# Patient Record
Sex: FEMALE | Race: BLACK OR AFRICAN AMERICAN | NOT HISPANIC OR LATINO | URBAN - METROPOLITAN AREA
[De-identification: names, ages, dates, MRNs, and addresses within clinical notes are randomized per-mention and may not be internally consistent; named-entity substitution may affect disease eponyms.]

---

## 2020-11-22 ENCOUNTER — INPATIENT (INPATIENT)
Facility: HOSPITAL | Age: 70
LOS: 9 days | Discharge: HOME HEALTH | End: 2020-12-02
Attending: HOSPITALIST | Admitting: HOSPITALIST
Payer: MEDICARE

## 2020-11-22 VITALS
SYSTOLIC BLOOD PRESSURE: 92 MMHG | DIASTOLIC BLOOD PRESSURE: 49 MMHG | HEART RATE: 89 BPM | TEMPERATURE: 98 F | OXYGEN SATURATION: 98 % | RESPIRATION RATE: 18 BRPM

## 2020-11-22 DIAGNOSIS — Z98.890 OTHER SPECIFIED POSTPROCEDURAL STATES: Chronic | ICD-10-CM

## 2020-11-22 DIAGNOSIS — R62.7 ADULT FAILURE TO THRIVE: ICD-10-CM

## 2020-11-22 DIAGNOSIS — R19.7 DIARRHEA, UNSPECIFIED: ICD-10-CM

## 2020-11-22 DIAGNOSIS — K86.9 DISEASE OF PANCREAS, UNSPECIFIED: ICD-10-CM

## 2020-11-22 DIAGNOSIS — R73.9 HYPERGLYCEMIA, UNSPECIFIED: ICD-10-CM

## 2020-11-22 DIAGNOSIS — Z29.9 ENCOUNTER FOR PROPHYLACTIC MEASURES, UNSPECIFIED: ICD-10-CM

## 2020-11-22 DIAGNOSIS — N28.9 DISORDER OF KIDNEY AND URETER, UNSPECIFIED: ICD-10-CM

## 2020-11-22 DIAGNOSIS — E87.2 ACIDOSIS: ICD-10-CM

## 2020-11-22 DIAGNOSIS — Z02.9 ENCOUNTER FOR ADMINISTRATIVE EXAMINATIONS, UNSPECIFIED: ICD-10-CM

## 2020-11-22 LAB
ALBUMIN SERPL ELPH-MCNC: 3.3 G/DL — SIGNIFICANT CHANGE UP (ref 3.3–5)
ALP SERPL-CCNC: 301 U/L — HIGH (ref 40–120)
ALT FLD-CCNC: 114 U/L — HIGH (ref 4–33)
ANION GAP SERPL CALC-SCNC: 12 MMO/L — SIGNIFICANT CHANGE UP (ref 7–14)
AST SERPL-CCNC: 91 U/L — HIGH (ref 4–32)
BASE EXCESS BLDV CALC-SCNC: 2.7 MMOL/L — SIGNIFICANT CHANGE UP
BASOPHILS # BLD AUTO: 0 K/UL — SIGNIFICANT CHANGE UP (ref 0–0.2)
BASOPHILS NFR BLD AUTO: 0 % — SIGNIFICANT CHANGE UP (ref 0–2)
BILIRUB SERPL-MCNC: 0.3 MG/DL — SIGNIFICANT CHANGE UP (ref 0.2–1.2)
BLOOD GAS VENOUS - CREATININE: 0.61 MG/DL — SIGNIFICANT CHANGE UP (ref 0.5–1.3)
BLOOD GAS VENOUS - FIO2: 21 — SIGNIFICANT CHANGE UP
BUN SERPL-MCNC: 15 MG/DL — SIGNIFICANT CHANGE UP (ref 7–23)
CALCIUM SERPL-MCNC: 9.9 MG/DL — SIGNIFICANT CHANGE UP (ref 8.4–10.5)
CHLORIDE BLDV-SCNC: 103 MMOL/L — SIGNIFICANT CHANGE UP (ref 96–108)
CHLORIDE SERPL-SCNC: 101 MMOL/L — SIGNIFICANT CHANGE UP (ref 98–107)
CO2 SERPL-SCNC: 24 MMOL/L — SIGNIFICANT CHANGE UP (ref 22–31)
CREAT SERPL-MCNC: 0.45 MG/DL — LOW (ref 0.5–1.3)
EOSINOPHIL # BLD AUTO: 0 K/UL — SIGNIFICANT CHANGE UP (ref 0–0.5)
EOSINOPHIL NFR BLD AUTO: 0 % — SIGNIFICANT CHANGE UP (ref 0–6)
GAS PNL BLDV: 138 MMOL/L — SIGNIFICANT CHANGE UP (ref 136–146)
GLUCOSE BLDC GLUCOMTR-MCNC: 182 MG/DL — HIGH (ref 70–99)
GLUCOSE BLDC GLUCOMTR-MCNC: 199 MG/DL — HIGH (ref 70–99)
GLUCOSE BLDC GLUCOMTR-MCNC: 204 MG/DL — HIGH (ref 70–99)
GLUCOSE BLDC GLUCOMTR-MCNC: 251 MG/DL — HIGH (ref 70–99)
GLUCOSE BLDV-MCNC: 647 MG/DL — CRITICAL HIGH (ref 70–99)
GLUCOSE SERPL-MCNC: 674 MG/DL — CRITICAL HIGH (ref 70–99)
HCO3 BLDV-SCNC: 24 MMOL/L — SIGNIFICANT CHANGE UP (ref 20–27)
HCT VFR BLD CALC: 40.2 % — SIGNIFICANT CHANGE UP (ref 34.5–45)
HCT VFR BLDV CALC: 41.9 % — SIGNIFICANT CHANGE UP (ref 34.5–45)
HGB BLD-MCNC: 13.3 G/DL — SIGNIFICANT CHANGE UP (ref 11.5–15.5)
HGB BLDV-MCNC: 13.6 G/DL — SIGNIFICANT CHANGE UP (ref 11.5–15.5)
IMM GRANULOCYTES NFR BLD AUTO: 0.4 % — SIGNIFICANT CHANGE UP (ref 0–1.5)
LACTATE BLDV-MCNC: 3.1 MMOL/L — HIGH (ref 0.5–2)
LYMPHOCYTES # BLD AUTO: 0.77 K/UL — LOW (ref 1–3.3)
LYMPHOCYTES # BLD AUTO: 14.8 % — SIGNIFICANT CHANGE UP (ref 13–44)
MCHC RBC-ENTMCNC: 29.2 PG — SIGNIFICANT CHANGE UP (ref 27–34)
MCHC RBC-ENTMCNC: 33.1 % — SIGNIFICANT CHANGE UP (ref 32–36)
MCV RBC AUTO: 88.4 FL — SIGNIFICANT CHANGE UP (ref 80–100)
MONOCYTES # BLD AUTO: 0.31 K/UL — SIGNIFICANT CHANGE UP (ref 0–0.9)
MONOCYTES NFR BLD AUTO: 6 % — SIGNIFICANT CHANGE UP (ref 2–14)
NEUTROPHILS # BLD AUTO: 4.1 K/UL — SIGNIFICANT CHANGE UP (ref 1.8–7.4)
NEUTROPHILS NFR BLD AUTO: 78.8 % — HIGH (ref 43–77)
NRBC # FLD: 0 K/UL — SIGNIFICANT CHANGE UP (ref 0–0)
PCO2 BLDV: 58 MMHG — HIGH (ref 41–51)
PH BLDV: 7.31 PH — LOW (ref 7.32–7.43)
PLATELET # BLD AUTO: 135 K/UL — LOW (ref 150–400)
PMV BLD: 12.4 FL — SIGNIFICANT CHANGE UP (ref 7–13)
PO2 BLDV: < 24 MMHG — LOW (ref 35–40)
POTASSIUM BLDV-SCNC: 4.7 MMOL/L — HIGH (ref 3.4–4.5)
POTASSIUM SERPL-MCNC: 5.2 MMOL/L — SIGNIFICANT CHANGE UP (ref 3.5–5.3)
POTASSIUM SERPL-SCNC: 5.2 MMOL/L — SIGNIFICANT CHANGE UP (ref 3.5–5.3)
PROT SERPL-MCNC: 6.3 G/DL — SIGNIFICANT CHANGE UP (ref 6–8.3)
RBC # BLD: 4.55 M/UL — SIGNIFICANT CHANGE UP (ref 3.8–5.2)
RBC # FLD: 15.1 % — HIGH (ref 10.3–14.5)
SAO2 % BLDV: 18.3 % — LOW (ref 60–85)
SARS-COV-2 RNA SPEC QL NAA+PROBE: SIGNIFICANT CHANGE UP
SODIUM SERPL-SCNC: 137 MMOL/L — SIGNIFICANT CHANGE UP (ref 135–145)
TSH SERPL-MCNC: 2.48 UIU/ML — SIGNIFICANT CHANGE UP (ref 0.27–4.2)
WBC # BLD: 5.2 K/UL — SIGNIFICANT CHANGE UP (ref 3.8–10.5)
WBC # FLD AUTO: 5.2 K/UL — SIGNIFICANT CHANGE UP (ref 3.8–10.5)

## 2020-11-22 PROCEDURE — 99223 1ST HOSP IP/OBS HIGH 75: CPT | Mod: GC

## 2020-11-22 PROCEDURE — 99285 EMERGENCY DEPT VISIT HI MDM: CPT

## 2020-11-22 PROCEDURE — 71250 CT THORAX DX C-: CPT | Mod: 26

## 2020-11-22 PROCEDURE — 71046 X-RAY EXAM CHEST 2 VIEWS: CPT | Mod: 26

## 2020-11-22 PROCEDURE — 74177 CT ABD & PELVIS W/CONTRAST: CPT | Mod: 26

## 2020-11-22 RX ORDER — DEXTROSE 50 % IN WATER 50 %
15 SYRINGE (ML) INTRAVENOUS ONCE
Refills: 0 | Status: DISCONTINUED | OUTPATIENT
Start: 2020-11-22 | End: 2020-11-22

## 2020-11-22 RX ORDER — GLUCAGON INJECTION, SOLUTION 0.5 MG/.1ML
1 INJECTION, SOLUTION SUBCUTANEOUS ONCE
Refills: 0 | Status: DISCONTINUED | OUTPATIENT
Start: 2020-11-22 | End: 2020-11-25

## 2020-11-22 RX ORDER — SODIUM CHLORIDE 9 MG/ML
1000 INJECTION, SOLUTION INTRAVENOUS
Refills: 0 | Status: DISCONTINUED | OUTPATIENT
Start: 2020-11-22 | End: 2020-11-22

## 2020-11-22 RX ORDER — DEXTROSE 50 % IN WATER 50 %
25 SYRINGE (ML) INTRAVENOUS ONCE
Refills: 0 | Status: DISCONTINUED | OUTPATIENT
Start: 2020-11-22 | End: 2020-11-22

## 2020-11-22 RX ORDER — SODIUM CHLORIDE 9 MG/ML
1000 INJECTION INTRAMUSCULAR; INTRAVENOUS; SUBCUTANEOUS ONCE
Refills: 0 | Status: COMPLETED | OUTPATIENT
Start: 2020-11-22 | End: 2020-11-22

## 2020-11-22 RX ORDER — DEXTROSE 50 % IN WATER 50 %
15 SYRINGE (ML) INTRAVENOUS ONCE
Refills: 0 | Status: DISCONTINUED | OUTPATIENT
Start: 2020-11-22 | End: 2020-11-25

## 2020-11-22 RX ORDER — SODIUM CHLORIDE 9 MG/ML
1000 INJECTION, SOLUTION INTRAVENOUS
Refills: 0 | Status: DISCONTINUED | OUTPATIENT
Start: 2020-11-22 | End: 2020-11-25

## 2020-11-22 RX ORDER — INSULIN LISPRO 100/ML
VIAL (ML) SUBCUTANEOUS
Refills: 0 | Status: DISCONTINUED | OUTPATIENT
Start: 2020-11-22 | End: 2020-11-22

## 2020-11-22 RX ORDER — INSULIN LISPRO 100/ML
VIAL (ML) SUBCUTANEOUS AT BEDTIME
Refills: 0 | Status: DISCONTINUED | OUTPATIENT
Start: 2020-11-22 | End: 2020-11-23

## 2020-11-22 RX ORDER — ENOXAPARIN SODIUM 100 MG/ML
40 INJECTION SUBCUTANEOUS DAILY
Refills: 0 | Status: DISCONTINUED | OUTPATIENT
Start: 2020-11-22 | End: 2020-11-23

## 2020-11-22 RX ORDER — POLYETHYLENE GLYCOL 3350 17 G/17G
17 POWDER, FOR SOLUTION ORAL DAILY
Refills: 0 | Status: DISCONTINUED | OUTPATIENT
Start: 2020-11-22 | End: 2020-11-27

## 2020-11-22 RX ORDER — INSULIN LISPRO 100/ML
VIAL (ML) SUBCUTANEOUS AT BEDTIME
Refills: 0 | Status: DISCONTINUED | OUTPATIENT
Start: 2020-11-22 | End: 2020-11-22

## 2020-11-22 RX ORDER — GLUCAGON INJECTION, SOLUTION 0.5 MG/.1ML
1 INJECTION, SOLUTION SUBCUTANEOUS ONCE
Refills: 0 | Status: DISCONTINUED | OUTPATIENT
Start: 2020-11-22 | End: 2020-11-22

## 2020-11-22 RX ORDER — DEXTROSE 50 % IN WATER 50 %
12.5 SYRINGE (ML) INTRAVENOUS ONCE
Refills: 0 | Status: DISCONTINUED | OUTPATIENT
Start: 2020-11-22 | End: 2020-11-25

## 2020-11-22 RX ORDER — INSULIN LISPRO 100/ML
6 VIAL (ML) SUBCUTANEOUS ONCE
Refills: 0 | Status: COMPLETED | OUTPATIENT
Start: 2020-11-22 | End: 2020-11-22

## 2020-11-22 RX ORDER — DEXTROSE 50 % IN WATER 50 %
25 SYRINGE (ML) INTRAVENOUS ONCE
Refills: 0 | Status: DISCONTINUED | OUTPATIENT
Start: 2020-11-22 | End: 2020-11-25

## 2020-11-22 RX ORDER — DEXTROSE 50 % IN WATER 50 %
12.5 SYRINGE (ML) INTRAVENOUS ONCE
Refills: 0 | Status: DISCONTINUED | OUTPATIENT
Start: 2020-11-22 | End: 2020-11-22

## 2020-11-22 RX ORDER — SENNA PLUS 8.6 MG/1
2 TABLET ORAL AT BEDTIME
Refills: 0 | Status: DISCONTINUED | OUTPATIENT
Start: 2020-11-22 | End: 2020-11-27

## 2020-11-22 RX ORDER — INSULIN LISPRO 100/ML
VIAL (ML) SUBCUTANEOUS
Refills: 0 | Status: DISCONTINUED | OUTPATIENT
Start: 2020-11-22 | End: 2020-11-23

## 2020-11-22 RX ADMIN — SODIUM CHLORIDE 1000 MILLILITER(S): 9 INJECTION INTRAMUSCULAR; INTRAVENOUS; SUBCUTANEOUS at 13:20

## 2020-11-22 RX ADMIN — Medication 1: at 22:37

## 2020-11-22 RX ADMIN — SODIUM CHLORIDE 1000 MILLILITER(S): 9 INJECTION INTRAMUSCULAR; INTRAVENOUS; SUBCUTANEOUS at 12:39

## 2020-11-22 RX ADMIN — Medication 6 UNIT(S): at 13:20

## 2020-11-22 RX ADMIN — Medication 6 UNIT(S): at 14:43

## 2020-11-22 RX ADMIN — SODIUM CHLORIDE 1000 MILLILITER(S): 9 INJECTION INTRAMUSCULAR; INTRAVENOUS; SUBCUTANEOUS at 14:41

## 2020-11-22 RX ADMIN — SODIUM CHLORIDE 1000 MILLILITER(S): 9 INJECTION INTRAMUSCULAR; INTRAVENOUS; SUBCUTANEOUS at 17:45

## 2020-11-22 NOTE — ED ADULT NURSE NOTE - OBJECTIVE STATEMENT
pt bib family d/t  increased weakness, decreased po intake, diarrhea and loss of taste x 2 months. pt appears cachetic. pt aaaox 3, decreased temp noted, warming measures applied. piv placed, labs sent. see emar for tx. denies pain.

## 2020-11-22 NOTE — ED ADULT NURSE NOTE - NS ED PATIENT SAFETY CONCERN
----- Message from Karina Osorio sent at 2/8/2017  3:02 PM CST -----  Contact: [phone]  Please call above patient mother need to speak you about coming in thanks   Unable to assess due to medical condition

## 2020-11-22 NOTE — ED PROVIDER NOTE - PHYSICAL EXAMINATION
PE:   GEN: Awake, alert, interactive, cachetic   HEAD AND NECK: NC/AT. Airway patent. Neck supple.   EYES: Clear b/l. PERRL  CARDIAC: RRR. S1, S2. No evident pedal edema.    RESP: Normal respiratory effort with no use of accessory muscles or retractions. Clear throughout on auscultation.  ABD: soft, non-distended, non-tender. No rebound, no guarding.   NEURO: AOx3, CN II-XII grossly intact, no focal deficits.   MSK: Moving all extremities with no apparent deformities.   SKIN: Warm, dry, intact normal color

## 2020-11-22 NOTE — H&P ADULT - PROBLEM SELECTOR PLAN 3
Frail and cachetic, likely d/t malignancy. Poor appetite.   - Regular diet as tolerated  - Nutrition consult

## 2020-11-22 NOTE — H&P ADULT - PROBLEM SELECTOR PLAN 6
Transitions of Care Status:  1.  Name of PCP: none currently  2.  PCP Contacted on Admission: [ ] Y    [ ] N    3.  PCP contacted at Discharge: [ ] Y    [ ] N    [ ] N/A  4.  Post-Discharge Appointment Date and Location:  5.  Summary of Handoff given to PCP: Indeterminate 1.6cm R upper lobe renal lesion.  - R renal ultrasound

## 2020-11-22 NOTE — ED PROVIDER NOTE - CLINICAL SUMMARY MEDICAL DECISION MAKING FREE TEXT BOX
71yo female hx of smoking presents complaining of unexplained weight loss and intermittent diarrhea for months. Cachetic on exam. Concerning for failure to thrive possibly 2/2 malignancy. Will get labs, ekg, ct abd and admit.

## 2020-11-22 NOTE — H&P ADULT - PROBLEM SELECTOR PLAN 1
Pancreatic lesion in neck and body. Unintentional weight loss, poor appetite, generalized weakness likely secondary to malignancy.  Likely has suppression of both exocrine and endocrine function causing hyperglycemia and diarrhea.  Elevation in alk phos, AST, ALT likely also secondary to obstruction from lesion. Indeterminate lesions in liver and kidney.  - Triple phase CT of abdomen to better characterize pancreatic lesion or MR  - Check CA 19-9, CEA  - CT chest for preliminary staging  - GI consult for EUS FNA Pancreatic lesion in neck and body. Unintentional weight loss, poor appetite, generalized weakness likely secondary to malignancy.  Likely has suppression of both exocrine and endocrine function causing hyperglycemia and diarrhea.  Elevation in alk phos, AST, ALT likely also secondary to obstruction from lesion. Indeterminate lesions in liver and kidney.  - Check CA 19-9, CEA  - CT chest for preliminary staging  - GI consult for EUS FNA and whether triple phase or MR for further evaluating pancreatic lesion Pancreatic lesion in neck and body. Unintentional weight loss, poor appetite, generalized weakness likely secondary to malignancy.  Likely has suppression of both exocrine and endocrine function causing hyperglycemia and diarrhea.  Elevation in alk phos, AST, ALT likely also secondary to obstruction from lesion. Indeterminate lesions in liver and kidney.  - Check CA 19-9  - CT chest for preliminary staging  - GI consult for EUS FNA and whether triple phase or MR for further evaluating pancreatic lesion

## 2020-11-22 NOTE — ED PROVIDER NOTE - OBJECTIVE STATEMENT
69yo female hx of smoking presents complaining of unexplained weight loss and intermittent diarrhea for months. States that since May she has been progressively weak and has lost almost 40 pounds. Reports decreased appetite with intermittent non bloody diarrhea. Denies abd pain, hx of ca, fevers/chills, chest pain, difficulty breathing, rubin, palpitations, urinary symptoms, nausea/vomiting and other associated sx.

## 2020-11-22 NOTE — H&P ADULT - NSHPLABSRESULTS_GEN_ALL_CORE
LABS: Personally reviewed labs, imaging, and ECG                          13.3   5.20  )-----------( 135      ( 22 Nov 2020 12:00 )             40.2       11-22    137  |  101  |  15  ----------------------------<  674<HH>  5.2   |  24  |  0.45<L>    Ca    9.9      22 Nov 2020 12:00    TPro  6.3  /  Alb  3.3  /  TBili  0.3  /  DBili  x   /  AST  91<H>  /  ALT  114<H>  /  AlkPhos  301<H>  11-22       LIVER FUNCTIONS - ( 22 Nov 2020 12:00 )  Alb: 3.3 g/dL / Pro: 6.3 g/dL / ALK PHOS: 301 u/L / ALT: 114 u/L / AST: 91 u/L / GGT: x                            Lactate Trend            CAPILLARY BLOOD GLUCOSE      POCT Blood Glucose.: 182 mg/dL (22 Nov 2020 16:21)            RADIOLOGY & ADDITIONAL TESTS: LABS: Personally reviewed labs, imaging, and ECG                          13.3   5.20  )-----------( 135      ( 22 Nov 2020 12:00 )             40.2       11-22    137  |  101  |  15  ----------------------------<  674<HH>  5.2   |  24  |  0.45<L>    Ca    9.9      22 Nov 2020 12:00    TPro  6.3  /  Alb  3.3  /  TBili  0.3  /  DBili  x   /  AST  91<H>  /  ALT  114<H>  /  AlkPhos  301<H>  11-22       LIVER FUNCTIONS - ( 22 Nov 2020 12:00 )  Alb: 3.3 g/dL / Pro: 6.3 g/dL / ALK PHOS: 301 u/L / ALT: 114 u/L / AST: 91 u/L / GGT: x                Blood Gas Venous Comprehensive (11.22.20 @ 12:00)   Blood Gas Venous - Lactate: 3.1: Please note updated reference range. mmol/L   Blood Gas Venous - Chloride: 103 mmol/L   Blood Gas Venous - Creatinine: 0.61 mg/dL   pH, Venous: 7.31 pH   pCO2, Venous: 58 mmHg   pO2, Venous: < 24 mmHg   HCO3, Venous: 24 mmol/L   Blood Gas Venous - FIO2: 21   Base Excess, Venous: 2.7: REFERENCE RANGE = -3 + 2 mmol/L mmol/L   Oxygen Saturation, Venous: 18.3 %   Blood Gas Venous - Sodium: 138 mmol/L   Blood Gas Venous - Potassium: 4.7 mmol/L   Blood Gas Venous - Glucose: 647 mg/dL   Blood Gas Venous - Hemoglobin: 13.6 g/dL   Blood Gas Venous - Hematocrit: 41.9 %     CAPILLARY BLOOD GLUCOSE  POCT Blood Glucose.: 182 mg/dL (22 Nov 2020 16:21)      RADIOLOGY & ADDITIONAL TESTS:  < from: CT Abdomen and Pelvis w/ IV Cont (11.22.20 @ 13:39) >    IMPRESSION:  Findings suspicious for obstructive neoplasm in the pancreatic neck/body. Suggest further evaluation with dedicated pancreatic CT or MRI.  Two indeterminate liver lesions, likely benign. Further characterization by MRI is advised.  Questionable indeterminate 1.6 cm right upper pole renal lesion. Recommend further evaluation ultrasound.  < end of copied text >    < from: Xray Chest 2 Views PA/Lat (11.22.20 @ 12:44) >  IMPRESSION: Clear lungs.  < end of copied text >

## 2020-11-22 NOTE — H&P ADULT - NSHPSOCIALHISTORY_GEN_ALL_CORE
Lives alone, performs all IADL alone  Formerly a nurse    Tobacco: 1/2 PPD x 20 years  Alcohol: none  Rec drugs: none

## 2020-11-22 NOTE — ED ADULT NURSE REASSESSMENT NOTE - NS ED NURSE REASSESS COMMENT FT1
Patient alert and awake, breathing with ease on room air, administered RX and in CT scan of abd at this time, no new distress noted. Covering RN: Patient alert and awake, breathing with ease on room air, administered Rx as ordered, patient CT scan of abd at this time, no new distress noted.

## 2020-11-22 NOTE — H&P ADULT - ATTENDING COMMENTS
70F w/ ?osteoporosis, multiple tendon repairs who presents with weight loss since May and diarrhea x 2 weeks. Found to have a pancreatic mass on CT A/P concerning for malignancy c/b hyperglycemia to 600s in ED with improvement s/p subq insulin and IVF. On exam abdomen is soft, non tender, non distended.    #Pancreatic mass: pt aware it is concerning for malignancy. CT abdomen showed 1.8 cm hypoattenuating lesion in anterior pancreas and severe duct dilatation measuring up to 1.4 cm. elevated transaminases and alk phos secondary to obstruction. Consult GI/[facility] for further reccs.    #Hyperglycemia: Possibly due to pancreatic insufficiency in setting of malignancy. FS 180s now. Start on ISS. Endo consult, check A1c    #Severe stool burden on CT abdomen/pelvis with overflow diarrhea - start miralax and senna, if no improvement in stool burden consider manual disimpaction. Passing flatus, no obstruction.    # R. kidney upper pole lesion: incidentally seen on CT A/P (1.6 cm right upper pole lesion). Per radiology obtain US to evaluate further.    #Severe protein malnourishment - secondary to malignancy. nutrition consult.    #DVT ppx: lovenox subq

## 2020-11-22 NOTE — H&P ADULT - NSHPREVIEWOFSYSTEMS_GEN_ALL_CORE
General: ++ weight loss, gen weak, poor appetite. No fevers, chills.  HEENT: No headaches, rhinorrhea, sore throat  CVS: No chest pain, palpitations  Resp: No cough, dyspnea, wheezing  GI: + diarrhea. No abdominal pain, nausea, vomiting, hematochezia, melena  : No dysuria, frequency, hematuria  MSK: No joint pain or swelling  Ext: No edema, no claudication  Skin: No rashes or itching  Heme: No easy bruising or petechiae  Neuro: No confusion, focal weakness, or loss of consciousness  Endocrine: No excessive heat or cold symptoms

## 2020-11-22 NOTE — ED PROVIDER NOTE - ATTENDING CONTRIBUTION TO CARE
agree with above hpi  on my exam  GEN - NAD; cachectic; A+O x3   HEAD - NC/AT   EYES- PERRL, EOMI  ENT: Airway patent, dry mm, Oral cavity and pharynx normal. No inflammation, swelling, exudate, or lesions.    NECK: Neck supple, non-tender without lymphadenopathy, no masses.  PULMONARY - CTA b/l, symmetric breath sounds.   CARDIAC -s1s2, RRR, no M,G,R  ABDOMEN - +BS, ND, NT, soft, no guarding, no rebound  BACK - no CVA tenderness, Normal  spine   EXTREMITIES - FROM, symmetric pulses, capillary refill < 2 seconds, no edema   SKIN - no rash or bruising   NEUROLOGIC - alert, speech clear, no focal deficits  PSYCH -nl mood/affect, nl insight.  Patient presents to ed with several months of generalized weakness, intermittent diarrhea, weight loss of approx 40 pounds, diarrhea worse over last few weeks, and loss of taste since may. no fevers, cough, cp, sob, abd pain, vomiting, dysuria, hematuria. +smoking hx. On exam appears cachectic and with dry mm, otherwise nonfocal. Plan for labs, ct a/p-eval for bowel pathology v. malignancy v. elec disturbance v. metabolic dysfunction, hydrate, admit pending ed w/u.

## 2020-11-22 NOTE — ED PROVIDER NOTE - PROGRESS NOTE DETAILS
Scott: Glucose noted on blood gas. pH is 7.31. Waiting for CMP to evaluate AG and K. Will add more fluids for now. Insulin pending labs. Scott: No AG on CMP. Not DKA. Will give insulin push and more fluids. TBA Scott: CT results discussed with patient. Discussed the possibility of malignancy. Pt asked for some time and would ask questions later. Hospitalist paged for admission

## 2020-11-22 NOTE — H&P ADULT - PROBLEM SELECTOR PLAN 5
DVT ppx: Lovenox  Diet: Regular, as tolerated VBG with mild acidosis, mixed respiratory and metabolic. PCO2 elevated. Lactate 3.1. Lactic acidosis likely d/t hypovolemia/dehydration.  - Trend VBG and lactate in AM

## 2020-11-22 NOTE — ED PROVIDER NOTE - NS ED ROS FT
Constitutional: (-) Fever, (-) Anorexia, (-) Generalized Malaise, (+) Weight loss.   Eyes: (-)Discharge, (-) Irritation,  (-) Visual changes  EARS: (-) Ear Pain, (-) Apparent hearing changes  NOSE: (-) Congestion, (-) Bloody nose  MOUTH/THROAT: (-) Vocal Changes, (-) Drooling, (-) Sore throat  NECK: (-) Lumps, (-) Stiffness, (-) Pain  CV: (-) Chest Pain, (-) Palpitations, (-) Edema   RESP:  (-) Cough, (-) SOB, (-) SNOW,  (-) Wheezing  GI: (-) Nausea, (-) Vomiting, (-) Abdominal Pain, (+) Diarrhea, (-) Constipation, (-) Bloody stools  : (-) Dysuria, (-) Frequency, (-) Hematuria, (-) Incontinence  MSK: (-) Joint Pain, (-) Back Pain, (-) Deformities  SKIN: (-) Wounds, (-) Color change, (-)Rash, (-) Swelling  NEURO:(-) Headache, (-) Dizziness, (-) Numbness/Tingling,  (-)LOC

## 2020-11-22 NOTE — ED ADULT TRIAGE NOTE - CHIEF COMPLAINT QUOTE
Pt complaining of diarrhea x 2 weeks loss of taste x 1 month and 30lb weight loss over. Pt also complaining of weakness and fatigue. Pt denies chest pain, sob. fever or chills.

## 2020-11-22 NOTE — H&P ADULT - PROBLEM SELECTOR PLAN 4
Diarrhea likely secondary to suppression of pancreatic exocrine function leading to decreased pancreatic enzymes.  No current sx.  - Monitor electrolytes  - Monitor for recurrence Diarrhea possibly secondary to immense stool burden (as seen on CT) vs. suppression of pancreatic exocrine function leading to decreased pancreatic enzymes.  No current sx.  - Senna and Miralax daily   - Monitor electrolytes  - Monitor for recurrence

## 2020-11-22 NOTE — H&P ADULT - PROBLEM SELECTOR PLAN 2
Hyperglycemia possibly secondary to suppression of pancreatic endocrine function possibly d/t infiltration of lesion.   - s/p 6u lispro x 2. FS now 182.  - FS qAC/HS, low dose SSI  - Endocrinology consult? Hyperglycemia possibly secondary to suppression of pancreatic endocrine function possibly d/t infiltration of lesion.   - s/p 6u lispro x 2. FS now 182.  - FS qAC/HS, low dose SSI  - Endocrinology consult

## 2020-11-22 NOTE — H&P ADULT - NSHPPHYSICALEXAM_GEN_ALL_CORE
Vital Signs Last 24 Hrs  T(C): 36.4 (11-22-20 @ 15:30), Max: 36.4 (11-22-20 @ 11:32)  T(F): 97.5 (11-22-20 @ 15:30), Max: 97.6 (11-22-20 @ 11:32)  HR: 69 (11-22-20 @ 15:30) (60 - 89)  BP: 106/54 (11-22-20 @ 15:30) (92/49 - 129/78)  BP(mean): --  RR: 17 (11-22-20 @ 15:30) (12 - 18)  SpO2: 100% (11-22-20 @ 15:30) (98% - 100%)    Physical Exam:  Gen: Alert, well-developed, NAD  HEENT: NCAT, PERRL, EOMI, clear conjunctiva, no scleral icterus, no erythema or exudates in the oropharynx, mmm  Neck: Supple, no JVD, no LAD  CV: RRR, S1S2, no m/r/g  Resp: CTAB, normal respiratory effort  Abd: Soft, NT, ND, normal bowel sounds  Ext: no edema, no clubbing or cyanosis  Neuro: AOx3, CN2-12 grossly intact, LÓPEZ  Skin: warm, perfused Vital Signs Last 24 Hrs  T(C): 36.4 (11-22-20 @ 15:30), Max: 36.4 (11-22-20 @ 11:32)  T(F): 97.5 (11-22-20 @ 15:30), Max: 97.6 (11-22-20 @ 11:32)  HR: 69 (11-22-20 @ 15:30) (60 - 89)  BP: 106/54 (11-22-20 @ 15:30) (92/49 - 129/78)  BP(mean): --  RR: 17 (11-22-20 @ 15:30) (12 - 18)  SpO2: 100% (11-22-20 @ 15:30) (98% - 100%)    Physical Exam:  Gen: Alert, very thin, cachetic black female, in NAD  HEENT: NCAT, PERRL, EOMI, clear conjunctiva, no erythema or exudates in the oropharynx, mucous mem dry  Neck: Supple, no LAD  CV: RRR, S1S2, no m/r/g  Resp: CTAB, normal respiratory effort  Abd: Soft, NT, ND, normal bowel sounds  Ext: trace edema up to ankles, no clubbing or cyanosis  Neuro: AOx3, CN2-12 grossly intact, LÓPEZ  Skin: no rashes, no ecchymoses

## 2020-11-22 NOTE — H&P ADULT - PROBLEM SELECTOR PLAN 8
Transitions of Care Status:  1.  Name of PCP: none currently  2.  PCP Contacted on Admission: [ ] Y    [ ] N    3.  PCP contacted at Discharge: [ ] Y    [ ] N    [ ] N/A  4.  Post-Discharge Appointment Date and Location:  5.  Summary of Handoff given to PCP:

## 2020-11-22 NOTE — H&P ADULT - ASSESSMENT
70F w/ current smoker, ?osteoporosis who presents with unintentional weight loss and diarrhea, found to have pancreatic lesion, likely secondary to malignancy.  Diarrhea and hyperglycemia likely explained by suppression of pancreatic function.

## 2020-11-23 DIAGNOSIS — R91.8 OTHER NONSPECIFIC ABNORMAL FINDING OF LUNG FIELD: ICD-10-CM

## 2020-11-23 LAB
ALBUMIN SERPL ELPH-MCNC: 2.7 G/DL — LOW (ref 3.3–5)
ALP SERPL-CCNC: 229 U/L — HIGH (ref 40–120)
ALT FLD-CCNC: 86 U/L — HIGH (ref 4–33)
ANION GAP SERPL CALC-SCNC: 8 MMO/L — SIGNIFICANT CHANGE UP (ref 7–14)
AST SERPL-CCNC: 54 U/L — HIGH (ref 4–32)
BASE EXCESS BLDV CALC-SCNC: 2.2 MMOL/L — SIGNIFICANT CHANGE UP
BASOPHILS # BLD AUTO: 0.01 K/UL — SIGNIFICANT CHANGE UP (ref 0–0.2)
BASOPHILS NFR BLD AUTO: 0.2 % — SIGNIFICANT CHANGE UP (ref 0–2)
BILIRUB SERPL-MCNC: 0.2 MG/DL — SIGNIFICANT CHANGE UP (ref 0.2–1.2)
BLOOD GAS VENOUS - CREATININE: 0.52 MG/DL — SIGNIFICANT CHANGE UP (ref 0.5–1.3)
BLOOD GAS VENOUS - FIO2: 21 — SIGNIFICANT CHANGE UP
BUN SERPL-MCNC: 17 MG/DL — SIGNIFICANT CHANGE UP (ref 7–23)
CALCIUM SERPL-MCNC: 8.8 MG/DL — SIGNIFICANT CHANGE UP (ref 8.4–10.5)
CANCER AG19-9 SERPL-ACNC: 2 U/ML — SIGNIFICANT CHANGE UP
CHLORIDE BLDV-SCNC: 107 MMOL/L — SIGNIFICANT CHANGE UP (ref 96–108)
CHLORIDE SERPL-SCNC: 103 MMOL/L — SIGNIFICANT CHANGE UP (ref 98–107)
CO2 SERPL-SCNC: 25 MMOL/L — SIGNIFICANT CHANGE UP (ref 22–31)
CREAT SERPL-MCNC: 0.47 MG/DL — LOW (ref 0.5–1.3)
EOSINOPHIL # BLD AUTO: 0 K/UL — SIGNIFICANT CHANGE UP (ref 0–0.5)
EOSINOPHIL NFR BLD AUTO: 0 % — SIGNIFICANT CHANGE UP (ref 0–6)
GAS PNL BLDV: 138 MMOL/L — SIGNIFICANT CHANGE UP (ref 136–146)
GLUCOSE BLDC GLUCOMTR-MCNC: 143 MG/DL — HIGH (ref 70–99)
GLUCOSE BLDC GLUCOMTR-MCNC: 302 MG/DL — HIGH (ref 70–99)
GLUCOSE BLDC GLUCOMTR-MCNC: 334 MG/DL — HIGH (ref 70–99)
GLUCOSE BLDC GLUCOMTR-MCNC: 347 MG/DL — HIGH (ref 70–99)
GLUCOSE BLDV-MCNC: 321 MG/DL — HIGH (ref 70–99)
GLUCOSE SERPL-MCNC: 338 MG/DL — HIGH (ref 70–99)
HBA1C BLD-MCNC: > 18 % — HIGH (ref 4–5.6)
HCO3 BLDV-SCNC: 26 MMOL/L — SIGNIFICANT CHANGE UP (ref 20–27)
HCT VFR BLD CALC: 36.1 % — SIGNIFICANT CHANGE UP (ref 34.5–45)
HCT VFR BLDV CALC: 38 % — SIGNIFICANT CHANGE UP (ref 34.5–45)
HCV AB S/CO SERPL IA: 0.07 S/CO — SIGNIFICANT CHANGE UP (ref 0–0.99)
HCV AB SERPL-IMP: SIGNIFICANT CHANGE UP
HGB BLD-MCNC: 11.9 G/DL — SIGNIFICANT CHANGE UP (ref 11.5–15.5)
HGB BLDV-MCNC: 12.4 G/DL — SIGNIFICANT CHANGE UP (ref 11.5–15.5)
IMM GRANULOCYTES NFR BLD AUTO: 0.2 % — SIGNIFICANT CHANGE UP (ref 0–1.5)
LACTATE BLDV-MCNC: 1.4 MMOL/L — SIGNIFICANT CHANGE UP (ref 0.5–2)
LYMPHOCYTES # BLD AUTO: 1.77 K/UL — SIGNIFICANT CHANGE UP (ref 1–3.3)
LYMPHOCYTES # BLD AUTO: 36.9 % — SIGNIFICANT CHANGE UP (ref 13–44)
MAGNESIUM SERPL-MCNC: 1.9 MG/DL — SIGNIFICANT CHANGE UP (ref 1.6–2.6)
MCHC RBC-ENTMCNC: 28.8 PG — SIGNIFICANT CHANGE UP (ref 27–34)
MCHC RBC-ENTMCNC: 33 % — SIGNIFICANT CHANGE UP (ref 32–36)
MCV RBC AUTO: 87.4 FL — SIGNIFICANT CHANGE UP (ref 80–100)
MONOCYTES # BLD AUTO: 0.36 K/UL — SIGNIFICANT CHANGE UP (ref 0–0.9)
MONOCYTES NFR BLD AUTO: 7.5 % — SIGNIFICANT CHANGE UP (ref 2–14)
NEUTROPHILS # BLD AUTO: 2.65 K/UL — SIGNIFICANT CHANGE UP (ref 1.8–7.4)
NEUTROPHILS NFR BLD AUTO: 55.2 % — SIGNIFICANT CHANGE UP (ref 43–77)
NRBC # FLD: 0 K/UL — SIGNIFICANT CHANGE UP (ref 0–0)
PCO2 BLDV: 43 MMHG — SIGNIFICANT CHANGE UP (ref 41–51)
PH BLDV: 7.41 PH — SIGNIFICANT CHANGE UP (ref 7.32–7.43)
PHOSPHATE SERPL-MCNC: 1.3 MG/DL — LOW (ref 2.5–4.5)
PLATELET # BLD AUTO: 116 K/UL — LOW (ref 150–400)
PMV BLD: 12.6 FL — SIGNIFICANT CHANGE UP (ref 7–13)
PO2 BLDV: 58 MMHG — HIGH (ref 35–40)
POTASSIUM BLDV-SCNC: 3.4 MMOL/L — SIGNIFICANT CHANGE UP (ref 3.4–4.5)
POTASSIUM SERPL-MCNC: 3.6 MMOL/L — SIGNIFICANT CHANGE UP (ref 3.5–5.3)
POTASSIUM SERPL-SCNC: 3.6 MMOL/L — SIGNIFICANT CHANGE UP (ref 3.5–5.3)
PROT SERPL-MCNC: 5.4 G/DL — LOW (ref 6–8.3)
RBC # BLD: 4.13 M/UL — SIGNIFICANT CHANGE UP (ref 3.8–5.2)
RBC # FLD: 15.2 % — HIGH (ref 10.3–14.5)
SAO2 % BLDV: 91 % — HIGH (ref 60–85)
SODIUM SERPL-SCNC: 136 MMOL/L — SIGNIFICANT CHANGE UP (ref 135–145)
WBC # BLD: 4.8 K/UL — SIGNIFICANT CHANGE UP (ref 3.8–10.5)
WBC # FLD AUTO: 4.8 K/UL — SIGNIFICANT CHANGE UP (ref 3.8–10.5)

## 2020-11-23 PROCEDURE — 99222 1ST HOSP IP/OBS MODERATE 55: CPT

## 2020-11-23 PROCEDURE — 76775 US EXAM ABDO BACK WALL LIM: CPT | Mod: 26

## 2020-11-23 PROCEDURE — 76770 US EXAM ABDO BACK WALL COMP: CPT | Mod: 26

## 2020-11-23 PROCEDURE — 99233 SBSQ HOSP IP/OBS HIGH 50: CPT

## 2020-11-23 RX ORDER — SODIUM,POTASSIUM PHOSPHATES 278-250MG
1 POWDER IN PACKET (EA) ORAL
Refills: 0 | Status: COMPLETED | OUTPATIENT
Start: 2020-11-23 | End: 2020-11-23

## 2020-11-23 RX ORDER — INSULIN LISPRO 100/ML
VIAL (ML) SUBCUTANEOUS
Refills: 0 | Status: DISCONTINUED | OUTPATIENT
Start: 2020-11-23 | End: 2020-11-24

## 2020-11-23 RX ORDER — INSULIN LISPRO 100/ML
2 VIAL (ML) SUBCUTANEOUS
Refills: 0 | Status: DISCONTINUED | OUTPATIENT
Start: 2020-11-23 | End: 2020-11-24

## 2020-11-23 RX ORDER — INSULIN LISPRO 100/ML
VIAL (ML) SUBCUTANEOUS AT BEDTIME
Refills: 0 | Status: DISCONTINUED | OUTPATIENT
Start: 2020-11-23 | End: 2020-11-24

## 2020-11-23 RX ADMIN — Medication 8: at 12:28

## 2020-11-23 RX ADMIN — Medication 2 UNIT(S): at 12:28

## 2020-11-23 RX ADMIN — Medication 4: at 08:34

## 2020-11-23 RX ADMIN — Medication 4: at 22:35

## 2020-11-23 RX ADMIN — Medication 1 PACKET(S): at 17:48

## 2020-11-23 RX ADMIN — Medication 1 PACKET(S): at 12:28

## 2020-11-23 RX ADMIN — ENOXAPARIN SODIUM 40 MILLIGRAM(S): 100 INJECTION SUBCUTANEOUS at 06:01

## 2020-11-23 RX ADMIN — Medication 2 UNIT(S): at 17:48

## 2020-11-23 RX ADMIN — POLYETHYLENE GLYCOL 3350 17 GRAM(S): 17 POWDER, FOR SOLUTION ORAL at 12:33

## 2020-11-23 NOTE — PROGRESS NOTE ADULT - PROBLEM SELECTOR PLAN 5
VBG with mild acidosis, mixed respiratory and metabolic. PCO2 elevated. Lactate 3.1. Lactic acidosis likely d/t hypovolemia/dehydration.  - Trend VBG and lactate in AM Now resolved. No acidosis and lactate wnl.

## 2020-11-23 NOTE — PROGRESS NOTE ADULT - PROBLEM SELECTOR PLAN 2
Hyperglycemia possibly secondary to suppression of pancreatic endocrine function possibly d/t infiltration of lesion.   - s/p 6u lispro x 2. FS now 182.  - FS qAC/HS, low dose SSI  - Endocrinology consult Opacity noted on RLL, possibly atelectasis vs. primary lung lesion vs. metastatic. 4 small nodules also noted.

## 2020-11-23 NOTE — PROGRESS NOTE ADULT - PROBLEM SELECTOR PLAN 3
Frail and cachetic, likely d/t malignancy. Poor appetite.   - Regular diet as tolerated  - Nutrition consult Hyperglycemia possibly secondary to suppression of pancreatic endocrine function possibly d/t infiltration of lesion.   - s/p 6u lispro x 2. FS now 182.  - FS qAC/HS, low dose SSI  - Lispro 2u AC TID  - Will wait to start Lantus until tmr

## 2020-11-23 NOTE — PROGRESS NOTE ADULT - PROBLEM SELECTOR PLAN 7
DVT ppx: Lovenox  Diet: Regular, as tolerated DVT ppx: Holding lovenox for EUS FNA  Diet: Regular, as tolerated

## 2020-11-23 NOTE — CONSULT NOTE ADULT - SUBJECTIVE AND OBJECTIVE BOX
Chief Complaint:  Patient is a 70y old  Female who presents with a chief complaint of Weight loss, Diarrhea (23 Nov 2020 07:04)      HPI:  70 year F with history of osteoporosis, multiple tendon repairs presents with 30lbs weight loss since May, poor appetite and progressive weakness. GI is consulted for abnormal CT as shown: parenchymal atrophy of pancreatic body and tail w severe duct dilatation measuring up to 1.4cm, abrupt transition to normal duct caliber at pancreatic neck, 1.8cm hypoattenuating lesion within anterior pancreas. Pt currently denies nausea, vomiting, abd pain, fever, chills, CP, palpitations, melena, hematochezia. Family hx of cancer includes sister who had liver cancer. Pt reports that she is a current smoker, 1/2 PPD x 20 years.      Allergies:  No Known Allergies      Home Medications:    Hospital Medications:  dextrose 40% Gel 15 Gram(s) Oral once  dextrose 5%. 1000 milliLiter(s) IV Continuous <Continuous>  dextrose 5%. 1000 milliLiter(s) IV Continuous <Continuous>  dextrose 50% Injectable 25 Gram(s) IV Push once  dextrose 50% Injectable 12.5 Gram(s) IV Push once  dextrose 50% Injectable 25 Gram(s) IV Push once  enoxaparin Injectable 40 milliGRAM(s) SubCutaneous daily  glucagon  Injectable 1 milliGRAM(s) IntraMuscular once  insulin lispro (ADMELOG) corrective regimen sliding scale   SubCutaneous three times a day before meals  insulin lispro (ADMELOG) corrective regimen sliding scale   SubCutaneous at bedtime  polyethylene glycol 3350 17 Gram(s) Oral daily  potassium phosphate / sodium phosphate Powder (PHOS-NaK) 1 Packet(s) Oral three times a day with meals  senna 2 Tablet(s) Oral at bedtime      PMHX/PSHX:  S/P tendon repair        Family history:  Family history of liver cancer        There is no family history of peptic ulcer disease, gastric cancer, colon polyps, colon cancer, celiac disease, biliary, hepatic, or pancreatic disease.  None of the female relatives have breast, uterine, or ovarian cancer.     Social History:     ROS:     General:  No wt loss, fevers, chills, night sweats, fatigue,   Eyes:  Good vision, no reported pain  ENT:  No sore throat, pain, runny nose, dysphagia  CV:  No pain, palpitations, hypo/hypertension  Resp:  No dyspnea, cough, tachypnea, wheezing  GI:  see HPI   :  No pain, bleeding, incontinence, nocturia  Muscle:  No pain, weakness  Neuro:  No weakness, tingling, memory problems  Psych:  No fatigue, insomnia, mood problems, depression  Endocrine:  No polyuria, polydipsia, cold/heat intolerance  Heme:  No petechiae, ecchymosis, easy bruisability  Skin:  No rash, tattoos, scars, edema      PHYSICAL EXAM:     GENERAL:  Appears stated age, well-groomed  HEENT:  NC/AT,  conjunctivae clear and pink, no thyromegaly  CHEST:  Full & symmetric excursion, no increased effort, breath sounds clear  HEART:  Regular rhythm, S1, S2  ABDOMEN:  Soft, non-tender, non-distended, normoactive bowel sounds  EXTEREMITIES:  no cyanosis,clubbing or edema  SKIN:  No rash/erythema/ecchymoses  NEURO:  Alert, oriented, no asterixis    Vital Signs:  Vital Signs Last 24 Hrs  T(C): 37.3 (23 Nov 2020 05:59), Max: 37.3 (23 Nov 2020 05:59)  T(F): 99.1 (23 Nov 2020 05:59), Max: 99.1 (23 Nov 2020 05:59)  HR: 100 (23 Nov 2020 05:59) (60 - 100)  BP: 103/51 (23 Nov 2020 05:59) (92/49 - 129/78)  BP(mean): --  RR: 17 (23 Nov 2020 05:59) (12 - 18)  SpO2: 97% (23 Nov 2020 05:59) (96% - 100%)  Daily Height in cm: 160.02 (22 Nov 2020 20:56)    Daily     LABS:                        11.9   4.80  )-----------( 116      ( 23 Nov 2020 05:45 )             36.1     Mean Cell Volume: 87.4 fL (11-23-20 @ 05:45)    11-23    136  |  103  |  17  ----------------------------<  338<H>  3.6   |  25  |  0.47<L>    Ca    8.8      23 Nov 2020 05:45  Phos  1.3     11-23  Mg     1.9     11-23    TPro  5.4<L>  /  Alb  2.7<L>  /  TBili  0.2  /  DBili  x   /  AST  54<H>  /  ALT  86<H>  /  AlkPhos  229<H>  11-23    LIVER FUNCTIONS - ( 23 Nov 2020 05:45 )  Alb: 2.7 g/dL / Pro: 5.4 g/dL / ALK PHOS: 229 u/L / ALT: 86 u/L / AST: 54 u/L / GGT: x                                       11.9   4.80  )-----------( 116      ( 23 Nov 2020 05:45 )             36.1                         13.3   5.20  )-----------( 135      ( 22 Nov 2020 12:00 )             40.2     Imaging:    < from: CT Abdomen and Pelvis w/ IV Cont (11.22.20 @ 13:39) >  FINDINGS:  LOWER CHEST: Within normal limits.    LIVER: A triangular region of hypoattenuation at the falciform ligament possibly representing a hepatic pseudolesion.  3 cm lesion of mixed attenuation in segment 5, possible hemangioma.  BILE DUCTS: Normal caliber.  GALLBLADDER: Small gallstone.  SPLEEN: Within normal limits.  PANCREAS: Parenchymal atrophy of the pancreatic body and tail with severe duct dilatation measuring up to 1.4 cm. Abrupt transition to normal duct caliber at the pancreatic neck. 1.8 cm hypoattenuating lesion seen within anterior pancreas at this level (2, 30).  ADRENALS: Within normal limits.  KIDNEYS/URETERS: Left renal cysts. Questionable indeterminate 1.6 cm right upper pole lesion (2, 25).    BLADDER: Within normal limits.  REPRODUCTIVE ORGANS: Uterine fibroids.    BOWEL: Severe stool burden. No bowel obstruction. Appendix is not visualized. No evidence of inflammation in the pericecal region.  PERITONEUM: No ascites.  VESSELS: Atherosclerotic changes.  RETROPERITONEUM/LYMPH NODES: No lymphadenopathy.  ABDOMINAL WALL: Cachexia.  BONES: Within normal limits.    IMPRESSION:  Findings suspicious for obstructive neoplasm in the pancreatic neck/body. Suggest further evaluation with dedicated pancreatic CT or MRI.    Two indeterminate liver lesions, likely benign. Further characterization by MRI is advised.    Questionable indeterminate 1.6 cm right upper pole lesion. Recommend further evaluation ultrasound.      < end of copied text >           Chief Complaint:  Patient is a 70y old  Female who presents with a chief complaint of Weight loss, Diarrhea (23 Nov 2020 07:04)      HPI:  70 year F with history of osteoporosis, multiple tendon repairs presents with 30lbs weight loss since May, poor appetite and progressive weakness. GI is consulted for abnormal CT as shown: parenchymal atrophy of pancreatic body and tail w severe duct dilatation measuring up to 1.4cm, abrupt transition to normal duct caliber at pancreatic neck, 1.8cm hypoattenuating lesion within anterior pancreas. Pt currently denies nausea, vomiting, abd pain, fever, chills, CP, palpitations, melena, hematochezia. Family hx of cancer includes sister who had liver cancer. Pt reports that she is a current smoker, 1/2 PPD x 20 years.      Allergies:  No Known Allergies      Home Medications:    Hospital Medications:  dextrose 40% Gel 15 Gram(s) Oral once  dextrose 5%. 1000 milliLiter(s) IV Continuous <Continuous>  dextrose 5%. 1000 milliLiter(s) IV Continuous <Continuous>  dextrose 50% Injectable 25 Gram(s) IV Push once  dextrose 50% Injectable 12.5 Gram(s) IV Push once  dextrose 50% Injectable 25 Gram(s) IV Push once  enoxaparin Injectable 40 milliGRAM(s) SubCutaneous daily  glucagon  Injectable 1 milliGRAM(s) IntraMuscular once  insulin lispro (ADMELOG) corrective regimen sliding scale   SubCutaneous three times a day before meals  insulin lispro (ADMELOG) corrective regimen sliding scale   SubCutaneous at bedtime  polyethylene glycol 3350 17 Gram(s) Oral daily  potassium phosphate / sodium phosphate Powder (PHOS-NaK) 1 Packet(s) Oral three times a day with meals  senna 2 Tablet(s) Oral at bedtime      PMHX/PSHX:  S/P tendon repair        Family history:  Family history of liver cancer        There is no family history of peptic ulcer disease, gastric cancer, colon polyps, colon cancer, celiac disease, biliary, hepatic, or pancreatic disease.  None of the female relatives have breast, uterine, or ovarian cancer.     Social History: No EtOH or tobacco    ROS:     General:  No wt loss, fevers, chills, night sweats, fatigue,   Eyes:  Good vision, no reported pain  ENT:  No sore throat, pain, runny nose, dysphagia  CV:  No pain, palpitations, hypo/hypertension  Resp:  No dyspnea, cough, tachypnea, wheezing  GI:  see HPI   :  No pain, bleeding, incontinence, nocturia  Muscle:  No pain, weakness  Neuro:  No weakness, tingling, memory problems  Psych:  No fatigue, insomnia, mood problems, depression  Endocrine:  No polyuria, polydipsia, cold/heat intolerance  Heme:  No petechiae, ecchymosis, easy bruisability  Skin:  No rash, tattoos, scars, edema      PHYSICAL EXAM:     GENERAL:  Appears stated age, well-groomed  HEENT:  NC/AT,  conjunctivae clear and pink, no thyromegaly  CHEST:  Full & symmetric excursion, no increased effort, breath sounds clear  HEART:  Regular rhythm, S1, S2  ABDOMEN:  Soft, non-tender, non-distended, normoactive bowel sounds  EXTEREMITIES:  no cyanosis,clubbing or edema  SKIN:  No rash/erythema/ecchymoses  NEURO:  Alert, oriented, no asterixis    Vital Signs:  Vital Signs Last 24 Hrs  T(C): 37.3 (23 Nov 2020 05:59), Max: 37.3 (23 Nov 2020 05:59)  T(F): 99.1 (23 Nov 2020 05:59), Max: 99.1 (23 Nov 2020 05:59)  HR: 100 (23 Nov 2020 05:59) (60 - 100)  BP: 103/51 (23 Nov 2020 05:59) (92/49 - 129/78)  BP(mean): --  RR: 17 (23 Nov 2020 05:59) (12 - 18)  SpO2: 97% (23 Nov 2020 05:59) (96% - 100%)  Daily Height in cm: 160.02 (22 Nov 2020 20:56)    Daily     LABS:                        11.9   4.80  )-----------( 116      ( 23 Nov 2020 05:45 )             36.1     Mean Cell Volume: 87.4 fL (11-23-20 @ 05:45)    11-23    136  |  103  |  17  ----------------------------<  338<H>  3.6   |  25  |  0.47<L>    Ca    8.8      23 Nov 2020 05:45  Phos  1.3     11-23  Mg     1.9     11-23    TPro  5.4<L>  /  Alb  2.7<L>  /  TBili  0.2  /  DBili  x   /  AST  54<H>  /  ALT  86<H>  /  AlkPhos  229<H>  11-23    LIVER FUNCTIONS - ( 23 Nov 2020 05:45 )  Alb: 2.7 g/dL / Pro: 5.4 g/dL / ALK PHOS: 229 u/L / ALT: 86 u/L / AST: 54 u/L / GGT: x                                       11.9   4.80  )-----------( 116      ( 23 Nov 2020 05:45 )             36.1                         13.3   5.20  )-----------( 135      ( 22 Nov 2020 12:00 )             40.2     Imaging:    < from: CT Abdomen and Pelvis w/ IV Cont (11.22.20 @ 13:39) >  FINDINGS:  LOWER CHEST: Within normal limits.    LIVER: A triangular region of hypoattenuation at the falciform ligament possibly representing a hepatic pseudolesion.  3 cm lesion of mixed attenuation in segment 5, possible hemangioma.  BILE DUCTS: Normal caliber.  GALLBLADDER: Small gallstone.  SPLEEN: Within normal limits.  PANCREAS: Parenchymal atrophy of the pancreatic body and tail with severe duct dilatation measuring up to 1.4 cm. Abrupt transition to normal duct caliber at the pancreatic neck. 1.8 cm hypoattenuating lesion seen within anterior pancreas at this level (2, 30).  ADRENALS: Within normal limits.  KIDNEYS/URETERS: Left renal cysts. Questionable indeterminate 1.6 cm right upper pole lesion (2, 25).    BLADDER: Within normal limits.  REPRODUCTIVE ORGANS: Uterine fibroids.    BOWEL: Severe stool burden. No bowel obstruction. Appendix is not visualized. No evidence of inflammation in the pericecal region.  PERITONEUM: No ascites.  VESSELS: Atherosclerotic changes.  RETROPERITONEUM/LYMPH NODES: No lymphadenopathy.  ABDOMINAL WALL: Cachexia.  BONES: Within normal limits.    IMPRESSION:  Findings suspicious for obstructive neoplasm in the pancreatic neck/body. Suggest further evaluation with dedicated pancreatic CT or MRI.    Two indeterminate liver lesions, likely benign. Further characterization by MRI is advised.    Questionable indeterminate 1.6 cm right upper pole lesion. Recommend further evaluation ultrasound.      < end of copied text >

## 2020-11-23 NOTE — PROGRESS NOTE ADULT - ATTENDING COMMENTS
appreciate GI recs - plan for MRI and EUS for biopsy - concern for malignancy.  Pt with no medical care in the last ~ 4 years, likely with DM2 - start premeal insulin for now, NPO tonight so will hold lantus for now and likely start tomorrow evening.  will need DM education and dietician.

## 2020-11-23 NOTE — PROGRESS NOTE ADULT - SUBJECTIVE AND OBJECTIVE BOX
Ace Mendozaon, PGY1  Pager [phone]    INCOMPLETE NOTE - IN PROGRESS    Patient is a 70y old  Female who presents with a chief complaint of Weight loss, Diarrhea (22 Nov 2020 16:10)      SUBJECTIVE/INTERVAL EVENTS: Patient seen and examined at bedside.    MEDICATIONS  (STANDING):  dextrose 40% Gel 15 Gram(s) Oral once  dextrose 5%. 1000 milliLiter(s) (50 mL/Hr) IV Continuous <Continuous>  dextrose 5%. 1000 milliLiter(s) (100 mL/Hr) IV Continuous <Continuous>  dextrose 50% Injectable 25 Gram(s) IV Push once  dextrose 50% Injectable 12.5 Gram(s) IV Push once  dextrose 50% Injectable 25 Gram(s) IV Push once  enoxaparin Injectable 40 milliGRAM(s) SubCutaneous daily  glucagon  Injectable 1 milliGRAM(s) IntraMuscular once  insulin lispro (ADMELOG) corrective regimen sliding scale   SubCutaneous three times a day before meals  insulin lispro (ADMELOG) corrective regimen sliding scale   SubCutaneous at bedtime  polyethylene glycol 3350 17 Gram(s) Oral daily  senna 2 Tablet(s) Oral at bedtime    MEDICATIONS  (PRN):      VITAL SIGNS:  T(F): 99.1 (11-23-20 @ 05:59), Max: 99.1 (11-23-20 @ 05:59)  HR: 100 (11-23-20 @ 05:59) (60 - 100)  BP: 103/51 (11-23-20 @ 05:59) (92/49 - 129/78)  RR: 17 (11-23-20 @ 05:59) (12 - 18)  SpO2: 97% (11-23-20 @ 05:59) (96% - 100%)    I&O's Summary    22 Nov 2020 07:01  -  23 Nov 2020 07:00  --------------------------------------------------------  IN: 100 mL / OUT: 0 mL / NET: 100 mL      Daily Height in cm: 160.02 (22 Nov 2020 20:56)    Daily     PHYSICAL EXAM:  Gen: Alert, NAD  HEENT: NCAT, conjunctiva clear, sclera anicteric, no erythema or exudates in the oropharynx, mmm  Neck: Supple, no JVD  CV: RRR, S1S2, no m/r/g  Resp: CTAB, normal respiratory effort  Abd: Soft, nontender, nondistended, normal bowel sounds  Ext: no edema, no clubbing or cyanosis  Neuro: AOx3, CN2-12 grossly intact, LÓPEZ  SKIN: warm, perfused    LABS:                        13.3   5.20  )-----------( 135      ( 22 Nov 2020 12:00 )             40.2     Hgb Trend: 13.3<--  11-22    137  |  101  |  15  ----------------------------<  674<HH>  5.2   |  24  |  0.45<L>    Ca    9.9      22 Nov 2020 12:00    TPro  6.3  /  Alb  3.3  /  TBili  0.3  /  DBili  x   /  AST  91<H>  /  ALT  114<H>  /  AlkPhos  301<H>  11-22    Creatinine Trend: 0.45<--  LIVER FUNCTIONS - ( 22 Nov 2020 12:00 )  Alb: 3.3 g/dL / Pro: 6.3 g/dL / ALK PHOS: 301 u/L / ALT: 114 u/L / AST: 91 u/L / GGT: x                     CAPILLARY BLOOD GLUCOSE      POCT Blood Glucose.: 251 mg/dL (22 Nov 2020 22:29)  POCT Blood Glucose.: 204 mg/dL (22 Nov 2020 21:10)  POCT Blood Glucose.: 199 mg/dL (22 Nov 2020 20:38)  POCT Blood Glucose.: 182 mg/dL (22 Nov 2020 16:21)  POCT Blood Glucose.: 537 mg/dL (22 Nov 2020 13:42)      RADIOLOGY & ADDITIONAL TESTS: Reviewed    Imaging Personally Reviewed:    Consultant(s) Notes Reviewed:      Care Discussed with Consultants/Other Providers:   Ace Whelan Mariela, PGY1  Pager [phone]      Patient is a 70y old  Female who presents with a chief complaint of Weight loss, Diarrhea (22 Nov 2020 16:10)      SUBJECTIVE/INTERVAL EVENTS:  Patient eating full tray of breakfast, reports appetite is good, wants more sugar packets.  No acute complaints this AM.  No abd pain, n/v/d. Pt had small amount of soft stools this AM.  Patient seen and examined at bedside.      MEDICATIONS  (STANDING):  dextrose 40% Gel 15 Gram(s) Oral once  dextrose 5%. 1000 milliLiter(s) (50 mL/Hr) IV Continuous <Continuous>  dextrose 5%. 1000 milliLiter(s) (100 mL/Hr) IV Continuous <Continuous>  dextrose 50% Injectable 25 Gram(s) IV Push once  dextrose 50% Injectable 12.5 Gram(s) IV Push once  dextrose 50% Injectable 25 Gram(s) IV Push once  enoxaparin Injectable 40 milliGRAM(s) SubCutaneous daily  glucagon  Injectable 1 milliGRAM(s) IntraMuscular once  insulin lispro (ADMELOG) corrective regimen sliding scale   SubCutaneous three times a day before meals  insulin lispro (ADMELOG) corrective regimen sliding scale   SubCutaneous at bedtime  polyethylene glycol 3350 17 Gram(s) Oral daily  senna 2 Tablet(s) Oral at bedtime    MEDICATIONS  (PRN):      VITAL SIGNS:  T(F): 99.1 (11-23-20 @ 05:59), Max: 99.1 (11-23-20 @ 05:59)  HR: 100 (11-23-20 @ 05:59) (60 - 100)  BP: 103/51 (11-23-20 @ 05:59) (92/49 - 129/78)  RR: 17 (11-23-20 @ 05:59) (12 - 18)  SpO2: 97% (11-23-20 @ 05:59) (96% - 100%)    I&O's Summary    22 Nov 2020 07:01  -  23 Nov 2020 07:00  --------------------------------------------------------  IN: 100 mL / OUT: 0 mL / NET: 100 mL      Daily Height in cm: 160.02 (22 Nov 2020 20:56)    Daily     PHYSICAL EXAM:  Gen: Alert, very thin, cachetic black female, in NAD  HEENT: NCAT, PERRL, EOMI, clear conjunctiva, no erythema or exudates in the oropharynx, mmm  Neck: Supple, no LAD  CV: RRR, S1S2, no m/r/g  Resp: CTAB, normal respiratory effort  Abd: Soft, NT, ND, normal bowel sounds  Ext: trace edema up to ankles, no clubbing or cyanosis  Neuro: AOx3, CN2-12 grossly intact, LÓPEZ  Skin: no rashes, no ecchymoses    LABS:                        13.3   5.20  )-----------( 135      ( 22 Nov 2020 12:00 )             40.2     Hgb Trend: 13.3<--  11-22    137  |  101  |  15  ----------------------------<  674<HH>  5.2   |  24  |  0.45<L>    Ca    9.9      22 Nov 2020 12:00    TPro  6.3  /  Alb  3.3  /  TBili  0.3  /  DBili  x   /  AST  91<H>  /  ALT  114<H>  /  AlkPhos  301<H>  11-22    Creatinine Trend: 0.45<--  LIVER FUNCTIONS - ( 22 Nov 2020 12:00 )  Alb: 3.3 g/dL / Pro: 6.3 g/dL / ALK PHOS: 301 u/L / ALT: 114 u/L / AST: 91 u/L / GGT: x                     CAPILLARY BLOOD GLUCOSE      POCT Blood Glucose.: 251 mg/dL (22 Nov 2020 22:29)  POCT Blood Glucose.: 204 mg/dL (22 Nov 2020 21:10)  POCT Blood Glucose.: 199 mg/dL (22 Nov 2020 20:38)  POCT Blood Glucose.: 182 mg/dL (22 Nov 2020 16:21)  POCT Blood Glucose.: 537 mg/dL (22 Nov 2020 13:42)    A1C with Estimated Average Glucose: > 18.0: HA1C = 21.5     RADIOLOGY & ADDITIONAL TESTS: Reviewed    Imaging Personally Reviewed:    Consultant(s) Notes Reviewed:      Care Discussed with Consultants/Other Providers:

## 2020-11-23 NOTE — CONSULT NOTE ADULT - ASSESSMENT
Impression:  1) Pancreatic lesion on CT- differential diagnosis includes pancreatic ca, pancreatic cystic neoplasm (IPMN, mucinous cystic, serous cystic etc)    Recommendations:  -Will need EUS for further evaluation, can perform sometime this week  -Keep NPO past midnight for tentative procedure  -Can get MRI in the meantime  -Rest of care per primary team    Liliana Jay, PGY6  Gastroenterology Fellow  Pager # [phone] / [phone]  Can be contacted via [employer] Teams

## 2020-11-23 NOTE — PROGRESS NOTE ADULT - PROBLEM SELECTOR PLAN 4
Diarrhea possibly secondary to immense stool burden (as seen on CT) vs. suppression of pancreatic exocrine function leading to decreased pancreatic enzymes.  No current sx.  - Senna and Miralax daily   - Monitor electrolytes  - Monitor for recurrence Frail and cachetic, likely d/t malignancy. Likely has protein-calorie malnutrition.  - Regular diet as tolerated  - Nutrition consult

## 2020-11-23 NOTE — PROGRESS NOTE ADULT - PROBLEM SELECTOR PLAN 1
Pancreatic lesion in neck and body. Unintentional weight loss, poor appetite, generalized weakness likely secondary to malignancy.  Likely has suppression of both exocrine and endocrine function causing hyperglycemia and diarrhea.  Elevation in alk phos, AST, ALT likely also secondary to obstruction from lesion. Indeterminate lesions in liver and kidney.  - Check CA 19-9  - CT chest for preliminary staging  - GI consult for EUS FNA and whether triple phase or MR for further evaluating pancreatic lesion Pancreatic lesion in neck and body. Unintentional weight loss, poor appetite, generalized weakness likely secondary to malignancy.  Likely has suppression of both exocrine and endocrine function causing hyperglycemia and diarrhea.  Elevation in alk phos, AST, ALT likely also secondary to obstruction from lesion.  - CT chest showing RLL opacity and small nodules, unclear if metastatic lesions  - GI following, possible EUS FNA tmr  - MR abdomen/pelvis to better characterize pancreatic lesion

## 2020-11-23 NOTE — PROGRESS NOTE ADULT - PROBLEM SELECTOR PLAN 6
Indeterminate 1.6cm R upper lobe renal lesion.  - R renal ultrasound R renal ultrasound showing hemorrhagic cyst  - Continue to monitor

## 2020-11-24 DIAGNOSIS — E78.5 HYPERLIPIDEMIA, UNSPECIFIED: ICD-10-CM

## 2020-11-24 DIAGNOSIS — E08.65 DIABETES MELLITUS DUE TO UNDERLYING CONDITION WITH HYPERGLYCEMIA: ICD-10-CM

## 2020-11-24 DIAGNOSIS — I10 ESSENTIAL (PRIMARY) HYPERTENSION: ICD-10-CM

## 2020-11-24 LAB
ALBUMIN SERPL ELPH-MCNC: 2.6 G/DL — LOW (ref 3.3–5)
ALP SERPL-CCNC: 219 U/L — HIGH (ref 40–120)
ALT FLD-CCNC: 80 U/L — HIGH (ref 4–33)
ANION GAP SERPL CALC-SCNC: 7 MMO/L — SIGNIFICANT CHANGE UP (ref 7–14)
APTT BLD: 27.4 SEC — SIGNIFICANT CHANGE UP (ref 27–36.3)
AST SERPL-CCNC: 56 U/L — HIGH (ref 4–32)
BILIRUB SERPL-MCNC: 0.2 MG/DL — SIGNIFICANT CHANGE UP (ref 0.2–1.2)
BUN SERPL-MCNC: 16 MG/DL — SIGNIFICANT CHANGE UP (ref 7–23)
CALCIUM SERPL-MCNC: 8.6 MG/DL — SIGNIFICANT CHANGE UP (ref 8.4–10.5)
CHLORIDE SERPL-SCNC: 105 MMOL/L — SIGNIFICANT CHANGE UP (ref 98–107)
CO2 SERPL-SCNC: 24 MMOL/L — SIGNIFICANT CHANGE UP (ref 22–31)
CREAT SERPL-MCNC: 0.39 MG/DL — LOW (ref 0.5–1.3)
GLUCOSE BLDC GLUCOMTR-MCNC: 214 MG/DL — HIGH (ref 70–99)
GLUCOSE BLDC GLUCOMTR-MCNC: 317 MG/DL — HIGH (ref 70–99)
GLUCOSE BLDC GLUCOMTR-MCNC: 352 MG/DL — HIGH (ref 70–99)
GLUCOSE BLDC GLUCOMTR-MCNC: 415 MG/DL — HIGH (ref 70–99)
GLUCOSE BLDC GLUCOMTR-MCNC: 431 MG/DL — HIGH (ref 70–99)
GLUCOSE BLDC GLUCOMTR-MCNC: 445 MG/DL — HIGH (ref 70–99)
GLUCOSE SERPL-MCNC: 337 MG/DL — HIGH (ref 70–99)
HCT VFR BLD CALC: 35 % — SIGNIFICANT CHANGE UP (ref 34.5–45)
HGB BLD-MCNC: 11.5 G/DL — SIGNIFICANT CHANGE UP (ref 11.5–15.5)
INR BLD: 0.91 — SIGNIFICANT CHANGE UP (ref 0.88–1.16)
MAGNESIUM SERPL-MCNC: 1.9 MG/DL — SIGNIFICANT CHANGE UP (ref 1.6–2.6)
MCHC RBC-ENTMCNC: 29.1 PG — SIGNIFICANT CHANGE UP (ref 27–34)
MCHC RBC-ENTMCNC: 32.9 % — SIGNIFICANT CHANGE UP (ref 32–36)
MCV RBC AUTO: 88.6 FL — SIGNIFICANT CHANGE UP (ref 80–100)
NRBC # FLD: 0 K/UL — SIGNIFICANT CHANGE UP (ref 0–0)
PHOSPHATE SERPL-MCNC: 1.9 MG/DL — LOW (ref 2.5–4.5)
PLATELET # BLD AUTO: 103 K/UL — LOW (ref 150–400)
PMV BLD: 12.4 FL — SIGNIFICANT CHANGE UP (ref 7–13)
POTASSIUM SERPL-MCNC: 3.8 MMOL/L — SIGNIFICANT CHANGE UP (ref 3.5–5.3)
POTASSIUM SERPL-SCNC: 3.8 MMOL/L — SIGNIFICANT CHANGE UP (ref 3.5–5.3)
PROT SERPL-MCNC: 5.2 G/DL — LOW (ref 6–8.3)
PROTHROM AB SERPL-ACNC: 10.5 SEC — LOW (ref 10.6–13.6)
RBC # BLD: 3.95 M/UL — SIGNIFICANT CHANGE UP (ref 3.8–5.2)
RBC # FLD: 15.1 % — HIGH (ref 10.3–14.5)
SODIUM SERPL-SCNC: 136 MMOL/L — SIGNIFICANT CHANGE UP (ref 135–145)
WBC # BLD: 4.36 K/UL — SIGNIFICANT CHANGE UP (ref 3.8–10.5)
WBC # FLD AUTO: 4.36 K/UL — SIGNIFICANT CHANGE UP (ref 3.8–10.5)

## 2020-11-24 PROCEDURE — 99223 1ST HOSP IP/OBS HIGH 75: CPT | Mod: GC

## 2020-11-24 PROCEDURE — 99233 SBSQ HOSP IP/OBS HIGH 50: CPT

## 2020-11-24 RX ORDER — INSULIN LISPRO 100/ML
VIAL (ML) SUBCUTANEOUS AT BEDTIME
Refills: 0 | Status: DISCONTINUED | OUTPATIENT
Start: 2020-11-24 | End: 2020-11-25

## 2020-11-24 RX ORDER — INSULIN LISPRO 100/ML
4 VIAL (ML) SUBCUTANEOUS
Refills: 0 | Status: DISCONTINUED | OUTPATIENT
Start: 2020-11-24 | End: 2020-11-25

## 2020-11-24 RX ORDER — INSULIN LISPRO 100/ML
VIAL (ML) SUBCUTANEOUS
Refills: 0 | Status: DISCONTINUED | OUTPATIENT
Start: 2020-11-25 | End: 2020-11-25

## 2020-11-24 RX ORDER — ENOXAPARIN SODIUM 100 MG/ML
40 INJECTION SUBCUTANEOUS DAILY
Refills: 0 | Status: DISCONTINUED | OUTPATIENT
Start: 2020-11-24 | End: 2020-11-24

## 2020-11-24 RX ORDER — INSULIN GLARGINE 100 [IU]/ML
6 INJECTION, SOLUTION SUBCUTANEOUS AT BEDTIME
Refills: 0 | Status: DISCONTINUED | OUTPATIENT
Start: 2020-11-24 | End: 2020-11-25

## 2020-11-24 RX ORDER — SODIUM,POTASSIUM PHOSPHATES 278-250MG
1 POWDER IN PACKET (EA) ORAL THREE TIMES A DAY
Refills: 0 | Status: COMPLETED | OUTPATIENT
Start: 2020-11-24 | End: 2020-11-25

## 2020-11-24 RX ADMIN — Medication 4: at 23:22

## 2020-11-24 RX ADMIN — Medication 8: at 08:41

## 2020-11-24 RX ADMIN — Medication 4: at 17:47

## 2020-11-24 RX ADMIN — INSULIN GLARGINE 6 UNIT(S): 100 INJECTION, SOLUTION SUBCUTANEOUS at 23:23

## 2020-11-24 RX ADMIN — Medication 4 UNIT(S): at 17:48

## 2020-11-24 RX ADMIN — Medication 12: at 12:42

## 2020-11-24 RX ADMIN — ENOXAPARIN SODIUM 40 MILLIGRAM(S): 100 INJECTION SUBCUTANEOUS at 12:42

## 2020-11-24 RX ADMIN — Medication 2 UNIT(S): at 08:57

## 2020-11-24 RX ADMIN — Medication 4 UNIT(S): at 12:42

## 2020-11-24 RX ADMIN — Medication 1 PACKET(S): at 12:42

## 2020-11-24 RX ADMIN — Medication 1 PACKET(S): at 21:37

## 2020-11-24 NOTE — PROGRESS NOTE ADULT - PROBLEM SELECTOR PLAN 2
Opacity noted on RLL, possibly atelectasis vs. primary lung lesion vs. metastatic. 4 small nodules also noted.

## 2020-11-24 NOTE — PROGRESS NOTE ADULT - PROBLEM SELECTOR PLAN 3
Hyperglycemia possibly secondary to suppression of pancreatic endocrine function possibly d/t infiltration of lesion.   - s/p 6u lispro x 2. FS now 182.  - FS qAC/HS, low dose SSI  - Lispro 2u AC TID  - Will wait to start Lantus until tmr Hyperglycemia possibly secondary to suppression of pancreatic endocrine function possibly d/t infiltration of lesion.  FS up to 400s today.  - 12u additional correctional insulin required over past 24 hours.  - Increase Lispro to 4u AC TID  - Start Lantus 6u QHS  - c/w FS qAC/HS, low dose SSI

## 2020-11-24 NOTE — CONSULT NOTE ADULT - PROBLEM SELECTOR RECOMMENDATION 3
- LDL goal <70, can check a fasting am lipid profile.       Michelle Castillo MD  Endocrine Fellow  Pager [phone] from 9 am to 5 pm Mon to Fri.  After hours and on weekends please call [phone]

## 2020-11-24 NOTE — ADVANCED PRACTICE NURSE CONSULT - REASON FOR CONSULT
70F w/ ?osteoporosis, multiple tendon repairs who presents with weight loss since May and diarrhea x 2 weeks.  Patient reports 30lb weight loss since May 2020, poor appetite d/t loss of taste, progressive weakness.  Patient reports non-bloody diarrhea x 2 weeks that resolved this past Tuesday.  Patient denies f/c, nightsweats, abd pain, n/v, CP, palpitations, SOB, cough, blood in stools or urine, dysuria, leg pain/swelling.  No personal hx of cancer.  Family hx of cancer includes sister who had liver cancer. Pt reports that she is a current smoker, 1/2 PPD x 20 years.  Patient with Pancreatic lesion in neck and body. Awaiting further work up. Unintentional weight loss, poor appetite, generalized weakness likely secondary to malignancy.  Likely has suppression of both exocrine and endocrine function causing hyperglycemia and diarrhea.  . Patient also with indeterminate lesions in liver and kidney.

## 2020-11-24 NOTE — CONSULT NOTE ADULT - PROBLEM SELECTOR PROBLEM 1
Diabetes mellitus due to underlying condition with hyperglycemia, without long-term current use of insulin

## 2020-11-24 NOTE — ADVANCED PRACTICE NURSE CONSULT - ASSESSMENT
At time of visit pt expressing being upset because procedure was canceled. Primary team to come to speak to patient. Most of visit with patient was spent on explaining the cause of the elevated blood sugars and the need for insulin. Patient stated she lives alone but does have good emotional support. Patient is very thin and frail being followed by dietitian. Patient has very few area for insulin administration to ensure proper administration. Patient could give insulin on upper outer thigh. Patient was instructed to avoid increase activity after insulin administration to this area to avoid hypoglycemia. Patient instructed on s/s and proper treatment of hypoglycemia. Patient complaining of feeling tired and week. Patient was instructed that one of the causes of her feeling this way is the elevated blood sugars.Am blood sugar was 333. Suggested that lantus be started. Case discussed with primary RN and endocrine team. Endocrine team going to see pt.  Pt demonstrated understanding of information taught. Patient has also started watching educational videos and staff has also started diabetes education.

## 2020-11-24 NOTE — PROGRESS NOTE ADULT - PROBLEM SELECTOR PLAN 4
Frail and cachetic, likely d/t malignancy. Likely has protein-calorie malnutrition.  - Regular diet as tolerated  - Nutrition consult

## 2020-11-24 NOTE — CONSULT NOTE ADULT - SUBJECTIVE AND OBJECTIVE BOX
HPI:  70F w/ ?osteoporosis, multiple tendon repairs who presents with weight loss, diarrhea, poor appetite d/t loss of taste, progressive weakness. Found to have pancreatic lesion concerning for pancreatic neoplasm and new onset DM with A1c >18.       Endocrine history:  Patient is a 70 year old female with no prior personal or family history of DM who presented with weight loss, diarrhea, poor appetite d/t loss of taste, progressive weakness. Found to have pancreatic lesion concerning for pancreatic neoplasm and new onset DM with A1c >18. Patient likely has exocrine and endocrine pancreatic insufficiency. Did endorsed slightly polydipsia but denies polyuria. Blood sugars have been ranging high in 300s-400s. Currently on regular diet and planned for procedure tomorrow.          PAST MEDICAL & SURGICAL HISTORY:  S/P tendon repair  R foot, R elbow      FAMILY HISTORY:  Family history of liver cancer, Sister      Social History:  Active smoker. No ETOH or illicit drug use reported      Outpatient Medications:  Calcium + Vitamin D      MEDICATIONS  (STANDING):  dextrose 40% Gel 15 Gram(s) Oral once  dextrose 5%. 1000 milliLiter(s) (50 mL/Hr) IV Continuous <Continuous>  dextrose 5%. 1000 milliLiter(s) (100 mL/Hr) IV Continuous <Continuous>  dextrose 50% Injectable 25 Gram(s) IV Push once  dextrose 50% Injectable 12.5 Gram(s) IV Push once  dextrose 50% Injectable 25 Gram(s) IV Push once  glucagon  Injectable 1 milliGRAM(s) IntraMuscular once  insulin glargine Injectable (LANTUS) 6 Unit(s) SubCutaneous at bedtime  insulin lispro (ADMELOG) corrective regimen sliding scale   SubCutaneous three times a day before meals  insulin lispro (ADMELOG) corrective regimen sliding scale   SubCutaneous at bedtime  insulin lispro Injectable (ADMELOG) 4 Unit(s) SubCutaneous three times a day before meals  polyethylene glycol 3350 17 Gram(s) Oral daily  potassium phosphate / sodium phosphate Powder (PHOS-NaK) 1 Packet(s) Oral three times a day  senna 2 Tablet(s) Oral at bedtime    MEDICATIONS  (PRN):      Allergies  No Known Allergies        Review of Systems:  Constitutional: + poor appetite/po intake, + wt loss, + weakness  Eyes: No blurry vision, no diplopia  Neuro: No tremors, no neuropathy   HEENT: No pain, + loss of taste  Cardiovascular: No chest pain, no palpitations  Respiratory: No SOB, no cough  GI: No nausea, no vomiting   : No dysuria, hematuria  Endocrine: no polyuria, polydipsia  Hem/lymph: no swelling  Osteoporosis: + history of humeral fracture   ALL OTHER SYSTEMS REVIEWED AND NEGATIVE        PHYSICAL EXAM:  VITALS: T(C): 36.5 (11-24-20 @ 13:13)  T(F): 97.7 (11-24-20 @ 13:13), Max: 98.7 (11-23-20 @ 21:51)  HR: 93 (11-24-20 @ 13:13) (82 - 93)  BP: 115/60 (11-24-20 @ 13:13) (115/60 - 126/62)  RR:  (17 - 18)  SpO2:  (97% - 100%)  Wt(kg): --  GENERAL: malnourished, well-groomed  EYES: No proptosis, anicteric  HEENT:  Atraumatic, Normocephalic, moist mucous membranes  THYROID: Normal size, no palpable nodules  RESPIRATORY: Clear to auscultation bilaterally; No rales, rhonchi, wheezing, or rubs  CARDIOVASCULAR: Regular rate and rhythm; no peripheral edema  GI: Soft, nontender, non distended, normal bowel sounds  SKIN: Dry, intact, No rashes or lesions  NEURO: AAO x 3, no gross or focal deficit   PSYCH: reactive affect, euthymic mood      POCT Blood Glucose.: 214 mg/dL (11-24-20 @ 17:20)  POCT Blood Glucose.: 431 mg/dL (11-24-20 @ 12:19)  POCT Blood Glucose.: 415 mg/dL (11-24-20 @ 12:18)  POCT Blood Glucose.: 317 mg/dL (11-24-20 @ 08:36)  POCT Blood Glucose.: 352 mg/dL (11-24-20 @ 06:55)  POCT Blood Glucose.: 347 mg/dL (11-23-20 @ 22:23)  POCT Blood Glucose.: 143 mg/dL (11-23-20 @ 17:08)  POCT Blood Glucose.: 302 mg/dL (11-23-20 @ 12:22)  POCT Blood Glucose.: 334 mg/dL (11-23-20 @ 08:30)  POCT Blood Glucose.: 251 mg/dL (11-22-20 @ 22:29)  POCT Blood Glucose.: 204 mg/dL (11-22-20 @ 21:10)  POCT Blood Glucose.: 199 mg/dL (11-22-20 @ 20:38)  POCT Blood Glucose.: 182 mg/dL (11-22-20 @ 16:21)  POCT Blood Glucose.: 537 mg/dL (11-22-20 @ 13:42)                            11.5   4.36  )-----------( 103      ( 24 Nov 2020 07:00 )             35.0       11-24    136  |  105  |  16  ----------------------------<  337<H>  3.8   |  24  |  0.39<L>    EGFR if : 124  EGFR if non : 107    Ca    8.6      11-24  Mg     1.9     11-24  Phos  1.9     11-24    TPro  5.2<L>  /  Alb  2.6<L>  /  TBili  0.2  /  DBili  x   /  AST  56<H>  /  ALT  80<H>  /  AlkPhos  219<H>  11-24      Thyroid Function Tests:  11-22 @ 12:00 TSH 2.48     A1C with Estimated Average Glucose: > 18.0. % (11.23.20 @ 05:45)

## 2020-11-24 NOTE — CONSULT NOTE ADULT - ATTENDING COMMENTS
Seen/discussed with fellow  Imaging reviewed personally  Evidence of panc mass that could be a cancer  Needs EUS biopsy  Keep NPO
New DM uncontrolled with HbA1c > 18% with pancreatic mass, low BMI - suspect DM due to pancreatic dysfunction.  Will need basal bolus insulin plan for management. Start pen teaching.  Endocrine team consulted for uncontrolled diabetes. Patient is high risk with high level decision making due to uncontrolled diabetes which places patient at high risk for cardiovascular and cerebrovascular events. Patient with lability of glucose requiring close monitoring and insulin adjustments.    Joan Redman MD  Division of Endocrinology  Pager: [phone]    If after 6PM or before 9AM, or on weekends/holidays, please call endocrine answering service for assistance ([phone]).  For nonurgent matters email [email or web address] for assistance.

## 2020-11-24 NOTE — PROVIDER CONTACT NOTE (OTHER) - BACKGROUND
Patient admitted for Hyperglycemia and found to have pancreatic lesion, likely secondary to malignancy.

## 2020-11-24 NOTE — PROGRESS NOTE ADULT - PROBLEM SELECTOR PLAN 8
Transitions of Care Status:  1.  Name of PCP: none currently  2.  PCP Contacted on Admission: [ ] Y    [ ] N    3.  PCP contacted at Discharge: [ ] Y    [ ] N    [ ] N/A  4.  Post-Discharge Appointment Date and Location:  5.  Summary of Handoff given to PCP: Transitions of Care Status:  1.  Name of PCP: none currently  2.  PCP Contacted on Admission: N/A  3.  PCP contacted at Discharge: [ ] Y    [ ] N    [ ] N/A  4.  Post-Discharge Appointment Date and Location:  5.  Summary of Handoff given to PCP:

## 2020-11-24 NOTE — CONSULT NOTE ADULT - PROBLEM SELECTOR RECOMMENDATION 9
A1c >18 indicating grossly uncontrolled DM. New onset. No prior history. Likely secondary to pancreatic insufficiency and will need basal/bolus insulin for management.  - BG goal inpatient is 100-180  - Patient with poor appetite and will be NPO past midnight for procedure tomorrow  - Agree with Lantus 6 units qhs and Admelog 4 units sq ac TID for now  - Change scale to low correctional ac and hs  - Change diet to consistent carb and recommend using glucerna as supplement   - Monitor FS ac and hs or q6h if NPO  - RD consult  - DM teaching     Discharge plan: Patient to be discharged on basal + bolus insulin pens (doses TBD), please call endocrine team prior to discharge for final recs.   If patient wishes to follow up with [facility]  [address] [place] [address], [address], [place], [place] [id number] [phone]

## 2020-11-24 NOTE — PROGRESS NOTE ADULT - PROBLEM SELECTOR PLAN 1
Pancreatic lesion in neck and body. Unintentional weight loss, poor appetite, generalized weakness likely secondary to malignancy.  Likely has suppression of both exocrine and endocrine function causing hyperglycemia and diarrhea.  Elevation in alk phos, AST, ALT likely also secondary to obstruction from lesion.  - CT chest showing RLL opacity and small nodules, unclear if metastatic lesions  - GI following, possible EUS FNA tmr  - MR abdomen/pelvis to better characterize pancreatic lesion Pancreatic lesion in neck and body. Unintentional weight loss, poor appetite, generalized weakness likely secondary to malignancy.  Likely has suppression of both exocrine and endocrine function causing hyperglycemia and diarrhea.  Elevation in alk phos, AST, ALT likely also secondary to obstruction from lesion.  - CT chest showing RLL opacity and small nodules, unclear if metastatic lesions  - GI following, EUS FNA is delayed to 11/25. NPO at [facility].  - MR abdomen/pelvis pending

## 2020-11-24 NOTE — CONSULT NOTE ADULT - ASSESSMENT
70F w/ ?osteoporosis, multiple tendon repairs who presents with weight loss, diarrhea, poor appetite d/t loss of taste, progressive weakness. Found to have pancreatic lesion concerning for pancreatic neoplasm and new onset DM with A1c >18.

## 2020-11-24 NOTE — PROGRESS NOTE ADULT - SUBJECTIVE AND OBJECTIVE BOX
Ace Sierra, PGY1  Pager [phone]    INCOMPLETE NOTE - IN PROGRESS    Patient is a 70y old  Female who presents with a chief complaint of Weight loss, Diarrhea (23 Nov 2020 10:29)      SUBJECTIVE/INTERVAL EVENTS: Patient seen and examined at bedside.    MEDICATIONS  (STANDING):  dextrose 40% Gel 15 Gram(s) Oral once  dextrose 5%. 1000 milliLiter(s) (50 mL/Hr) IV Continuous <Continuous>  dextrose 5%. 1000 milliLiter(s) (100 mL/Hr) IV Continuous <Continuous>  dextrose 50% Injectable 25 Gram(s) IV Push once  dextrose 50% Injectable 12.5 Gram(s) IV Push once  dextrose 50% Injectable 25 Gram(s) IV Push once  glucagon  Injectable 1 milliGRAM(s) IntraMuscular once  insulin lispro (ADMELOG) corrective regimen sliding scale   SubCutaneous three times a day before meals  insulin lispro (ADMELOG) corrective regimen sliding scale   SubCutaneous at bedtime  insulin lispro Injectable (ADMELOG) 2 Unit(s) SubCutaneous three times a day before meals  polyethylene glycol 3350 17 Gram(s) Oral daily  senna 2 Tablet(s) Oral at bedtime    MEDICATIONS  (PRN):      VITAL SIGNS:  T(F): 98.6 (11-24-20 @ 05:18), Max: 98.7 (11-23-20 @ 21:51)  HR: 82 (11-24-20 @ 05:18) (71 - 85)  BP: 123/62 (11-24-20 @ 05:18) (112/65 - 126/62)  RR: 18 (11-24-20 @ 05:18) (18 - 18)  SpO2: 97% (11-24-20 @ 05:18) (97% - 98%)    I&O's Summary    22 Nov 2020 07:01  -  23 Nov 2020 07:00  --------------------------------------------------------  IN: 100 mL / OUT: 0 mL / NET: 100 mL      Daily Height in cm: 160 (24 Nov 2020 00:36)    Daily     PHYSICAL EXAM:  Gen: Alert, NAD  HEENT: NCAT, conjunctiva clear, sclera anicteric, no erythema or exudates in the oropharynx, mmm  Neck: Supple, no JVD  CV: RRR, S1S2, no m/r/g  Resp: CTAB, normal respiratory effort  Abd: Soft, nontender, nondistended, normal bowel sounds  Ext: no edema, no clubbing or cyanosis  Neuro: AOx3, CN2-12 grossly intact, LÓPEZ  SKIN: warm, perfused    LABS:                        11.9   4.80  )-----------( 116      ( 23 Nov 2020 05:45 )             36.1     Hgb Trend: 11.9<--, 13.3<--  11-23    136  |  103  |  17  ----------------------------<  338<H>  3.6   |  25  |  0.47<L>    Ca    8.8      23 Nov 2020 05:45  Phos  1.3     11-23  Mg     1.9     11-23    TPro  5.4<L>  /  Alb  2.7<L>  /  TBili  0.2  /  DBili  x   /  AST  54<H>  /  ALT  86<H>  /  AlkPhos  229<H>  11-23    Creatinine Trend: 0.47<--, 0.45<--  LIVER FUNCTIONS - ( 23 Nov 2020 05:45 )  Alb: 2.7 g/dL / Pro: 5.4 g/dL / ALK PHOS: 229 u/L / ALT: 86 u/L / AST: 54 u/L / GGT: x                     CAPILLARY BLOOD GLUCOSE      POCT Blood Glucose.: 347 mg/dL (23 Nov 2020 22:23)  POCT Blood Glucose.: 143 mg/dL (23 Nov 2020 17:08)  POCT Blood Glucose.: 302 mg/dL (23 Nov 2020 12:22)  POCT Blood Glucose.: 334 mg/dL (23 Nov 2020 08:30)      RADIOLOGY & ADDITIONAL TESTS: Reviewed    Imaging Personally Reviewed:    Consultant(s) Notes Reviewed:      Care Discussed with Consultants/Other Providers:   Ace Mendozaon, PGY1  Pager [phone]      Patient is a 70y old  Female who presents with a chief complaint of Weight loss, Diarrhea (23 Nov 2020 10:29)      SUBJECTIVE/INTERVAL EVENTS:  NAEON. Was planned for EUS today but has been delayed to tomorrow.  Patient is hungry and wants to eat.  Reports good appetite, wants more sugar packets with her trays.  Denies n/v/d. Patient reports moderate amount of soft stools this AM. Denies abd pain.  Patient continues to have weakness but denies any other acute complaints.      MEDICATIONS  (STANDING):  dextrose 40% Gel 15 Gram(s) Oral once  dextrose 5%. 1000 milliLiter(s) (50 mL/Hr) IV Continuous <Continuous>  dextrose 5%. 1000 milliLiter(s) (100 mL/Hr) IV Continuous <Continuous>  dextrose 50% Injectable 25 Gram(s) IV Push once  dextrose 50% Injectable 12.5 Gram(s) IV Push once  dextrose 50% Injectable 25 Gram(s) IV Push once  glucagon  Injectable 1 milliGRAM(s) IntraMuscular once  insulin lispro (ADMELOG) corrective regimen sliding scale   SubCutaneous three times a day before meals  insulin lispro (ADMELOG) corrective regimen sliding scale   SubCutaneous at bedtime  insulin lispro Injectable (ADMELOG) 2 Unit(s) SubCutaneous three times a day before meals  polyethylene glycol 3350 17 Gram(s) Oral daily  senna 2 Tablet(s) Oral at bedtime    MEDICATIONS  (PRN):      VITAL SIGNS:  T(F): 98.6 (11-24-20 @ 05:18), Max: 98.7 (11-23-20 @ 21:51)  HR: 82 (11-24-20 @ 05:18) (71 - 85)  BP: 123/62 (11-24-20 @ 05:18) (112/65 - 126/62)  RR: 18 (11-24-20 @ 05:18) (18 - 18)  SpO2: 97% (11-24-20 @ 05:18) (97% - 98%)    I&O's Summary    22 Nov 2020 07:01  -  23 Nov 2020 07:00  --------------------------------------------------------  IN: 100 mL / OUT: 0 mL / NET: 100 mL      Daily Height in cm: 160 (24 Nov 2020 00:36)    Daily     PHYSICAL EXAM:  Gen: Alert, very thin, cachetic black female, in NAD  HEENT: NCAT, PERRL, EOMI, clear conjunctiva, no erythema or exudates in the oropharynx, mmm  Neck: Supple, no LAD  CV: RRR, S1S2, no m/r/g  Resp: CTAB, normal respiratory effort  Abd: Soft, NT, ND, normal bowel sounds  Ext: trace edema up to ankles, no clubbing or cyanosis  Neuro: AOx3, CN2-12 grossly intact, LÓPEZ  Skin: no rashes, no ecchymoses      LABS:                        11.9   4.80  )-----------( 116      ( 23 Nov 2020 05:45 )             36.1     Hgb Trend: 11.9<--, 13.3<--  11-23    136  |  103  |  17  ----------------------------<  338<H>  3.6   |  25  |  0.47<L>    Ca    8.8      23 Nov 2020 05:45  Phos  1.3     11-23  Mg     1.9     11-23    TPro  5.4<L>  /  Alb  2.7<L>  /  TBili  0.2  /  DBili  x   /  AST  54<H>  /  ALT  86<H>  /  AlkPhos  229<H>  11-23    Creatinine Trend: 0.47<--, 0.45<--  LIVER FUNCTIONS - ( 23 Nov 2020 05:45 )  Alb: 2.7 g/dL / Pro: 5.4 g/dL / ALK PHOS: 229 u/L / ALT: 86 u/L / AST: 54 u/L / GGT: x                     CAPILLARY BLOOD GLUCOSE      POCT Blood Glucose.: 347 mg/dL (23 Nov 2020 22:23)  POCT Blood Glucose.: 143 mg/dL (23 Nov 2020 17:08)  POCT Blood Glucose.: 302 mg/dL (23 Nov 2020 12:22)  POCT Blood Glucose.: 334 mg/dL (23 Nov 2020 08:30)      RADIOLOGY & ADDITIONAL TESTS: Reviewed    Imaging Personally Reviewed:    Consultant(s) Notes Reviewed:      Care Discussed with Consultants/Other Providers:

## 2020-11-25 DIAGNOSIS — D64.9 ANEMIA, UNSPECIFIED: ICD-10-CM

## 2020-11-25 LAB
ALBUMIN SERPL ELPH-MCNC: 2.7 G/DL — LOW (ref 3.3–5)
ALP SERPL-CCNC: 227 U/L — HIGH (ref 40–120)
ALT FLD-CCNC: 76 U/L — HIGH (ref 4–33)
ANION GAP SERPL CALC-SCNC: 8 MMO/L — SIGNIFICANT CHANGE UP (ref 7–14)
APTT BLD: 26.7 SEC — LOW (ref 27–36.3)
AST SERPL-CCNC: 61 U/L — HIGH (ref 4–32)
BILIRUB SERPL-MCNC: < 0.2 MG/DL — LOW (ref 0.2–1.2)
BUN SERPL-MCNC: 16 MG/DL — SIGNIFICANT CHANGE UP (ref 7–23)
CALCIUM SERPL-MCNC: 8.6 MG/DL — SIGNIFICANT CHANGE UP (ref 8.4–10.5)
CHLORIDE SERPL-SCNC: 105 MMOL/L — SIGNIFICANT CHANGE UP (ref 98–107)
CO2 SERPL-SCNC: 26 MMOL/L — SIGNIFICANT CHANGE UP (ref 22–31)
CREAT SERPL-MCNC: 0.44 MG/DL — LOW (ref 0.5–1.3)
GLUCOSE BLDC GLUCOMTR-MCNC: 102 MG/DL — HIGH (ref 70–99)
GLUCOSE BLDC GLUCOMTR-MCNC: 274 MG/DL — HIGH (ref 70–99)
GLUCOSE BLDC GLUCOMTR-MCNC: 289 MG/DL — HIGH (ref 70–99)
GLUCOSE BLDC GLUCOMTR-MCNC: 338 MG/DL — HIGH (ref 70–99)
GLUCOSE BLDC GLUCOMTR-MCNC: 86 MG/DL — SIGNIFICANT CHANGE UP (ref 70–99)
GLUCOSE SERPL-MCNC: 297 MG/DL — HIGH (ref 70–99)
HCT VFR BLD CALC: 33.4 % — LOW (ref 34.5–45)
HGB BLD-MCNC: 11.1 G/DL — LOW (ref 11.5–15.5)
INR BLD: 0.88 — SIGNIFICANT CHANGE UP (ref 0.88–1.16)
MAGNESIUM SERPL-MCNC: 1.9 MG/DL — SIGNIFICANT CHANGE UP (ref 1.6–2.6)
MCHC RBC-ENTMCNC: 29.3 PG — SIGNIFICANT CHANGE UP (ref 27–34)
MCHC RBC-ENTMCNC: 33.2 % — SIGNIFICANT CHANGE UP (ref 32–36)
MCV RBC AUTO: 88.1 FL — SIGNIFICANT CHANGE UP (ref 80–100)
NRBC # FLD: 0 K/UL — SIGNIFICANT CHANGE UP (ref 0–0)
PHOSPHATE SERPL-MCNC: 1.5 MG/DL — LOW (ref 2.5–4.5)
PLATELET # BLD AUTO: 92 K/UL — LOW (ref 150–400)
PMV BLD: 12.3 FL — SIGNIFICANT CHANGE UP (ref 7–13)
POTASSIUM SERPL-MCNC: 3.8 MMOL/L — SIGNIFICANT CHANGE UP (ref 3.5–5.3)
POTASSIUM SERPL-SCNC: 3.8 MMOL/L — SIGNIFICANT CHANGE UP (ref 3.5–5.3)
PROT SERPL-MCNC: 5.2 G/DL — LOW (ref 6–8.3)
PROTHROM AB SERPL-ACNC: 10.1 SEC — LOW (ref 10.6–13.6)
RBC # BLD: 3.79 M/UL — LOW (ref 3.8–5.2)
RBC # FLD: 15 % — HIGH (ref 10.3–14.5)
SODIUM SERPL-SCNC: 139 MMOL/L — SIGNIFICANT CHANGE UP (ref 135–145)
WBC # BLD: 4.31 K/UL — SIGNIFICANT CHANGE UP (ref 3.8–10.5)
WBC # FLD AUTO: 4.31 K/UL — SIGNIFICANT CHANGE UP (ref 3.8–10.5)

## 2020-11-25 PROCEDURE — 74183 MRI ABD W/O CNTR FLWD CNTR: CPT | Mod: 26

## 2020-11-25 PROCEDURE — 99233 SBSQ HOSP IP/OBS HIGH 50: CPT

## 2020-11-25 PROCEDURE — 43259 EGD US EXAM DUODENUM/JEJUNUM: CPT | Mod: GC

## 2020-11-25 PROCEDURE — 43239 EGD BIOPSY SINGLE/MULTIPLE: CPT | Mod: 59,GC

## 2020-11-25 RX ORDER — INSULIN LISPRO 100/ML
VIAL (ML) SUBCUTANEOUS
Refills: 0 | Status: DISCONTINUED | OUTPATIENT
Start: 2020-11-25 | End: 2020-11-27

## 2020-11-25 RX ORDER — SODIUM CHLORIDE 9 MG/ML
1000 INJECTION, SOLUTION INTRAVENOUS
Refills: 0 | Status: DISCONTINUED | OUTPATIENT
Start: 2020-11-25 | End: 2020-11-25

## 2020-11-25 RX ORDER — DEXTROSE 50 % IN WATER 50 %
25 SYRINGE (ML) INTRAVENOUS ONCE
Refills: 0 | Status: DISCONTINUED | OUTPATIENT
Start: 2020-11-25 | End: 2020-11-27

## 2020-11-25 RX ORDER — INSULIN GLARGINE 100 [IU]/ML
6 INJECTION, SOLUTION SUBCUTANEOUS AT BEDTIME
Refills: 0 | Status: DISCONTINUED | OUTPATIENT
Start: 2020-11-25 | End: 2020-11-26

## 2020-11-25 RX ORDER — INSULIN LISPRO 100/ML
VIAL (ML) SUBCUTANEOUS AT BEDTIME
Refills: 0 | Status: DISCONTINUED | OUTPATIENT
Start: 2020-11-25 | End: 2020-11-25

## 2020-11-25 RX ORDER — SODIUM CHLORIDE 9 MG/ML
1000 INJECTION, SOLUTION INTRAVENOUS
Refills: 0 | Status: DISCONTINUED | OUTPATIENT
Start: 2020-11-25 | End: 2020-11-27

## 2020-11-25 RX ORDER — INSULIN GLARGINE 100 [IU]/ML
6 INJECTION, SOLUTION SUBCUTANEOUS AT BEDTIME
Refills: 0 | Status: DISCONTINUED | OUTPATIENT
Start: 2020-11-25 | End: 2020-11-25

## 2020-11-25 RX ORDER — DEXTROSE 50 % IN WATER 50 %
15 SYRINGE (ML) INTRAVENOUS ONCE
Refills: 0 | Status: DISCONTINUED | OUTPATIENT
Start: 2020-11-25 | End: 2020-11-25

## 2020-11-25 RX ORDER — GLUCAGON INJECTION, SOLUTION 0.5 MG/.1ML
1 INJECTION, SOLUTION SUBCUTANEOUS ONCE
Refills: 0 | Status: DISCONTINUED | OUTPATIENT
Start: 2020-11-25 | End: 2020-11-25

## 2020-11-25 RX ORDER — INSULIN LISPRO 100/ML
VIAL (ML) SUBCUTANEOUS
Refills: 0 | Status: DISCONTINUED | OUTPATIENT
Start: 2020-11-25 | End: 2020-11-25

## 2020-11-25 RX ORDER — INSULIN LISPRO 100/ML
VIAL (ML) SUBCUTANEOUS EVERY 6 HOURS
Refills: 0 | Status: DISCONTINUED | OUTPATIENT
Start: 2020-11-25 | End: 2020-11-25

## 2020-11-25 RX ORDER — DEXTROSE 50 % IN WATER 50 %
12.5 SYRINGE (ML) INTRAVENOUS ONCE
Refills: 0 | Status: DISCONTINUED | OUTPATIENT
Start: 2020-11-25 | End: 2020-11-25

## 2020-11-25 RX ORDER — DEXTROSE 50 % IN WATER 50 %
25 SYRINGE (ML) INTRAVENOUS ONCE
Refills: 0 | Status: DISCONTINUED | OUTPATIENT
Start: 2020-11-25 | End: 2020-11-25

## 2020-11-25 RX ORDER — POTASSIUM PHOSPHATE, MONOBASIC POTASSIUM PHOSPHATE, DIBASIC 236; 224 MG/ML; MG/ML
15 INJECTION, SOLUTION INTRAVENOUS ONCE
Refills: 0 | Status: COMPLETED | OUTPATIENT
Start: 2020-11-25 | End: 2020-11-25

## 2020-11-25 RX ORDER — GLUCAGON INJECTION, SOLUTION 0.5 MG/.1ML
1 INJECTION, SOLUTION SUBCUTANEOUS ONCE
Refills: 0 | Status: DISCONTINUED | OUTPATIENT
Start: 2020-11-25 | End: 2020-11-27

## 2020-11-25 RX ORDER — DEXTROSE 50 % IN WATER 50 %
15 SYRINGE (ML) INTRAVENOUS ONCE
Refills: 0 | Status: DISCONTINUED | OUTPATIENT
Start: 2020-11-25 | End: 2020-11-27

## 2020-11-25 RX ORDER — INSULIN LISPRO 100/ML
6 VIAL (ML) SUBCUTANEOUS ONCE
Refills: 0 | Status: COMPLETED | OUTPATIENT
Start: 2020-11-25 | End: 2020-11-25

## 2020-11-25 RX ORDER — DEXTROSE 50 % IN WATER 50 %
12.5 SYRINGE (ML) INTRAVENOUS ONCE
Refills: 0 | Status: DISCONTINUED | OUTPATIENT
Start: 2020-11-25 | End: 2020-11-27

## 2020-11-25 RX ORDER — INSULIN LISPRO 100/ML
4 VIAL (ML) SUBCUTANEOUS
Refills: 0 | Status: DISCONTINUED | OUTPATIENT
Start: 2020-11-25 | End: 2020-11-27

## 2020-11-25 RX ORDER — INSULIN LISPRO 100/ML
VIAL (ML) SUBCUTANEOUS AT BEDTIME
Refills: 0 | Status: DISCONTINUED | OUTPATIENT
Start: 2020-11-25 | End: 2020-11-27

## 2020-11-25 RX ADMIN — Medication 1: at 21:43

## 2020-11-25 RX ADMIN — POTASSIUM PHOSPHATE, MONOBASIC POTASSIUM PHOSPHATE, DIBASIC 62.5 MILLIMOLE(S): 236; 224 INJECTION, SOLUTION INTRAVENOUS at 11:27

## 2020-11-25 RX ADMIN — INSULIN GLARGINE 6 UNIT(S): 100 INJECTION, SOLUTION SUBCUTANEOUS at 21:43

## 2020-11-25 RX ADMIN — Medication 6 UNIT(S): at 06:25

## 2020-11-25 RX ADMIN — Medication 4 UNIT(S): at 17:50

## 2020-11-25 RX ADMIN — SODIUM CHLORIDE 30 MILLILITER(S): 9 INJECTION, SOLUTION INTRAVENOUS at 14:53

## 2020-11-25 NOTE — PROGRESS NOTE ADULT - PROBLEM SELECTOR PLAN 5
Now resolved. No acidosis and lactate wnl. Frail and cachetic, likely d/t malignancy. Likely has protein-calorie malnutrition.  - CC diet with glucerna

## 2020-11-25 NOTE — CHART NOTE - NSCHARTNOTEFT_GEN_A_CORE
Patient off the floor at endoscopy. Chart reviewed.  Hyperglycemia yesterday improved.  One value of 86 prelunch after receiving Admelog 6 units in AM (uncertain if ate breakfast, likely not).  Agree with continue current insulin regimen of Lantus 6 units qhs, Admelog 4/4/4 and low correction scales.  Will follow.    Joan Redman MD  Division of Endocrinology  Pager: [phone]    If after 6PM or before 9AM, or on weekends/holidays, please call endocrine answering service for assistance ([phone]).  For nonurgent matters email [email or web address] for assistance.

## 2020-11-25 NOTE — DIETITIAN INITIAL EVALUATION ADULT. - PROBLEM SELECTOR PLAN 1
Pancreatic lesion in neck and body. Unintentional weight loss, poor appetite, generalized weakness likely secondary to malignancy.  Likely has suppression of both exocrine and endocrine function causing hyperglycemia and diarrhea.  Elevation in alk phos, AST, ALT likely also secondary to obstruction from lesion. Indeterminate lesions in liver and kidney.  - Check CA 19-9  - CT chest for preliminary staging  - GI consult for EUS FNA and whether triple phase or MR for further evaluating pancreatic lesion

## 2020-11-25 NOTE — DIETITIAN INITIAL EVALUATION ADULT. - PERSON TAUGHT/METHOD
patient instructed/ask me 3/teach back - (Patient repeats in own words)/written material/verbal instruction

## 2020-11-25 NOTE — DIETITIAN INITIAL EVALUATION ADULT. - ORAL INTAKE PTA/DIET HISTORY
Pt. reports generally good appetite/PO intake, however with acute decrease x ~1 month PTA.  Endorses lack of taste since April of this year.    Drinks mostly water, gatorade.  Has been consuming Ensure supplement 1x daily at home.

## 2020-11-25 NOTE — DIETITIAN INITIAL EVALUATION ADULT. - PROBLEM SELECTOR PLAN 5
VBG with mild acidosis, mixed respiratory and metabolic. PCO2 elevated. Lactate 3.1. Lactic acidosis likely d/t hypovolemia/dehydration.  - Trend VBG and lactate in AM

## 2020-11-25 NOTE — PROGRESS NOTE ADULT - PROBLEM SELECTOR PLAN 3
Hyperglycemia possibly secondary to suppression of pancreatic endocrine function possibly d/t infiltration of lesion.  FS up to 400s today.  - 12u additional correctional insulin required over past 24 hours.  - Increase Lispro to 4u AC TID  - Start Lantus 6u QHS  - c/w FS qAC/HS, low dose SSI Hyperglycemia possibly secondary to suppression of pancreatic endocrine function possibly d/t infiltration of lesion.  - C/w Lispro 4u AC TID, Lantus 6u QHS for now  - C/w FS qAC/HS once off NPO, low dose SSI  - Will f/u Endo recs

## 2020-11-25 NOTE — PROGRESS NOTE ADULT - SUBJECTIVE AND OBJECTIVE BOX
Ace Mendozaon, PGY1  Pager [phone]    INCOMPLETE NOTE - IN PROGRESS    Patient is a 70y old  Female who presents with a chief complaint of Weight loss, Diarrhea (2020 18:14)      SUBJECTIVE/INTERVAL EVENTS: Patient seen and examined at bedside.    MEDICATIONS  (STANDING):  dextrose 40% Gel 15 Gram(s) Oral once  dextrose 5%. 1000 milliLiter(s) (50 mL/Hr) IV Continuous <Continuous>  dextrose 5%. 1000 milliLiter(s) (100 mL/Hr) IV Continuous <Continuous>  dextrose 50% Injectable 12.5 Gram(s) IV Push once  dextrose 50% Injectable 25 Gram(s) IV Push once  dextrose 50% Injectable 25 Gram(s) IV Push once  glucagon  Injectable 1 milliGRAM(s) IntraMuscular once  insulin lispro (ADMELOG) corrective regimen sliding scale   SubCutaneous every 6 hours  polyethylene glycol 3350 17 Gram(s) Oral daily  senna 2 Tablet(s) Oral at bedtime    MEDICATIONS  (PRN):      VITAL SIGNS:  T(F): 98.1 (20 @ 05:48), Max: 98.2 (20 @ 21:32)  HR: 86 (20 @ 05:48) (81 - 93)  BP: 120/65 (20 @ 05:48) (115/60 - 125/57)  RR: 17 (20 @ 05:48) (17 - 17)  SpO2: 99% (20 @ 05:48) (99% - 100%)    I&O's Summary    Daily     Daily Weight in k.2 (2020 05:48)    PHYSICAL EXAM:  Gen: Alert, NAD  HEENT: NCAT, conjunctiva clear, sclera anicteric, no erythema or exudates in the oropharynx, mmm  Neck: Supple, no JVD  CV: RRR, S1S2, no m/r/g  Resp: CTAB, normal respiratory effort  Abd: Soft, nontender, nondistended, normal bowel sounds  Ext: no edema, no clubbing or cyanosis  Neuro: AOx3, CN2-12 grossly intact, LÓPEZ  SKIN: warm, perfused    LABS:                        11.5   4.36  )-----------( 103      ( 2020 07:00 )             35.0     Hgb Trend: 11.5<--, 11.9<--, 13.3<--  11-24    136  |  105  |  16  ----------------------------<  337<H>  3.8   |  24  |  0.39<L>    Ca    8.6      2020 07:00  Phos  1.9     11-24  Mg     1.9     1124    TPro  5.2<L>  /  Alb  2.6<L>  /  TBili  0.2  /  DBili  x   /  AST  56<H>  /  ALT  80<H>  /  AlkPhos  219<H>  1124    Creatinine Trend: 0.39<--, 0.47<--, 0.45<--  LIVER FUNCTIONS - ( 2020 07:00 )  Alb: 2.6 g/dL / Pro: 5.2 g/dL / ALK PHOS: 219 u/L / ALT: 80 u/L / AST: 56 u/L / GGT: x           PT/INR - ( 2020 07:00 )   PT: 10.5 SEC;   INR: 0.91          PTT - ( 2020 07:00 )  PTT:27.4 SEC          CAPILLARY BLOOD GLUCOSE      POCT Blood Glucose.: 289 mg/dL (2020 05:58)  POCT Blood Glucose.: 338 mg/dL (2020 03:05)  POCT Blood Glucose.: 445 mg/dL (2020 23:11)  POCT Blood Glucose.: 214 mg/dL (2020 17:20)  POCT Blood Glucose.: 431 mg/dL (2020 12:19)  POCT Blood Glucose.: 415 mg/dL (2020 12:18)  POCT Blood Glucose.: 317 mg/dL (2020 08:36)      RADIOLOGY & ADDITIONAL TESTS: Reviewed    Imaging Personally Reviewed:    Consultant(s) Notes Reviewed:      Care Discussed with Consultants/Other Providers:   Ace Whelan Mariela, PGY1  Pager [phone]      Patient is a 70y old  Female who presents with a chief complaint of Weight loss, Diarrhea (2020 18:14)      SUBJECTIVE/INTERVAL EVENTS:  NAEON  MR of abdomen was done this AM  Patient denies n/v/d. Had moderate amount of soft stools this AM.  Patient seen and examined at bedside.      MEDICATIONS  (STANDING):  dextrose 40% Gel 15 Gram(s) Oral once  dextrose 5%. 1000 milliLiter(s) (50 mL/Hr) IV Continuous <Continuous>  dextrose 5%. 1000 milliLiter(s) (100 mL/Hr) IV Continuous <Continuous>  dextrose 50% Injectable 12.5 Gram(s) IV Push once  dextrose 50% Injectable 25 Gram(s) IV Push once  dextrose 50% Injectable 25 Gram(s) IV Push once  glucagon  Injectable 1 milliGRAM(s) IntraMuscular once  insulin lispro (ADMELOG) corrective regimen sliding scale   SubCutaneous every 6 hours  polyethylene glycol 3350 17 Gram(s) Oral daily  senna 2 Tablet(s) Oral at bedtime    MEDICATIONS  (PRN):      VITAL SIGNS:  T(F): 98.1 (20 @ 05:48), Max: 98.2 (20 @ 21:32)  HR: 86 (20 @ 05:48) (81 - 93)  BP: 120/65 (20 @ 05:48) (115/60 - 125/57)  RR: 17 (20 @ 05:48) (17 - 17)  SpO2: 99% (20 @ 05:48) (99% - 100%)    I&O's Summary    Daily     Daily Weight in k.2 (2020 05:48)    PHYSICAL EXAM:  Gen: Alert, very thin, cachetic black female, in NAD  HEENT: NCAT, PERRL, EOMI, clear conjunctiva, no erythema or exudates in the oropharynx, mmm  Neck: Supple, no LAD  CV: RRR, S1S2, no m/r/g  Resp: Crackles in RLL, normal respiratory effort  Abd: Soft, NT, ND, normal bowel sounds  Ext: trace edema up to ankles, no clubbing or cyanosis  Neuro: AOx3, CN2-12 grossly intact, LÓPEZ  Skin: no rashes, no ecchymoses    LABS:                        11.5   4.36  )-----------( 103      ( 2020 07:00 )             35.0     Hgb Trend: 11.5<--, 11.9<--, 13.3<--  11-24    136  |  105  |  16  ----------------------------<  337<H>  3.8   |  24  |  0.39<L>    Ca    8.6      :  Phos  1.9       Mg     1.9     24    TPro  5.2<L>  /  Alb  2.6<L>  /  TBili  0.2  /  DBili  x   /  AST  56<H>  /  ALT  80<H>  /  AlkPhos  219<H>  24    Creatinine Trend: 0.39<--, 0.47<--, 0.45<--  LIVER FUNCTIONS - (  )  Alb: 2.6 g/dL / Pro: 5.2 g/dL / ALK PHOS: 219 u/L / ALT: 80 u/L / AST: 56 u/L / GGT: x           PT/INR - ( : )   PT: 10.5 SEC;   INR: 0.91          PTT - (  )  PTT:27.4 SEC          CAPILLARY BLOOD GLUCOSE      POCT Blood Glucose.: 289 mg/dL (2020 05:58)  POCT Blood Glucose.: 338 mg/dL (2020 03:05)  POCT Blood Glucose.: 445 mg/dL (2020 23:11)  POCT Blood Glucose.: 214 mg/dL (2020 17:20)  POCT Blood Glucose.: 431 mg/dL (2020 12:19)  POCT Blood Glucose.: 415 mg/dL (2020 12:18)  POCT Blood Glucose.: 317 mg/dL (2020 08:36)      RADIOLOGY & ADDITIONAL TESTS: Reviewed    Imaging Personally Reviewed:    Consultant(s) Notes Reviewed:      Care Discussed with Consultants/Other Providers:

## 2020-11-25 NOTE — PROGRESS NOTE ADULT - PROBLEM SELECTOR PLAN 4
Frail and cachetic, likely d/t malignancy. Likely has protein-calorie malnutrition.  - Regular diet as tolerated  - Nutrition consult Normocytic anemia, downtrending since admission.  Possibly secondary to malignancy vs. iron/vitamin deficiency.  Also with thrombocytopenia, downtrending since admission.  - Will check iron, TIBC, ferritin, B12, folate

## 2020-11-25 NOTE — DIETITIAN INITIAL EVALUATION ADULT. - PROBLEM SELECTOR PLAN 4
Diarrhea possibly secondary to immense stool burden (as seen on CT) vs. suppression of pancreatic exocrine function leading to decreased pancreatic enzymes.  No current sx.  - Senna and Miralax daily   - Monitor electrolytes  - Monitor for recurrence

## 2020-11-25 NOTE — CHART NOTE - FINDINGS AS BASED ON:
Patient Education/Food acceptance and intake status from observations by staff/Comprehensive nutrition assessment and consultation

## 2020-11-25 NOTE — PROGRESS NOTE ADULT - ASSESSMENT
70F w/ current smoker, ?osteoporosis who presents with unintentional weight loss and diarrhea, found to have pancreatic lesion, likely secondary to malignancy.  Diarrhea and hyperglycemia likely explained by suppression of pancreatic function. 70F current smoker, w/ ?osteoporosis who presents with unintentional weight loss and diarrhea, found to have pancreatic lesion, likely secondary to malignancy.  Diarrhea and hyperglycemia likely explained by suppression of pancreatic function.

## 2020-11-25 NOTE — DIETITIAN INITIAL EVALUATION ADULT. - PROBLEM SELECTOR PLAN 2
Hyperglycemia possibly secondary to suppression of pancreatic endocrine function possibly d/t infiltration of lesion.   - s/p 6u lispro x 2. FS now 182.  - FS qAC/HS, low dose SSI  - Endocrinology consult

## 2020-11-25 NOTE — PROGRESS NOTE ADULT - ATTENDING COMMENTS
MRI and EUS planned for today.  Started lantus for the first time last night - will continue to monitor FS today before further adjusting insulin regimen - appreciate endo recs.

## 2020-11-25 NOTE — DIETITIAN INITIAL EVALUATION ADULT. - PERTINENT LABORATORY DATA
11-25 Na139 mmol/L Glu 297 mg/dL<H> K+ 3.8 mmol/L Cr  0.44 mg/dL<L> BUN 16 mg/dL 11-25 Phos 1.5 mg/dL<L> 11-25 Alb 2.7 g/dL<L>  HbA1c >18.0%    CAPILLARY BLOOD GLUCOSE      POCT Blood Glucose.: 289 mg/dL (25 Nov 2020 05:58)  POCT Blood Glucose.: 338 mg/dL (25 Nov 2020 03:05)  POCT Blood Glucose.: 445 mg/dL (24 Nov 2020 23:11)  POCT Blood Glucose.: 214 mg/dL (24 Nov 2020 17:20)  POCT Blood Glucose.: 431 mg/dL (24 Nov 2020 12:19)  POCT Blood Glucose.: 415 mg/dL (24 Nov 2020 12:18)

## 2020-11-25 NOTE — DIETITIAN INITIAL EVALUATION ADULT. - PERTINENT MEDS FT
MEDICATIONS  (STANDING):  dextrose 40% Gel 15 Gram(s) Oral once  dextrose 5%. 1000 milliLiter(s) (50 mL/Hr) IV Continuous <Continuous>  dextrose 5%. 1000 milliLiter(s) (100 mL/Hr) IV Continuous <Continuous>  dextrose 50% Injectable 12.5 Gram(s) IV Push once  dextrose 50% Injectable 25 Gram(s) IV Push once  dextrose 50% Injectable 25 Gram(s) IV Push once  glucagon  Injectable 1 milliGRAM(s) IntraMuscular once  insulin lispro (ADMELOG) corrective regimen sliding scale   SubCutaneous every 6 hours  polyethylene glycol 3350 17 Gram(s) Oral daily  potassium phosphate IVPB 15 milliMole(s) IV Intermittent once  senna 2 Tablet(s) Oral at bedtime

## 2020-11-25 NOTE — PROGRESS NOTE ADULT - PROBLEM SELECTOR PLAN 8
Transitions of Care Status:  1.  Name of PCP: none currently  2.  PCP Contacted on Admission: N/A  3.  PCP contacted at Discharge: [ ] Y    [ ] N    [ ] N/A  4.  Post-Discharge Appointment Date and Location:  5.  Summary of Handoff given to PCP:

## 2020-11-25 NOTE — PROGRESS NOTE ADULT - PROBLEM SELECTOR PLAN 1
Pancreatic lesion in neck and body. Unintentional weight loss, poor appetite, generalized weakness likely secondary to malignancy.  Likely has suppression of both exocrine and endocrine function causing hyperglycemia and diarrhea.  Elevation in alk phos, AST, ALT likely also secondary to obstruction from lesion.  - CT chest showing RLL opacity and small nodules, unclear if metastatic lesions  - GI following, EUS FNA is delayed to 11/25. NPO at [facility].  - MR abdomen/pelvis pending Pancreatic lesion in neck and body. Unintentional weight loss, poor appetite, generalized weakness likely secondary to malignancy.  Likely has suppression of both exocrine and endocrine function causing hyperglycemia and diarrhea.  Elevation in alk phos, AST, ALT likely also secondary to obstruction from lesion.  - CT chest showing RLL opacity and small nodules, unclear if metastatic lesions  - GI following, EUS FNA planned 11/25  - MR abdomen/pelvis done, pending read

## 2020-11-25 NOTE — DIETITIAN INITIAL EVALUATION ADULT. - OTHER INFO
Pt. with findings of pancreatic lesion and new onset DM.  Pt. denies food allergies, nausea/vomiting/constipation, or issues with chewing/swallowing.  Spoke with medical team re: nutrition recommendations, as well as concern about loss of taste since April (no noted Hx of COVID)    Pt. reports that she is consuming <50% of most meals provided at hospital.  Encouraged Pt. to drink Glucerna supplement between meals.  Discussed food preferences.  Reviewed menu.    RDN provided extensive verbal & printed nutrition education re: therapeutic diet.  Discussed carbohydrate food sources, consistent [fiber dense] carbohydrate intake & carb. counting, hypoglycemia prevention/management, & nutrition label reading.  Discouraged consumption of concentrated sweetened beverages.  Also encouraged self glucose monitoring and emphasized endocrinology f/u.   Pt. receptive to education.    States usual body weight as ~140lbs with weight loss over past 8 months.  Consistent with significant weight change x <1 year.

## 2020-11-26 LAB
ALBUMIN SERPL ELPH-MCNC: 2.5 G/DL — LOW (ref 3.3–5)
ALP SERPL-CCNC: 186 U/L — HIGH (ref 40–120)
ALT FLD-CCNC: 58 U/L — HIGH (ref 4–33)
ANION GAP SERPL CALC-SCNC: 8 MMO/L — SIGNIFICANT CHANGE UP (ref 7–14)
AST SERPL-CCNC: 33 U/L — HIGH (ref 4–32)
B-OH-BUTYR SERPL-SCNC: < 0 MMOL/L — LOW (ref 0–0.4)
BASE EXCESS BLDV CALC-SCNC: 1.4 MMOL/L — SIGNIFICANT CHANGE UP
BILIRUB SERPL-MCNC: 0.2 MG/DL — SIGNIFICANT CHANGE UP (ref 0.2–1.2)
BUN SERPL-MCNC: 15 MG/DL — SIGNIFICANT CHANGE UP (ref 7–23)
CALCIUM SERPL-MCNC: 8.4 MG/DL — SIGNIFICANT CHANGE UP (ref 8.4–10.5)
CHLORIDE SERPL-SCNC: 107 MMOL/L — SIGNIFICANT CHANGE UP (ref 98–107)
CO2 SERPL-SCNC: 25 MMOL/L — SIGNIFICANT CHANGE UP (ref 22–31)
CREAT SERPL-MCNC: 0.39 MG/DL — LOW (ref 0.5–1.3)
FERRITIN SERPL-MCNC: 981.9 NG/ML — HIGH (ref 15–150)
FOLATE SERPL-MCNC: 11.9 NG/ML — SIGNIFICANT CHANGE UP (ref 4.7–20)
GLUCOSE BLDC GLUCOMTR-MCNC: 322 MG/DL — HIGH (ref 70–99)
GLUCOSE BLDC GLUCOMTR-MCNC: 346 MG/DL — HIGH (ref 70–99)
GLUCOSE BLDC GLUCOMTR-MCNC: 375 MG/DL — HIGH (ref 70–99)
GLUCOSE BLDC GLUCOMTR-MCNC: 407 MG/DL — HIGH (ref 70–99)
GLUCOSE BLDC GLUCOMTR-MCNC: 433 MG/DL — HIGH (ref 70–99)
GLUCOSE SERPL-MCNC: 238 MG/DL — HIGH (ref 70–99)
HCO3 BLDV-SCNC: 24 MMOL/L — SIGNIFICANT CHANGE UP (ref 20–27)
HCT VFR BLD CALC: 32.4 % — LOW (ref 34.5–45)
HGB BLD-MCNC: 10.5 G/DL — LOW (ref 11.5–15.5)
IRON SATN MFR SERPL: 124 UG/DL — LOW (ref 140–530)
IRON SATN MFR SERPL: 38 UG/DL — SIGNIFICANT CHANGE UP (ref 30–160)
MAGNESIUM SERPL-MCNC: 1.8 MG/DL — SIGNIFICANT CHANGE UP (ref 1.6–2.6)
MCHC RBC-ENTMCNC: 28.7 PG — SIGNIFICANT CHANGE UP (ref 27–34)
MCHC RBC-ENTMCNC: 32.4 % — SIGNIFICANT CHANGE UP (ref 32–36)
MCV RBC AUTO: 88.5 FL — SIGNIFICANT CHANGE UP (ref 80–100)
NRBC # FLD: 0 K/UL — SIGNIFICANT CHANGE UP (ref 0–0)
PCO2 BLDV: 52 MMHG — HIGH (ref 41–51)
PH BLDV: 7.33 PH — SIGNIFICANT CHANGE UP (ref 7.32–7.43)
PHOSPHATE SERPL-MCNC: 2.6 MG/DL — SIGNIFICANT CHANGE UP (ref 2.5–4.5)
PLATELET # BLD AUTO: 88 K/UL — LOW (ref 150–400)
PMV BLD: 13 FL — SIGNIFICANT CHANGE UP (ref 7–13)
PO2 BLDV: 28 MMHG — LOW (ref 35–40)
POTASSIUM SERPL-MCNC: 3.7 MMOL/L — SIGNIFICANT CHANGE UP (ref 3.5–5.3)
POTASSIUM SERPL-SCNC: 3.7 MMOL/L — SIGNIFICANT CHANGE UP (ref 3.5–5.3)
PROT SERPL-MCNC: 4.5 G/DL — LOW (ref 6–8.3)
RBC # BLD: 3.66 M/UL — LOW (ref 3.8–5.2)
RBC # FLD: 15.4 % — HIGH (ref 10.3–14.5)
RETICS #: 46 K/UL — SIGNIFICANT CHANGE UP (ref 25–125)
RETICS/RBC NFR: 1.3 % — SIGNIFICANT CHANGE UP (ref 0.5–2.5)
SAO2 % BLDV: 47.5 % — LOW (ref 60–85)
SODIUM SERPL-SCNC: 140 MMOL/L — SIGNIFICANT CHANGE UP (ref 135–145)
UIBC SERPL-MCNC: 86.3 UG/DL — LOW (ref 110–370)
VIT B12 SERPL-MCNC: 1290 PG/ML — HIGH (ref 200–900)
WBC # BLD: 4.97 K/UL — SIGNIFICANT CHANGE UP (ref 3.8–10.5)
WBC # FLD AUTO: 4.97 K/UL — SIGNIFICANT CHANGE UP (ref 3.8–10.5)

## 2020-11-26 PROCEDURE — 99233 SBSQ HOSP IP/OBS HIGH 50: CPT | Mod: GC

## 2020-11-26 RX ORDER — INSULIN GLARGINE 100 [IU]/ML
8 INJECTION, SOLUTION SUBCUTANEOUS AT BEDTIME
Refills: 0 | Status: DISCONTINUED | OUTPATIENT
Start: 2020-11-26 | End: 2020-11-27

## 2020-11-26 RX ORDER — HEPARIN SODIUM 5000 [USP'U]/ML
5000 INJECTION INTRAVENOUS; SUBCUTANEOUS ONCE
Refills: 0 | Status: DISCONTINUED | OUTPATIENT
Start: 2020-11-26 | End: 2020-11-26

## 2020-11-26 RX ORDER — HEPARIN SODIUM 5000 [USP'U]/ML
5000 INJECTION INTRAVENOUS; SUBCUTANEOUS ONCE
Refills: 0 | Status: COMPLETED | OUTPATIENT
Start: 2020-11-26 | End: 2020-11-26

## 2020-11-26 RX ORDER — HEPARIN SODIUM 5000 [USP'U]/ML
5000 INJECTION INTRAVENOUS; SUBCUTANEOUS EVERY 12 HOURS
Refills: 0 | Status: DISCONTINUED | OUTPATIENT
Start: 2020-11-26 | End: 2020-11-26

## 2020-11-26 RX ADMIN — Medication 4 UNIT(S): at 12:19

## 2020-11-26 RX ADMIN — Medication 4: at 17:42

## 2020-11-26 RX ADMIN — Medication 4 UNIT(S): at 08:58

## 2020-11-26 RX ADMIN — Medication 6: at 08:58

## 2020-11-26 RX ADMIN — Medication 4 UNIT(S): at 17:42

## 2020-11-26 RX ADMIN — Medication 5: at 12:19

## 2020-11-26 RX ADMIN — INSULIN GLARGINE 8 UNIT(S): 100 INJECTION, SOLUTION SUBCUTANEOUS at 22:18

## 2020-11-26 RX ADMIN — Medication 2: at 22:18

## 2020-11-26 RX ADMIN — HEPARIN SODIUM 5000 UNIT(S): 5000 INJECTION INTRAVENOUS; SUBCUTANEOUS at 12:19

## 2020-11-26 NOTE — PROGRESS NOTE ADULT - SUBJECTIVE AND OBJECTIVE BOX
Ace Sierra, PGY1  Pager [phone]    INCOMPLETE NOTE - IN PROGRESS    Patient is a 70y old  Female who presents with a chief complaint of Weight loss, Diarrhea (25 Nov 2020 11:15)      SUBJECTIVE/INTERVAL EVENTS: Patient seen and examined at bedside.    MEDICATIONS  (STANDING):  dextrose 40% Gel 15 Gram(s) Oral once  dextrose 5%. 1000 milliLiter(s) (50 mL/Hr) IV Continuous <Continuous>  dextrose 5%. 1000 milliLiter(s) (100 mL/Hr) IV Continuous <Continuous>  dextrose 50% Injectable 12.5 Gram(s) IV Push once  dextrose 50% Injectable 25 Gram(s) IV Push once  dextrose 50% Injectable 25 Gram(s) IV Push once  glucagon  Injectable 1 milliGRAM(s) IntraMuscular once  insulin glargine Injectable (LANTUS) 6 Unit(s) SubCutaneous at bedtime  insulin lispro (ADMELOG) corrective regimen sliding scale   SubCutaneous three times a day before meals  insulin lispro (ADMELOG) corrective regimen sliding scale   SubCutaneous at bedtime  insulin lispro Injectable (ADMELOG) 4 Unit(s) SubCutaneous three times a day before meals  polyethylene glycol 3350 17 Gram(s) Oral daily  senna 2 Tablet(s) Oral at bedtime    MEDICATIONS  (PRN):      VITAL SIGNS:  T(F): 98.4 (11-26-20 @ 05:43), Max: 98.7 (11-25-20 @ 12:35)  HR: 85 (11-26-20 @ 05:43) (72 - 85)  BP: 108/60 (11-26-20 @ 05:43) (91/48 - 108/60)  RR: 17 (11-26-20 @ 05:43) (14 - 17)  SpO2: 98% (11-26-20 @ 05:43) (94% - 100%)    I&O's Summary    Daily Height in cm: 160 (25 Nov 2020 12:35)    Daily     PHYSICAL EXAM:  Gen: Alert, NAD  HEENT: NCAT, conjunctiva clear, sclera anicteric, no erythema or exudates in the oropharynx, mmm  Neck: Supple, no JVD  CV: RRR, S1S2, no m/r/g  Resp: CTAB, normal respiratory effort  Abd: Soft, nontender, nondistended, normal bowel sounds  Ext: no edema, no clubbing or cyanosis  Neuro: AOx3, CN2-12 grossly intact, LÓPEZ  SKIN: warm, perfused    LABS:                        11.1   4.31  )-----------( 92       ( 25 Nov 2020 07:10 )             33.4     Hgb Trend: 11.1<--, 11.5<--, 11.9<--, 13.3<--  11-25    139  |  105  |  16  ----------------------------<  297<H>  3.8   |  26  |  0.44<L>    Ca    8.6      25 Nov 2020 07:10  Phos  1.5     11-25  Mg     1.9     11-25    TPro  5.2<L>  /  Alb  2.7<L>  /  TBili  < 0.2<L>  /  DBili  x   /  AST  61<H>  /  ALT  76<H>  /  AlkPhos  227<H>  11-25    Creatinine Trend: 0.44<--, 0.39<--, 0.47<--, 0.45<--  LIVER FUNCTIONS - ( 25 Nov 2020 07:10 )  Alb: 2.7 g/dL / Pro: 5.2 g/dL / ALK PHOS: 227 u/L / ALT: 76 u/L / AST: 61 u/L / GGT: x           PT/INR - ( 25 Nov 2020 07:10 )   PT: 10.1 SEC;   INR: 0.88          PTT - ( 25 Nov 2020 07:10 )  PTT:26.7 SEC          CAPILLARY BLOOD GLUCOSE      POCT Blood Glucose.: 274 mg/dL (25 Nov 2020 21:40)  POCT Blood Glucose.: 102 mg/dL (25 Nov 2020 16:14)  POCT Blood Glucose.: 86 mg/dL (25 Nov 2020 12:17)      RADIOLOGY & ADDITIONAL TESTS: Reviewed    Imaging Personally Reviewed:    Consultant(s) Notes Reviewed:      Care Discussed with Consultants/Other Providers:   Ace Mendozaon, PGY1  Pager [phone]      Patient is a 70y old  Female who presents with a chief complaint of Weight loss, Diarrhea (25 Nov 2020 11:15)      SUBJECTIVE/INTERVAL EVENTS:  S/p EUS yesterday PM  NAEON  Patient reports continued weakness.  Patient unhappy about CC diet and not getting sugar packets. Denies n/v/d, abd pain.  Patient seen and examined at bedside.      MEDICATIONS  (STANDING):  dextrose 40% Gel 15 Gram(s) Oral once  dextrose 5%. 1000 milliLiter(s) (50 mL/Hr) IV Continuous <Continuous>  dextrose 5%. 1000 milliLiter(s) (100 mL/Hr) IV Continuous <Continuous>  dextrose 50% Injectable 12.5 Gram(s) IV Push once  dextrose 50% Injectable 25 Gram(s) IV Push once  dextrose 50% Injectable 25 Gram(s) IV Push once  glucagon  Injectable 1 milliGRAM(s) IntraMuscular once  insulin glargine Injectable (LANTUS) 6 Unit(s) SubCutaneous at bedtime  insulin lispro (ADMELOG) corrective regimen sliding scale   SubCutaneous three times a day before meals  insulin lispro (ADMELOG) corrective regimen sliding scale   SubCutaneous at bedtime  insulin lispro Injectable (ADMELOG) 4 Unit(s) SubCutaneous three times a day before meals  polyethylene glycol 3350 17 Gram(s) Oral daily  senna 2 Tablet(s) Oral at bedtime    MEDICATIONS  (PRN):      VITAL SIGNS:  T(F): 98.4 (11-26-20 @ 05:43), Max: 98.7 (11-25-20 @ 12:35)  HR: 85 (11-26-20 @ 05:43) (72 - 85)  BP: 108/60 (11-26-20 @ 05:43) (91/48 - 108/60)  RR: 17 (11-26-20 @ 05:43) (14 - 17)  SpO2: 98% (11-26-20 @ 05:43) (94% - 100%)    I&O's Summary    Daily Height in cm: 160 (25 Nov 2020 12:35)    Daily     PHYSICAL EXAM:  Gen: Alert, NAD  HEENT: NCAT, conjunctiva clear, sclera anicteric, no erythema or exudates in the oropharynx, mmm  Neck: Supple, no JVD  CV: RRR, S1S2, no m/r/g  Resp: CTAB, normal respiratory effort  Abd: Soft, nontender, nondistended, normal bowel sounds  Ext: no edema, no clubbing or cyanosis  Neuro: AOx3, CN2-12 grossly intact, LÓPEZ  SKIN: warm, perfused    LABS:                        11.1   4.31  )-----------( 92       ( 25 Nov 2020 07:10 )             33.4     Hgb Trend: 11.1<--, 11.5<--, 11.9<--, 13.3<--  11-25    139  |  105  |  16  ----------------------------<  297<H>  3.8   |  26  |  0.44<L>    Ca    8.6      25 Nov 2020 07:10  Phos  1.5     11-25  Mg     1.9     11-25    TPro  5.2<L>  /  Alb  2.7<L>  /  TBili  < 0.2<L>  /  DBili  x   /  AST  61<H>  /  ALT  76<H>  /  AlkPhos  227<H>  11-25    Creatinine Trend: 0.44<--, 0.39<--, 0.47<--, 0.45<--  LIVER FUNCTIONS - ( 25 Nov 2020 07:10 )  Alb: 2.7 g/dL / Pro: 5.2 g/dL / ALK PHOS: 227 u/L / ALT: 76 u/L / AST: 61 u/L / GGT: x           PT/INR - ( 25 Nov 2020 07:10 )   PT: 10.1 SEC;   INR: 0.88          PTT - ( 25 Nov 2020 07:10 )  PTT:26.7 SEC          CAPILLARY BLOOD GLUCOSE      POCT Blood Glucose.: 274 mg/dL (25 Nov 2020 21:40)  POCT Blood Glucose.: 102 mg/dL (25 Nov 2020 16:14)  POCT Blood Glucose.: 86 mg/dL (25 Nov 2020 12:17)      RADIOLOGY & ADDITIONAL TESTS: Reviewed    Imaging Personally Reviewed:    Consultant(s) Notes Reviewed:      Care Discussed with Consultants/Other Providers:   Ace Mendozaon, PGY1  Pager [phone]      Patient is a 70y old  Female who presents with a chief complaint of Weight loss, Diarrhea (25 Nov 2020 11:15)      SUBJECTIVE/INTERVAL EVENTS:  S/p EUS yesterday PM  NAEON  Patient reports continued weakness.  Patient unhappy about CC diet and not getting sugar packets. Denies n/v/d, abd pain.  Patient seen and examined at bedside.      MEDICATIONS  (STANDING):  dextrose 40% Gel 15 Gram(s) Oral once  dextrose 5%. 1000 milliLiter(s) (50 mL/Hr) IV Continuous <Continuous>  dextrose 5%. 1000 milliLiter(s) (100 mL/Hr) IV Continuous <Continuous>  dextrose 50% Injectable 12.5 Gram(s) IV Push once  dextrose 50% Injectable 25 Gram(s) IV Push once  dextrose 50% Injectable 25 Gram(s) IV Push once  glucagon  Injectable 1 milliGRAM(s) IntraMuscular once  insulin glargine Injectable (LANTUS) 6 Unit(s) SubCutaneous at bedtime  insulin lispro (ADMELOG) corrective regimen sliding scale   SubCutaneous three times a day before meals  insulin lispro (ADMELOG) corrective regimen sliding scale   SubCutaneous at bedtime  insulin lispro Injectable (ADMELOG) 4 Unit(s) SubCutaneous three times a day before meals  polyethylene glycol 3350 17 Gram(s) Oral daily  senna 2 Tablet(s) Oral at bedtime    MEDICATIONS  (PRN):      VITAL SIGNS:  T(F): 98.4 (11-26-20 @ 05:43), Max: 98.7 (11-25-20 @ 12:35)  HR: 85 (11-26-20 @ 05:43) (72 - 85)  BP: 108/60 (11-26-20 @ 05:43) (91/48 - 108/60)  RR: 17 (11-26-20 @ 05:43) (14 - 17)  SpO2: 98% (11-26-20 @ 05:43) (94% - 100%)    I&O's Summary    Daily Height in cm: 160 (25 Nov 2020 12:35)    Daily     PHYSICAL EXAM:  Gen: Alert, very thin, cachetic black female, in NAD  HEENT: NCAT, PERRL, EOMI, clear conjunctiva, no erythema or exudates in the oropharynx, mmm  Neck: Supple, no LAD  CV: RRR, S1S2, no m/r/g  Resp: Crackles in RLL, normal respiratory effort  Abd: Soft, NT, ND, normal bowel sounds  Ext: trace edema up to ankles, no clubbing or cyanosis  Neuro: AOx3, CN2-12 grossly intact, LÓPEZ  Skin: no rashes, no ecchymoses    LABS:                        11.1   4.31  )-----------( 92       ( 25 Nov 2020 07:10 )             33.4     Hgb Trend: 11.1<--, 11.5<--, 11.9<--, 13.3<--  11-25    139  |  105  |  16  ----------------------------<  297<H>  3.8   |  26  |  0.44<L>    Ca    8.6      25 Nov 2020 07:10  Phos  1.5     11-25  Mg     1.9     11-25    TPro  5.2<L>  /  Alb  2.7<L>  /  TBili  < 0.2<L>  /  DBili  x   /  AST  61<H>  /  ALT  76<H>  /  AlkPhos  227<H>  11-25    Creatinine Trend: 0.44<--, 0.39<--, 0.47<--, 0.45<--  LIVER FUNCTIONS - ( 25 Nov 2020 07:10 )  Alb: 2.7 g/dL / Pro: 5.2 g/dL / ALK PHOS: 227 u/L / ALT: 76 u/L / AST: 61 u/L / GGT: x           PT/INR - ( 25 Nov 2020 07:10 )   PT: 10.1 SEC;   INR: 0.88          PTT - ( 25 Nov 2020 07:10 )  PTT:26.7 SEC          CAPILLARY BLOOD GLUCOSE      POCT Blood Glucose.: 274 mg/dL (25 Nov 2020 21:40)  POCT Blood Glucose.: 102 mg/dL (25 Nov 2020 16:14)  POCT Blood Glucose.: 86 mg/dL (25 Nov 2020 12:17)      RADIOLOGY & ADDITIONAL TESTS:  < from: Upper EUS (11.25.20 @ 09:59) >  Impression:          - Normal esophagus.                       - Gastritis.                       - Normal duodenal bulb and second portion of the                        duodenum.                       - Hypoechoic and enlargedpancreas.                       - 1.8cm pancreatic cyst.                       - No pancreatic mass seen.  Recommendation:      - [facility].                       - Plan to repeat EUS on Friday for FNA/FNB, after                        reviewing imaging with radiology.    < end of copied text >    < from: MR Abdomen w/wo IV Cont (11.25.20 @ 09:23) >  IMPRESSION:  Large infiltrative pancreatic tail/body mass.    Bilobar hepatic metastases.    < end of copied text >

## 2020-11-26 NOTE — PROGRESS NOTE ADULT - PROBLEM SELECTOR PLAN 1
A1c >18 indicating grossly uncontrolled DM. New onset. No prior history. Likely secondary to pancreatic insufficiency and will need basal/bolus insulin for management.  Currently BG ranging high in 300s-400s but patient still endorses poor appetite and will be NPO past midnight again for procedure tomorrow.  - BG goal inpatient is 100-180   - Increase Lantus to 8 units qhs since she will be NPO past midnight again  - Continue Admelog 4 units sq ac TID for now as she reports poor appetite and likely needs more basal coverage   - Continue low correctional scale ac and hs  - Consistent carb die with Glucerna as supplement   - Monitor FS ac and hs or q6h if NPO  - RD consult appreciated  - DM teaching     Discharge plan: Patient to be discharged on basal + bolus insulin pens (doses TBD), please call endocrine team prior to discharge for final recs.   If patient wishes to follow up with [facility]  [address], [address], [place], [place] [address] [phone].

## 2020-11-26 NOTE — PROGRESS NOTE ADULT - PROBLEM SELECTOR PLAN 6
R renal ultrasound showing hemorrhagic cyst  - Continue to monitor DVT ppx: Holding lovenox for EUS FNA  Diet: Regular, as tolerated

## 2020-11-26 NOTE — PROGRESS NOTE ADULT - PROBLEM SELECTOR PLAN 2
Opacity noted on RLL, possibly atelectasis vs. primary lung lesion vs. metastatic. 4 small nodules also noted. Hyperglycemia possibly secondary to suppression of pancreatic endocrine function possibly d/t infiltration of lesion.  - FS ranging widely. 400s this AM, low to 80s yesterday at noon.  - Will f/u Endo recs  - C/w Lispro 4u AC TID, Lantus 6u QHS for now  - C/w FS qAC/HS, low dose SSI

## 2020-11-26 NOTE — PROGRESS NOTE ADULT - ASSESSMENT
70F current smoker, w/ ?osteoporosis who presents with unintentional weight loss and diarrhea, found to have pancreatic lesion, likely secondary to malignancy.  Diarrhea and hyperglycemia likely explained by suppression of pancreatic function. 70F current smoker, w/ ?osteoporosis who presents with unintentional weight loss and diarrhea, found to have pancreatic lesion, likely secondary to malignancy.  Diarrhea and hyperglycemia likely explained by suppression of pancreatic function. MR with large pancreatic tail/body mass with possible hepatic metastases.

## 2020-11-26 NOTE — PROGRESS NOTE ADULT - PROBLEM SELECTOR PLAN 7
DVT ppx: Holding lovenox for EUS FNA  Diet: Regular, as tolerated Transitions of Care Status:  1.  Name of PCP: none currently  2.  PCP Contacted on Admission: N/A  3.  PCP contacted at Discharge: [ ] Y    [ ] N    [ ] N/A  4.  Post-Discharge Appointment Date and Location:  5.  Summary of Handoff given to PCP:

## 2020-11-26 NOTE — PROGRESS NOTE ADULT - PROBLEM SELECTOR PLAN 4
Normocytic anemia, downtrending since admission.  Possibly secondary to malignancy vs. iron/vitamin deficiency.  Also with thrombocytopenia, downtrending since admission.  - Will check iron, TIBC, ferritin, B12, folate Frail and cachetic, likely d/t malignancy. Likely has protein-calorie malnutrition.  - CC diet with glucerna

## 2020-11-26 NOTE — PROGRESS NOTE ADULT - PROBLEM SELECTOR PLAN 5
Frail and cachetic, likely d/t malignancy. Likely has protein-calorie malnutrition.  - CC diet with glucerna R renal ultrasound showing hemorrhagic cyst  - Continue to monitor

## 2020-11-26 NOTE — PROGRESS NOTE ADULT - SUBJECTIVE AND OBJECTIVE BOX
Follow-up on: New onset DM secondary to pancreatic insufficiency     Subjective: Patient seen at bedside, reports not doing well and she thinks she is becoming worse since being here. Endorses distended stomach. Appetite is poor and she does not like food here. States she will be NPO again past midnight for another procedure tomorrow.       MEDICATIONS  (STANDING):  dextrose 40% Gel 15 Gram(s) Oral once  dextrose 5%. 1000 milliLiter(s) (50 mL/Hr) IV Continuous <Continuous>  dextrose 5%. 1000 milliLiter(s) (100 mL/Hr) IV Continuous <Continuous>  dextrose 50% Injectable 12.5 Gram(s) IV Push once  dextrose 50% Injectable 25 Gram(s) IV Push once  dextrose 50% Injectable 25 Gram(s) IV Push once  glucagon  Injectable 1 milliGRAM(s) IntraMuscular once  insulin glargine Injectable (LANTUS) 8 Unit(s) SubCutaneous at bedtime  insulin lispro (ADMELOG) corrective regimen sliding scale   SubCutaneous three times a day before meals  insulin lispro (ADMELOG) corrective regimen sliding scale   SubCutaneous at bedtime  insulin lispro Injectable (ADMELOG) 4 Unit(s) SubCutaneous three times a day before meals  polyethylene glycol 3350 17 Gram(s) Oral daily  senna 2 Tablet(s) Oral at bedtime    MEDICATIONS  (PRN):      PHYSICAL EXAM:  VITALS: T(C): 36.8 (11-26-20 @ 12:23)  T(F): 98.3 (11-26-20 @ 12:23), Max: 98.4 (11-26-20 @ 05:43)  HR: 84 (11-26-20 @ 12:23) (73 - 85)  BP: 138/71 (11-26-20 @ 12:23) (91/48 - 138/71)  RR:  (14 - 17)  SpO2:  (94% - 100%)  Wt(kg): --  GENERAL: NAD, well-groomed, well-developed  EYES: No proptosis, no injection  HEENT:  Atraumatic, Normocephalic  Neuro: AAO x 3, no gross or focal deficit  Psych: reactive affect, euthymic mood      POCT Blood Glucose.: 375 mg/dL (11-26-20 @ 12:11)  POCT Blood Glucose.: 433 mg/dL (11-26-20 @ 08:31)  POCT Blood Glucose.: 407 mg/dL (11-26-20 @ 08:29)  POCT Blood Glucose.: 274 mg/dL (11-25-20 @ 21:40)  POCT Blood Glucose.: 102 mg/dL (11-25-20 @ 16:14)  POCT Blood Glucose.: 86 mg/dL (11-25-20 @ 12:17)  POCT Blood Glucose.: 289 mg/dL (11-25-20 @ 05:58)  POCT Blood Glucose.: 338 mg/dL (11-25-20 @ 03:05)  POCT Blood Glucose.: 445 mg/dL (11-24-20 @ 23:11)  POCT Blood Glucose.: 214 mg/dL (11-24-20 @ 17:20)  POCT Blood Glucose.: 431 mg/dL (11-24-20 @ 12:19)  POCT Blood Glucose.: 415 mg/dL (11-24-20 @ 12:18)  POCT Blood Glucose.: 317 mg/dL (11-24-20 @ 08:36)  POCT Blood Glucose.: 352 mg/dL (11-24-20 @ 06:55)  POCT Blood Glucose.: 347 mg/dL (11-23-20 @ 22:23)  POCT Blood Glucose.: 143 mg/dL (11-23-20 @ 17:08)    11-26    140  |  107  |  15  ----------------------------<  238<H>  3.7   |  25  |  0.39<L>    EGFR if : 124  EGFR if non : 107    Ca    8.4      11-26  Mg     1.8     11-26  Phos  2.6     11-26    TPro  4.5<L>  /  Alb  2.5<L>  /  TBili  0.2  /  DBili  x   /  AST  33<H>  /  ALT  58<H>  /  AlkPhos  186<H>  11-26      Thyroid Function Tests:  11-22 @ 12:00 TSH 2.48      A1C with Estimated Average Glucose: > 18.0% (11.23.20 @ 05:45)

## 2020-11-26 NOTE — PROGRESS NOTE ADULT - PROBLEM SELECTOR PLAN 3
Hyperglycemia possibly secondary to suppression of pancreatic endocrine function possibly d/t infiltration of lesion.  - C/w Lispro 4u AC TID, Lantus 6u QHS for now  - C/w FS qAC/HS once off NPO, low dose SSI  - Will f/u Endo recs Normocytic anemia, downtrending since admission.  Possibly secondary to malignancy vs. iron/vitamin deficiency.  Also with thrombocytopenia, downtrending since admission.  - not consistent with iron deficency, no B12/folate deficiency

## 2020-11-26 NOTE — PROGRESS NOTE ADULT - PROBLEM SELECTOR PLAN 1
Pancreatic lesion in neck and body. Unintentional weight loss, poor appetite, generalized weakness likely secondary to malignancy.  Likely has suppression of both exocrine and endocrine function causing hyperglycemia and diarrhea.  Elevation in alk phos, AST, ALT likely also secondary to obstruction from lesion.  - CT chest showing RLL opacity and small nodules, unclear if metastatic lesions  - GI following, EUS FNA planned 11/25  - MR abdomen/pelvis done, pending read Pancreatic lesion in neck and body. Unintentional weight loss, poor appetite, generalized weakness likely secondary to malignancy.  Likely has suppression of both exocrine and endocrine function causing hyperglycemia and diarrhea.  Elevation in alk phos, AST, ALT likely also secondary to obstruction from lesion.  - CT chest showing RLL opacity and small nodules, unclear if metastatic lesions  - MR showing 6x4 mass in pancreatic body/mass and possible metastatic hepatic lesions  - GI following, EUS done on 11/25 - mass unable to be seen, only cyst was seen. FNB was not done.  Repeat EUS planned for 11/27.

## 2020-11-27 LAB
AMYLASE P1 CFR SERPL: 266 U/L — HIGH (ref 25–125)
ANION GAP SERPL CALC-SCNC: 7 MMO/L — SIGNIFICANT CHANGE UP (ref 7–14)
APTT BLD: 25.2 SEC — LOW (ref 27–36.3)
BUN SERPL-MCNC: 14 MG/DL — SIGNIFICANT CHANGE UP (ref 7–23)
CALCIUM SERPL-MCNC: 8.3 MG/DL — LOW (ref 8.4–10.5)
CHLORIDE SERPL-SCNC: 105 MMOL/L — SIGNIFICANT CHANGE UP (ref 98–107)
CO2 SERPL-SCNC: 24 MMOL/L — SIGNIFICANT CHANGE UP (ref 22–31)
CREAT SERPL-MCNC: 0.36 MG/DL — LOW (ref 0.5–1.3)
CULTURE RESULTS: SIGNIFICANT CHANGE UP
GLUCOSE BLDC GLUCOMTR-MCNC: 196 MG/DL — HIGH (ref 70–99)
GLUCOSE BLDC GLUCOMTR-MCNC: 228 MG/DL — HIGH (ref 70–99)
GLUCOSE BLDC GLUCOMTR-MCNC: 324 MG/DL — HIGH (ref 70–99)
GLUCOSE BLDC GLUCOMTR-MCNC: 390 MG/DL — HIGH (ref 70–99)
GLUCOSE BLDC GLUCOMTR-MCNC: 394 MG/DL — HIGH (ref 70–99)
GLUCOSE BLDC GLUCOMTR-MCNC: 424 MG/DL — HIGH (ref 70–99)
GLUCOSE BLDC GLUCOMTR-MCNC: 463 MG/DL — CRITICAL HIGH (ref 70–99)
GLUCOSE SERPL-MCNC: 185 MG/DL — HIGH (ref 70–99)
HCT VFR BLD CALC: 31.5 % — LOW (ref 34.5–45)
HGB BLD-MCNC: 10.3 G/DL — LOW (ref 11.5–15.5)
INR BLD: 0.91 — SIGNIFICANT CHANGE UP (ref 0.88–1.16)
LIDOCAIN IGE QN: 157.6 U/L — HIGH (ref 7–60)
MAGNESIUM SERPL-MCNC: 1.9 MG/DL — SIGNIFICANT CHANGE UP (ref 1.6–2.6)
MCHC RBC-ENTMCNC: 28.8 PG — SIGNIFICANT CHANGE UP (ref 27–34)
MCHC RBC-ENTMCNC: 32.7 % — SIGNIFICANT CHANGE UP (ref 32–36)
MCV RBC AUTO: 88 FL — SIGNIFICANT CHANGE UP (ref 80–100)
NRBC # FLD: 0 K/UL — SIGNIFICANT CHANGE UP (ref 0–0)
PHOSPHATE SERPL-MCNC: 2.3 MG/DL — LOW (ref 2.5–4.5)
PLATELET # BLD AUTO: 101 K/UL — LOW (ref 150–400)
PMV BLD: 12.3 FL — SIGNIFICANT CHANGE UP (ref 7–13)
POTASSIUM SERPL-MCNC: 3.2 MMOL/L — LOW (ref 3.5–5.3)
POTASSIUM SERPL-SCNC: 3.2 MMOL/L — LOW (ref 3.5–5.3)
PROTHROM AB SERPL-ACNC: 10.4 SEC — LOW (ref 10.6–13.6)
RBC # BLD: 3.58 M/UL — LOW (ref 3.8–5.2)
RBC # FLD: 15.2 % — HIGH (ref 10.3–14.5)
SARS-COV-2 IGG SERPL QL IA: NEGATIVE — SIGNIFICANT CHANGE UP
SARS-COV-2 IGM SERPL IA-ACNC: 0.09 INDEX — SIGNIFICANT CHANGE UP
SODIUM SERPL-SCNC: 136 MMOL/L — SIGNIFICANT CHANGE UP (ref 135–145)
SPECIMEN SOURCE: SIGNIFICANT CHANGE UP
WBC # BLD: 5.22 K/UL — SIGNIFICANT CHANGE UP (ref 3.8–10.5)
WBC # FLD AUTO: 5.22 K/UL — SIGNIFICANT CHANGE UP (ref 3.8–10.5)

## 2020-11-27 PROCEDURE — 99233 SBSQ HOSP IP/OBS HIGH 50: CPT

## 2020-11-27 PROCEDURE — 99232 SBSQ HOSP IP/OBS MODERATE 35: CPT

## 2020-11-27 RX ORDER — INSULIN LISPRO 100/ML
6 VIAL (ML) SUBCUTANEOUS ONCE
Refills: 0 | Status: COMPLETED | OUTPATIENT
Start: 2020-11-27 | End: 2020-11-27

## 2020-11-27 RX ORDER — SODIUM CHLORIDE 9 MG/ML
1000 INJECTION, SOLUTION INTRAVENOUS
Refills: 0 | Status: DISCONTINUED | OUTPATIENT
Start: 2020-11-27 | End: 2020-11-30

## 2020-11-27 RX ORDER — SENNA PLUS 8.6 MG/1
2 TABLET ORAL AT BEDTIME
Refills: 0 | Status: DISCONTINUED | OUTPATIENT
Start: 2020-11-27 | End: 2020-12-02

## 2020-11-27 RX ORDER — POLYETHYLENE GLYCOL 3350 17 G/17G
17 POWDER, FOR SOLUTION ORAL
Refills: 0 | Status: DISCONTINUED | OUTPATIENT
Start: 2020-11-27 | End: 2020-12-02

## 2020-11-27 RX ORDER — INSULIN GLARGINE 100 [IU]/ML
9 INJECTION, SOLUTION SUBCUTANEOUS AT BEDTIME
Refills: 0 | Status: DISCONTINUED | OUTPATIENT
Start: 2020-11-27 | End: 2020-11-28

## 2020-11-27 RX ORDER — HEPARIN SODIUM 5000 [USP'U]/ML
5000 INJECTION INTRAVENOUS; SUBCUTANEOUS EVERY 12 HOURS
Refills: 0 | Status: DISCONTINUED | OUTPATIENT
Start: 2020-11-27 | End: 2020-11-27

## 2020-11-27 RX ORDER — DEXTROSE 50 % IN WATER 50 %
25 SYRINGE (ML) INTRAVENOUS ONCE
Refills: 0 | Status: DISCONTINUED | OUTPATIENT
Start: 2020-11-27 | End: 2020-11-30

## 2020-11-27 RX ORDER — INSULIN LISPRO 100/ML
VIAL (ML) SUBCUTANEOUS EVERY 6 HOURS
Refills: 0 | Status: DISCONTINUED | OUTPATIENT
Start: 2020-11-27 | End: 2020-11-27

## 2020-11-27 RX ORDER — HEPARIN SODIUM 5000 [USP'U]/ML
5000 INJECTION INTRAVENOUS; SUBCUTANEOUS EVERY 12 HOURS
Refills: 0 | Status: COMPLETED | OUTPATIENT
Start: 2020-11-27 | End: 2020-11-29

## 2020-11-27 RX ORDER — INSULIN LISPRO 100/ML
VIAL (ML) SUBCUTANEOUS
Refills: 0 | Status: DISCONTINUED | OUTPATIENT
Start: 2020-11-27 | End: 2020-11-30

## 2020-11-27 RX ORDER — GLUCAGON INJECTION, SOLUTION 0.5 MG/.1ML
1 INJECTION, SOLUTION SUBCUTANEOUS ONCE
Refills: 0 | Status: DISCONTINUED | OUTPATIENT
Start: 2020-11-27 | End: 2020-11-30

## 2020-11-27 RX ORDER — POTASSIUM PHOSPHATE, MONOBASIC POTASSIUM PHOSPHATE, DIBASIC 236; 224 MG/ML; MG/ML
15 INJECTION, SOLUTION INTRAVENOUS ONCE
Refills: 0 | Status: COMPLETED | OUTPATIENT
Start: 2020-11-27 | End: 2020-11-27

## 2020-11-27 RX ORDER — DEXTROSE 50 % IN WATER 50 %
12.5 SYRINGE (ML) INTRAVENOUS ONCE
Refills: 0 | Status: DISCONTINUED | OUTPATIENT
Start: 2020-11-27 | End: 2020-11-30

## 2020-11-27 RX ORDER — DEXTROSE 50 % IN WATER 50 %
15 SYRINGE (ML) INTRAVENOUS ONCE
Refills: 0 | Status: DISCONTINUED | OUTPATIENT
Start: 2020-11-27 | End: 2020-11-30

## 2020-11-27 RX ORDER — POTASSIUM CHLORIDE 20 MEQ
10 PACKET (EA) ORAL
Refills: 0 | Status: COMPLETED | OUTPATIENT
Start: 2020-11-27 | End: 2020-11-27

## 2020-11-27 RX ORDER — INSULIN LISPRO 100/ML
4 VIAL (ML) SUBCUTANEOUS
Refills: 0 | Status: DISCONTINUED | OUTPATIENT
Start: 2020-11-27 | End: 2020-11-27

## 2020-11-27 RX ORDER — SODIUM CHLORIDE 9 MG/ML
1000 INJECTION, SOLUTION INTRAVENOUS
Refills: 0 | Status: DISCONTINUED | OUTPATIENT
Start: 2020-11-27 | End: 2020-11-27

## 2020-11-27 RX ORDER — INSULIN LISPRO 100/ML
VIAL (ML) SUBCUTANEOUS AT BEDTIME
Refills: 0 | Status: DISCONTINUED | OUTPATIENT
Start: 2020-11-27 | End: 2020-11-30

## 2020-11-27 RX ORDER — INSULIN LISPRO 100/ML
5 VIAL (ML) SUBCUTANEOUS
Refills: 0 | Status: DISCONTINUED | OUTPATIENT
Start: 2020-11-27 | End: 2020-11-28

## 2020-11-27 RX ORDER — INSULIN LISPRO 100/ML
1 VIAL (ML) SUBCUTANEOUS ONCE
Refills: 0 | Status: COMPLETED | OUTPATIENT
Start: 2020-11-27 | End: 2020-11-27

## 2020-11-27 RX ORDER — INSULIN GLARGINE 100 [IU]/ML
10 INJECTION, SOLUTION SUBCUTANEOUS AT BEDTIME
Refills: 0 | Status: DISCONTINUED | OUTPATIENT
Start: 2020-11-27 | End: 2020-11-27

## 2020-11-27 RX ORDER — POLYETHYLENE GLYCOL 3350 17 G/17G
17 POWDER, FOR SOLUTION ORAL DAILY
Refills: 0 | Status: DISCONTINUED | OUTPATIENT
Start: 2020-11-27 | End: 2020-11-27

## 2020-11-27 RX ORDER — INSULIN LISPRO 100/ML
5 VIAL (ML) SUBCUTANEOUS ONCE
Refills: 0 | Status: COMPLETED | OUTPATIENT
Start: 2020-11-27 | End: 2020-11-27

## 2020-11-27 RX ADMIN — Medication 5 UNIT(S): at 17:28

## 2020-11-27 RX ADMIN — Medication 2: at 05:50

## 2020-11-27 RX ADMIN — Medication 5 UNIT(S): at 21:34

## 2020-11-27 RX ADMIN — Medication 100 MILLIEQUIVALENT(S): at 12:45

## 2020-11-27 RX ADMIN — Medication 6: at 17:29

## 2020-11-27 RX ADMIN — Medication 1 UNIT(S): at 12:46

## 2020-11-27 RX ADMIN — Medication 100 MILLIEQUIVALENT(S): at 10:03

## 2020-11-27 RX ADMIN — Medication 6 UNIT(S): at 18:47

## 2020-11-27 RX ADMIN — POTASSIUM PHOSPHATE, MONOBASIC POTASSIUM PHOSPHATE, DIBASIC 62.5 MILLIMOLE(S): 236; 224 INJECTION, SOLUTION INTRAVENOUS at 12:45

## 2020-11-27 RX ADMIN — Medication 100 MILLIEQUIVALENT(S): at 11:02

## 2020-11-27 RX ADMIN — INSULIN GLARGINE 9 UNIT(S): 100 INJECTION, SOLUTION SUBCUTANEOUS at 21:35

## 2020-11-27 RX ADMIN — Medication 2: at 21:35

## 2020-11-27 RX ADMIN — Medication 4 UNIT(S): at 12:45

## 2020-11-27 NOTE — PROGRESS NOTE ADULT - PROBLEM SELECTOR PLAN 4
Frail and cachetic, likely d/t malignancy. Likely has protein-calorie malnutrition.  - CC diet with glucerna

## 2020-11-27 NOTE — CONSULT NOTE ADULT - ASSESSMENT
Assessment: 70F current smoker presented with unintentional weight loss and diarrhea, found to have large pancreatic tail/body mass concerning for malignancy, with possible hepatic metastases on MRI. Patient s/p pancreatic biopsy via EUS by GI today 11/30. IR consulted for biopsy of liver lesions.    Plan:  - Will plan for biopsy on Tuesday 12/1/2020.  - Keep patient NPO after midnight on Monday 11/30/2020.  - Hold AM dose of DVT ppx on 12/1/2020.  - CBC, BMP, PT/INR and PTT AM of 12/1/2020.  - Case discussed with IR attending Dr. Pike and primary team.  - Page [phone] with any questions.

## 2020-11-27 NOTE — OCCUPATIONAL THERAPY INITIAL EVALUATION ADULT - DIAGNOSIS, OT EVAL
s/p pancreatic mass with hepatic mets, s/p hyperglycemia; decreased functional mobility, decreased ADL performance

## 2020-11-27 NOTE — OCCUPATIONAL THERAPY INITIAL EVALUATION ADULT - GENERAL OBSERVATIONS, REHAB EVAL
Patient received seated in bedside chair in NAD. +IV. Per EARLE Henry, patient okay to participate in OT evaluation.

## 2020-11-27 NOTE — PROGRESS NOTE ADULT - SUBJECTIVE AND OBJECTIVE BOX
Chief Complaint: Secondary DM    History: NPO for endoscopy  Patient reports feeling frustrated by frequent NPO status  No hypoglycemia events    MEDICATIONS  (STANDING):  dextrose 40% Gel 15 Gram(s) Oral once  dextrose 5%. 1000 milliLiter(s) (50 mL/Hr) IV Continuous <Continuous>  dextrose 5%. 1000 milliLiter(s) (100 mL/Hr) IV Continuous <Continuous>  dextrose 50% Injectable 25 Gram(s) IV Push once  dextrose 50% Injectable 12.5 Gram(s) IV Push once  dextrose 50% Injectable 25 Gram(s) IV Push once  glucagon  Injectable 1 milliGRAM(s) IntraMuscular once  insulin glargine Injectable (LANTUS) 10 Unit(s) SubCutaneous at bedtime  insulin lispro (ADMELOG) corrective regimen sliding scale   SubCutaneous three times a day before meals  insulin lispro (ADMELOG) corrective regimen sliding scale   SubCutaneous at bedtime  insulin lispro Injectable (ADMELOG) 4 Unit(s) SubCutaneous three times a day before meals  polyethylene glycol 3350 17 Gram(s) Oral daily  senna 2 Tablet(s) Oral at bedtime    MEDICATIONS  (PRN):      Allergies    No Known Allergies    Intolerances      Review of Systems:    ALL OTHER SYSTEMS REVIEWED AND NEGATIVE      PHYSICAL EXAM:  VITALS: T(C): 36.7 (11-27-20 @ 12:52)  T(F): 98.1 (11-27-20 @ 12:52), Max: 99.4 (11-26-20 @ 21:37)  HR: 85 (11-27-20 @ 12:52) (85 - 92)  BP: 142/68 (11-27-20 @ 12:52) (121/58 - 142/68)  RR:  (17 - 18)  SpO2:  (98% - 100%)  Wt(kg): --  GENERAL: NAD, cachectic  EYES: No proptosis, no lid lag, anicteric  HEENT:  Atraumatic, Normocephalic, moist mucous membranes  RESPIRATORY: nonlabored respirations  PSYCH: Alert and oriented x 3, normal affect, normal mood    CAPILLARY BLOOD GLUCOSE      POCT Blood Glucose.: 196 mg/dL (27 Nov 2020 12:04)  POCT Blood Glucose.: 228 mg/dL (27 Nov 2020 05:47)  POCT Blood Glucose.: 346 mg/dL (26 Nov 2020 22:11)  POCT Blood Glucose.: 322 mg/dL (26 Nov 2020 17:30)      11-27    136  |  105  |  14  ----------------------------<  185<H>  3.2<L>   |  24  |  0.36<L>    EGFR if : 128  EGFR if non : 110    Ca    8.3<L>      11-27  Mg     1.9     11-27  Phos  2.3     11-27    TPro  4.5<L>  /  Alb  2.5<L>  /  TBili  0.2  /  DBili  x   /  AST  33<H>  /  ALT  58<H>  /  AlkPhos  186<H>  11-26      A1C with Estimated Average Glucose: > 18.0 % (11-23-20 @ 05:45)  A1C with Estimated Average Glucose: > 18.0 % (11-22-20 @ 11:46)      Thyroid Function Tests:  11-22 @ 12:00 TSH 2.48 FreeT4 -- T3 -- Anti TPO -- Anti Thyroglobulin Ab -- TSI --

## 2020-11-27 NOTE — CHART NOTE - NSCHARTNOTEFT_GEN_A_CORE
Unable to perform EUS today 2/2 hypokalemia. Please keep NPO past midnight Sunday for tentative EUS.

## 2020-11-27 NOTE — PROGRESS NOTE ADULT - PROBLEM SELECTOR PLAN 1
A1c >18 indicating grossly uncontrolled DM. New onset. No prior history. Likely secondary to pancreatic insufficiency and will need basal/bolus insulin for management.  Yesterday BG ranging high in 300s-400s and today lower while NPO.  - BG goal inpatient is 100-180   - Increase Lantus to 9 units qhs  - Increase Admelog to 5 units sq ac TID once resume eating  - Continue low correctional scale ac and low bedtime scale  - Consistent carb die with Glucerna as supplement   - Monitor FS ac and hs or q6h if NPO  - RD consult appreciated  - DM teaching   -Check c-peptide with BMP in AM to confirm low insulin secretion status.    Discharge plan: Patient to be discharged on basal + bolus insulin pens (doses TBD), please call endocrine team prior to discharge for final recs.   If patient wishes to follow up with [facility]  [address] [place] [address], [address], [place], [place] [id number] [phone].

## 2020-11-27 NOTE — PROGRESS NOTE ADULT - ATTENDING COMMENTS
Joan Redman MD  Division of Endocrinology  Pager: [phone]    If after 6PM or before 9AM, or on weekends/holidays, please call endocrine answering service for assistance ([phone]).  For nonurgent matters email [email or web address] for assistance.

## 2020-11-27 NOTE — OCCUPATIONAL THERAPY INITIAL EVALUATION ADULT - ANTICIPATED DISCHARGE DISPOSITION, OT EVAL
Home with no skilled OT needs. Patient may benefit from continued PT services to address standing balance deficits. OT to continue to follow.

## 2020-11-27 NOTE — PHYSICAL THERAPY INITIAL EVALUATION ADULT - ASR EQUIP NEEDS DISCH PT EVAL
pt owns a cane Bactrim Counseling:  I discussed with the patient the risks of sulfa antibiotics including but not limited to GI upset, allergic reaction, drug rash, diarrhea, dizziness, photosensitivity, and yeast infections.  Rarely, more serious reactions can occur including but not limited to aplastic anemia, agranulocytosis, methemoglobinemia, blood dyscrasias, liver or kidney failure, lung infiltrates or desquamative/blistering drug rashes.

## 2020-11-27 NOTE — PHYSICAL THERAPY INITIAL EVALUATION ADULT - PERTINENT HX OF CURRENT PROBLEM, REHAB EVAL
This is a 70F with osteoporosis who presents with unintentional weight loss and diarrhea, found to have pancreatic lesion, likely secondary to malignancy.

## 2020-11-27 NOTE — CONSULT NOTE ADULT - SUBJECTIVE AND OBJECTIVE BOX
----------------------------------------------------------  Interventional Radiology Brief Consult Note  -----------------------------------------------------------    Reason for Referral: Liver biopsy    Clinical Summary: 70F current smoker, w/ ?osteoporosis presented with unintentional weight loss and diarrhea, found to have pancreatic lesion on CT/MRI, likely secondary to malignancy. MRI with large pancreatic tail/body mass with possible hepatic metastases. IR consulted for biopsy of liver biopsy.    Vitals:  T(C): 36.7 (11-27-20 @ 12:52), Max: 37.4 (11-26-20 @ 21:37)  HR: 85 (11-27-20 @ 12:52) (85 - 92)  BP: 142/68 (11-27-20 @ 12:52) (121/58 - 142/68)  RR: 18 (11-27-20 @ 12:52) (17 - 18)  SpO2: 100% (11-27-20 @ 12:52) (98% - 100%)    Labs:           10.3  5.22)-----(101     (11-27-20 @ 07:15)         31.5     136 | 105 | 14  --------------------< 185     (11-27-20 @ 07:15)  3.2 | 24 | 0.36       PT: 10.4<L> 11-27-20 @ 07:15  aPTT: 25.2<L> 11-27-20 @ 07:15   INR: 0.91 11-27-20 @ 07:15      Assessment: 70F current smoker presented with unintentional weight loss and diarrhea, found to have large pancreatic tail/body mass concerning for malignancy, with possible hepatic metastases on MRI. Patient pending pancreatic biopsy via EUS by GI on Monday 11/30. IR consulted for biopsy of liver lesions.    Recommendations:  - Will plan for biopsy on Tuesday 12/1/2020.  - Keep patient NPO after midnight on Monday 12/30/2020.  - Hold AM dose of DVT ppx on 12/1/2020.  - CBC, BMP, PT/INR and PTT AM of 12/1/2020.  - Case discussed with IR attending Dr. Pike and primary team.  - Page [phone] with any questions.       ----------------------------------------------------------  Interventional Radiology Brief Consult Note  -----------------------------------------------------------    Reason for Referral: Liver biopsy    Clinical Summary: 70F current smoker, w/ ?osteoporosis presented with unintentional weight loss and diarrhea, found to have pancreatic lesion on CT/MRI, likely secondary to malignancy. MRI with large pancreatic tail/body mass with possible hepatic metastases. IR consulted for biopsy of liver biopsy.    Vitals:  T(C): 36.7 (11-27-20 @ 12:52), Max: 37.4 (11-26-20 @ 21:37)  HR: 85 (11-27-20 @ 12:52) (85 - 92)  BP: 142/68 (11-27-20 @ 12:52) (121/58 - 142/68)  RR: 18 (11-27-20 @ 12:52) (17 - 18)  SpO2: 100% (11-27-20 @ 12:52) (98% - 100%)    Labs:           10.3  5.22)-----(101     (11-27-20 @ 07:15)         31.5     136 | 105 | 14  --------------------< 185     (11-27-20 @ 07:15)  3.2 | 24 | 0.36       PT: 10.4<L> 11-27-20 @ 07:15  aPTT: 25.2<L> 11-27-20 @ 07:15   INR: 0.91 11-27-20 @ 07:15      Assessment: 70F current smoker presented with unintentional weight loss and diarrhea, found to have large pancreatic tail/body mass concerning for malignancy, with possible hepatic metastases on MRI. Patient pending pancreatic biopsy via EUS by GI on Monday 11/30. IR consulted for biopsy of liver lesions.    Recommendations:  - Will plan for biopsy on Tuesday 12/1/2020.  - Keep patient NPO after midnight on Monday 11/30/2020.  - Hold AM dose of DVT ppx on 12/1/2020.  - CBC, BMP, PT/INR and PTT AM of 12/1/2020.  - Case discussed with IR attending Dr. Pike and primary team.  - Page [phone] with any questions.           Reason for Referral: Liver biopsy    HPI:  70 year F with history of osteoporosis, multiple tendon repairs presented with 30lbs unintentional weight loss since May 2020, diarrhea,  poor appetite and progressive weakness. CT revealed: parenchymal atrophy of pancreatic body and tail w severe duct dilatation measuring up to 1.4cm, abrupt transition to normal duct caliber at pancreatic neck, 1.8cm hypoattenuating lesion within anterior pancreas.   MRI with large pancreatic tail/body mass with possible hepatic metastases. IR consulted for biopsy of liver biopsy.  Pt currently denies nausea, vomiting, abd pain, fever, chills, CP, palpitations, melena, hematochezia. Family hx of cancer includes sister who had liver cancer. Pt reports that she is a current smoker, 1/2 PPD x 20 years.            PAST MEDICAL & SURGICAL HISTORY:  S/P tendon repair  R foot, R elbow      FAMILY HISTORY:  Family history of liver cancer, Sister      Social History:  Active smoker. 1/2 ppd x 20 years, No ETOH or illicit drug use reported      Outpatient Medications:  Calcium + Vitamin D      MEDICATIONS  (STANDING):  dextrose 40% Gel 15 Gram(s) Oral once  dextrose 5%. 1000 milliLiter(s) (50 mL/Hr) IV Continuous <Continuous>  dextrose 5%. 1000 milliLiter(s) (100 mL/Hr) IV Continuous <Continuous>  dextrose 50% Injectable 25 Gram(s) IV Push once  dextrose 50% Injectable 12.5 Gram(s) IV Push once  dextrose 50% Injectable 25 Gram(s) IV Push once  glucagon  Injectable 1 milliGRAM(s) IntraMuscular once  insulin glargine Injectable (LANTUS) 6 Unit(s) SubCutaneous at bedtime  insulin lispro (ADMELOG) corrective regimen sliding scale   SubCutaneous three times a day before meals  insulin lispro (ADMELOG) corrective regimen sliding scale   SubCutaneous at bedtime  insulin lispro Injectable (ADMELOG) 4 Unit(s) SubCutaneous three times a day before meals  polyethylene glycol 3350 17 Gram(s) Oral daily  potassium phosphate / sodium phosphate Powder (PHOS-NaK) 1 Packet(s) Oral three times a day  senna 2 Tablet(s) Oral at bedtime    MEDICATIONS  (PRN):      Allergies  No Known Allergies        Review of Systems:  Constitutional: + poor appetite/po intake, + wt loss, + weakness  Eyes: No blurry vision, no diplopia  Neuro: No tremors, no neuropathy   HEENT: No pain, + loss of taste  Cardiovascular: No chest pain, no palpitations  Respiratory: No SOB, no cough  GI: No nausea, no vomiting   : No dysuria, hematuria  Endocrine: no polyuria, polydipsia  Hem/lymph: no swelling  Osteoporosis: + history of humeral fracture   ALL OTHER SYSTEMS REVIEWED AND NEGATIVE            Vital Signs Last 24 Hrs  T(C): 36.1 (30 Nov 2020 09:45), Max: 36.9 (30 Nov 2020 05:32)  T(F): 97 (30 Nov 2020 09:45), Max: 98.5 (30 Nov 2020 05:32)  HR: 72 (30 Nov 2020 10:30) (72 - 88)  BP: 114/62 (30 Nov 2020 10:30) (97/51 - 118/68)  BP(mean): 79 (30 Nov 2020 09:50) (77 - 81)  RR: 17 (30 Nov 2020 10:30) (15 - 20)  SpO2: 98% (30 Nov 2020 10:30) (97% - 100%)            Physical Exam:  GENERAL: malnourished, well-groomed  EYES: No proptosis, anicteric  HEENT:  Atraumatic, Normocephalic, moist mucous membranes  THYROID: Normal size, no palpable nodules  RESPIRATORY: Clear to auscultation bilaterally; No rales, rhonchi, wheezing, or rubs  CARDIOVASCULAR: Regular rate and rhythm; no peripheral edema  GI: Soft, nontender, non distended, normal bowel sounds  SKIN: Dry, intact, No rashes or lesions  NEURO: AAO x 3, no gross or focal deficit           Labs:                        9.2    4.61  )-----------( 142      ( 30 Nov 2020 06:10 )             28.0   11-30    138  |  106  |  12  ----------------------------<  156<H>  3.8   |  25  |  0.43<L>    Ca    8.2<L>      30 Nov 2020 06:10  Phos  2.5     11-30  Mg     1.8     11-30        PT/INR - ( 30 Nov 2020 06:01 )   PT: 10.0 SEC;   INR: 0.87          PTT - ( 30 Nov 2020 06:01 )  PTT:19.8 SEC

## 2020-11-27 NOTE — PROGRESS NOTE ADULT - SUBJECTIVE AND OBJECTIVE BOX
Ace Mendozaon, PGY1  Pager [phone]    INCOMPLETE NOTE - IN PROGRESS    Patient is a 70y old  Female who presents with a chief complaint of Weight loss, Diarrhea (26 Nov 2020 13:41)      SUBJECTIVE/INTERVAL EVENTS: Patient seen and examined at bedside.    MEDICATIONS  (STANDING):  dextrose 40% Gel 15 Gram(s) Oral once  dextrose 5%. 1000 milliLiter(s) (50 mL/Hr) IV Continuous <Continuous>  dextrose 5%. 1000 milliLiter(s) (100 mL/Hr) IV Continuous <Continuous>  dextrose 50% Injectable 25 Gram(s) IV Push once  dextrose 50% Injectable 12.5 Gram(s) IV Push once  dextrose 50% Injectable 25 Gram(s) IV Push once  glucagon  Injectable 1 milliGRAM(s) IntraMuscular once  insulin lispro (ADMELOG) corrective regimen sliding scale   SubCutaneous every 6 hours  polyethylene glycol 3350 17 Gram(s) Oral daily  senna 2 Tablet(s) Oral at bedtime    MEDICATIONS  (PRN):      VITAL SIGNS:  T(F): 98.2 (11-27-20 @ 05:43), Max: 99.4 (11-26-20 @ 21:37)  HR: 88 (11-27-20 @ 05:43) (84 - 92)  BP: 133/65 (11-27-20 @ 05:43) (121/58 - 138/71)  RR: 17 (11-27-20 @ 05:43) (17 - 18)  SpO2: 99% (11-27-20 @ 05:43) (98% - 100%)    I&O's Summary    Daily     Daily     PHYSICAL EXAM:  Gen: Alert, NAD  HEENT: NCAT, conjunctiva clear, sclera anicteric, no erythema or exudates in the oropharynx, mmm  Neck: Supple, no JVD  CV: RRR, S1S2, no m/r/g  Resp: CTAB, normal respiratory effort  Abd: Soft, nontender, nondistended, normal bowel sounds  Ext: no edema, no clubbing or cyanosis  Neuro: AOx3, CN2-12 grossly intact, LÓPEZ  SKIN: warm, perfused    LABS:                        10.5   4.97  )-----------( 88       ( 26 Nov 2020 05:55 )             32.4     Hgb Trend: 10.5<--, 11.1<--, 11.5<--, 11.9<--, 13.3<--  11-26    140  |  107  |  15  ----------------------------<  238<H>  3.7   |  25  |  0.39<L>    Ca    8.4      26 Nov 2020 05:55  Phos  2.6     11-26  Mg     1.8     11-26    TPro  4.5<L>  /  Alb  2.5<L>  /  TBili  0.2  /  DBili  x   /  AST  33<H>  /  ALT  58<H>  /  AlkPhos  186<H>  11-26    Creatinine Trend: 0.39<--, 0.44<--, 0.39<--, 0.47<--, 0.45<--  LIVER FUNCTIONS - ( 26 Nov 2020 05:55 )  Alb: 2.5 g/dL / Pro: 4.5 g/dL / ALK PHOS: 186 u/L / ALT: 58 u/L / AST: 33 u/L / GGT: x           PT/INR - ( 25 Nov 2020 07:10 )   PT: 10.1 SEC;   INR: 0.88          PTT - ( 25 Nov 2020 07:10 )  PTT:26.7 SEC          CAPILLARY BLOOD GLUCOSE      POCT Blood Glucose.: 228 mg/dL (27 Nov 2020 05:47)  POCT Blood Glucose.: 346 mg/dL (26 Nov 2020 22:11)  POCT Blood Glucose.: 322 mg/dL (26 Nov 2020 17:30)  POCT Blood Glucose.: 375 mg/dL (26 Nov 2020 12:11)  POCT Blood Glucose.: 433 mg/dL (26 Nov 2020 08:31)  POCT Blood Glucose.: 407 mg/dL (26 Nov 2020 08:29)      RADIOLOGY & ADDITIONAL TESTS: Reviewed    Imaging Personally Reviewed:    Consultant(s) Notes Reviewed:      Care Discussed with Consultants/Other Providers:   Ace Whelan Mariela, PGY1  Pager [phone]      Patient is a 70y old  Female who presents with a chief complaint of Weight loss, Diarrhea (26 Nov 2020 13:41)      SUBJECTIVE/INTERVAL EVENTS:  NAEON  This morning, patient reported some abdominal distention.  Patient appears to be having a small smear of stool on her trinity everyday, but no substantial amount of BM.    No n/v/d, leg pain, CP, SOB.  Patient seen and examined at bedside.      MEDICATIONS  (STANDING):  dextrose 40% Gel 15 Gram(s) Oral once  dextrose 5%. 1000 milliLiter(s) (50 mL/Hr) IV Continuous <Continuous>  dextrose 5%. 1000 milliLiter(s) (100 mL/Hr) IV Continuous <Continuous>  dextrose 50% Injectable 25 Gram(s) IV Push once  dextrose 50% Injectable 12.5 Gram(s) IV Push once  dextrose 50% Injectable 25 Gram(s) IV Push once  glucagon  Injectable 1 milliGRAM(s) IntraMuscular once  insulin lispro (ADMELOG) corrective regimen sliding scale   SubCutaneous every 6 hours  polyethylene glycol 3350 17 Gram(s) Oral daily  senna 2 Tablet(s) Oral at bedtime    MEDICATIONS  (PRN):      VITAL SIGNS:  T(F): 98.2 (11-27-20 @ 05:43), Max: 99.4 (11-26-20 @ 21:37)  HR: 88 (11-27-20 @ 05:43) (84 - 92)  BP: 133/65 (11-27-20 @ 05:43) (121/58 - 138/71)  RR: 17 (11-27-20 @ 05:43) (17 - 18)  SpO2: 99% (11-27-20 @ 05:43) (98% - 100%)    I&O's Summary    Daily     Daily     PHYSICAL EXAM:  Gen: Alert, very thin, cachetic black female, in NAD  HEENT: NCAT, PERRL, EOMI, clear conjunctiva, no erythema or exudates in the oropharynx, mmm  Neck: Supple, no LAD  CV: RRR, S1S2, no m/r/g  Resp: Crackles in RLL, normal respiratory effort  Abd: Soft, NT, ND, normal bowel sounds  Ext: trace edema up to ankles, no clubbing or cyanosis  Neuro: AOx3, CN2-12 grossly intact, LÓPEZ  Skin: no rashes, no ecchymoses    LABS:                        10.5   4.97  )-----------( 88       ( 26 Nov 2020 05:55 )             32.4     Hgb Trend: 10.5<--, 11.1<--, 11.5<--, 11.9<--, 13.3<--  11-26    140  |  107  |  15  ----------------------------<  238<H>  3.7   |  25  |  0.39<L>    Ca    8.4      26 Nov 2020 05:55  Phos  2.6     11-26  Mg     1.8     11-26    TPro  4.5<L>  /  Alb  2.5<L>  /  TBili  0.2  /  DBili  x   /  AST  33<H>  /  ALT  58<H>  /  AlkPhos  186<H>  11-26    Creatinine Trend: 0.39<--, 0.44<--, 0.39<--, 0.47<--, 0.45<--  LIVER FUNCTIONS - ( 26 Nov 2020 05:55 )  Alb: 2.5 g/dL / Pro: 4.5 g/dL / ALK PHOS: 186 u/L / ALT: 58 u/L / AST: 33 u/L / GGT: x           PT/INR - ( 25 Nov 2020 07:10 )   PT: 10.1 SEC;   INR: 0.88          PTT - ( 25 Nov 2020 07:10 )  PTT:26.7 SEC          CAPILLARY BLOOD GLUCOSE      POCT Blood Glucose.: 228 mg/dL (27 Nov 2020 05:47)  POCT Blood Glucose.: 346 mg/dL (26 Nov 2020 22:11)  POCT Blood Glucose.: 322 mg/dL (26 Nov 2020 17:30)  POCT Blood Glucose.: 375 mg/dL (26 Nov 2020 12:11)  POCT Blood Glucose.: 433 mg/dL (26 Nov 2020 08:31)  POCT Blood Glucose.: 407 mg/dL (26 Nov 2020 08:29)      RADIOLOGY & ADDITIONAL TESTS: Reviewed    Imaging Personally Reviewed:    Consultant(s) Notes Reviewed:      Care Discussed with Consultants/Other Providers:

## 2020-11-27 NOTE — PROGRESS NOTE ADULT - PROBLEM SELECTOR PLAN 3
Normocytic anemia, downtrending since admission.  Possibly secondary to malignancy vs. iron/vitamin deficiency.  Also with thrombocytopenia, downtrending since admission.  - not consistent with iron deficency, no B12/folate deficiency

## 2020-11-27 NOTE — PROGRESS NOTE ADULT - PROBLEM SELECTOR PLAN 6
DVT ppx: Holding lovenox for EUS FNA  Diet: Regular, as tolerated DVT ppx: Lovenox  Diet: Regular, as tolerated

## 2020-11-27 NOTE — OCCUPATIONAL THERAPY INITIAL EVALUATION ADULT - LIVES WITH, PROFILE
Patient lives alone in a private home with ~4 steps to enter +flight of stairs to 2nd floor bed/bathroom. Patient has access to a walk-in and tub shower at home.

## 2020-11-27 NOTE — PROGRESS NOTE ADULT - ASSESSMENT
70F current smoker, w/ ?osteoporosis who presents with unintentional weight loss and diarrhea, found to have pancreatic lesion, likely secondary to malignancy.  Diarrhea and hyperglycemia likely explained by suppression of pancreatic function. MR with large pancreatic tail/body mass with possible hepatic metastases.

## 2020-11-27 NOTE — PROGRESS NOTE ADULT - PROBLEM SELECTOR PLAN 2
Hyperglycemia possibly secondary to suppression of pancreatic endocrine function possibly d/t infiltration of lesion.  - FS ranging widely. 400s this AM, low to 80s yesterday at noon.  - Will f/u Endo recs  - C/w Lispro 4u AC TID, Lantus 6u QHS for now  - C/w FS qAC/HS, low dose SSI Hyperglycemia likely secondary suppression of pancreatic endocrine function from infiltration of lesion.  - 's yesterday, downtrending to 300-200s  - Will f/u Endo recs  - Lispro 5u AC TID, Lantus 9u QHS  - C/w FS qAC/HS, low dose SSI

## 2020-11-27 NOTE — PROGRESS NOTE ADULT - PROBLEM SELECTOR PLAN 1
Pancreatic lesion in neck and body. Unintentional weight loss, poor appetite, generalized weakness likely secondary to malignancy.  Likely has suppression of both exocrine and endocrine function causing hyperglycemia and diarrhea.  Elevation in alk phos, AST, ALT likely also secondary to obstruction from lesion.  - CT chest showing RLL opacity and small nodules, unclear if metastatic lesions  - MR showing 6x4 mass in pancreatic body/mass and possible metastatic hepatic lesions  - GI following, EUS done on 11/25 - mass unable to be seen, only cyst was seen. FNB was not done.  Repeat EUS planned for 11/27. Pancreatic lesion in neck and body. Unintentional weight loss, poor appetite, generalized weakness likely secondary to malignancy.  Likely has suppression of both exocrine and endocrine function causing hyperglycemia and diarrhea.  Elevation in alk phos, AST, ALT likely also secondary to obstruction from lesion.  - CT chest showing RLL opacity and small nodules, unclear if metastatic lesions  - MR showing 6x4 mass in pancreatic body/mass and possible metastatic hepatic lesions  - GI following, EUS done on 11/25, needed to verify with radiology regarding imaging of pancreas. Planned for repeat EUS 11/27, but was re-scheduled for Monday 11/30.  - IR consulted for liver lesion biopsy.  Possible biopsy next Tuesday 12/1.

## 2020-11-27 NOTE — PHYSICAL THERAPY INITIAL EVALUATION ADULT - GENERAL OBSERVATIONS, REHAB EVAL
Patient received seated out of bed in a chair , (+) IV, (+) mild swelling noted on left hand and R leg/foot ,pt in no apparent distress.

## 2020-11-28 LAB
ANION GAP SERPL CALC-SCNC: 8 MMO/L — SIGNIFICANT CHANGE UP (ref 7–14)
BUN SERPL-MCNC: 12 MG/DL — SIGNIFICANT CHANGE UP (ref 7–23)
C PEPTIDE SERPL-MCNC: 0.4 NG/ML — LOW (ref 1.1–4.4)
CALCIUM SERPL-MCNC: 8.2 MG/DL — LOW (ref 8.4–10.5)
CEA SERPL-MCNC: 201.4 NG/ML — HIGH (ref 1–3.8)
CHLORIDE SERPL-SCNC: 107 MMOL/L — SIGNIFICANT CHANGE UP (ref 98–107)
CO2 SERPL-SCNC: 24 MMOL/L — SIGNIFICANT CHANGE UP (ref 22–31)
CREAT SERPL-MCNC: 0.31 MG/DL — LOW (ref 0.5–1.3)
GLUCOSE BLDC GLUCOMTR-MCNC: 152 MG/DL — HIGH (ref 70–99)
GLUCOSE BLDC GLUCOMTR-MCNC: 201 MG/DL — HIGH (ref 70–99)
GLUCOSE BLDC GLUCOMTR-MCNC: 224 MG/DL — HIGH (ref 70–99)
GLUCOSE BLDC GLUCOMTR-MCNC: 289 MG/DL — HIGH (ref 70–99)
GLUCOSE BLDC GLUCOMTR-MCNC: 340 MG/DL — HIGH (ref 70–99)
GLUCOSE SERPL-MCNC: 208 MG/DL — HIGH (ref 70–99)
HCT VFR BLD CALC: 30.5 % — LOW (ref 34.5–45)
HGB BLD-MCNC: 10 G/DL — LOW (ref 11.5–15.5)
MCHC RBC-ENTMCNC: 29.2 PG — SIGNIFICANT CHANGE UP (ref 27–34)
MCHC RBC-ENTMCNC: 32.8 % — SIGNIFICANT CHANGE UP (ref 32–36)
MCV RBC AUTO: 89.2 FL — SIGNIFICANT CHANGE UP (ref 80–100)
NRBC # FLD: 0 K/UL — SIGNIFICANT CHANGE UP (ref 0–0)
PLATELET # BLD AUTO: 122 K/UL — LOW (ref 150–400)
PMV BLD: 12.2 FL — SIGNIFICANT CHANGE UP (ref 7–13)
POTASSIUM SERPL-MCNC: 3.5 MMOL/L — SIGNIFICANT CHANGE UP (ref 3.5–5.3)
POTASSIUM SERPL-SCNC: 3.5 MMOL/L — SIGNIFICANT CHANGE UP (ref 3.5–5.3)
RBC # BLD: 3.42 M/UL — LOW (ref 3.8–5.2)
RBC # FLD: 15.6 % — HIGH (ref 10.3–14.5)
SODIUM SERPL-SCNC: 139 MMOL/L — SIGNIFICANT CHANGE UP (ref 135–145)
WBC # BLD: 5.43 K/UL — SIGNIFICANT CHANGE UP (ref 3.8–10.5)
WBC # FLD AUTO: 5.43 K/UL — SIGNIFICANT CHANGE UP (ref 3.8–10.5)

## 2020-11-28 PROCEDURE — 99232 SBSQ HOSP IP/OBS MODERATE 35: CPT

## 2020-11-28 PROCEDURE — 99233 SBSQ HOSP IP/OBS HIGH 50: CPT

## 2020-11-28 RX ORDER — INSULIN LISPRO 100/ML
8 VIAL (ML) SUBCUTANEOUS
Refills: 0 | Status: DISCONTINUED | OUTPATIENT
Start: 2020-11-28 | End: 2020-11-30

## 2020-11-28 RX ORDER — INSULIN LISPRO 100/ML
6 VIAL (ML) SUBCUTANEOUS
Refills: 0 | Status: DISCONTINUED | OUTPATIENT
Start: 2020-11-28 | End: 2020-11-28

## 2020-11-28 RX ORDER — INSULIN GLARGINE 100 [IU]/ML
15 INJECTION, SOLUTION SUBCUTANEOUS AT BEDTIME
Refills: 0 | Status: DISCONTINUED | OUTPATIENT
Start: 2020-11-28 | End: 2020-11-29

## 2020-11-28 RX ADMIN — INSULIN GLARGINE 15 UNIT(S): 100 INJECTION, SOLUTION SUBCUTANEOUS at 21:41

## 2020-11-28 RX ADMIN — Medication 8 UNIT(S): at 17:38

## 2020-11-28 RX ADMIN — Medication 5 UNIT(S): at 12:50

## 2020-11-28 RX ADMIN — Medication 4: at 17:37

## 2020-11-28 RX ADMIN — Medication 5 UNIT(S): at 08:47

## 2020-11-28 RX ADMIN — HEPARIN SODIUM 5000 UNIT(S): 5000 INJECTION INTRAVENOUS; SUBCUTANEOUS at 05:28

## 2020-11-28 RX ADMIN — Medication 3: at 12:49

## 2020-11-28 RX ADMIN — Medication 2: at 08:46

## 2020-11-28 NOTE — PROGRESS NOTE ADULT - PROBLEM SELECTOR PLAN 2
Hyperglycemia likely secondary suppression of pancreatic endocrine function from infiltration of lesion.  - 's yesterday, downtrending to 300-200s  - Will f/u Endo recs  - Lispro 5u AC TID, Lantus 9u QHS  - C/w FS qAC/HS, low dose SSI Hyperglycemia likely secondary suppression of pancreatic endocrine function from infiltration of lesion.   - FS up to 400s around dinnertime yesterday  - Will f/u Endo recs  - Lispro 5u AC TID, Lantus 9u QHS  - C/w FS qAC/HS, moderate dose SSI  - If FS in 400s or higher, will give additional lispro

## 2020-11-28 NOTE — PROGRESS NOTE ADULT - SUBJECTIVE AND OBJECTIVE BOX
Ace Mendozaon, PGY1  Pager [phone]    INCOMPLETE NOTE - IN PROGRESS    Patient is a 70y old  Female who presents with a chief complaint of Weight loss, Diarrhea (27 Nov 2020 16:09)      SUBJECTIVE/INTERVAL EVENTS: Patient seen and examined at bedside.    MEDICATIONS  (STANDING):  dextrose 40% Gel 15 Gram(s) Oral once  dextrose 5%. 1000 milliLiter(s) (50 mL/Hr) IV Continuous <Continuous>  dextrose 5%. 1000 milliLiter(s) (100 mL/Hr) IV Continuous <Continuous>  dextrose 50% Injectable 25 Gram(s) IV Push once  dextrose 50% Injectable 12.5 Gram(s) IV Push once  dextrose 50% Injectable 25 Gram(s) IV Push once  glucagon  Injectable 1 milliGRAM(s) IntraMuscular once  heparin   Injectable 5000 Unit(s) SubCutaneous every 12 hours  insulin glargine Injectable (LANTUS) 9 Unit(s) SubCutaneous at bedtime  insulin lispro (ADMELOG) corrective regimen sliding scale   SubCutaneous three times a day before meals  insulin lispro (ADMELOG) corrective regimen sliding scale   SubCutaneous at bedtime  insulin lispro Injectable (ADMELOG) 5 Unit(s) SubCutaneous three times a day before meals  polyethylene glycol 3350 17 Gram(s) Oral two times a day  senna 2 Tablet(s) Oral at bedtime    MEDICATIONS  (PRN):      VITAL SIGNS:  T(F): 98.4 (11-28-20 @ 05:27), Max: 98.7 (11-27-20 @ 21:33)  HR: 96 (11-28-20 @ 05:27) (85 - 98)  BP: 121/62 (11-28-20 @ 05:27) (121/62 - 142/68)  RR: 18 (11-28-20 @ 05:27) (18 - 18)  SpO2: 100% (11-28-20 @ 05:27) (100% - 100%)    I&O's Summary    Daily     Daily     PHYSICAL EXAM:  Gen: Alert, NAD  HEENT: NCAT, conjunctiva clear, sclera anicteric, no erythema or exudates in the oropharynx, mmm  Neck: Supple, no JVD  CV: RRR, S1S2, no m/r/g  Resp: CTAB, normal respiratory effort  Abd: Soft, nontender, nondistended, normal bowel sounds  Ext: no edema, no clubbing or cyanosis  Neuro: AOx3, CN2-12 grossly intact, LÓPEZ  SKIN: warm, perfused    LABS:                        10.3   5.22  )-----------( 101      ( 27 Nov 2020 07:15 )             31.5     Hgb Trend: 10.3<--, 10.5<--, 11.1<--, 11.5<--, 11.9<--  11-27    136  |  105  |  14  ----------------------------<  185<H>  3.2<L>   |  24  |  0.36<L>    Ca    8.3<L>      27 Nov 2020 07:15  Phos  2.3     11-27  Mg     1.9     11-27      Creatinine Trend: 0.36<--, 0.39<--, 0.44<--, 0.39<--, 0.47<--, 0.45<--    PT/INR - ( 27 Nov 2020 07:15 )   PT: 10.4 SEC;   INR: 0.91          PTT - ( 27 Nov 2020 07:15 )  PTT:25.2 SEC          CAPILLARY BLOOD GLUCOSE      POCT Blood Glucose.: 224 mg/dL (28 Nov 2020 05:33)  POCT Blood Glucose.: 324 mg/dL (27 Nov 2020 21:31)  POCT Blood Glucose.: 390 mg/dL (27 Nov 2020 19:44)  POCT Blood Glucose.: 394 mg/dL (27 Nov 2020 18:31)  POCT Blood Glucose.: 424 mg/dL (27 Nov 2020 17:12)  POCT Blood Glucose.: 463 mg/dL (27 Nov 2020 17:08)  POCT Blood Glucose.: 196 mg/dL (27 Nov 2020 12:04)      RADIOLOGY & ADDITIONAL TESTS: Reviewed    Imaging Personally Reviewed:    Consultant(s) Notes Reviewed:      Care Discussed with Consultants/Other Providers:   Ace Whelan Mariela, PGY1  Pager [phone]      Patient is a 70y old  Female who presents with a chief complaint of Weight loss, Diarrhea (27 Nov 2020 16:09)      SUBJECTIVE/INTERVAL EVENTS:  NAEON  Denies n/v/d, abd pain.  Patient seen and examined at bedside.      MEDICATIONS  (STANDING):  dextrose 40% Gel 15 Gram(s) Oral once  dextrose 5%. 1000 milliLiter(s) (50 mL/Hr) IV Continuous <Continuous>  dextrose 5%. 1000 milliLiter(s) (100 mL/Hr) IV Continuous <Continuous>  dextrose 50% Injectable 25 Gram(s) IV Push once  dextrose 50% Injectable 12.5 Gram(s) IV Push once  dextrose 50% Injectable 25 Gram(s) IV Push once  glucagon  Injectable 1 milliGRAM(s) IntraMuscular once  heparin   Injectable 5000 Unit(s) SubCutaneous every 12 hours  insulin glargine Injectable (LANTUS) 9 Unit(s) SubCutaneous at bedtime  insulin lispro (ADMELOG) corrective regimen sliding scale   SubCutaneous three times a day before meals  insulin lispro (ADMELOG) corrective regimen sliding scale   SubCutaneous at bedtime  insulin lispro Injectable (ADMELOG) 5 Unit(s) SubCutaneous three times a day before meals  polyethylene glycol 3350 17 Gram(s) Oral two times a day  senna 2 Tablet(s) Oral at bedtime    MEDICATIONS  (PRN):      VITAL SIGNS:  T(F): 98.4 (11-28-20 @ 05:27), Max: 98.7 (11-27-20 @ 21:33)  HR: 96 (11-28-20 @ 05:27) (85 - 98)  BP: 121/62 (11-28-20 @ 05:27) (121/62 - 142/68)  RR: 18 (11-28-20 @ 05:27) (18 - 18)  SpO2: 100% (11-28-20 @ 05:27) (100% - 100%)    I&O's Summary    Daily     Daily     PHYSICAL EXAM:  Gen: Alert, very thin, cachetic black female, in NAD  HEENT: NCAT, PERRL, EOMI, clear conjunctiva, no erythema or exudates in the oropharynx, mmm  Neck: Supple, no LAD  CV: RRR, S1S2, no m/r/g  Resp: Crackles in RLL, normal respiratory effort  Abd: Soft, NT, ND, normal bowel sounds  Ext: trace edema up to ankles, no clubbing or cyanosis  Neuro: AOx3, CN2-12 grossly intact, LÓPEZ  Skin: no rashes, no ecchymoses    LABS:                        10.3   5.22  )-----------( 101      ( 27 Nov 2020 07:15 )             31.5     Hgb Trend: 10.3<--, 10.5<--, 11.1<--, 11.5<--, 11.9<--  11-27    136  |  105  |  14  ----------------------------<  185<H>  3.2<L>   |  24  |  0.36<L>    Ca    8.3<L>      27 Nov 2020 07:15  Phos  2.3     11-27  Mg     1.9     11-27      Creatinine Trend: 0.36<--, 0.39<--, 0.44<--, 0.39<--, 0.47<--, 0.45<--    PT/INR - ( 27 Nov 2020 07:15 )   PT: 10.4 SEC;   INR: 0.91          PTT - ( 27 Nov 2020 07:15 )  PTT:25.2 SEC          CAPILLARY BLOOD GLUCOSE      POCT Blood Glucose.: 224 mg/dL (28 Nov 2020 05:33)  POCT Blood Glucose.: 324 mg/dL (27 Nov 2020 21:31)  POCT Blood Glucose.: 390 mg/dL (27 Nov 2020 19:44)  POCT Blood Glucose.: 394 mg/dL (27 Nov 2020 18:31)  POCT Blood Glucose.: 424 mg/dL (27 Nov 2020 17:12)  POCT Blood Glucose.: 463 mg/dL (27 Nov 2020 17:08)  POCT Blood Glucose.: 196 mg/dL (27 Nov 2020 12:04)      RADIOLOGY & ADDITIONAL TESTS: Reviewed    Imaging Personally Reviewed:    Consultant(s) Notes Reviewed:      Care Discussed with Consultants/Other Providers:

## 2020-11-28 NOTE — PROGRESS NOTE ADULT - ASSESSMENT
70F w/ ?osteoporosis, multiple tendon repairs who presents with weight loss, diarrhea, poor appetite d/t loss of taste, progressive weakness. Found to have pancreatic lesion concerning for pancreatic neoplasm and new onset DM with A1c >18.     PROBLEM 1) New onset uncontrolled DM, likely secondary d/t pancreatic insufficiency    A1c >18 indicating grossly uncontrolled DM. New onset. No prior history. Likely secondary to pancreatic insufficiency and will need basal/bolus insulin for management.  - BG goal inpatient is 100-180 mg/dl  - Increase Lantus to 15 units qhs  - Increase Admelog to 8 units sq ac TID (hold if no eating/NPO)  - Continue low correctional scale ac and low bedtime scale  - Consistent carb die with Glucerna as supplement   - Monitor FS ac and hs or q6h if NPO  - RD consult appreciated  - DM teaching- please ensure that patient knows how to use insulin pen, how to self inject, and how to check blood sugar and knows s/s and management of hypoglycemia.  Provider to Rn placed for bedside education  -c-peptide resulted: 0.4 with serum BG of 208 indicating that patient is not producing endogenous insulin and therefore will need basal bolus for d/c    Discharge plan: Patient to be discharged on basal + bolus insulin pens (doses TBD),   Please send Lantus solsotar pen and humalog kwikpen as test script to check insurance coverage.  Ok to send with current doses and update prior to d/c    If Lantus not covered- can try alternating with one of following   tresiba/basaglar/toujeo/Levemir    If Humalog not covered- can try alternating with one of following  novolog/apidra/admelog/fiasp    Please also send scripts for glucometer to [facility] so that patient can have the opportunity to learn how to use glucometer that she will be going home with.    please call endocrine team prior to discharge for final recs.   If patient wishes to follow up with [facility]  [address] [place] [address], [address], [place], [place] [address] [phone].

## 2020-11-28 NOTE — PROGRESS NOTE ADULT - PROBLEM SELECTOR PLAN 1
Pancreatic lesion in neck and body. Unintentional weight loss, poor appetite, generalized weakness likely secondary to malignancy.  Likely has suppression of both exocrine and endocrine function causing hyperglycemia and diarrhea.  Elevation in alk phos, AST, ALT likely also secondary to obstruction from lesion.  - CT chest showing RLL opacity and small nodules, unclear if metastatic lesions  - MR showing 6x4 mass in pancreatic body/mass and possible metastatic hepatic lesions  - GI following, EUS done on 11/25, needed to verify with radiology regarding imaging of pancreas. Planned for repeat EUS 11/27, but was re-scheduled for Monday 11/30.  - IR consulted for liver lesion biopsy.  Possible biopsy next Tuesday 12/1.

## 2020-11-28 NOTE — PROGRESS NOTE ADULT - SUBJECTIVE AND OBJECTIVE BOX
Chief Complaint: Type 2 DM    History: Patient seen at bedside today. Patient reports decreased appetite because she does not like the hospital food. Denies s/s of hypoglycemia. Denies nausea and vomiting.     MEDICATIONS  (STANDING):  dextrose 40% Gel 15 Gram(s) Oral once  dextrose 5%. 1000 milliLiter(s) (50 mL/Hr) IV Continuous <Continuous>  dextrose 5%. 1000 milliLiter(s) (100 mL/Hr) IV Continuous <Continuous>  dextrose 50% Injectable 25 Gram(s) IV Push once  dextrose 50% Injectable 12.5 Gram(s) IV Push once  dextrose 50% Injectable 25 Gram(s) IV Push once  glucagon  Injectable 1 milliGRAM(s) IntraMuscular once  heparin   Injectable 5000 Unit(s) SubCutaneous every 12 hours  insulin glargine Injectable (LANTUS) 9 Unit(s) SubCutaneous at bedtime  insulin lispro (ADMELOG) corrective regimen sliding scale   SubCutaneous three times a day before meals  insulin lispro (ADMELOG) corrective regimen sliding scale   SubCutaneous at bedtime  insulin lispro Injectable (ADMELOG) 6 Unit(s) SubCutaneous three times a day before meals  polyethylene glycol 3350 17 Gram(s) Oral two times a day  senna 2 Tablet(s) Oral at bedtime    No Known Allergies    Review of Systems:  Constitutional: No fever  Eyes: No blurry vision  Neuro: No tremors  HEENT: No pain  GI: No nausea, vomiting, abdominal pain  Endocrine: no polyuria, polydipsia    PHYSICAL EXAM:  VITALS: T(C): 37 (11-28-20 @ 12:51)  T(F): 98.6 (11-28-20 @ 12:51), Max: 98.7 (11-27-20 @ 21:33)  HR: 90 (11-28-20 @ 12:51) (86 - 98)  BP: 106/58 (11-28-20 @ 12:51) (104/61 - 135/72)  RR:  (16 - 18)  SpO2:  (100% - 100%)  Wt(kg): --  GENERAL: NAD,   EYES: No proptosis, anicteric  HEENT:  Atraumatic, Normocephalic  PSYCH: Alert and oriented x 3    CAPILLARY BLOOD GLUCOSE  POCT Blood Glucose.: 289 mg/dL (28 Nov 2020 12:22)  POCT Blood Glucose.: 201 mg/dL (28 Nov 2020 08:33)  POCT Blood Glucose.: 224 mg/dL (28 Nov 2020 05:33)  POCT Blood Glucose.: 324 mg/dL (27 Nov 2020 21:31)  POCT Blood Glucose.: 390 mg/dL (27 Nov 2020 19:44)  POCT Blood Glucose.: 394 mg/dL (27 Nov 2020 18:31)  POCT Blood Glucose.: 424 mg/dL (27 Nov 2020 17:12)  POCT Blood Glucose.: 463 mg/dL (27 Nov 2020 17:08)    11-28    139  |  107  |  12  ----------------------------<  208<H>  3.5   |  24  |  0.31<L>    EGFR if : 134  EGFR if non : 116    Ca    8.2<L>      11-28  Mg     1.9     11-27  Phos  2.3     11-27    TPro  4.5<L>  /  Alb  2.5<L>  /  TBili  0.2  /  DBili  x   /  AST  33<H>  /  ALT  58<H>  /  AlkPhos  186<H>  11-26      Thyroid Function Tests:  11-22 @ 12:00 TSH 2.48 FreeT4 -- T3 -- Anti TPO -- Anti Thyroglobulin Ab -- TSI --    A1C with Estimated Average Glucose (11.23.20 @ 05:45)    A1C with Estimated Average Glucose: > 18.0: HA1C = 21.5  High Risk (prediabetic)    5.7 - 6.4 %  Diabetic, diagnostic           > 6.5 %  ADA diabetic treatment goal    < 7.0 %    HbA1C values may not accurately reflect mean blood glucose  in patients with Hb variants.  Suggest clinical correlation. %

## 2020-11-29 ENCOUNTER — TRANSCRIPTION ENCOUNTER (OUTPATIENT)
Age: 70
End: 2020-11-29

## 2020-11-29 LAB
ANION GAP SERPL CALC-SCNC: 6 MMO/L — LOW (ref 7–14)
BUN SERPL-MCNC: 14 MG/DL — SIGNIFICANT CHANGE UP (ref 7–23)
CALCIUM SERPL-MCNC: 8.4 MG/DL — SIGNIFICANT CHANGE UP (ref 8.4–10.5)
CHLORIDE SERPL-SCNC: 105 MMOL/L — SIGNIFICANT CHANGE UP (ref 98–107)
CHOLEST SERPL-MCNC: 169 MG/DL — SIGNIFICANT CHANGE UP (ref 120–199)
CO2 SERPL-SCNC: 26 MMOL/L — SIGNIFICANT CHANGE UP (ref 22–31)
CREAT SERPL-MCNC: 0.37 MG/DL — LOW (ref 0.5–1.3)
DIRECT LDL: 64 MG/DL — SIGNIFICANT CHANGE UP
GLUCOSE BLDC GLUCOMTR-MCNC: 101 MG/DL — HIGH (ref 70–99)
GLUCOSE BLDC GLUCOMTR-MCNC: 116 MG/DL — HIGH (ref 70–99)
GLUCOSE BLDC GLUCOMTR-MCNC: 152 MG/DL — HIGH (ref 70–99)
GLUCOSE BLDC GLUCOMTR-MCNC: 156 MG/DL — HIGH (ref 70–99)
GLUCOSE SERPL-MCNC: 56 MG/DL — LOW (ref 70–99)
HCT VFR BLD CALC: 32 % — LOW (ref 34.5–45)
HDLC SERPL-MCNC: 105 MG/DL — HIGH (ref 45–65)
HGB BLD-MCNC: 10.2 G/DL — LOW (ref 11.5–15.5)
MAGNESIUM SERPL-MCNC: 1.9 MG/DL — SIGNIFICANT CHANGE UP (ref 1.6–2.6)
MCHC RBC-ENTMCNC: 28.7 PG — SIGNIFICANT CHANGE UP (ref 27–34)
MCHC RBC-ENTMCNC: 31.9 % — LOW (ref 32–36)
MCV RBC AUTO: 89.9 FL — SIGNIFICANT CHANGE UP (ref 80–100)
NRBC # FLD: 0 K/UL — SIGNIFICANT CHANGE UP (ref 0–0)
PHOSPHATE SERPL-MCNC: 2.2 MG/DL — LOW (ref 2.5–4.5)
PLATELET # BLD AUTO: 144 K/UL — LOW (ref 150–400)
PMV BLD: 11.1 FL — SIGNIFICANT CHANGE UP (ref 7–13)
POTASSIUM SERPL-MCNC: 3.8 MMOL/L — SIGNIFICANT CHANGE UP (ref 3.5–5.3)
POTASSIUM SERPL-SCNC: 3.8 MMOL/L — SIGNIFICANT CHANGE UP (ref 3.5–5.3)
RBC # BLD: 3.56 M/UL — LOW (ref 3.8–5.2)
RBC # FLD: 16 % — HIGH (ref 10.3–14.5)
SODIUM SERPL-SCNC: 137 MMOL/L — SIGNIFICANT CHANGE UP (ref 135–145)
TRIGL SERPL-MCNC: 49 MG/DL — SIGNIFICANT CHANGE UP (ref 10–149)
WBC # BLD: 5.61 K/UL — SIGNIFICANT CHANGE UP (ref 3.8–10.5)
WBC # FLD AUTO: 5.61 K/UL — SIGNIFICANT CHANGE UP (ref 3.8–10.5)

## 2020-11-29 PROCEDURE — 99233 SBSQ HOSP IP/OBS HIGH 50: CPT | Mod: GC

## 2020-11-29 RX ORDER — INSULIN GLARGINE 100 [IU]/ML
8 INJECTION, SOLUTION SUBCUTANEOUS AT BEDTIME
Refills: 0 | Status: DISCONTINUED | OUTPATIENT
Start: 2020-11-29 | End: 2020-11-30

## 2020-11-29 RX ORDER — SODIUM,POTASSIUM PHOSPHATES 278-250MG
1 POWDER IN PACKET (EA) ORAL ONCE
Refills: 0 | Status: COMPLETED | OUTPATIENT
Start: 2020-11-29 | End: 2020-11-29

## 2020-11-29 RX ADMIN — SENNA PLUS 2 TABLET(S): 8.6 TABLET ORAL at 03:14

## 2020-11-29 RX ADMIN — Medication 1: at 12:35

## 2020-11-29 RX ADMIN — Medication 8 UNIT(S): at 12:34

## 2020-11-29 RX ADMIN — Medication 1: at 17:48

## 2020-11-29 RX ADMIN — INSULIN GLARGINE 8 UNIT(S): 100 INJECTION, SOLUTION SUBCUTANEOUS at 22:05

## 2020-11-29 RX ADMIN — Medication 8 UNIT(S): at 17:48

## 2020-11-29 RX ADMIN — Medication 8 UNIT(S): at 08:54

## 2020-11-29 RX ADMIN — Medication 1 PACKET(S): at 17:48

## 2020-11-29 NOTE — PROGRESS NOTE ADULT - SUBJECTIVE AND OBJECTIVE BOX
Author:   Phil Fajardo MD  Internal Medicine, PGY2  [phone]    Patient:  MARIA NICOLAS  [id number]    Progress Note    Interval events: No acute events.  Pertinent ROS (if any):      Administered:  senna: 2 Tablet(s) Oral (11-29 @ 03:14)  insulin glargine Injectable (LANTUS): 15 Unit(s) SubCutaneous (11-28 @ 21:41)        OBJECTIVE:    CAPILLARY BLOOD GLUCOSE      POCT Blood Glucose.: 152 mg/dL (28 Nov 2020 21:35)        VITALS:  T(F): 98.1 (11-29-20 @ 05:48), Max: 98.6 (11-28-20 @ 12:51)  HR: 86 (11-29-20 @ 05:48) (86 - 90)  BP: 104/52 (11-29-20 @ 05:48) (103/59 - 122/70)  BP(mean): --  ABP: --  ABP(mean): --  RR: 16 (11-29-20 @ 05:48) (16 - 17)  SpO2: 100% (11-29-20 @ 05:48) (98% - 100%)    PHYSICAL EXAM:  GENERAL: NAD, lying in bed comfortably  HEAD:  Atraumatic, Normocephalic  EYES: EOMI, PERRLA, conjunctiva and sclera clear  ENT: Moist mucous membranes  NECK: Supple, No JVD  CHEST/LUNG: Clear to auscultation bilaterally; No rales, rhonchi, wheezing, or rubs. Unlabored respirations  HEART: Regular rate and rhythm; No murmurs, rubs, or gallops  ABDOMEN: Bowel sounds present; Soft, Nontender, Nondistended. No hepatomegaly  EXTREMITIES:  2+ Peripheral Pulses, brisk capillary refill. No clubbing, cyanosis, or edema  NERVOUS SYSTEM:  Alert & Oriented X3, speech clear. No deficits   MSK: FROM all 4 extremities, full and equal strength  SKIN: No rashes or lesions    HOSPITAL MEDICATIONS:  Standing Meds:  dextrose 40% Gel 15 Gram(s) Oral once  dextrose 5%. 1000 milliLiter(s) IV Continuous <Continuous>  dextrose 5%. 1000 milliLiter(s) IV Continuous <Continuous>  dextrose 50% Injectable 25 Gram(s) IV Push once  dextrose 50% Injectable 12.5 Gram(s) IV Push once  dextrose 50% Injectable 25 Gram(s) IV Push once  glucagon  Injectable 1 milliGRAM(s) IntraMuscular once  heparin   Injectable 5000 Unit(s) SubCutaneous every 12 hours  insulin glargine Injectable (LANTUS) 15 Unit(s) SubCutaneous at bedtime  insulin lispro (ADMELOG) corrective regimen sliding scale   SubCutaneous three times a day before meals  insulin lispro (ADMELOG) corrective regimen sliding scale   SubCutaneous at bedtime  insulin lispro Injectable (ADMELOG) 8 Unit(s) SubCutaneous three times a day before meals  polyethylene glycol 3350 17 Gram(s) Oral two times a day  senna 2 Tablet(s) Oral at bedtime      PRN Meds:      LABS:  CBC 11-28-20 @ 05:50                        10.0   5.43  )-----------( 122                   30.5       Hgb trend: 10.0 <-- , 10.3 <--   WBC trend: 5.43 <-- , 5.22 <--       CMP 11-28-20 @ 05:50    139  |  107  |  12  ----------------------------<  208<H>  3.5   |  24  |  0.31<L>          Serum Cr trend: 0.31 <-- , 0.36 <--         ABG Trend:         MICROBIOLOGY:       RADIOLOGY:  [ ] Reviewed and interpreted by me    EKG:

## 2020-11-29 NOTE — DISCHARGE NOTE PROVIDER - HOSPITAL COURSE
HPI:  70F w/ ?osteoporosis, multiple tendon repairs who presents with weight loss since May and diarrhea x 2 weeks.  Patient reports 30lb weight loss since May 2020, poor appetite d/t loss of taste, progressive weakness.  Patient reports non-bloody diarrhea x 2 weeks that resolved this past Tuesday.  Patient denies f/c, nightsweats, abd pain, n/v, CP, palpitations, SOB, cough, blood in stools or urine, dysuria, leg pain/swelling.  No personal hx of cancer.  Family hx of cancer includes sister who had liver cancer. Pt reports that she is a current smoker, 1/2 PPD x 20 years.      Hospital Course:   HPI:  70F w/ ?osteoporosis, multiple tendon repairs who presents with weight loss since May and diarrhea x 2 weeks.  Patient reports 30lb weight loss since May 2020, poor appetite d/t loss of taste, progressive weakness.  Patient reports non-bloody diarrhea x 2 weeks that resolved this past Tuesday.  Patient denies f/c, nightsweats, abd pain, n/v, CP, palpitations, SOB, cough, blood in stools or urine, dysuria, leg pain/swelling.  No personal hx of cancer.  Family hx of cancer includes sister who had liver cancer. Pt reports that she is a current smoker, 1/2 PPD x 30 years.      Hospital Course:    Diabetes:  Patient's A1c was found to be elevated to >18. This was thought to be due to exocrine and endocrine insufficiency from pancreatic lesion .    Pancreatic adenocarcinoma:  MR sowed a 6x4 mass in pancreatic body and possible metastatic hepatic lesions. CEA was elevated to 201. GI was consulte HPI:  70F w/ ?osteoporosis, multiple tendon repairs who presents with weight loss since May and diarrhea x 2 weeks.  Patient reports 30lb weight loss since May 2020, poor appetite d/t loss of taste, progressive weakness.  Patient reports non-bloody diarrhea x 2 weeks that resolved this past Tuesday.  Patient denies f/c, nightsweats, abd pain, n/v, CP, palpitations, SOB, cough, blood in stools or urine, dysuria, leg pain/swelling.  No personal hx of cancer.  Family hx of cancer includes sister who had liver cancer. Pt reports that she is a current smoker, 1/2 PPD x 30 years.      Hospital Course:  Patient's A1c was found to be elevated to >18. This was thought to be due to exocrine and endocrine insufficiency from pancreatic lesion. Endocrinology was consulted and patient was placed on long-acting and pre-meal insulin. Patient was taught how to use insulin supplies and diabetic teaching was provided. Patient was set-up with endocrine appointment for follow-up prior to discharge.   MR sowed a 6x4 mass in pancreatic body/tail  and possible metastatic hepatic lesions. CEA was elevated to 201. GI was consulted for EUS on 11/25 which was indeterminate. A repeat EUS was done on 11/20, with biopsy of the pancreatic tail with concern for some omental and kidney involvement. Pathology report showed invasive pancreatic adenocarcinoma. IR was also consulted for liver biopsy, which was done 12/1. Currently, awaiting results. CT chest showed RLL opacities and small nodules, unclear whether metastatic lesions. Oncology was consulted inpatient. Patient was set up to follow-up outpatient with [facility] for the malignancy findings.  Patient was seen by physical therapy, who recommended home with outpatient services. Patient was deemed ready for discharge on 12/2/2020.

## 2020-11-29 NOTE — DISCHARGE NOTE PROVIDER - NSFOLLOWUPCLINICS_GEN_ALL_ED_FT
Gastroenterology  Gastroenterology  [address] [place] [address] [address]  [place] [address]  Phone: [phone]  Fax:     [facility]  Hematology/Oncology  [address] [place] [address]  Phone: [phone]  Fax:   Follow Up Time:

## 2020-11-29 NOTE — DISCHARGE NOTE PROVIDER - CARE PROVIDER_API CALL
Sendy Li  INTERNAL MEDICINE  [address] [place] [address]  Phone: [phone]  Fax: [phone]  Follow Up Time:     Radha Blanco  [address] [place] [address]  Phone: [phone]  Fax: (   )    -  Scheduled Appointment: 12/18/2020 02:30 PM   Sendy Li  INTERNAL MEDICINE  [address] [place] [address]  Phone: [phone]  Fax: [phone]  Follow Up Time:     Radha Blanco  [address] [place] [address]  Phone: [phone]  Fax: (   )    -  Scheduled Appointment: 12/18/2020 02:30 PM    Nolberto Prabhakar  INTERNAL MEDICINE  [address] [address]  Phone: [phone]  Fax: ()-  Scheduled Appointment: 03/09/2021 01:45 PM

## 2020-11-29 NOTE — DISCHARGE NOTE PROVIDER - CARE PROVIDERS DIRECT ADDRESSES
,DirectAddress_Unknown,DirectAddress_Unknown ,DirectAddress_Unknown,DirectAddress_Unknown,[email or web address]

## 2020-11-29 NOTE — DISCHARGE NOTE PROVIDER - PROVIDER TOKENS
PROVIDER:[TOKEN:[[id number]]],FREE:[LAST:[Francis],FIRST:[Radha],PHONE:[phone]],FAX:[(   )    -],ADDRESS:[[address] [place] [address]],SCHEDULEDAPPT:[12/18/2020],SCHEDULEDAPPTTIME:[02:30 PM]] PROVIDER:[TOKEN:[[id number]]],FREE:[LAST:[Francis],FIRST:[Radha],PHONE:[phone]],FAX:[(   )    -],ADDRESS:[[address] [place] [address]],SCHEDULEDAPPT:[12/18/2020],SCHEDULEDAPPTTIME:[02:30 PM]],PROVIDER:[TOKEN:[[id number]],SCHEDULEDAPPT:[03/09/2021],SCHEDULEDAPPTTIME:[01:45 PM]]

## 2020-11-29 NOTE — DISCHARGE NOTE PROVIDER - NSDCCPCAREPLAN_GEN_ALL_CORE_FT
PRINCIPAL DISCHARGE DIAGNOSIS  Diagnosis: Pancreatic cancer  Assessment and Plan of Treatment:       SECONDARY DISCHARGE DIAGNOSES  Diagnosis: Hyperglycemia  Assessment and Plan of Treatment:      PRINCIPAL DISCHARGE DIAGNOSIS  Diagnosis: Pancreatic cancer  Assessment and Plan of Treatment: While you were admitted, imaging sowed a mass on your pancreas. A biopsy of the pancreas was performed by gastroenterology, and surgical report showed an invasive pancreatic adenocarcinoma. We were also concerned that there may be metastases to the liver. A liver biopsy was done by IR, and results are currently pending. Oncology was consulted and a referral was made for you to follow-up with them after discharge. They will be calling you within the next couple of days to set-up an appointment. If you do not receive a call, please call [phone] to make an appointment. Please also follow up with gastroenterology as an outpatient: please call [phone] to set-up an appointment. Please also follow-up with your PCP      SECONDARY DISCHARGE DIAGNOSES  Diagnosis: Opacity of lung on imaging study  Assessment and Plan of Treatment: An opacity was seen in your lung on imaging. It is unclear whether this is metastases or not. It is important that you follow-up with oncology outpatient to evaluate for further imaging and wheter the lesion needs to be biopsied. Please call [phone] to set-up an appointment if you are not called within the next two days.    Diagnosis: Hyperglycemia  Assessment and Plan of Treatment: You were also found to have diabetes during your stay. You were taught how to use insulin and discarged with a supply of insulin and diabetic materials. It is very important that you follow-up with endocrine following discharge. An appointment has been made for you.     PRINCIPAL DISCHARGE DIAGNOSIS  Diagnosis: Pancreatic cancer  Assessment and Plan of Treatment: While you were admitted, imaging sowed a mass on your pancreas. A biopsy of the pancreas was performed by gastroenterology, and surgical report showed an invasive pancreatic adenocarcinoma. We were also concerned that there may be metastases to the liver. A liver biopsy was done by IR, and results are currently pending. Oncology was consulted and a referral was made for you to follow-up with them after discharge. They will be calling you within the next couple of days to set-up an appointment. If you do not receive a call, please call [phone] to make an appointment. Please also follow up with gastroenterology as an outpatient: please call [phone] to set-up an appointment. Please also follow-up with your PCP      SECONDARY DISCHARGE DIAGNOSES  Diagnosis: Opacity of lung on imaging study  Assessment and Plan of Treatment: An opacity was seen in your lung on imaging. It is unclear whether this is metastases or not. It is important that you follow-up with oncology outpatient to evaluate for further imaging and wheter the lesion needs to be biopsied. Please call [phone] to set-up an appointment if you are not called within the next two days.    Diagnosis: Hyperglycemia  Assessment and Plan of Treatment: You were also found to have diabetes during your stay. You were taught how to use insulin and discarged with a supply of insulin and diabetic materials. It is very important that you follow-up with endocrine following discharge. An appointment has been made for you: you have an appointment with Nutritionist Radha Blanco on 12/18/2020 at 2:30pm and an appointment with endocrinologist, Dr. Prabhakar, on 3/9/2020 at 1:45 pm.

## 2020-11-29 NOTE — DISCHARGE NOTE PROVIDER - NSDCMRMEDTOKEN_GEN_ALL_CORE_FT
Calcium 500+D oral tablet, chewable: 1 tab(s) orally once a day   alcohol swabs : Apply topically to affected area 4 times a day  Meds to Beds  [facility]  12/2 10am  page [phone]   Basaglar KwikPen 100 units/mL subcutaneous solution: 9 unit(s) subcutaneous once a day at 12pm  test script  Meds to Beds  [facility]  12/2 10am  page [phone]   Calcium 500+D oral tablet, chewable: 1 tab(s) orally once a day  glucometer (per patient&#x27;s insurance): 1 application subcutaneous 3 times a day   Meds to Beds  [facility]  12/2 10am  page [phone]   Insulin Pen Needles, 4mm: 1 application subcutaneously 3 times a day . ** Use with insulin pen **   Meds to Beds  [facility]  12/2 10am  page [phone]   ICD: E11.9  lancets: 1 application subcutaneously 3 times a day   Meds to Beds  [facility]  12/2 10am  page [phone]   ICD:E11.9  NovoLOG FlexPen 100 units/mL injectable solution: 8 unit(s) injectable 3 times a day (before meals)  test scripts  Meds to Beds  [facility]  12/2 10am  page [phone]    test strips (per patient&#x27;s insurance): 1 application subcutaneously 3 times a day . ** Compatible with patient&#x27;s glucometer **   MedtoBed  [facility]  12/2 10am   [id number]   ICD: E11.9   alcohol swabs : Apply topically to affected area 4 times a day  Meds to Beds  9N [facility]  12/2 10am  page [phone]   Basaglar KwikPen 100 units/mL subcutaneous solution: 9 unit(s) subcutaneous once a day at 12pm  test script  Meds to Beds  9N [facility]  12/2 10am  page [phone]   glucometer (per patient&#x27;s insurance): 1 application subcutaneous 3 times a day   Meds to Beds  9[facility]  12/2 10am  page [phone]   Insulin Pen Needles, 4mm: 1 application subcutaneously 3 times a day . ** Use with insulin pen **   Meds to Beds  9N [facility]  12/2 10am  page [phone]   ICD: E11.9  lancets: 1 application subcutaneously 3 times a day   Meds to Beds  9N [facility]  12/2 10am  page [phone]   ICD:E11.9  NovoLOG FlexPen 100 units/mL injectable solution: 8 unit(s) injectable 3 times a day (before meals)  test scripts  Meds to Beds  9N [facility]  12/2 10am  page [phone]    test strips (per patient&#x27;s insurance): 1 application subcutaneously 3 times a day . ** Compatible with patient&#x27;s glucometer **   MedtoBed  9N [facility]25[facility]  12/2 10am   [id number]   ICD: E11.9

## 2020-11-30 ENCOUNTER — RESULT REVIEW (OUTPATIENT)
Age: 70
End: 2020-11-30

## 2020-11-30 LAB
ANION GAP SERPL CALC-SCNC: 7 MMO/L — SIGNIFICANT CHANGE UP (ref 7–14)
APTT BLD: 19.8 SEC — LOW (ref 27–36.3)
BUN SERPL-MCNC: 12 MG/DL — SIGNIFICANT CHANGE UP (ref 7–23)
CALCIUM SERPL-MCNC: 8.2 MG/DL — LOW (ref 8.4–10.5)
CHLORIDE SERPL-SCNC: 106 MMOL/L — SIGNIFICANT CHANGE UP (ref 98–107)
CO2 SERPL-SCNC: 25 MMOL/L — SIGNIFICANT CHANGE UP (ref 22–31)
CREAT SERPL-MCNC: 0.43 MG/DL — LOW (ref 0.5–1.3)
GLUCOSE BLDC GLUCOMTR-MCNC: 136 MG/DL — HIGH (ref 70–99)
GLUCOSE BLDC GLUCOMTR-MCNC: 163 MG/DL — HIGH (ref 70–99)
GLUCOSE BLDC GLUCOMTR-MCNC: 206 MG/DL — HIGH (ref 70–99)
GLUCOSE BLDC GLUCOMTR-MCNC: 211 MG/DL — HIGH (ref 70–99)
GLUCOSE BLDC GLUCOMTR-MCNC: 235 MG/DL — HIGH (ref 70–99)
GLUCOSE BLDC GLUCOMTR-MCNC: 294 MG/DL — HIGH (ref 70–99)
GLUCOSE SERPL-MCNC: 156 MG/DL — HIGH (ref 70–99)
HCT VFR BLD CALC: 28 % — LOW (ref 34.5–45)
HGB BLD-MCNC: 9.2 G/DL — LOW (ref 11.5–15.5)
INR BLD: 0.87 — LOW (ref 0.88–1.16)
MAGNESIUM SERPL-MCNC: 1.8 MG/DL — SIGNIFICANT CHANGE UP (ref 1.6–2.6)
MCHC RBC-ENTMCNC: 29.7 PG — SIGNIFICANT CHANGE UP (ref 27–34)
MCHC RBC-ENTMCNC: 32.9 % — SIGNIFICANT CHANGE UP (ref 32–36)
MCV RBC AUTO: 90.3 FL — SIGNIFICANT CHANGE UP (ref 80–100)
NRBC # FLD: 0 K/UL — SIGNIFICANT CHANGE UP (ref 0–0)
PHOSPHATE SERPL-MCNC: 2.5 MG/DL — SIGNIFICANT CHANGE UP (ref 2.5–4.5)
PLATELET # BLD AUTO: 142 K/UL — LOW (ref 150–400)
PMV BLD: SIGNIFICANT CHANGE UP FL (ref 7–13)
POTASSIUM SERPL-MCNC: 3.8 MMOL/L — SIGNIFICANT CHANGE UP (ref 3.5–5.3)
POTASSIUM SERPL-SCNC: 3.8 MMOL/L — SIGNIFICANT CHANGE UP (ref 3.5–5.3)
PROTHROM AB SERPL-ACNC: 10 SEC — LOW (ref 10.6–13.6)
RBC # BLD: 3.1 M/UL — LOW (ref 3.8–5.2)
RBC # FLD: 16.2 % — HIGH (ref 10.3–14.5)
SODIUM SERPL-SCNC: 138 MMOL/L — SIGNIFICANT CHANGE UP (ref 135–145)
WBC # BLD: 4.61 K/UL — SIGNIFICANT CHANGE UP (ref 3.8–10.5)
WBC # FLD AUTO: 4.61 K/UL — SIGNIFICANT CHANGE UP (ref 3.8–10.5)

## 2020-11-30 PROCEDURE — 99232 SBSQ HOSP IP/OBS MODERATE 35: CPT

## 2020-11-30 PROCEDURE — 43242 EGD US FINE NEEDLE BX/ASPIR: CPT

## 2020-11-30 PROCEDURE — 88307 TISSUE EXAM BY PATHOLOGIST: CPT | Mod: 26

## 2020-11-30 PROCEDURE — 99233 SBSQ HOSP IP/OBS HIGH 50: CPT | Mod: GC

## 2020-11-30 PROCEDURE — 99231 SBSQ HOSP IP/OBS SF/LOW 25: CPT

## 2020-11-30 RX ORDER — SODIUM CHLORIDE 9 MG/ML
1000 INJECTION, SOLUTION INTRAVENOUS
Refills: 0 | Status: DISCONTINUED | OUTPATIENT
Start: 2020-11-30 | End: 2020-11-30

## 2020-11-30 RX ORDER — INSULIN LISPRO 100/ML
8 VIAL (ML) SUBCUTANEOUS ONCE
Refills: 0 | Status: COMPLETED | OUTPATIENT
Start: 2020-11-30 | End: 2020-11-30

## 2020-11-30 RX ORDER — SODIUM CHLORIDE 9 MG/ML
1000 INJECTION, SOLUTION INTRAVENOUS
Refills: 0 | Status: DISCONTINUED | OUTPATIENT
Start: 2020-11-30 | End: 2020-12-01

## 2020-11-30 RX ORDER — INSULIN LISPRO 100/ML
VIAL (ML) SUBCUTANEOUS
Refills: 0 | Status: DISCONTINUED | OUTPATIENT
Start: 2020-11-30 | End: 2020-11-30

## 2020-11-30 RX ORDER — ENOXAPARIN SODIUM 100 MG/ML
9 INJECTION SUBCUTANEOUS
Qty: 3 | Refills: 0
Start: 2020-11-30 | End: 2020-12-29

## 2020-11-30 RX ORDER — GLUCAGON INJECTION, SOLUTION 0.5 MG/.1ML
1 INJECTION, SOLUTION SUBCUTANEOUS ONCE
Refills: 0 | Status: DISCONTINUED | OUTPATIENT
Start: 2020-11-30 | End: 2020-11-30

## 2020-11-30 RX ORDER — GLUCAGON INJECTION, SOLUTION 0.5 MG/.1ML
1 INJECTION, SOLUTION SUBCUTANEOUS ONCE
Refills: 0 | Status: DISCONTINUED | OUTPATIENT
Start: 2020-11-30 | End: 2020-12-01

## 2020-11-30 RX ORDER — DEXTROSE 50 % IN WATER 50 %
25 SYRINGE (ML) INTRAVENOUS ONCE
Refills: 0 | Status: DISCONTINUED | OUTPATIENT
Start: 2020-11-30 | End: 2020-11-30

## 2020-11-30 RX ORDER — DEXTROSE 50 % IN WATER 50 %
25 SYRINGE (ML) INTRAVENOUS ONCE
Refills: 0 | Status: DISCONTINUED | OUTPATIENT
Start: 2020-11-30 | End: 2020-12-01

## 2020-11-30 RX ORDER — DEXTROSE 50 % IN WATER 50 %
15 SYRINGE (ML) INTRAVENOUS ONCE
Refills: 0 | Status: DISCONTINUED | OUTPATIENT
Start: 2020-11-30 | End: 2020-11-30

## 2020-11-30 RX ORDER — DEXTROSE 50 % IN WATER 50 %
12.5 SYRINGE (ML) INTRAVENOUS ONCE
Refills: 0 | Status: DISCONTINUED | OUTPATIENT
Start: 2020-11-30 | End: 2020-12-01

## 2020-11-30 RX ORDER — DEXTROSE 50 % IN WATER 50 %
12.5 SYRINGE (ML) INTRAVENOUS ONCE
Refills: 0 | Status: DISCONTINUED | OUTPATIENT
Start: 2020-11-30 | End: 2020-11-30

## 2020-11-30 RX ORDER — INSULIN LISPRO 100/ML
VIAL (ML) SUBCUTANEOUS AT BEDTIME
Refills: 0 | Status: DISCONTINUED | OUTPATIENT
Start: 2020-11-30 | End: 2020-11-30

## 2020-11-30 RX ORDER — ISOPROPYL ALCOHOL, BENZOCAINE .7; .06 ML/ML; ML/ML
1 SWAB TOPICAL
Qty: 100 | Refills: 1
Start: 2020-11-30 | End: 2021-01-28

## 2020-11-30 RX ORDER — INSULIN LISPRO 100/ML
VIAL (ML) SUBCUTANEOUS EVERY 6 HOURS
Refills: 0 | Status: DISCONTINUED | OUTPATIENT
Start: 2020-11-30 | End: 2020-12-01

## 2020-11-30 RX ORDER — INSULIN GLARGINE 100 [IU]/ML
9 INJECTION, SOLUTION SUBCUTANEOUS AT BEDTIME
Refills: 0 | Status: DISCONTINUED | OUTPATIENT
Start: 2020-11-30 | End: 2020-11-30

## 2020-11-30 RX ORDER — DEXTROSE 50 % IN WATER 50 %
15 SYRINGE (ML) INTRAVENOUS ONCE
Refills: 0 | Status: DISCONTINUED | OUTPATIENT
Start: 2020-11-30 | End: 2020-12-01

## 2020-11-30 RX ORDER — INSULIN LISPRO 100/ML
6 VIAL (ML) SUBCUTANEOUS
Qty: 540 | Refills: 0
Start: 2020-11-30 | End: 2020-12-29

## 2020-11-30 RX ORDER — INSULIN LISPRO 100/ML
6 VIAL (ML) SUBCUTANEOUS
Refills: 0 | Status: DISCONTINUED | OUTPATIENT
Start: 2020-11-30 | End: 2020-12-02

## 2020-11-30 RX ORDER — INSULIN LISPRO 100/ML
VIAL (ML) SUBCUTANEOUS EVERY 6 HOURS
Refills: 0 | Status: DISCONTINUED | OUTPATIENT
Start: 2020-11-30 | End: 2020-11-30

## 2020-11-30 RX ADMIN — Medication 6 UNIT(S): at 17:35

## 2020-11-30 RX ADMIN — Medication 8 UNIT(S): at 12:16

## 2020-11-30 RX ADMIN — Medication 2: at 23:13

## 2020-11-30 RX ADMIN — Medication 3: at 17:35

## 2020-11-30 RX ADMIN — Medication 1: at 06:19

## 2020-11-30 NOTE — PROGRESS NOTE ADULT - ATTENDING COMMENTS
s/p repeat EUS; f/u result.  Plan for IR biopsy of liver mets to secure diagnosis  appreciate endocrinology recs

## 2020-11-30 NOTE — PROGRESS NOTE ADULT - ATTENDING COMMENTS
Joan Redman MD  Division of Endocrinology  Pager: [phone]    If after 6PM or before 9AM, or on weekends/holidays, please call endocrine answering service for assistance ([phone]).  For nonurgent matters email [email or web address] for assistance.

## 2020-11-30 NOTE — PROGRESS NOTE ADULT - SUBJECTIVE AND OBJECTIVE BOX
PROGRESS NOTE:   Authored by Myrtle Craig MD  Pager: [facility] [phone]; [facility] [phone]    Patient is a 70y old  Female who presents with a chief complaint of Weight loss, Diarrhea (29 Nov 2020 23:15)      SUBJECTIVE / OVERNIGHT EVENTS:    ADDITIONAL REVIEW OF SYSTEMS:    MEDICATIONS  (STANDING):  dextrose 40% Gel 15 Gram(s) Oral once  dextrose 5%. 1000 milliLiter(s) (50 mL/Hr) IV Continuous <Continuous>  dextrose 5%. 1000 milliLiter(s) (100 mL/Hr) IV Continuous <Continuous>  dextrose 50% Injectable 25 Gram(s) IV Push once  dextrose 50% Injectable 12.5 Gram(s) IV Push once  dextrose 50% Injectable 25 Gram(s) IV Push once  glucagon  Injectable 1 milliGRAM(s) IntraMuscular once  insulin lispro (ADMELOG) corrective regimen sliding scale   SubCutaneous every 6 hours  polyethylene glycol 3350 17 Gram(s) Oral two times a day  senna 2 Tablet(s) Oral at bedtime    MEDICATIONS  (PRN):      CAPILLARY BLOOD GLUCOSE      POCT Blood Glucose.: 206 mg/dL (30 Nov 2020 00:01)  POCT Blood Glucose.: 116 mg/dL (29 Nov 2020 21:58)  POCT Blood Glucose.: 156 mg/dL (29 Nov 2020 17:40)  POCT Blood Glucose.: 152 mg/dL (29 Nov 2020 12:06)  POCT Blood Glucose.: 101 mg/dL (29 Nov 2020 08:45)    I&O's Summary      PHYSICAL EXAM:  Vital Signs Last 24 Hrs  T(C): 36.9 (30 Nov 2020 05:32), Max: 36.9 (30 Nov 2020 05:32)  T(F): 98.5 (30 Nov 2020 05:32), Max: 98.5 (30 Nov 2020 05:32)  HR: 78 (30 Nov 2020 05:32) (78 - 80)  BP: 105/65 (30 Nov 2020 05:32) (105/65 - 118/68)  BP(mean): --  RR: 16 (30 Nov 2020 05:32) (16 - 17)  SpO2: 100% (30 Nov 2020 05:32) (100% - 100%)    PHYSICAL EXAM:  Gen: Alert, very thin, in NAD  HEENT: NCAT, PERRL, EOMI, clear conjunctiva, no erythema or exudates in the oropharynx, mmm  Neck: Supple, no LAD  CV: RRR, S1S2, no m/r/g  Resp: CTAB, normal respiratory effort  Abd: Soft, NT, ND, normal bowel sounds  Ext: trace edema up to ankles, no clubbing or cyanosis  Neuro: AOx3, CN2-12 grossly intact  Skin: no rashes, no ecchymoses    LABS:                        10.2   5.61  )-----------( 144      ( 29 Nov 2020 06:15 )             32.0     11-29    137  |  105  |  14  ----------------------------<  56<L>  3.8   |  26  |  0.37<L>    Ca    8.4      29 Nov 2020 06:15  Phos  2.2     11-29  Mg     1.9     11-29                  RADIOLOGY & ADDITIONAL TESTS:  Results Reviewed:   Imaging Personally Reviewed:  Electrocardiogram Personally Reviewed:    COORDINATION OF CARE:  Care Discussed with Consultants/Other Providers [Y/N]:  Prior or Outpatient Records Reviewed [Y/N]:   PROGRESS NOTE:   Authored by Myrtle Craig MD  Pager: [facility] [phone]; [facility] [phone]    Patient is a 70y old  Female who presents with a chief complaint of Weight loss, Diarrhea (29 Nov 2020 23:15)      SUBJECTIVE / OVERNIGHT EVENTS:   This AM, patient frustrated asking when the biopsy will be done. Denies CP, SOB, subjective f/c, n/v  ADDITIONAL REVIEW OF SYSTEMS:    MEDICATIONS  (STANDING):  dextrose 40% Gel 15 Gram(s) Oral once  dextrose 5%. 1000 milliLiter(s) (50 mL/Hr) IV Continuous <Continuous>  dextrose 5%. 1000 milliLiter(s) (100 mL/Hr) IV Continuous <Continuous>  dextrose 50% Injectable 25 Gram(s) IV Push once  dextrose 50% Injectable 12.5 Gram(s) IV Push once  dextrose 50% Injectable 25 Gram(s) IV Push once  glucagon  Injectable 1 milliGRAM(s) IntraMuscular once  insulin lispro (ADMELOG) corrective regimen sliding scale   SubCutaneous every 6 hours  polyethylene glycol 3350 17 Gram(s) Oral two times a day  senna 2 Tablet(s) Oral at bedtime    MEDICATIONS  (PRN):      CAPILLARY BLOOD GLUCOSE      POCT Blood Glucose.: 206 mg/dL (30 Nov 2020 00:01)  POCT Blood Glucose.: 116 mg/dL (29 Nov 2020 21:58)  POCT Blood Glucose.: 156 mg/dL (29 Nov 2020 17:40)  POCT Blood Glucose.: 152 mg/dL (29 Nov 2020 12:06)  POCT Blood Glucose.: 101 mg/dL (29 Nov 2020 08:45)    I&O's Summary      PHYSICAL EXAM:  Vital Signs Last 24 Hrs  T(C): 36.9 (30 Nov 2020 05:32), Max: 36.9 (30 Nov 2020 05:32)  T(F): 98.5 (30 Nov 2020 05:32), Max: 98.5 (30 Nov 2020 05:32)  HR: 78 (30 Nov 2020 05:32) (78 - 80)  BP: 105/65 (30 Nov 2020 05:32) (105/65 - 118/68)  BP(mean): --  RR: 16 (30 Nov 2020 05:32) (16 - 17)  SpO2: 100% (30 Nov 2020 05:32) (100% - 100%)    PHYSICAL EXAM:  Gen: Alert, very thin, in NAD  HEENT: NCAT, PERRL, EOMI, clear conjunctiva, no erythema or exudates in the oropharynx, mmm  Neck: Supple, no LAD  CV: RRR, S1S2, no m/r/g  Resp: CTAB, normal respiratory effort  Abd: Soft, NT, ND, normal bowel sounds  Ext: trace edema up to ankles, no clubbing or cyanosis  Neuro: AOx3, CN2-12 grossly intact  Skin: no rashes, no ecchymoses    LABS:                        10.2   5.61  )-----------( 144      ( 29 Nov 2020 06:15 )             32.0     11-29    137  |  105  |  14  ----------------------------<  56<L>  3.8   |  26  |  0.37<L>    Ca    8.4      29 Nov 2020 06:15  Phos  2.2     11-29  Mg     1.9     11-29                  RADIOLOGY & ADDITIONAL TESTS:  Results Reviewed:   Imaging Personally Reviewed:  Electrocardiogram Personally Reviewed:    COORDINATION OF CARE:  Care Discussed with Consultants/Other Providers [Y/N]:  Prior or Outpatient Records Reviewed [Y/N]:

## 2020-11-30 NOTE — PROGRESS NOTE ADULT - PROBLEM SELECTOR PLAN 3
Normocytic anemia, stable at ~10.  Possibly secondary to malignancy  - not consistent with iron deficency, no B12/folate deficiency Normocytic anemia. Hgb 9.2 fom 10.  Possibly secondary to malignancy  - not consistent with iron deficency, no B12/folate deficiency

## 2020-11-30 NOTE — PROGRESS NOTE ADULT - PROBLEM SELECTOR PLAN 2
Hyperglycemia likely secondary suppression of pancreatic endocrine function from infiltration of lesion.   - A1c to >18.  - Appreciate Endocrine recs  - Lispro 5u AC TID, Lantus 9u QHS  - C/w FS qAC/HS, moderate dose SSI  - If FS in 400s or higher, will give additional lispro

## 2020-11-30 NOTE — PROGRESS NOTE ADULT - PROBLEM SELECTOR PLAN 6
DVT ppx: Lovenox (held in s/o procedure)  Diet: Regular, as tolerated  Bowel regimen: miralax and senna

## 2020-11-30 NOTE — PROVIDER CONTACT NOTE (OTHER) - ASSESSMENT
patient has new leg swelling. +3 in feet and +2 in legs. Patient states it started after EUS. Patient denies SOB.

## 2020-11-30 NOTE — PROGRESS NOTE ADULT - ASSESSMENT
70F w/ ?osteoporosis, multiple tendon repairs who presents with weight loss, diarrhea, poor appetite d/t loss of taste, progressive weakness. Found to have pancreatic lesion concerning for pancreatic neoplasm and new onset DM with A1c >18.     PROBLEM 1) New onset uncontrolled DM, likely secondary d/t pancreatic insufficiency    A1c >18 indicating grossly uncontrolled DM. New onset. No prior history. Likely secondary to pancreatic insufficiency and will need basal/bolus insulin for management. C-peptide is low 0.4 (glu 208) which confirms need for insulin at discharge and dependence on basal and bolus insulin moving forward.  - BG goal inpatient is 100-180 mg/dl  Insulin doses held for NPO earlier.  - Resume Lantus at 9 units qhs  - Resume Admelog at 6 units sq ac TID (hold if no eating/NPO)  - Change back to low correctional scale ac and low bedtime scale as patient is insulin sensitive and not insulin resistant.  - Consistent carb die with Glucerna as supplement   - Monitor FS ac and hs or q6h if NPO  - RD consult appreciated  - DM teaching- please ensure that patient knows how to use insulin pen, how to self inject, and how to check blood sugar and knows s/s and management of hypoglycemia.  Provider to Rn placed for bedside education. Patient reports self injecting with vial/syringe but has not practiced with pens yet.    Discharge plan: Patient to be discharged on basal + bolus insulin pens (doses TBD),   Please send Lantus solsotar pen and humalog kwikpen as test script to check insurance coverage.  Ok to send with current doses and update prior to d/c    If Lantus not covered- can try alternating with one of following   tresiba/basaglar/toujeo/Levemir    If Humalog not covered- can try alternating with one of following  novolog/apidra/admelog/fiasp    Please also send scripts for glucometer to [facility] so that patient can have the opportunity to learn how to use glucometer that she will be going home with.    please call endocrine team prior to discharge for final recs.   If patient wishes to follow up with [facility]  [address] [place] [address], [address], [place], [place] [id number] [phone].

## 2020-11-30 NOTE — PROGRESS NOTE ADULT - ASSESSMENT
70F current smoker, w/ ?osteoporosis who presents with unintentional weight loss and diarrhea, found to have pancreatic lesion, likely secondary to malignancy.  Diarrhea and hyperglycemia likely explained by suppression of pancreatic function. MR with large pancreatic tail/body mass with possible hepatic metastases. EUS 11/25 indeterminate. Now pending repeat EUS 11/30 and IR liver bx 12/1. 70F current smoker, w/ ?osteoporosis who presents with unintentional weight loss and diarrhea, found to have pancreatic lesion, likely secondary to malignancy.  Diarrhea and hyperglycemia likely explained by suppression of pancreatic function. MR with large pancreatic tail/body mass with possible hepatic metastases. EUS 11/25 indeterminate. s/p repeat EUS 11/30 and IR liver bx 12/1.

## 2020-11-30 NOTE — PROGRESS NOTE ADULT - SUBJECTIVE AND OBJECTIVE BOX
Chief Complaint: Secondary DM    History: Patient reports feeling frustrated about how long it takes to complete all necessary tests/procedures.  S/p pancreas biopsy this AM.  Ate a yogurt for late breakfast after procedure and plans to eat lunch but reports that she does not like the hospital food and it is very different from what she was used to eating at home.  No hypoglycemia events or symptoms.    MEDICATIONS  (STANDING):  dextrose 40% Gel 15 Gram(s) Oral once  dextrose 5%. 1000 milliLiter(s) (50 mL/Hr) IV Continuous <Continuous>  dextrose 5%. 1000 milliLiter(s) (100 mL/Hr) IV Continuous <Continuous>  dextrose 50% Injectable 25 Gram(s) IV Push once  dextrose 50% Injectable 12.5 Gram(s) IV Push once  dextrose 50% Injectable 25 Gram(s) IV Push once  glucagon  Injectable 1 milliGRAM(s) IntraMuscular once  insulin glargine Injectable (LANTUS) 9 Unit(s) SubCutaneous at bedtime  insulin lispro (ADMELOG) corrective regimen sliding scale   SubCutaneous three times a day before meals  insulin lispro (ADMELOG) corrective regimen sliding scale   SubCutaneous at bedtime  insulin lispro Injectable (ADMELOG) 6 Unit(s) SubCutaneous three times a day before meals  polyethylene glycol 3350 17 Gram(s) Oral two times a day  senna 2 Tablet(s) Oral at bedtime    MEDICATIONS  (PRN):      Allergies    No Known Allergies    Intolerances      Review of Systems:    ALL OTHER SYSTEMS REVIEWED AND NEGATIVE    PHYSICAL EXAM:  VITALS: T(C): 36.9 (11-30-20 @ 12:38)  T(F): 98.4 (11-30-20 @ 12:38), Max: 98.5 (11-30-20 @ 05:32)  HR: 78 (11-30-20 @ 12:38) (72 - 88)  BP: 106/61 (11-30-20 @ 12:38) (97/51 - 118/68)  RR:  (15 - 20)  SpO2:  (97% - 100%)  Wt(kg): --  GENERAL: NAD, thin female  EYES: No proptosis, no lid lag, anicteric  HEENT:  Atraumatic, Normocephalic, moist mucous membranes  RESPIRATORY: nonlabored respirations  PSYCH: Alert and oriented x 3, normal affect, normal mood    CAPILLARY BLOOD GLUCOSE      POCT Blood Glucose.: 235 mg/dL (30 Nov 2020 12:02)  POCT Blood Glucose.: 136 mg/dL (30 Nov 2020 10:19)  POCT Blood Glucose.: 163 mg/dL (30 Nov 2020 06:02)  POCT Blood Glucose.: 206 mg/dL (30 Nov 2020 00:01)  POCT Blood Glucose.: 116 mg/dL (29 Nov 2020 21:58)  POCT Blood Glucose.: 156 mg/dL (29 Nov 2020 17:40)      11-30    138  |  106  |  12  ----------------------------<  156<H>  3.8   |  25  |  0.43<L>    EGFR if : 120  EGFR if non : 104    Ca    8.2<L>      11-30  Mg     1.8     11-30  Phos  2.5     11-30        A1C with Estimated Average Glucose: > 18.0 % (11-23-20 @ 05:45)  A1C with Estimated Average Glucose: > 18.0 % (11-22-20 @ 11:46)      Thyroid Function Tests:  11-22 @ 12:00 TSH 2.48 FreeT4 -- T3 -- Anti TPO -- Anti Thyroglobulin Ab -- TSI --

## 2020-11-30 NOTE — PROGRESS NOTE ADULT - PROBLEM SELECTOR PLAN 1
Pancreatic lesion in neck and body. Unintentional weight loss, poor appetite, generalized weakness likely secondary to malignancy.  Likely has suppression of both exocrine and endocrine function causing hyperglycemia and diarrhea.  Elevation in alk phos, AST, ALT likely also secondary to obstruction from lesion.  - CT chest showing RLL opacity and small nodules, unclear if metastatic lesions  - MR showing 6x4 mass in pancreatic body/mass and possible metastatic hepatic lesions  - CEA elevated to 201.  - GI following, EUS done on 11/25, needed to verify with radiology regarding imaging of pancreas. Planned for repeat EUS 11/27, but was re-scheduled for Monday 11/30.  - IR consulted for liver lesion biopsy.  Possible biopsy Tuesday 12/1.

## 2020-12-01 ENCOUNTER — RESULT REVIEW (OUTPATIENT)
Age: 70
End: 2020-12-01

## 2020-12-01 LAB
ANION GAP SERPL CALC-SCNC: 8 MMO/L — SIGNIFICANT CHANGE UP (ref 7–14)
APTT BLD: 26.1 SEC — LOW (ref 27–36.3)
BUN SERPL-MCNC: 11 MG/DL — SIGNIFICANT CHANGE UP (ref 7–23)
CALCIUM SERPL-MCNC: 8.1 MG/DL — LOW (ref 8.4–10.5)
CHLORIDE SERPL-SCNC: 105 MMOL/L — SIGNIFICANT CHANGE UP (ref 98–107)
CO2 SERPL-SCNC: 25 MMOL/L — SIGNIFICANT CHANGE UP (ref 22–31)
CREAT SERPL-MCNC: 0.33 MG/DL — LOW (ref 0.5–1.3)
GLUCOSE BLDC GLUCOMTR-MCNC: 191 MG/DL — HIGH (ref 70–99)
GLUCOSE BLDC GLUCOMTR-MCNC: 222 MG/DL — HIGH (ref 70–99)
GLUCOSE BLDC GLUCOMTR-MCNC: 232 MG/DL — HIGH (ref 70–99)
GLUCOSE BLDC GLUCOMTR-MCNC: 300 MG/DL — HIGH (ref 70–99)
GLUCOSE SERPL-MCNC: 173 MG/DL — HIGH (ref 70–99)
HCT VFR BLD CALC: 26.6 % — LOW (ref 34.5–45)
HGB BLD-MCNC: 8.7 G/DL — LOW (ref 11.5–15.5)
INR BLD: 0.93 — SIGNIFICANT CHANGE UP (ref 0.88–1.16)
MAGNESIUM SERPL-MCNC: 1.8 MG/DL — SIGNIFICANT CHANGE UP (ref 1.6–2.6)
MCHC RBC-ENTMCNC: 29.7 PG — SIGNIFICANT CHANGE UP (ref 27–34)
MCHC RBC-ENTMCNC: 32.7 % — SIGNIFICANT CHANGE UP (ref 32–36)
MCV RBC AUTO: 90.8 FL — SIGNIFICANT CHANGE UP (ref 80–100)
NRBC # FLD: 0 K/UL — SIGNIFICANT CHANGE UP (ref 0–0)
PHOSPHATE SERPL-MCNC: 2.8 MG/DL — SIGNIFICANT CHANGE UP (ref 2.5–4.5)
PLATELET # BLD AUTO: 162 K/UL — SIGNIFICANT CHANGE UP (ref 150–400)
PMV BLD: 10.9 FL — SIGNIFICANT CHANGE UP (ref 7–13)
POTASSIUM SERPL-MCNC: 4 MMOL/L — SIGNIFICANT CHANGE UP (ref 3.5–5.3)
POTASSIUM SERPL-SCNC: 4 MMOL/L — SIGNIFICANT CHANGE UP (ref 3.5–5.3)
PROTHROM AB SERPL-ACNC: 10.7 SEC — SIGNIFICANT CHANGE UP (ref 10.6–13.6)
RBC # BLD: 2.93 M/UL — LOW (ref 3.8–5.2)
RBC # FLD: 16.3 % — HIGH (ref 10.3–14.5)
SODIUM SERPL-SCNC: 138 MMOL/L — SIGNIFICANT CHANGE UP (ref 135–145)
SURGICAL PATHOLOGY STUDY: SIGNIFICANT CHANGE UP
WBC # BLD: 5.81 K/UL — SIGNIFICANT CHANGE UP (ref 3.8–10.5)
WBC # FLD AUTO: 5.81 K/UL — SIGNIFICANT CHANGE UP (ref 3.8–10.5)

## 2020-12-01 PROCEDURE — 77012 CT SCAN FOR NEEDLE BIOPSY: CPT | Mod: 26

## 2020-12-01 PROCEDURE — 88307 TISSUE EXAM BY PATHOLOGIST: CPT | Mod: 26

## 2020-12-01 PROCEDURE — 88173 CYTOPATH EVAL FNA REPORT: CPT | Mod: 26

## 2020-12-01 PROCEDURE — 88305 TISSUE EXAM BY PATHOLOGIST: CPT | Mod: 26

## 2020-12-01 PROCEDURE — 99233 SBSQ HOSP IP/OBS HIGH 50: CPT | Mod: GC

## 2020-12-01 PROCEDURE — 47000 NEEDLE BIOPSY OF LIVER PERQ: CPT

## 2020-12-01 PROCEDURE — 99232 SBSQ HOSP IP/OBS MODERATE 35: CPT

## 2020-12-01 RX ORDER — ONDANSETRON 8 MG/1
4 TABLET, FILM COATED ORAL ONCE
Refills: 0 | Status: DISCONTINUED | OUTPATIENT
Start: 2020-12-01 | End: 2020-12-02

## 2020-12-01 RX ORDER — INSULIN GLARGINE 100 [IU]/ML
9 INJECTION, SOLUTION SUBCUTANEOUS
Qty: 5 | Refills: 0
Start: 2020-12-01 | End: 2020-12-30

## 2020-12-01 RX ORDER — INSULIN GLARGINE 100 [IU]/ML
9 INJECTION, SOLUTION SUBCUTANEOUS
Refills: 0 | Status: DISCONTINUED | OUTPATIENT
Start: 2020-12-01 | End: 2020-12-01

## 2020-12-01 RX ORDER — INSULIN GLARGINE 100 [IU]/ML
9 INJECTION, SOLUTION SUBCUTANEOUS
Refills: 0 | Status: DISCONTINUED | OUTPATIENT
Start: 2020-12-02 | End: 2020-12-02

## 2020-12-01 RX ORDER — FENTANYL CITRATE 50 UG/ML
25 INJECTION INTRAVENOUS
Refills: 0 | Status: DISCONTINUED | OUTPATIENT
Start: 2020-12-01 | End: 2020-12-02

## 2020-12-01 RX ORDER — INSULIN ASPART 100 [IU]/ML
8 INJECTION, SOLUTION SUBCUTANEOUS
Qty: 720 | Refills: 0
Start: 2020-12-01 | End: 2020-12-30

## 2020-12-01 RX ORDER — ISOPROPYL ALCOHOL, BENZOCAINE .7; .06 ML/ML; ML/ML
1 SWAB TOPICAL
Qty: 100 | Refills: 1
Start: 2020-12-01 | End: 2021-01-29

## 2020-12-01 RX ORDER — INSULIN GLARGINE 100 [IU]/ML
9 INJECTION, SOLUTION SUBCUTANEOUS ONCE
Refills: 0 | Status: COMPLETED | OUTPATIENT
Start: 2020-12-01 | End: 2020-12-01

## 2020-12-01 RX ADMIN — Medication 2: at 05:29

## 2020-12-01 RX ADMIN — INSULIN GLARGINE 9 UNIT(S): 100 INJECTION, SOLUTION SUBCUTANEOUS at 12:58

## 2020-12-01 RX ADMIN — Medication 6 UNIT(S): at 12:30

## 2020-12-01 RX ADMIN — Medication 6 UNIT(S): at 17:27

## 2020-12-01 RX ADMIN — POLYETHYLENE GLYCOL 3350 17 GRAM(S): 17 POWDER, FOR SOLUTION ORAL at 17:27

## 2020-12-01 NOTE — PROGRESS NOTE ADULT - ATTENDING COMMENTS
70F pancreatic mass, new DM  s/p repeat EUS with biopsy 11/30; f/u pathology. For IR biopsy of liver mets to secure diagnosis today  appreciate endocrinology recs  d/c planning

## 2020-12-01 NOTE — PROGRESS NOTE ADULT - PROBLEM SELECTOR PLAN 1
Pancreatic lesion in neck and body. Unintentional weight loss, poor appetite, generalized weakness likely secondary to malignancy.  Likely has suppression of both exocrine and endocrine function causing hyperglycemia and diarrhea.  Elevation in alk phos, AST, ALT likely also secondary to obstruction from lesion.  - CT chest showing RLL opacity and small nodules, unclear if metastatic lesions  - MR showing 6x4 mass in pancreatic body/mass and possible metastatic hepatic lesions  - CEA elevated to 201.  - GI following, EUS done on 11/25, needed to verify with radiology regarding imaging of pancreas. Planned for repeat EUS 11/27, but was re-scheduled for Monday 11/30.  - IR consulted for liver lesion biopsy.  Pending IR bx 12/1 Pancreatic lesion in neck and body. Unintentional weight loss, poor appetite, generalized weakness likely secondary to malignancy.  Likely has suppression of both exocrine and endocrine function causing hyperglycemia and diarrhea.  Elevation in alk phos, AST, ALT likely also secondary to obstruction from lesion.  - CT chest showing RLL opacity and small nodules, unclear if metastatic lesions  - MR showing 6x4 mass in pancreatic body/mass and possible metastatic hepatic lesions  - CEA elevated to 201.  - GI following, EUS done on 11/25, needed to verify with radiology regarding imaging of pancreas. Planned for repeat EUS 11/27, but was re-scheduled for Monday 11/30.  - s/p EUS on 11/30: awaiting pathology.  - IR consulted for liver lesion biopsy.  Pending IR bx 12/1

## 2020-12-01 NOTE — CHART NOTE - NSCHARTNOTEFT_GEN_A_CORE
70F w/ ?osteoporosis, multiple tendon repairs who presents with weight loss, diarrhea, poor appetite d/t loss of taste, progressive weakness. Found to have pancreatic lesion concerning for pancreatic neoplasm and new onset DM with A1c >18.     Unable to see patient at time of visit - off unit  Noted patient did not receive basal insulin last night at bedtime - was ordered for Lantus 9 units but this was discontinued   Patient with LOW cpeptide level 0.4 (low pancreatic insulin production), without basal insulin she is at risk for DKA  Please administer Lantus 9 units STAT now. Dose will need to be timed daily based on timing administered today (q24h)  Continue with Admelog 6 units TID before meals and Admelog LOW dose correctional scales before meals and bedtime  Per team, possible discharge tomorrow. Anticipate discharge dosing as follows: Lantus 9 units SQ daily (1 PM) and Admelog 8/8/8, no correctional scales for discharge.  Please send insulin pens to check which is covered by insurance. Options for basal are Lantus/Tresiba/Basaglar/Toujeo/Levemir. Options for bolus are Humalog/Novolog/Admelog/Apidra  Please also send scripts for glucometer to [facility] (and select MEDS-TO-BEDS) so that patient can have the opportunity to learn how to use glucometer that she will be going home with.  If patient wishes to follow up with [facility]  [address], [address], [place] [address]  [phone].  Contact endocrine with questions/concerns     CAPILLARY BLOOD GLUCOSE    POCT Blood Glucose.: 232 mg/dL (01 Dec 2020 12:03)  POCT Blood Glucose.: 222 mg/dL (01 Dec 2020 05:28)  POCT Blood Glucose.: 211 mg/dL (30 Nov 2020 23:10)  POCT Blood Glucose.: 294 mg/dL (30 Nov 2020 17:17)    12-01    138  |  105  |  11  ----------------------------<  173<H>  4.0   |  25  |  0.33<L>    Ca    8.1<L>      01 Dec 2020 04:08  Phos  2.8     12-01  Mg     1.8     12-01      MEDICATIONS  (STANDING):  dextrose 40% Gel 15 Gram(s) Oral once  dextrose 5%. 1000 milliLiter(s) (50 mL/Hr) IV Continuous <Continuous>  dextrose 5%. 1000 milliLiter(s) (100 mL/Hr) IV Continuous <Continuous>  dextrose 50% Injectable 25 Gram(s) IV Push once  dextrose 50% Injectable 12.5 Gram(s) IV Push once  dextrose 50% Injectable 25 Gram(s) IV Push once  glucagon  Injectable 1 milliGRAM(s) IntraMuscular once  insulin glargine Injectable (LANTUS) 9 Unit(s) SubCutaneous once  insulin lispro (ADMELOG) corrective regimen sliding scale   SubCutaneous three times a day before meals  insulin lispro (ADMELOG) corrective regimen sliding scale   SubCutaneous at bedtime  insulin lispro Injectable (ADMELOG) 6 Unit(s) SubCutaneous three times a day before meals  polyethylene glycol 3350 17 Gram(s) Oral two times a day  senna 2 Tablet(s) Oral at bedtime    A1C with Estimated Average Glucose: > 18.0 % (11-23-20 @ 05:45)  A1C with Estimated Average Glucose: > 18.0 % (11-22-20 @ 11:46)

## 2020-12-01 NOTE — PROGRESS NOTE ADULT - ASSESSMENT
Impression:  1) Pancreatic lesion on CT- S/P EGD revealing gastritis, 4.5cm ill defined distal body/tail of pancreas mass, possibly involving the omentum and left kidney, biopsied, also adjacent 15mm pancreatic body cystic lesion w mural nodule, likely side branch IPMN.     Recommendations:  -Follow up pathology  -Please have patient follow up with GI as an outpatient ([phone])  -Rest of care per primary team     Liliana Jay, PGY6  Gastroenterology Fellow  Pager # [phone] / [phone]  Can be contacted via [employer] Teams

## 2020-12-01 NOTE — PROGRESS NOTE ADULT - PROBLEM SELECTOR PLAN 3
Normocytic anemia. Hgb 9.2 from 10.  Possibly secondary to malignancy  - not consistent with iron deficiency, no B12/folate deficiency

## 2020-12-01 NOTE — PROGRESS NOTE ADULT - ASSESSMENT
70F current smoker, w/ ?osteoporosis who presents with unintentional weight loss and diarrhea, found to have pancreatic lesion, likely secondary to malignancy.  Diarrhea and hyperglycemia likely explained by suppression of pancreatic function. MR with large pancreatic tail/body mass with possible hepatic metastases. EUS 11/25 indeterminate. s/p repeat EUS 11/30 and IR liver bx 12/1.

## 2020-12-01 NOTE — PROGRESS NOTE ADULT - ATTENDING COMMENTS
Seen/discussed with fellow on 11/30  Agree with above Seen/discussed with fellow on 12/1  Agree with above

## 2020-12-01 NOTE — PROGRESS NOTE ADULT - SUBJECTIVE AND OBJECTIVE BOX
Chief Complaint:  Patient is a 70y old  Female who presents with a chief complaint of Weight loss, Diarrhea (01 Dec 2020 09:04)      Interval Events: Pt denies nausea, vomiting, abd pain.   ROS: All 12 point system except listed above were otherwise negative.    Allergies:  No Known Allergies        Hospital Medications:  dextrose 40% Gel 15 Gram(s) Oral once  dextrose 5%. 1000 milliLiter(s) IV Continuous <Continuous>  dextrose 5%. 1000 milliLiter(s) IV Continuous <Continuous>  dextrose 50% Injectable 25 Gram(s) IV Push once  dextrose 50% Injectable 12.5 Gram(s) IV Push once  dextrose 50% Injectable 25 Gram(s) IV Push once  glucagon  Injectable 1 milliGRAM(s) IntraMuscular once  insulin lispro (ADMELOG) corrective regimen sliding scale   SubCutaneous every 6 hours  insulin lispro Injectable (ADMELOG) 6 Unit(s) SubCutaneous three times a day before meals  polyethylene glycol 3350 17 Gram(s) Oral two times a day  senna 2 Tablet(s) Oral at bedtime      PMHX/PSHX:  S/P tendon repair        Family history:  Family history of liver cancer      There is no family history of peptic ulcer disease, gastric cancer, colon polyps, colon cancer, celiac disease, biliary, hepatic, or pancreatic disease.  None of the female relatives have breast, uterine, or ovarian cancer.     PHYSICAL EXAM:   Vital Signs:  Vital Signs Last 24 Hrs  T(C): 36.8 (01 Dec 2020 05:35), Max: 37 (30 Nov 2020 21:40)  T(F): 98.2 (01 Dec 2020 05:35), Max: 98.6 (30 Nov 2020 21:40)  HR: 86 (01 Dec 2020 05:35) (72 - 88)  BP: 107/66 (01 Dec 2020 05:35) (106/61 - 115/65)  BP(mean): 79 (30 Nov 2020 09:50) (79 - 81)  RR: 17 (01 Dec 2020 05:35) (15 - 20)  SpO2: 97% (01 Dec 2020 05:35) (97% - 99%)  Daily     Daily     PHYSICAL EXAM:     GENERAL:  Appears stated age, well-groomed  HEENT:  NC/AT,  conjunctivae clear and pink, no thyromegaly  CHEST:  Full & symmetric excursion, no increased effort, breath sounds clear  HEART:  Regular rhythm, S1, S2  ABDOMEN:  Soft, non-tender, non-distended, normoactive bowel sounds  EXTEREMITIES:  no cyanosis,clubbing or edema  SKIN:  No rash/erythema/ecchymoses  NEURO:  Alert, oriented, no asterixis    LABS:                        8.7    5.81  )-----------( 162      ( 01 Dec 2020 04:08 )             26.6     Mean Cell Volume: 90.8 fL (12-01-20 @ 04:08)    12-01    138  |  105  |  11  ----------------------------<  173<H>  4.0   |  25  |  0.33<L>    Ca    8.1<L>      01 Dec 2020 04:08  Phos  2.8     12-01  Mg     1.8     12-01        PT/INR - ( 01 Dec 2020 04:08 )   PT: 10.7 SEC;   INR: 0.93          PTT - ( 01 Dec 2020 04:08 )  PTT:26.1 SEC                            8.7    5.81  )-----------( 162      ( 01 Dec 2020 04:08 )             26.6                         9.2    4.61  )-----------( 142      ( 30 Nov 2020 06:10 )             28.0                         10.2   5.61  )-----------( 144      ( 29 Nov 2020 06:15 )             32.0     Imaging:  < from: Upper EUS (11.30.20 @ 08:45) >                                                                                   Findings:       EGD: :       The examined esophagus was normal.       There was a medium sized hiatal hernia.       The stomach was erythematous throughout and appeared atrophic.       The duodenum was normal.       EUS:       The exam was performed with a linear echoendoscope.       There was an ill-defined hypoechoic heterogeneous mass in the distal        body/tail of the pancreas. It measured approximately 4.5cm in largest        diameter. Adjacent to this mass in the body of the pancreas, there was a        15mm cyst with an isoechoic mural nodule. The mass was also adjacent to        the left kidney which contained a large cyst. The mass also seemed to        appose a hyperechoic and nodular omentum.       The head of the pancreas appeared normal. The PD and CBD were normal in        course and caliber.       The portosplenic confluence appeared normal and involved by the mass.       The examined portions of the liver and spleen appeared normal.       After ensuring an avascular path, a 22g FNB [employer] needle was used to        biopsy the lesion. 3 passes were made with good tissue aquisition.                                                                                   Impression:          - Hiatal hernia.                       - Gastritis.                       - 4.5cm ill defined distal body/tail of pancreas mass,                        possibly involving the omentum and left kidney. Biopsied.                       - Adjacent 15mm pancreatic body cystic lesion with mural                        nodule, likely a side branch IPMN.  Recommendation:  - [facility].                       - Followup pathology.                       - Further care per primary team.                                                                                   Attending Participation:       I was present and participated during the entire procedure, including        non-key portions.    < end of copied text >        < from: MR Abdomen w/wo IV Cont (11.25.20 @ 09:23) >  FINDINGS:  LOWER CHEST: Within normal limits.    LIVER: Two rim enhancing lesions compatible with metastases, including a 1.3 cm segment 6 lesion (11, 46) and a 1.0 cm segment 4A/2 lesion (11, 27). Perfusional abnormality in segment 5 with associated biliary ductal dilatation, may be related to an additional lesion, difficult to delineate.  BILE DUCTS: Normal caliber.  GALLBLADDER: Cholelithiasis.  SPLEEN: Within normal limits.  PANCREAS: Hypoenhancing mass in the pancreatic tail/body measures 6.2 x 2.7 cm (13, 42) with an associated 1.8 cm cystic component. Involvement of the splenic artery and occlusion of the splenic vein.  ADRENALS: Within normal limits.  KIDNEYS/URETERS: Renal cysts, some of which are hemorrhagic.    VISUALIZED PORTIONS:  BOWEL: Within normal limits.  PERITONEUM: No ascites.  VESSELS: As above.  RETROPERITONEUM/LYMPH NODES: Subcentimeter in short axis left para-aortic lymph nodes.  ABDOMINAL WALL: Within normal limits.  BONES: Within normal limits.    IMPRESSION:  Large infiltrative pancreatic tail/body mass.    Bilobar hepatic metastases.            < end of copied text >    < from: CT Abdomen and Pelvis w/ IV Cont (11.22.20 @ 13:39) >    FINDINGS:  LOWER CHEST: Within normal limits.    LIVER: A triangular region of hypoattenuation at the falciform ligament possibly representing a hepatic pseudolesion.  3 cm lesion of mixed attenuation in segment 5, possible hemangioma.  BILE DUCTS: Normal caliber.  GALLBLADDER: Small gallstone.  SPLEEN: Within normal limits.  PANCREAS: Parenchymal atrophy of the pancreatic body and tail with severe duct dilatation measuring up to 1.4 cm. Abrupt transition to normal duct caliber at the pancreatic neck. 1.8 cm hypoattenuating lesion seen within anterior pancreas at this level (2, 30).  ADRENALS: Within normal limits.  KIDNEYS/URETERS: Left renal cysts. Questionable indeterminate 1.6 cm right upper pole lesion (2, 25).    BLADDER: Within normal limits.  REPRODUCTIVE ORGANS: Uterine fibroids.    BOWEL: Severe stool burden. No bowel obstruction. Appendix is not visualized. No evidence of inflammation in the pericecal region.  PERITONEUM: No ascites.  VESSELS: Atherosclerotic changes.  RETROPERITONEUM/LYMPH NODES: No lymphadenopathy.  ABDOMINAL WALL: Cachexia.  BONES: Within normal limits.    IMPRESSION:  Findings suspicious for obstructive neoplasm in the pancreatic neck/body. Suggest further evaluation with dedicated pancreatic CT or MRI.    Two indeterminate liver lesions, likely benign. Further characterization by MRI is advised.    Questionable indeterminate 1.6 cm right upper pole lesion. Recommend further evaluation ultrasound.              MEI SCOTT MD; Resident Radiology  This document has been electronically signed.  CHRISTINE SKINNER MD; Attending Radiologist  This document hasbeen electronically signed. Nov 22 2020  2:13PM    < end of copied text >

## 2020-12-01 NOTE — CHART NOTE - NSCHARTNOTEFT_GEN_A_CORE
IR Pre-Procedure Note    Patient Age:   70y    Patient Gender:   Female    Procedure (including site / side if known):  Pnacreas biopsy  Diagnosis / Indication: Patient is a 70y old  Female who presents with a chief complaint of Weight loss, Diarrhea (01 Dec 2020 07:16)      Interventional Radiology Attending Physician: Dr. Pike    Ordering Attending Physician: Dr. Hernadez    PAST MEDICAL & SURGICAL HISTORY:  S/P tendon repair  R foot, R elbow         Pertinent Labs:   CBC Full  -  ( 01 Dec 2020 04:08 )  WBC Count : 5.81 K/uL  RBC Count : 2.93 M/uL  Hemoglobin : 8.7 g/dL  Hematocrit : 26.6 %  Platelet Count - Automated : 162 K/uL  Mean Cell Volume : 90.8 fL  Mean Cell Hemoglobin : 29.7 pg  Mean Cell Hemoglobin Concentration : 32.7 %  Auto Neutrophil # : x  Auto Lymphocyte # : x  Auto Monocyte # : x  Auto Eosinophil # : x  Auto Basophil # : x  Auto Neutrophil % : x  Auto Lymphocyte % : x  Auto Monocyte % : x  Auto Eosinophil % : x  Auto Basophil % : x    12-01    138  |  105  |  11  ----------------------------<  173<H>  4.0   |  25  |  0.33<L>    Ca    8.1<L>      01 Dec 2020 04:08  Phos  2.8     12-01  Mg     1.8     12-01      PT/INR - ( 01 Dec 2020 04:08 )   PT: 10.7 SEC;   INR: 0.93          PTT - ( 01 Dec 2020 04:08 )  PTT:26.1 SEC    Patient / Family aware of procedure:   [x  ] Y   [  ] N    Myrtle Craig PGY1  Pager:  [phone]; [facility] [phone]

## 2020-12-01 NOTE — PROGRESS NOTE ADULT - SUBJECTIVE AND OBJECTIVE BOX
PROGRESS NOTE:   Authored by Myrtle Craig MD  Pager: [facility] [phone]; [facility] [phone]    Patient is a 70y old  Female who presents with a chief complaint of Weight loss, Diarrhea (30 Nov 2020 13:23)      SUBJECTIVE / OVERNIGHT EVENTS:  No acute events overnight. For IR bx today.     MEDICATIONS  (STANDING):  dextrose 40% Gel 15 Gram(s) Oral once  dextrose 5%. 1000 milliLiter(s) (50 mL/Hr) IV Continuous <Continuous>  dextrose 5%. 1000 milliLiter(s) (100 mL/Hr) IV Continuous <Continuous>  dextrose 50% Injectable 25 Gram(s) IV Push once  dextrose 50% Injectable 12.5 Gram(s) IV Push once  dextrose 50% Injectable 25 Gram(s) IV Push once  glucagon  Injectable 1 milliGRAM(s) IntraMuscular once  insulin lispro (ADMELOG) corrective regimen sliding scale   SubCutaneous every 6 hours  insulin lispro Injectable (ADMELOG) 6 Unit(s) SubCutaneous three times a day before meals  polyethylene glycol 3350 17 Gram(s) Oral two times a day  senna 2 Tablet(s) Oral at bedtime    MEDICATIONS  (PRN):      CAPILLARY BLOOD GLUCOSE      POCT Blood Glucose.: 222 mg/dL (01 Dec 2020 05:28)  POCT Blood Glucose.: 211 mg/dL (30 Nov 2020 23:10)  POCT Blood Glucose.: 294 mg/dL (30 Nov 2020 17:17)  POCT Blood Glucose.: 235 mg/dL (30 Nov 2020 12:02)  POCT Blood Glucose.: 136 mg/dL (30 Nov 2020 10:19)    I&O's Summary      PHYSICAL EXAM:  Vital Signs Last 24 Hrs  T(C): 36.8 (01 Dec 2020 05:35), Max: 37 (30 Nov 2020 21:40)  T(F): 98.2 (01 Dec 2020 05:35), Max: 98.6 (30 Nov 2020 21:40)  HR: 86 (01 Dec 2020 05:35) (72 - 88)  BP: 107/66 (01 Dec 2020 05:35) (97/51 - 115/65)  BP(mean): 79 (30 Nov 2020 09:50) (77 - 81)  RR: 17 (01 Dec 2020 05:35) (15 - 20)  SpO2: 97% (01 Dec 2020 05:35) (97% - 100%)      PHYSICAL EXAM:  Gen: Alert, very thin, in NAD  HEENT: NCAT, PERRL, EOMI, clear conjunctiva, no erythema or exudates in the oropharynx, mmm  Neck: Supple, no LAD  CV: RRR, S1S2, no m/r/g  Resp: CTAB, normal respiratory effort  Abd: Soft, NT, ND, normal bowel sounds  Ext: trace edema up to ankles, no clubbing or cyanosis  Neuro: AOx3, CN2-12 grossly intact  Skin: no rashes, no ecchymoses    LABS:                        8.7    5.81  )-----------( 162      ( 01 Dec 2020 04:08 )             26.6     12-01    138  |  105  |  11  ----------------------------<  173<H>  4.0   |  25  |  0.33<L>    Ca    8.1<L>      01 Dec 2020 04:08  Phos  2.8     12-01  Mg     1.8     12-01      PT/INR - ( 01 Dec 2020 04:08 )   PT: 10.7 SEC;   INR: 0.93          PTT - ( 01 Dec 2020 04:08 )  PTT:26.1 SEC            RADIOLOGY & ADDITIONAL TESTS:  Results Reviewed:   Imaging Personally Reviewed:  Electrocardiogram Personally Reviewed:    COORDINATION OF CARE:  Care Discussed with Consultants/Other Providers [Y/N]:  Prior or Outpatient Records Reviewed [Y/N]:   PROGRESS NOTE:   Authored by Myrtle Craig MD  Pager: [facility] [phone]; [facility] [phone]    Patient is a 70y old  Female who presents with a chief complaint of Weight loss, Diarrhea (30 Nov 2020 13:23)      SUBJECTIVE / OVERNIGHT EVENTS:  No acute events overnight. For IR bx today. Unable to be seen as patient in IR biopsy of liver lesion.    MEDICATIONS  (STANDING):  dextrose 40% Gel 15 Gram(s) Oral once  dextrose 5%. 1000 milliLiter(s) (50 mL/Hr) IV Continuous <Continuous>  dextrose 5%. 1000 milliLiter(s) (100 mL/Hr) IV Continuous <Continuous>  dextrose 50% Injectable 25 Gram(s) IV Push once  dextrose 50% Injectable 12.5 Gram(s) IV Push once  dextrose 50% Injectable 25 Gram(s) IV Push once  glucagon  Injectable 1 milliGRAM(s) IntraMuscular once  insulin lispro (ADMELOG) corrective regimen sliding scale   SubCutaneous every 6 hours  insulin lispro Injectable (ADMELOG) 6 Unit(s) SubCutaneous three times a day before meals  polyethylene glycol 3350 17 Gram(s) Oral two times a day  senna 2 Tablet(s) Oral at bedtime    MEDICATIONS  (PRN):      CAPILLARY BLOOD GLUCOSE      POCT Blood Glucose.: 222 mg/dL (01 Dec 2020 05:28)  POCT Blood Glucose.: 211 mg/dL (30 Nov 2020 23:10)  POCT Blood Glucose.: 294 mg/dL (30 Nov 2020 17:17)  POCT Blood Glucose.: 235 mg/dL (30 Nov 2020 12:02)  POCT Blood Glucose.: 136 mg/dL (30 Nov 2020 10:19)    I&O's Summary      PHYSICAL EXAM:  Vital Signs Last 24 Hrs  T(C): 36.8 (01 Dec 2020 05:35), Max: 37 (30 Nov 2020 21:40)  T(F): 98.2 (01 Dec 2020 05:35), Max: 98.6 (30 Nov 2020 21:40)  HR: 86 (01 Dec 2020 05:35) (72 - 88)  BP: 107/66 (01 Dec 2020 05:35) (97/51 - 115/65)  BP(mean): 79 (30 Nov 2020 09:50) (77 - 81)  RR: 17 (01 Dec 2020 05:35) (15 - 20)  SpO2: 97% (01 Dec 2020 05:35) (97% - 100%)      PHYSICAL EXAM:  Gen: Alert, very thin, in NAD  HEENT: NCAT, PERRL, EOMI, clear conjunctiva, no erythema or exudates in the oropharynx, mmm  Neck: Supple, no LAD  CV: RRR, S1S2, no m/r/g  Resp: CTAB, normal respiratory effort  Abd: Soft, NT, ND, normal bowel sounds  Ext: trace edema up to ankles, no clubbing or cyanosis  Neuro: AOx3, CN2-12 grossly intact  Skin: no rashes, no ecchymoses    LABS:                        8.7    5.81  )-----------( 162      ( 01 Dec 2020 04:08 )             26.6     12-01    138  |  105  |  11  ----------------------------<  173<H>  4.0   |  25  |  0.33<L>    Ca    8.1<L>      01 Dec 2020 04:08  Phos  2.8     12-01  Mg     1.8     12-01      PT/INR - ( 01 Dec 2020 04:08 )   PT: 10.7 SEC;   INR: 0.93          PTT - ( 01 Dec 2020 04:08 )  PTT:26.1 SEC            RADIOLOGY & ADDITIONAL TESTS:  Results Reviewed:   Imaging Personally Reviewed:  Electrocardiogram Personally Reviewed:    COORDINATION OF CARE:  Care Discussed with Consultants/Other Providers [Y/N]:  Prior or Outpatient Records Reviewed [Y/N]:

## 2020-12-01 NOTE — PROGRESS NOTE ADULT - SUBJECTIVE AND OBJECTIVE BOX
Interventional Radiology Pre-Procedure Note    Procedure: Liver mass biopsy    Diagnosis/Indication: Patient is a 70y old Female who presents with a chief complaint of Weight loss, found to have a pancreatic mass and liver lesions. Biopsy of liver lesion was requested.    PAST MEDICAL & SURGICAL HISTORY:  S/P tendon repair  R foot, R elbow    Allergies: No Known Allergies    LABS:  CBC Full  -  ( 01 Dec 2020 04:08 )  WBC Count : 5.81 K/uL  RBC Count : 2.93 M/uL  Hemoglobin : 8.7 g/dL  Hematocrit : 26.6 %  Platelet Count - Automated : 162 K/uL  Mean Cell Volume : 90.8 fL  Mean Cell Hemoglobin : 29.7 pg  Mean Cell Hemoglobin Concentration : 32.7 %    12-01    138  |  105  |  11  ----------------------------<  173<H>  4.0   |  25  |  0.33<L>    Ca    8.1<L>      01 Dec 2020 04:08  Phos  2.8     12-01  Mg     1.8     12-01    PT/INR - ( 01 Dec 2020 04:08 )   PT: 10.7 SEC;   INR: 0.93        PTT - ( 01 Dec 2020 04:08 )  PTT:26.1 SEC    Procedure/ risks/ benefits/ alternatives were discussed with the patient, who verbalizes understanding, and witnessed informed consent was obtained.

## 2020-12-02 ENCOUNTER — TRANSCRIPTION ENCOUNTER (OUTPATIENT)
Age: 70
End: 2020-12-02

## 2020-12-02 VITALS
SYSTOLIC BLOOD PRESSURE: 113 MMHG | DIASTOLIC BLOOD PRESSURE: 60 MMHG | HEART RATE: 86 BPM | RESPIRATION RATE: 18 BRPM | TEMPERATURE: 99 F | OXYGEN SATURATION: 99 %

## 2020-12-02 DIAGNOSIS — C25.9 MALIGNANT NEOPLASM OF PANCREAS, UNSPECIFIED: ICD-10-CM

## 2020-12-02 LAB
ALBUMIN SERPL ELPH-MCNC: 2.3 G/DL — LOW (ref 3.3–5)
ALP SERPL-CCNC: 151 U/L — HIGH (ref 40–120)
ALT FLD-CCNC: 40 U/L — HIGH (ref 4–33)
ANION GAP SERPL CALC-SCNC: 9 MMO/L — SIGNIFICANT CHANGE UP (ref 7–14)
AST SERPL-CCNC: 32 U/L — SIGNIFICANT CHANGE UP (ref 4–32)
BASOPHILS # BLD AUTO: 0.01 K/UL — SIGNIFICANT CHANGE UP (ref 0–0.2)
BASOPHILS NFR BLD AUTO: 0.2 % — SIGNIFICANT CHANGE UP (ref 0–2)
BILIRUB SERPL-MCNC: < 0.2 MG/DL — LOW (ref 0.2–1.2)
BUN SERPL-MCNC: 12 MG/DL — SIGNIFICANT CHANGE UP (ref 7–23)
CALCIUM SERPL-MCNC: 8.4 MG/DL — SIGNIFICANT CHANGE UP (ref 8.4–10.5)
CHLORIDE SERPL-SCNC: 104 MMOL/L — SIGNIFICANT CHANGE UP (ref 98–107)
CO2 SERPL-SCNC: 24 MMOL/L — SIGNIFICANT CHANGE UP (ref 22–31)
CREAT SERPL-MCNC: 0.42 MG/DL — LOW (ref 0.5–1.3)
EOSINOPHIL # BLD AUTO: 0 K/UL — SIGNIFICANT CHANGE UP (ref 0–0.5)
EOSINOPHIL NFR BLD AUTO: 0 % — SIGNIFICANT CHANGE UP (ref 0–6)
GLUCOSE BLDC GLUCOMTR-MCNC: 269 MG/DL — HIGH (ref 70–99)
GLUCOSE BLDC GLUCOMTR-MCNC: 283 MG/DL — HIGH (ref 70–99)
GLUCOSE SERPL-MCNC: 244 MG/DL — HIGH (ref 70–99)
HCT VFR BLD CALC: 26.9 % — LOW (ref 34.5–45)
HGB BLD-MCNC: 8.6 G/DL — LOW (ref 11.5–15.5)
IMM GRANULOCYTES NFR BLD AUTO: 0.2 % — SIGNIFICANT CHANGE UP (ref 0–1.5)
LYMPHOCYTES # BLD AUTO: 1.04 K/UL — SIGNIFICANT CHANGE UP (ref 1–3.3)
LYMPHOCYTES # BLD AUTO: 21.1 % — SIGNIFICANT CHANGE UP (ref 13–44)
MAGNESIUM SERPL-MCNC: 1.8 MG/DL — SIGNIFICANT CHANGE UP (ref 1.6–2.6)
MCHC RBC-ENTMCNC: 29.3 PG — SIGNIFICANT CHANGE UP (ref 27–34)
MCHC RBC-ENTMCNC: 32 % — SIGNIFICANT CHANGE UP (ref 32–36)
MCV RBC AUTO: 91.5 FL — SIGNIFICANT CHANGE UP (ref 80–100)
MONOCYTES # BLD AUTO: 0.41 K/UL — SIGNIFICANT CHANGE UP (ref 0–0.9)
MONOCYTES NFR BLD AUTO: 8.3 % — SIGNIFICANT CHANGE UP (ref 2–14)
NEUTROPHILS # BLD AUTO: 3.47 K/UL — SIGNIFICANT CHANGE UP (ref 1.8–7.4)
NEUTROPHILS NFR BLD AUTO: 70.2 % — SIGNIFICANT CHANGE UP (ref 43–77)
NON-GYNECOLOGICAL CYTOLOGY STUDY: SIGNIFICANT CHANGE UP
NRBC # FLD: 0 K/UL — SIGNIFICANT CHANGE UP (ref 0–0)
PHOSPHATE SERPL-MCNC: 2.7 MG/DL — SIGNIFICANT CHANGE UP (ref 2.5–4.5)
PLATELET # BLD AUTO: 173 K/UL — SIGNIFICANT CHANGE UP (ref 150–400)
PMV BLD: 11.4 FL — SIGNIFICANT CHANGE UP (ref 7–13)
POTASSIUM SERPL-MCNC: 4.2 MMOL/L — SIGNIFICANT CHANGE UP (ref 3.5–5.3)
POTASSIUM SERPL-SCNC: 4.2 MMOL/L — SIGNIFICANT CHANGE UP (ref 3.5–5.3)
PROT SERPL-MCNC: 5 G/DL — LOW (ref 6–8.3)
RBC # BLD: 2.94 M/UL — LOW (ref 3.8–5.2)
RBC # FLD: 16.6 % — HIGH (ref 10.3–14.5)
SODIUM SERPL-SCNC: 137 MMOL/L — SIGNIFICANT CHANGE UP (ref 135–145)
WBC # BLD: 4.94 K/UL — SIGNIFICANT CHANGE UP (ref 3.8–10.5)
WBC # FLD AUTO: 4.94 K/UL — SIGNIFICANT CHANGE UP (ref 3.8–10.5)

## 2020-12-02 PROCEDURE — 99239 HOSP IP/OBS DSCHRG MGMT >30: CPT | Mod: GC

## 2020-12-02 PROCEDURE — 99223 1ST HOSP IP/OBS HIGH 75: CPT | Mod: GC

## 2020-12-02 PROCEDURE — 99232 SBSQ HOSP IP/OBS MODERATE 35: CPT

## 2020-12-02 PROCEDURE — 99232 SBSQ HOSP IP/OBS MODERATE 35: CPT | Mod: GC

## 2020-12-02 RX ORDER — INSULIN LISPRO 100/ML
8 VIAL (ML) SUBCUTANEOUS
Refills: 0 | Status: DISCONTINUED | OUTPATIENT
Start: 2020-12-02 | End: 2020-12-02

## 2020-12-02 RX ADMIN — Medication 8 UNIT(S): at 12:31

## 2020-12-02 RX ADMIN — Medication 6 UNIT(S): at 08:51

## 2020-12-02 RX ADMIN — INSULIN GLARGINE 9 UNIT(S): 100 INJECTION, SOLUTION SUBCUTANEOUS at 12:33

## 2020-12-02 NOTE — CHART NOTE - NSCHARTNOTEFT_GEN_A_CORE
NUTRITION FOLLOW-UP:  Pt. with pancreatic adenocarcinoma.   Pt. with <50% consumption of breakfast.  Attempted to obtain/provide food preferences. Requesting juice and cake. Reviewed nutrient dense food choices and informed of persistently elevated glucose values. Pt. has been drinking Glucerna supplement with good intake, however does not want to increase to more than 1x daily (prefers strawberry or chocolate flavor).   Pt. documented with diarrhea, however upon asking, replies she is no longer experiencing diarrhea.  Denies N/V or issues chewing/swallowing.   No significant weight change, however Pt. continues to physically appear extremely cachectic and emaciated.       Weight:  44.8kg (12/2)  45.0kg (11/30)  45.0kg (11/27)    Edema:  2+ B/L legs, 3+ B/L ankles    Skin:  No noted pressure injuries.       Pertinent Medications: MEDICATIONS  (STANDING):  dextrose 40% Gel 15 Gram(s) Oral once  dextrose 5%. 1000 milliLiter(s) (50 mL/Hr) IV Continuous <Continuous>  dextrose 5%. 1000 milliLiter(s) (100 mL/Hr) IV Continuous <Continuous>  dextrose 50% Injectable 25 Gram(s) IV Push once  dextrose 50% Injectable 25 Gram(s) IV Push once  dextrose 50% Injectable 12.5 Gram(s) IV Push once  glucagon  Injectable 1 milliGRAM(s) IntraMuscular once  insulin glargine Injectable (LANTUS) 9 Unit(s) SubCutaneous <User Schedule>  insulin lispro (ADMELOG) corrective regimen sliding scale   SubCutaneous three times a day before meals  insulin lispro (ADMELOG) corrective regimen sliding scale   SubCutaneous at bedtime  insulin lispro Injectable (ADMELOG) 6 Unit(s) SubCutaneous three times a day before meals  polyethylene glycol 3350 17 Gram(s) Oral two times a day  senna 2 Tablet(s) Oral at bedtime    MEDICATIONS  (PRN):  fentaNYL    Injectable 25 MICROGram(s) IV Push every 5 minutes PRN Moderate Pain (4 - 6)  ondansetron Injectable 4 milliGRAM(s) IV Push once PRN Nausea and/or Vomiting    Pertinent Labs:  12-02 Na137 mmol/L Glu 244 mg/dL<H> K+ 4.2 mmol/L Cr  0.42 mg/dL<L> BUN 12 mg/dL 12-02 Phos 2.7 mg/dL 12-02 Alb 2.3 g/dL<L> 11-29 Chol 169 mg/dL LDL 64 mg/dL  mg/dL<H> Trig 49 mg/dL      CAPILLARY BLOOD GLUCOSE      POCT Blood Glucose.: 269 mg/dL (02 Dec 2020 08:30)  POCT Blood Glucose.: 300 mg/dL (01 Dec 2020 21:52)  POCT Blood Glucose.: 191 mg/dL (01 Dec 2020 17:13)  POCT Blood Glucose.: 232 mg/dL (01 Dec 2020 12:03)          Diet, Regular:   Consistent Carbohydrate {Evening Snack} (CSTCHOSN)  No Fish  Supplement Feeding Modality:  Oral  Glucerna Shake Cans or Servings Per Day:  1       Frequency:  Daily (11-29-20 @ 13:35)      NUTRITION Dx:  Pt. remains severely malnourished       PLAN/RECOMMENDATIONS:    1) Continue current diet with Glucerna Shake   2) Obtain weekly weights      RDN remains available and will f/u PRN.          Abigali Skaggs RDN, CDN pager [phone]

## 2020-12-02 NOTE — PROGRESS NOTE ADULT - PROBLEM SELECTOR PLAN 1
Pancreatic lesion in neck and body. Unintentional weight loss, poor appetite, generalized weakness likely secondary to malignancy.  Likely has suppression of both exocrine and endocrine function causing hyperglycemia and diarrhea.  Elevation in alk phos, AST, ALT likely also secondary to obstruction from lesion.  - CT chest showing RLL opacity and small nodules, unclear if metastatic lesions  - MR showing 6x4 mass in pancreatic body/mass and possible metastatic hepatic lesions  - CEA elevated to 201.  - GI following, EUS done on 11/25, needed to verify with radiology regarding imaging of pancreas. Planned for repeat EUS 11/27, but was re-scheduled for Monday 11/30.  - s/p EUS on 11/30.  - surgical pathology report showing Pancreatic lesion in neck and body. Unintentional weight loss, poor appetite, generalized weakness likely secondary to malignancy.  Likely has suppression of both exocrine and endocrine function causing hyperglycemia and diarrhea.  Elevation in alk phos, AST, ALT likely also secondary to obstruction from lesion.  - CT chest showing RLL opacity and small nodules, unclear if metastatic lesions  - MR showing 6x4 mass in pancreatic body/mass and possible metastatic hepatic lesions  - CEA elevated to 201.  - GI following, EUS done on 11/25, needed to verify with radiology regarding imaging of pancreas. Planned for repeat EUS 11/27, but was re-scheduled for Monday 11/30.  - s/p EUS on 11/30  - surgical pathology report showing invasive moderately differentiated adenocarcinoma.  - s/p IR liver bx 12/1  - To have outpatient GI follow-up Pancreatic lesion in neck and body. Unintentional weight loss, poor appetite, generalized weakness likely secondary to malignancy.  Likely has suppression of both exocrine and endocrine function causing hyperglycemia and diarrhea.  Elevation in alk phos, AST, ALT likely also secondary to obstruction from lesion.  - CT chest showing RLL opacity and small nodules, unclear if metastatic lesions  - MR showing 6x4 mass in pancreatic body/mass and possible metastatic hepatic lesions  - CEA elevated to 201.  - GI following, EUS done on 11/25, needed to verify with radiology regarding imaging of pancreas. Planned for repeat EUS 11/27, but was re-scheduled for Monday 11/30.  - s/p EUS on 11/30.  - surgical pathology report showing pancreatic adenocarcinoma  - s/p IR bx 12/1

## 2020-12-02 NOTE — CONSULT NOTE ADULT - ASSESSMENT
70 female with no significant medical history who initially presented with progressive weight loss and diarrhea for approximately 6 months. Now with new diagnosis of pancreatic adenocarcinoma    # Pancreatic adenocarcinoma  - CT abd/pelvis that showed findings suspicious for obstructive neoplasm in the pancreatic neck/body; Two indeterminate liver lesions, likely benign and questionable indeterminate 1.6 cm right upper pole lesion  - Now s/p EUS x 2 for biopsy consistent with pancreatic adenocarcinoma  - She underwent IR guided liver lesion yesterday, pending results  - Will send referral to [facility] at discharge       # RLL lesion  - CT chest: 1.3 cm right lower lobe nodular opacity concerning for infection vs primary lung malignancy   - Given her smoking history and CT findings, she will likely need biopsy but could potentially be pursued as outpatient    ****INCOMPLETE*** 70 female with no significant medical history who initially presented with progressive weight loss and diarrhea for approximately 6 months. Now with new diagnosis of pancreatic adenocarcinoma    # Pancreatic adenocarcinoma  - CT abd/pelvis that showed findings suspicious for obstructive neoplasm in the pancreatic neck/body; Two indeterminate liver lesions, likely benign and questionable indeterminate 1.6 cm right upper pole lesion  - Now s/p EUS x 2 for biopsy consistent with pancreatic adenocarcinoma  - She underwent IR guided liver lesion yesterday, pending results  - Will send referral to [facility] at discharge     # RLL lesion  - CT chest: 1.3 cm right lower lobe nodular opacity concerning for infection vs primary lung malignancy   - Given her smoking history and CT findings, she will need further workup i.e. repeat scans vs biopsy    Janeen Kee  Hematology-Oncology PGY-6  [phone]

## 2020-12-02 NOTE — PROGRESS NOTE ADULT - NUTRITIONAL ASSESSMENT
This patient has been assessed with a concern for Malnutrition and has been determined to have a diagnosis/diagnoses of Severe protein-calorie malnutrition and Underweight/BMI < 19.    This patient is being managed with:   Diet Regular-  Consistent Carbohydrate {Evening Snack} (CSTCHOSN)  No Fish  Supplement Feeding Modality:  Oral  Glucerna Shake Cans or Servings Per Day:  1       Frequency:  Daily  Entered: Nov 29 2020  1:35PM    
This patient has been assessed with a concern for Malnutrition and has been determined to have a diagnosis/diagnoses of Severe protein-calorie malnutrition and Underweight/BMI < 19.    This patient is being managed with:   Diet NPO after Midnight-     NPO Start Date: 30-Nov-2020   NPO Start Time: 23:59  Except Medications  Entered: Nov 30 2020 12:26PM    Diet Regular-  Consistent Carbohydrate {Evening Snack} (CSTCHOSN)  No Fish  Supplement Feeding Modality:  Oral  Glucerna Shake Cans or Servings Per Day:  1       Frequency:  Daily  Entered: Nov 29 2020  1:35PM    
This patient has been assessed with a concern for Malnutrition and has been determined to have a diagnosis/diagnoses of Severe protein-calorie malnutrition and Underweight/BMI < 19.    This patient is being managed with:   Diet Regular-  Consistent Carbohydrate {Evening Snack} (CSTCHOSN)  No Fish  Supplement Feeding Modality:  Oral  Glucerna Shake Cans or Servings Per Day:  1       Frequency:  Daily  Entered: Nov 29 2020  1:35PM    
This patient has been assessed with a concern for Malnutrition and has been determined to have a diagnosis/diagnoses of Severe protein-calorie malnutrition and Underweight/BMI < 19.    This patient is being managed with:   Diet NPO after Midnight-     NPO Start Date: 26-Nov-2020   NPO Start Time: 23:59  Entered: Nov 26 2020  8:16AM    Diet Regular-  Consistent Carbohydrate {Evening Snack} (CSTCHOSN)  Supplement Feeding Modality:  Oral  Glucerna Shake Cans or Servings Per Day:  1       Frequency:  Daily  Entered: Nov 25 2020 11:12AM    
This patient has been assessed with a concern for Malnutrition and has been determined to have a diagnosis/diagnoses of Severe protein-calorie malnutrition and Underweight/BMI < 19.    This patient is being managed with:   Diet Regular-  Consistent Carbohydrate {Evening Snack} (CSTCHOSN)  Supplement Feeding Modality:  Oral  Glucerna Shake Cans or Servings Per Day:  1       Frequency:  Daily  Entered: Nov 27 2020  3:53AM    
This patient has been assessed with a concern for Malnutrition and has been determined to have a diagnosis/diagnoses of Severe protein-calorie malnutrition and Underweight/BMI < 19.    This patient is being managed with:   Diet NPO after Midnight-     NPO Start Date: 30-Nov-2020   NPO Start Time: 23:59  Except Medications  Entered: Nov 30 2020 12:26PM    Diet Regular-  Consistent Carbohydrate {Evening Snack} (CSTCHOSN)  No Fish  Supplement Feeding Modality:  Oral  Glucerna Shake Cans or Servings Per Day:  1       Frequency:  Daily  Entered: Nov 29 2020  1:35PM    
This patient has been assessed with a concern for Malnutrition and has been determined to have a diagnosis/diagnoses of Severe protein-calorie malnutrition and Underweight/BMI < 19.    This patient is being managed with:   Diet Regular-  Consistent Carbohydrate {Evening Snack} (CSTCHOSN)  Supplement Feeding Modality:  Oral  Glucerna Shake Cans or Servings Per Day:  1       Frequency:  Daily  Entered: Nov 27 2020  3:53AM    
This patient has been assessed with a concern for Malnutrition and has been determined to have a diagnosis/diagnoses of Severe protein-calorie malnutrition and Underweight/BMI < 19.    This patient is being managed with:   Diet NPO after Midnight-     NPO Start Date: 30-Nov-2020   NPO Start Time: 23:59  Except Medications  Entered: Nov 30 2020 12:26PM    Diet Regular-  Consistent Carbohydrate {Evening Snack} (CSTCHOSN)  No Fish  Supplement Feeding Modality:  Oral  Glucerna Shake Cans or Servings Per Day:  1       Frequency:  Daily  Entered: Nov 29 2020  1:35PM    
This patient has been assessed with a concern for Malnutrition and has been determined to have a diagnosis/diagnoses of Severe protein-calorie malnutrition and Underweight/BMI < 19.    This patient is being managed with:   Diet NPO-  Except Medications  Entered: Nov 27 2020  3:57AM    
This patient has been assessed with a concern for Malnutrition and has been determined to have a diagnosis/diagnoses of Severe protein-calorie malnutrition and Underweight/BMI < 19.    This patient is being managed with:   Diet Regular-  Consistent Carbohydrate {Evening Snack} (CSTCHOSN)  No Fish  Supplement Feeding Modality:  Oral  Glucerna Shake Cans or Servings Per Day:  1       Frequency:  Daily  Entered: Nov 29 2020  1:35PM    
This patient has been assessed with a concern for Malnutrition and has been determined to have a diagnosis/diagnoses of Severe protein-calorie malnutrition and Underweight/BMI < 19.    This patient is being managed with:   Diet Regular-  Consistent Carbohydrate {Evening Snack} (CSTCHOSN)  No Fish  Supplement Feeding Modality:  Oral  Glucerna Shake Cans or Servings Per Day:  1       Frequency:  Daily  Entered: Nov 29 2020  1:35PM    Diet NPO after Midnight-     NPO Start Date: 29-Nov-2020   NPO Start Time: 23:59  Entered: Nov 29 2020  8:20AM    
This patient has been assessed with a concern for Malnutrition and has been determined to have a diagnosis/diagnoses of Severe protein-calorie malnutrition and Underweight/BMI < 19.    This patient is being managed with:   Diet Regular-  Consistent Carbohydrate {Evening Snack} (CSTCHOSN)  Supplement Feeding Modality:  Oral  Glucerna Shake Cans or Servings Per Day:  1       Frequency:  Daily  Entered: Nov 27 2020  3:53AM    
This patient has been assessed with a concern for Malnutrition and has been determined to have a diagnosis/diagnoses of Severe protein-calorie malnutrition and Underweight/BMI < 19.    This patient is being managed with:   Diet Regular-  Consistent Carbohydrate {Evening Snack} (CSTCHOSN)  Supplement Feeding Modality:  Oral  Glucerna Shake Cans or Servings Per Day:  1       Frequency:  Daily  Entered: Nov 27 2020  3:53AM    
This patient has been assessed with a concern for Malnutrition and has been determined to have a diagnosis/diagnoses of Severe protein-calorie malnutrition and Underweight/BMI < 19.    This patient is being managed with:   Diet Regular-  Consistent Carbohydrate {Evening Snack} (CSTCHOSN)  Supplement Feeding Modality:  Oral  Glucerna Shake Cans or Servings Per Day:  1       Frequency:  Daily  Entered: Nov 25 2020 11:12AM

## 2020-12-02 NOTE — PROGRESS NOTE ADULT - SUBJECTIVE AND OBJECTIVE BOX
Chief Complaint:  Patient is a 70y old  Female who presents with a chief complaint of Weight loss, Diarrhea (02 Dec 2020 07:19)      Interval Events: Path came back as invasive moderately-differentiated adenocarcinoma.   ROS: All 12 point system except listed above were otherwise negative.    Allergies:  No Known Allergies        Hospital Medications:  dextrose 40% Gel 15 Gram(s) Oral once  dextrose 5%. 1000 milliLiter(s) IV Continuous <Continuous>  dextrose 5%. 1000 milliLiter(s) IV Continuous <Continuous>  dextrose 50% Injectable 25 Gram(s) IV Push once  dextrose 50% Injectable 25 Gram(s) IV Push once  dextrose 50% Injectable 12.5 Gram(s) IV Push once  fentaNYL    Injectable 25 MICROGram(s) IV Push every 5 minutes PRN  glucagon  Injectable 1 milliGRAM(s) IntraMuscular once  insulin glargine Injectable (LANTUS) 9 Unit(s) SubCutaneous <User Schedule>  insulin lispro (ADMELOG) corrective regimen sliding scale   SubCutaneous three times a day before meals  insulin lispro (ADMELOG) corrective regimen sliding scale   SubCutaneous at bedtime  insulin lispro Injectable (ADMELOG) 6 Unit(s) SubCutaneous three times a day before meals  ondansetron Injectable 4 milliGRAM(s) IV Push once PRN  polyethylene glycol 3350 17 Gram(s) Oral two times a day  senna 2 Tablet(s) Oral at bedtime      PMHX/PSHX:  S/P tendon repair        Family history:  Family history of liver cancer      There is no family history of peptic ulcer disease, gastric cancer, colon polyps, colon cancer, celiac disease, biliary, hepatic, or pancreatic disease.  None of the female relatives have breast, uterine, or ovarian cancer.     PHYSICAL EXAM:   Vital Signs:  Vital Signs Last 24 Hrs  T(C): 37.3 (02 Dec 2020 05:53), Max: 37.3 (02 Dec 2020 05:53)  T(F): 99.2 (02 Dec 2020 05:53), Max: 99.2 (02 Dec 2020 05:53)  HR: 84 (02 Dec 2020 05:53) (70 - 86)  BP: 109/58 (02 Dec 2020 05:53) (103/55 - 112/56)  BP(mean): --  RR: 18 (02 Dec 2020 05:53) (18 - 18)  SpO2: 98% (02 Dec 2020 05:53) (97% - 100%)  Daily     Daily     PHYSICAL EXAM:     GENERAL:  Appears stated age, well-groomed  HEENT:  NC/AT,  conjunctivae clear and pink, no thyromegaly  CHEST:  Full & symmetric excursion, no increased effort, breath sounds clear  HEART:  Regular rhythm, S1, S2  ABDOMEN:  Soft, non-tender, non-distended, normoactive bowel sounds  EXTEREMITIES:  no cyanosis,clubbing or edema  SKIN:  No rash/erythema/ecchymoses  NEURO:  Alert, oriented, no asterixis    LABS:                        8.6    4.94  )-----------( 173      ( 02 Dec 2020 05:45 )             26.9     Mean Cell Volume: 91.5 fL (12-02-20 @ 05:45)    12-02    137  |  104  |  12  ----------------------------<  244<H>  4.2   |  24  |  0.42<L>    Ca    8.4      02 Dec 2020 05:45  Phos  2.7     12-02  Mg     1.8     12-02    TPro  5.0<L>  /  Alb  2.3<L>  /  TBili  < 0.2<L>  /  DBili  x   /  AST  32  /  ALT  40<H>  /  AlkPhos  151<H>  12-02    LIVER FUNCTIONS - ( 02 Dec 2020 05:45 )  Alb: 2.3 g/dL / Pro: 5.0 g/dL / ALK PHOS: 151 u/L / ALT: 40 u/L / AST: 32 u/L / GGT: x           PT/INR - ( 01 Dec 2020 04:08 )   PT: 10.7 SEC;   INR: 0.93          PTT - ( 01 Dec 2020 04:08 )  PTT:26.1 SEC                            8.6    4.94  )-----------( 173      ( 02 Dec 2020 05:45 )             26.9                         8.7    5.81  )-----------( 162      ( 01 Dec 2020 04:08 )             26.6                         9.2    4.61  )-----------( 142      ( 30 Nov 2020 06:10 )             28.0     Imaging:

## 2020-12-02 NOTE — PROGRESS NOTE ADULT - ATTENDING COMMENTS
70F pancreatic mass, new DM  s/p repeat EUS with biopsy 11/30; pathology c/w cancer. s/p IR biopsy of liver mets 12/1  appreciate endocrinology recs  appreciate onc recs, will likely need biopsy of lung lesion, patient wishes to pursue further w/u and treatment as outpt  stable for d/c home  d/c time 45 min coordinating care

## 2020-12-02 NOTE — PROGRESS NOTE ADULT - PROBLEM SELECTOR PLAN 2
Hyperglycemia likely secondary suppression of pancreatic endocrine function from infiltration of lesion.   - A1c to >18.  - Appreciate Endocrine recs  - Lispro and lantus per endocrine.  - C/w FS qAC/HS, low dose SSI  - to be discharged with 8U pre-meal and 9U long-acting.   - Follow up with endocrine outpatient.

## 2020-12-02 NOTE — DISCHARGE NOTE NURSING/CASE MANAGEMENT/SOCIAL WORK - PATIENT PORTAL LINK FT
You can access the FollowMyHealth Patient Portal offered by [facility] by registering at the following website: [email or web address]. By joining [employer]’s FollowMyHealth portal, you will also be able to view your health information using other applications (apps) compatible with our system.

## 2020-12-02 NOTE — PROGRESS NOTE ADULT - SUBJECTIVE AND OBJECTIVE BOX
PROGRESS NOTE:   Authored by Myrtle Craig MD  Pager: [facility] [phone]; [facility] [phone]    Patient is a 70y old  Female who presents with a chief complaint of Weight loss, Diarrhea (01 Dec 2020 09:22)      SUBJECTIVE / OVERNIGHT EVENTS:  No acute events overnight. This AM, denies CP, SOB, subjective f/c, n/v. States she is ready to go home.     MEDICATIONS  (STANDING):  dextrose 40% Gel 15 Gram(s) Oral once  dextrose 5%. 1000 milliLiter(s) (50 mL/Hr) IV Continuous <Continuous>  dextrose 5%. 1000 milliLiter(s) (100 mL/Hr) IV Continuous <Continuous>  dextrose 50% Injectable 25 Gram(s) IV Push once  dextrose 50% Injectable 12.5 Gram(s) IV Push once  dextrose 50% Injectable 25 Gram(s) IV Push once  glucagon  Injectable 1 milliGRAM(s) IntraMuscular once  insulin glargine Injectable (LANTUS) 9 Unit(s) SubCutaneous <User Schedule>  insulin lispro (ADMELOG) corrective regimen sliding scale   SubCutaneous three times a day before meals  insulin lispro (ADMELOG) corrective regimen sliding scale   SubCutaneous at bedtime  insulin lispro Injectable (ADMELOG) 6 Unit(s) SubCutaneous three times a day before meals  polyethylene glycol 3350 17 Gram(s) Oral two times a day  senna 2 Tablet(s) Oral at bedtime    MEDICATIONS  (PRN):  fentaNYL    Injectable 25 MICROGram(s) IV Push every 5 minutes PRN Moderate Pain (4 - 6)  ondansetron Injectable 4 milliGRAM(s) IV Push once PRN Nausea and/or Vomiting      CAPILLARY BLOOD GLUCOSE      POCT Blood Glucose.: 300 mg/dL (01 Dec 2020 21:52)  POCT Blood Glucose.: 191 mg/dL (01 Dec 2020 17:13)  POCT Blood Glucose.: 232 mg/dL (01 Dec 2020 12:03)    I&O's Summary      PHYSICAL EXAM:  Vital Signs Last 24 Hrs  T(C): 37.3 (02 Dec 2020 05:53), Max: 37.3 (02 Dec 2020 05:53)  T(F): 99.2 (02 Dec 2020 05:53), Max: 99.2 (02 Dec 2020 05:53)  HR: 84 (02 Dec 2020 05:53) (70 - 86)  BP: 109/58 (02 Dec 2020 05:53) (103/55 - 112/56)  BP(mean): --  RR: 18 (02 Dec 2020 05:53) (18 - 18)  SpO2: 98% (02 Dec 2020 05:53) (97% - 100%)    PHYSICAL EXAM:  Gen: Alert, very thin, in NAD  HEENT: NCAT, PERRL, EOMI, clear conjunctiva, no erythema or exudates in the oropharynx, mmm  Neck: Supple, no LAD  CV: RRR, S1S2, no m/r/g  Resp: CTAB, normal respiratory effort  Abd: Soft, NT, ND, normal bowel sounds  Ext: trace edema up to ankles, no clubbing or cyanosis  Neuro: AOx3, CN2-12 grossly intact  Skin: no rashes, no ecchymoses    LABS:                        8.7    5.81  )-----------( 162      ( 01 Dec 2020 04:08 )             26.6     12-01    138  |  105  |  11  ----------------------------<  173<H>  4.0   |  25  |  0.33<L>    Ca    8.1<L>      01 Dec 2020 04:08  Phos  2.8     12-01  Mg     1.8     12-01      PT/INR - ( 01 Dec 2020 04:08 )   PT: 10.7 SEC;   INR: 0.93          PTT - ( 01 Dec 2020 04:08 )  PTT:26.1 SEC            RADIOLOGY & ADDITIONAL TESTS:  Surgical Final Report   Final Diagnosis   Pancreas, mass, fine needle biopsy   - Invasive moderately-differentiated adenocarcinoma   This case was reviewed for  with Dr. Yenny Jarrett   who concurs   with the diagnosis of ‘‘adenocarcinoma’’ on 12/1/2020.    PROGRESS NOTE:   Authored by Myrtle Craig MD  Pager: [facility] [phone]; [facility] [phone]    Patient is a 70y old  Female who presents with a chief complaint of Weight loss, Diarrhea (01 Dec 2020 09:22)      SUBJECTIVE / OVERNIGHT EVENTS:  No acute events overnight. This AM, denies CP, SOB, subjective f/c, n/v.    MEDICATIONS  (STANDING):  dextrose 40% Gel 15 Gram(s) Oral once  dextrose 5%. 1000 milliLiter(s) (50 mL/Hr) IV Continuous <Continuous>  dextrose 5%. 1000 milliLiter(s) (100 mL/Hr) IV Continuous <Continuous>  dextrose 50% Injectable 25 Gram(s) IV Push once  dextrose 50% Injectable 12.5 Gram(s) IV Push once  dextrose 50% Injectable 25 Gram(s) IV Push once  glucagon  Injectable 1 milliGRAM(s) IntraMuscular once  insulin glargine Injectable (LANTUS) 9 Unit(s) SubCutaneous <User Schedule>  insulin lispro (ADMELOG) corrective regimen sliding scale   SubCutaneous three times a day before meals  insulin lispro (ADMELOG) corrective regimen sliding scale   SubCutaneous at bedtime  insulin lispro Injectable (ADMELOG) 6 Unit(s) SubCutaneous three times a day before meals  polyethylene glycol 3350 17 Gram(s) Oral two times a day  senna 2 Tablet(s) Oral at bedtime    MEDICATIONS  (PRN):  fentaNYL    Injectable 25 MICROGram(s) IV Push every 5 minutes PRN Moderate Pain (4 - 6)  ondansetron Injectable 4 milliGRAM(s) IV Push once PRN Nausea and/or Vomiting      CAPILLARY BLOOD GLUCOSE      POCT Blood Glucose.: 300 mg/dL (01 Dec 2020 21:52)  POCT Blood Glucose.: 191 mg/dL (01 Dec 2020 17:13)  POCT Blood Glucose.: 232 mg/dL (01 Dec 2020 12:03)    I&O's Summary      PHYSICAL EXAM:  Vital Signs Last 24 Hrs  T(C): 37.3 (02 Dec 2020 05:53), Max: 37.3 (02 Dec 2020 05:53)  T(F): 99.2 (02 Dec 2020 05:53), Max: 99.2 (02 Dec 2020 05:53)  HR: 84 (02 Dec 2020 05:53) (70 - 86)  BP: 109/58 (02 Dec 2020 05:53) (103/55 - 112/56)  BP(mean): --  RR: 18 (02 Dec 2020 05:53) (18 - 18)  SpO2: 98% (02 Dec 2020 05:53) (97% - 100%)    PHYSICAL EXAM:  Gen: Alert, very thin, in NAD  HEENT: NCAT, PERRL, EOMI, clear conjunctiva, no erythema or exudates in the oropharynx, mmm  Neck: Supple, no LAD  CV: RRR, S1S2, no m/r/g  Resp: CTAB, normal respiratory effort  Abd: Soft, NT, ND, normal bowel sounds  Ext: trace edema up to ankles, no clubbing or cyanosis  Neuro: AOx3, CN2-12 grossly intact  Skin: no rashes, no ecchymoses    LABS:                        8.7    5.81  )-----------( 162      ( 01 Dec 2020 04:08 )             26.6     12-01    138  |  105  |  11  ----------------------------<  173<H>  4.0   |  25  |  0.33<L>    Ca    8.1<L>      01 Dec 2020 04:08  Phos  2.8     12-01  Mg     1.8     12-01      PT/INR - ( 01 Dec 2020 04:08 )   PT: 10.7 SEC;   INR: 0.93          PTT - ( 01 Dec 2020 04:08 )  PTT:26.1 SEC            RADIOLOGY & ADDITIONAL TESTS:  Surgical Final Report   Final Diagnosis   Pancreas, mass, fine needle biopsy   - Invasive moderately-differentiated adenocarcinoma   This case was reviewed for  with Dr. Yenny Jarrett   who concurs   with the diagnosis of ‘‘adenocarcinoma’’ on 12/1/2020.

## 2020-12-02 NOTE — PROGRESS NOTE ADULT - ASSESSMENT
70F w/ ?osteoporosis, multiple tendon repairs who presents with weight loss, diarrhea, poor appetite d/t loss of taste, progressive weakness. Found to have pancreatic lesion concerning for pancreatic neoplasm and new onset DM with A1c >18.     1. New onset uncontrolled DM, likely secondary d/t pancreatic insufficiency  -A1c >18 indicating grossly uncontrolled DM. New onset. No prior history. Likely secondary to pancreatic insufficiency and will need basal/bolus insulin for management. C-peptide is low 0.4 (glu 208) which confirms need for insulin at discharge and dependence on basal and bolus insulin moving forward.  -BG goal inpatient is 100-180 mg/dl  -Lantus was switched to daytime dosing yesterday after a missed bedtime dose  -Continue Lantus 9 units SQ daily at 12 PM  -Increased Admelog to 8 units SQ TID before meals (Hold if not eating/NPO)  -Continue Admelog LOW dose correctional scale before meals and bedtime  -Consistent carb diet with Glucerna as supplement,  RD consult appreciated  - Monitor FS before meals and bedtime  - DM Education: Please ensure that patient knows how to use insulin pen, how to self inject, and how to check blood sugar and knows s/s and management of hypoglycemia.  Provider to RN placed for bedside education. Patient endorses self injecting with vial/syringe before meals while admitted    Discharge Plan:   Patient to be discharged on basal + bolus insulin pens   Recommend discharge dosing as follows: Lantus 9 units SQ daily (1 PM) and Admelog 8/8/8, no correctional scales for discharge.  Please send insulin pens to check which is covered by insurance. Options for basal are Lantus/Tresiba/Basaglar/Toujeo/Levemir. Options for bolus are Humalog/Novolog/Admelog/Apidra  Please also send scripts for glucometer to [facility] (and select MEDS-TO-BEDS) so that patient can have the opportunity to learn how to use glucometer that she will be going home with.  Follow up with [facility], appts obtained  1. With diabetes educator: Friday 12/18/20 at 2:30 PM, [address], [address], [place] [facility] [place] [address], [phone]  2. With endocrinologist Dr. Prabhakar: Tuesday 3/9/21 at 1:45 PM, [address], [address], [place] [address], [phone]    2. HTN  -BP goal <130/80, currently at goal.    3. HLD  -LDL goal <70  -LDL 64 (11/29/20)  -Continue to monitor as outpatient    Reviewed with primary team MD Kiara Melissa  Nurse Practitioner  Division of Endocrinology & Diabetes  In house pager #[phone]/long range pager #[phone]    If before 9AM or after 6PM, or on weekends/holidays, please call endocrine answering service for assistance ([phone]).  For nonurgent matters email [email or web address] for assistance.

## 2020-12-02 NOTE — PROGRESS NOTE ADULT - ASSESSMENT
70F current smoker, w/ ?osteoporosis who presents with unintentional weight loss and diarrhea, found to have pancreatic lesion, likely secondary to malignancy.  Diarrhea and hyperglycemia likely explained by suppression of pancreatic function. MR with large pancreatic tail/body mass with possible hepatic metastases. EUS 11/25 indeterminate. s/p repeat EUS 11/30,  IR liver bx 12/1. Surgical pathology report resulting as invasive moderately- differentiated adenocarcinoma.

## 2020-12-02 NOTE — PROGRESS NOTE ADULT - REASON FOR ADMISSION
Weight loss, Diarrhea

## 2020-12-02 NOTE — PROGRESS NOTE ADULT - PROBLEM SELECTOR PROBLEM 1
Diabetes mellitus due to underlying condition with hyperglycemia, without long-term current use of insulin
Pancreatic adenocarcinoma
Pancreatic lesion
Diabetes mellitus due to underlying condition with hyperglycemia, without long-term current use of insulin

## 2020-12-02 NOTE — CONSULT NOTE ADULT - SUBJECTIVE AND OBJECTIVE BOX
HPI:  Patient is a 70 female with no significant medical history who initially presented with progressive weight loss and diarrhea for approximately 6 months. Per patient, since May she has been experiencing decreased appetite associated with a 30lb weight loss, progressive weakness, and non bloody diarrhea. In the ED, patient underwent CT abd/pelvis that showed findings suspicious for obstructive neoplasm in the pancreatic neck/body; Two indeterminate liver lesions, likely benign and questionable indeterminate 1.6 cm right upper pole lesion. She underwent CT chest for staging that was remarkable for   1.3 cm right lower lobe nodular opacity concerning for infection vs primary lung malignancy Patient is now s/p EUS x 2 for biopsy consistent with pancreatic adenocarcinoma.     Of note, patient is a current smoker, smokes 1/2ppd for 30+ years. She has family hx of colorectal cancer in her sister.       Allergies    No Known Allergies    Intolerances      MEDICATIONS  (STANDING):  dextrose 40% Gel 15 Gram(s) Oral once  dextrose 5%. 1000 milliLiter(s) (50 mL/Hr) IV Continuous <Continuous>  dextrose 5%. 1000 milliLiter(s) (100 mL/Hr) IV Continuous <Continuous>  dextrose 50% Injectable 25 Gram(s) IV Push once  dextrose 50% Injectable 25 Gram(s) IV Push once  dextrose 50% Injectable 12.5 Gram(s) IV Push once  glucagon  Injectable 1 milliGRAM(s) IntraMuscular once  insulin glargine Injectable (LANTUS) 9 Unit(s) SubCutaneous <User Schedule>  insulin lispro (ADMELOG) corrective regimen sliding scale   SubCutaneous three times a day before meals  insulin lispro (ADMELOG) corrective regimen sliding scale   SubCutaneous at bedtime  insulin lispro Injectable (ADMELOG) 6 Unit(s) SubCutaneous three times a day before meals  polyethylene glycol 3350 17 Gram(s) Oral two times a day  senna 2 Tablet(s) Oral at bedtime    MEDICATIONS  (PRN):  fentaNYL    Injectable 25 MICROGram(s) IV Push every 5 minutes PRN Moderate Pain (4 - 6)  ondansetron Injectable 4 milliGRAM(s) IV Push once PRN Nausea and/or Vomiting      PAST MEDICAL & SURGICAL HISTORY:  S/P tendon repair  R foot, R elbow        FAMILY HISTORY:  Family history of liver cancer  Sister        SOCIAL HISTORY: No EtOH, no tobacco    REVIEW OF SYSTEMS: per HPI      Weight (kg): 44.8 (12-02 @ 05:53)    T(F): 99.2 (12-02-20 @ 05:53), Max: 99.2 (12-02-20 @ 05:53)  HR: 84 (12-02-20 @ 05:53)  BP: 109/58 (12-02-20 @ 05:53)  RR: 18 (12-02-20 @ 05:53)  SpO2: 98% (12-02-20 @ 05:53)  Wt(kg): --    GENERAL: NAD  HEAD:  Atraumatic, Normocephalic  EYES: EOMI, conjunctiva and sclera clear  NECK: Supple  CHEST/LUNG: Clear to auscultation bilaterally  HEART: Regular rate and rhythm  ABDOMEN: Soft, Nontender, Nondistended  EXTREMITIES:  No clubbing, cyanosis, or edema  NEUROLOGY: non-focal  SKIN: No rashes or lesions                          8.6    4.94  )-----------( 173      ( 02 Dec 2020 05:45 )             26.9       12-02    137  |  104  |  12  ----------------------------<  244<H>  4.2   |  24  |  0.42<L>    Ca    8.4      02 Dec 2020 05:45  Phos  2.7     12-02  Mg     1.8     12-02    TPro  5.0<L>  /  Alb  2.3<L>  /  TBili  < 0.2<L>  /  DBili  x   /  AST  32  /  ALT  40<H>  /  AlkPhos  151<H>  12-02      Phosphorus Level, Serum: 2.7 mg/dL (12-02 @ 05:45)  Magnesium, Serum: 1.8 mg/dL (12-02 @ 05:45)       HPI:  Patient is a 70 female with no significant medical history who initially presented with progressive weight loss and diarrhea for approximately 6 months. Per patient, since May she has been experiencing decreased appetite associated with a 30lb weight loss, progressive weakness, and non bloody diarrhea. In the ED, patient underwent CT abd/pelvis that showed findings suspicious for obstructive neoplasm in the pancreatic neck/body; Two indeterminate liver lesions, likely benign and questionable indeterminate 1.6 cm right upper pole lesion. She underwent CT chest for staging that was remarkable for   1.3 cm right lower lobe nodular opacity concerning for infection vs primary lung malignancy Patient is now s/p EUS x 2 for biopsy consistent with pancreatic adenocarcinoma.     Of note, patient is a current smoker, smokes 1/2ppd for 30+ years. She has family hx of colorectal cancer in her sister.       Allergies    No Known Allergies    Intolerances      MEDICATIONS  (STANDING):  dextrose 40% Gel 15 Gram(s) Oral once  dextrose 5%. 1000 milliLiter(s) (50 mL/Hr) IV Continuous <Continuous>  dextrose 5%. 1000 milliLiter(s) (100 mL/Hr) IV Continuous <Continuous>  dextrose 50% Injectable 25 Gram(s) IV Push once  dextrose 50% Injectable 25 Gram(s) IV Push once  dextrose 50% Injectable 12.5 Gram(s) IV Push once  glucagon  Injectable 1 milliGRAM(s) IntraMuscular once  insulin glargine Injectable (LANTUS) 9 Unit(s) SubCutaneous <User Schedule>  insulin lispro (ADMELOG) corrective regimen sliding scale   SubCutaneous three times a day before meals  insulin lispro (ADMELOG) corrective regimen sliding scale   SubCutaneous at bedtime  insulin lispro Injectable (ADMELOG) 6 Unit(s) SubCutaneous three times a day before meals  polyethylene glycol 3350 17 Gram(s) Oral two times a day  senna 2 Tablet(s) Oral at bedtime    MEDICATIONS  (PRN):  fentaNYL    Injectable 25 MICROGram(s) IV Push every 5 minutes PRN Moderate Pain (4 - 6)  ondansetron Injectable 4 milliGRAM(s) IV Push once PRN Nausea and/or Vomiting      PAST MEDICAL & SURGICAL HISTORY:  S/P tendon repair  R foot, R elbow        FAMILY HISTORY:  Family history of liver cancer  Sister        SOCIAL HISTORY: No EtOH, no tobacco    REVIEW OF SYSTEMS: per HPI      Weight (kg): 44.8 (12-02 @ 05:53)    T(F): 99.2 (12-02-20 @ 05:53), Max: 99.2 (12-02-20 @ 05:53)  HR: 84 (12-02-20 @ 05:53)  BP: 109/58 (12-02-20 @ 05:53)  RR: 18 (12-02-20 @ 05:53)  SpO2: 98% (12-02-20 @ 05:53)  Wt(kg): --    GENERAL: cachectic  HEAD:  Atraumatic, Normocephalic  EYES: EOMI, conjunctiva and sclera clear  NECK: Supple  CHEST/LUNG: appropriate respiratory effort  HEART: Regular rate and rhythm  ABDOMEN: Soft  EXTREMITIES:  No edema  NEUROLOGY: non-focal  SKIN: No rashes or lesions                          8.6    4.94  )-----------( 173      ( 02 Dec 2020 05:45 )             26.9       12-02    137  |  104  |  12  ----------------------------<  244<H>  4.2   |  24  |  0.42<L>    Ca    8.4      02 Dec 2020 05:45  Phos  2.7     12-02  Mg     1.8     12-02    TPro  5.0<L>  /  Alb  2.3<L>  /  TBili  < 0.2<L>  /  DBili  x   /  AST  32  /  ALT  40<H>  /  AlkPhos  151<H>  12-02      Phosphorus Level, Serum: 2.7 mg/dL (12-02 @ 05:45)  Magnesium, Serum: 1.8 mg/dL (12-02 @ 05:45)

## 2020-12-02 NOTE — PROGRESS NOTE ADULT - ASSESSMENT
Impression:  1) Pancreatic lesion on CT- S/P EGD revealing gastritis, 4.5cm ill defined distal body/tail of pancreas mass, possibly involving the omentum and left kidney, biopsied, also adjacent 15mm pancreatic body cystic lesion w mural nodule, likely side branch IPMN. Path came back as  invasive moderately-differentiated adenocarcinoma, likely metastatic    Recommendations:  -Recommend oncology consult     Liliana Jay, PGY6  Gastroenterology Fellow  Pager # [phone] / [phone]  Can be contacted via [employer] Teams

## 2020-12-02 NOTE — PROGRESS NOTE ADULT - SUBJECTIVE AND OBJECTIVE BOX
Chief Complaint: Secondary DM    History: Patient seen at bedside. Plan for discharge today per primary team  Patient reports she ate breakfast today although she doesn't like hospital food  Patient reports feeling overwhelmed - hesitant to discuss diabetes plan. Reports she feels comfortable with self-injecting insulin at home  Outpatient endocrine followup scheduled    MEDICATIONS  (STANDING):  dextrose 40% Gel 15 Gram(s) Oral once  dextrose 5%. 1000 milliLiter(s) (100 mL/Hr) IV Continuous <Continuous>  dextrose 5%. 1000 milliLiter(s) (50 mL/Hr) IV Continuous <Continuous>  dextrose 50% Injectable 12.5 Gram(s) IV Push once  dextrose 50% Injectable 25 Gram(s) IV Push once  dextrose 50% Injectable 25 Gram(s) IV Push once  glucagon  Injectable 1 milliGRAM(s) IntraMuscular once  insulin glargine Injectable (LANTUS) 9 Unit(s) SubCutaneous <User Schedule>  insulin lispro (ADMELOG) corrective regimen sliding scale   SubCutaneous three times a day before meals  insulin lispro (ADMELOG) corrective regimen sliding scale   SubCutaneous at bedtime  insulin lispro Injectable (ADMELOG) 8 Unit(s) SubCutaneous three times a day before meals  polyethylene glycol 3350 17 Gram(s) Oral two times a day  senna 2 Tablet(s) Oral at bedtime    MEDICATIONS  (PRN):  fentaNYL    Injectable 25 MICROGram(s) IV Push every 5 minutes PRN Moderate Pain (4 - 6)  ondansetron Injectable 4 milliGRAM(s) IV Push once PRN Nausea and/or Vomiting    No Known Allergies      Review of Systems:  HEENT: No pain  Cardiovascular: No chest pain, palpitations  Respiratory: No SOB, no cough  GI: No nausea, vomiting, abdominal pain      PHYSICAL EXAM:  VITALS: T(C): 37.1 (12-02-20 @ 12:34)  T(F): 98.7 (12-02-20 @ 12:34), Max: 99.2 (12-02-20 @ 05:53)  HR: 86 (12-02-20 @ 12:34) (70 - 86)  BP: 113/60 (12-02-20 @ 12:34) (103/55 - 113/60)  RR:  (18 - 18)  SpO2:  (97% - 100%)  Wt(kg): --  GENERAL: NAD  EYES: No proptosis, no lid lag, anicteric  HEENT:  Atraumatic, Normocephalic, moist mucous membranes  RESPIRATORY: unlabored respirations   PSYCH: Alert and oriented x 3, normal affect, normal mood    CAPILLARY BLOOD GLUCOSE      POCT Blood Glucose.: 283 mg/dL (02 Dec 2020 12:15)  POCT Blood Glucose.: 269 mg/dL (02 Dec 2020 08:30)  POCT Blood Glucose.: 300 mg/dL (01 Dec 2020 21:52)  POCT Blood Glucose.: 191 mg/dL (01 Dec 2020 17:13)      12-02    137  |  104  |  12  ----------------------------<  244<H>  4.2   |  24  |  0.42<L>    EGFR if : 121  EGFR if non : 105    Ca    8.4      12-02  Mg     1.8     12-02  Phos  2.7     12-02    TPro  5.0<L>  /  Alb  2.3<L>  /  TBili  < 0.2<L>  /  DBili  x   /  AST  32  /  ALT  40<H>  /  AlkPhos  151<H>  12-02      Thyroid Function Tests:  11-22 @ 12:00 TSH 2.48 FreeT4 -- T3 -- Anti TPO -- Anti Thyroglobulin Ab -- TSI --      A1C with Estimated Average Glucose: > 18.0 % (11-23-20 @ 05:45)  A1C with Estimated Average Glucose: > 18.0 % (11-22-20 @ 11:46)

## 2020-12-03 LAB
IGG1 SER-MCNC: 451 MG/DL — SIGNIFICANT CHANGE UP (ref 248–810)
IGG2 SER-MCNC: 168 MG/DL — SIGNIFICANT CHANGE UP (ref 130–555)
IGG3 SER-MCNC: 54 MG/DL — SIGNIFICANT CHANGE UP (ref 15–102)
IGG4 SER-MCNC: 33 MG/DL — SIGNIFICANT CHANGE UP (ref 2–96)

## 2020-12-15 ENCOUNTER — RESULT REVIEW (OUTPATIENT)
Age: 70
End: 2020-12-15

## 2020-12-15 ENCOUNTER — APPOINTMENT (OUTPATIENT)
Dept: MULTI SPECIALTY CLINIC | Facility: CLINIC | Age: 70
End: 2020-12-15
Payer: MEDICARE

## 2020-12-15 ENCOUNTER — OUTPATIENT (OUTPATIENT)
Dept: OUTPATIENT SERVICES | Facility: HOSPITAL | Age: 70
LOS: 1 days | End: 2020-12-15
Payer: MEDICARE

## 2020-12-15 ENCOUNTER — OUTPATIENT (OUTPATIENT)
Dept: OUTPATIENT SERVICES | Facility: HOSPITAL | Age: 70
LOS: 1 days | Discharge: HOME | End: 2020-12-15

## 2020-12-15 ENCOUNTER — NON-APPOINTMENT (OUTPATIENT)
Age: 70
End: 2020-12-15

## 2020-12-15 ENCOUNTER — APPOINTMENT (OUTPATIENT)
Dept: ULTRASOUND IMAGING | Facility: IMAGING CENTER | Age: 70
End: 2020-12-15
Payer: MEDICARE

## 2020-12-15 VITALS
BODY MASS INDEX: 18.13 KG/M2 | HEIGHT: 63.19 IN | TEMPERATURE: 98.2 F | RESPIRATION RATE: 16 BRPM | SYSTOLIC BLOOD PRESSURE: 141 MMHG | WEIGHT: 103.62 LBS | OXYGEN SATURATION: 99 % | HEART RATE: 66 BPM | DIASTOLIC BLOOD PRESSURE: 64 MMHG

## 2020-12-15 DIAGNOSIS — Z98.890 OTHER SPECIFIED POSTPROCEDURAL STATES: Chronic | ICD-10-CM

## 2020-12-15 DIAGNOSIS — E11.9 TYPE 2 DIABETES MELLITUS W/OUT COMPLICATIONS: ICD-10-CM

## 2020-12-15 DIAGNOSIS — Z60.2 PROBLEMS RELATED TO LIVING ALONE: ICD-10-CM

## 2020-12-15 DIAGNOSIS — F17.200 NICOTINE DEPENDENCE, UNSPECIFIED, UNCOMPLICATED: ICD-10-CM

## 2020-12-15 DIAGNOSIS — Z87.39 PERSONAL HISTORY OF OTHER DISEASES OF THE MUSCULOSKELETAL SYSTEM AND CONNECTIVE TISSUE: ICD-10-CM

## 2020-12-15 DIAGNOSIS — C25.9 MALIGNANT NEOPLASM OF PANCREAS, UNSPECIFIED: ICD-10-CM

## 2020-12-15 DIAGNOSIS — Z80.0 FAMILY HISTORY OF MALIGNANT NEOPLASM OF DIGESTIVE ORGANS: ICD-10-CM

## 2020-12-15 LAB
BASOPHILS # BLD AUTO: 0.02 K/UL — SIGNIFICANT CHANGE UP (ref 0–0.2)
BASOPHILS NFR BLD AUTO: 0.3 % — SIGNIFICANT CHANGE UP (ref 0–2)
EOSINOPHIL # BLD AUTO: 0.03 K/UL — SIGNIFICANT CHANGE UP (ref 0–0.5)
EOSINOPHIL NFR BLD AUTO: 0.4 % — SIGNIFICANT CHANGE UP (ref 0–6)
HCT VFR BLD CALC: 35.9 % — SIGNIFICANT CHANGE UP (ref 34.5–45)
HGB BLD-MCNC: 11.3 G/DL — LOW (ref 11.5–15.5)
IMM GRANULOCYTES NFR BLD AUTO: 0.4 % — SIGNIFICANT CHANGE UP (ref 0–1.5)
LYMPHOCYTES # BLD AUTO: 1.58 K/UL — SIGNIFICANT CHANGE UP (ref 1–3.3)
LYMPHOCYTES # BLD AUTO: 23.3 % — SIGNIFICANT CHANGE UP (ref 13–44)
MCHC RBC-ENTMCNC: 31.3 PG — SIGNIFICANT CHANGE UP (ref 27–34)
MCHC RBC-ENTMCNC: 31.5 G/DL — LOW (ref 32–36)
MCV RBC AUTO: 99.4 FL — SIGNIFICANT CHANGE UP (ref 80–100)
MONOCYTES # BLD AUTO: 0.49 K/UL — SIGNIFICANT CHANGE UP (ref 0–0.9)
MONOCYTES NFR BLD AUTO: 7.2 % — SIGNIFICANT CHANGE UP (ref 2–14)
NEUTROPHILS # BLD AUTO: 4.64 K/UL — SIGNIFICANT CHANGE UP (ref 1.8–7.4)
NEUTROPHILS NFR BLD AUTO: 68.4 % — SIGNIFICANT CHANGE UP (ref 43–77)
NRBC # BLD: 0 /100 WBCS — SIGNIFICANT CHANGE UP (ref 0–0)
PLATELET # BLD AUTO: 208 K/UL — SIGNIFICANT CHANGE UP (ref 150–400)
RBC # BLD: 3.61 M/UL — LOW (ref 3.8–5.2)
RBC # FLD: 18.8 % — HIGH (ref 10.3–14.5)
WBC # BLD: 6.79 K/UL — SIGNIFICANT CHANGE UP (ref 3.8–10.5)
WBC # FLD AUTO: 6.79 K/UL — SIGNIFICANT CHANGE UP (ref 3.8–10.5)

## 2020-12-15 PROCEDURE — 99072 ADDL SUPL MATRL&STAF TM PHE: CPT

## 2020-12-15 PROCEDURE — 93970 EXTREMITY STUDY: CPT | Mod: 26

## 2020-12-15 PROCEDURE — 93970 EXTREMITY STUDY: CPT

## 2020-12-15 PROCEDURE — 99215 OFFICE O/P EST HI 40 MIN: CPT

## 2020-12-15 RX ORDER — LANCETS 33 GAUGE
EACH MISCELLANEOUS
Qty: 100 | Refills: 0 | Status: ACTIVE | COMMUNITY
Start: 2020-12-01

## 2020-12-15 RX ORDER — BLOOD-GLUCOSE METER
W/DEVICE EACH MISCELLANEOUS
Qty: 1 | Refills: 0 | Status: ACTIVE | COMMUNITY
Start: 2020-12-01

## 2020-12-15 RX ORDER — ISOPROPYL ALCOHOL 70 ML/100ML
70 SWAB TOPICAL
Qty: 100 | Refills: 0 | Status: ACTIVE | COMMUNITY
Start: 2020-12-01

## 2020-12-15 SDOH — SOCIAL STABILITY - SOCIAL INSECURITY: PROBLEMS RELATED TO LIVING ALONE: Z60.2

## 2020-12-16 ENCOUNTER — NON-APPOINTMENT (OUTPATIENT)
Age: 70
End: 2020-12-16

## 2020-12-17 ENCOUNTER — NON-APPOINTMENT (OUTPATIENT)
Age: 70
End: 2020-12-17

## 2020-12-17 LAB
ALBUMIN SERPL ELPH-MCNC: 3.5 G/DL
ALP BLD-CCNC: 165 U/L
ALT SERPL-CCNC: 17 U/L
ANION GAP SERPL CALC-SCNC: 12 MMOL/L
AST SERPL-CCNC: 18 U/L
BILIRUB SERPL-MCNC: 0.2 MG/DL
BUN SERPL-MCNC: 11 MG/DL
CALCIUM SERPL-MCNC: 9.4 MG/DL
CANCER AG19-9 SERPL-ACNC: 3 U/ML
CEA SERPL-MCNC: 163 NG/ML
CHLORIDE SERPL-SCNC: 102 MMOL/L
CO2 SERPL-SCNC: 27 MMOL/L
CREAT SERPL-MCNC: 0.49 MG/DL
GLUCOSE SERPL-MCNC: 166 MG/DL
HBV CORE IGG+IGM SER QL: NONREACTIVE
HBV CORE IGM SER QL: NONREACTIVE
HBV SURFACE AB SER QL: NONREACTIVE
HBV SURFACE AG SER QL: NONREACTIVE
HCV AB SER QL: NONREACTIVE
HCV S/CO RATIO: 0.07 S/CO
POTASSIUM SERPL-SCNC: 3.6 MMOL/L
PROT SERPL-MCNC: 6.3 G/DL
SODIUM SERPL-SCNC: 141 MMOL/L

## 2020-12-18 ENCOUNTER — APPOINTMENT (OUTPATIENT)
Dept: ENDOCRINOLOGY | Facility: CLINIC | Age: 70
End: 2020-12-18
Payer: MEDICARE

## 2020-12-18 VITALS — BODY MASS INDEX: 18.43 KG/M2 | WEIGHT: 104 LBS | HEIGHT: 63 IN

## 2020-12-18 PROCEDURE — G0108 DIAB MANAGE TRN  PER INDIV: CPT

## 2020-12-18 PROCEDURE — 99072 ADDL SUPL MATRL&STAF TM PHE: CPT

## 2020-12-23 NOTE — HISTORY OF PRESENT ILLNESS
[Was the patient reviewed at [facility]?] : The patient reviewed at [facility] [1] : 1 [Pancreatic ductal adenocarcinoma] : Pancreatic ductal adenocarcinoma [Metastatic] : metastatic [body] : body [de-identified] : Non-billable Pancreas Multidisciplinary Clinic Note*\par \par Ms. MARIA NICOLAS is a 70 year old female who presents for evaluation in our Pancreas Multidisciplinary Clinic for new diagnosis of pancreas adenocarcinoma. Patient initially presented to [facility] on 11/22/20 with chief complaint of unintentional weight loss of ~30 lbs since May, diarrhea, and generalized weakness that has be progressive over 2 months. She was newly diagnosed with type 2 diabetes and was started on insulin. She was found to have a pancreas neck/body mass and 2 liver lesions on imaging. EUS performed on 11/30/20 for further evaluation, findings noted a 4.5 cm distal body/tail mass and adjacent 15mm cystic lesion with a mural nodule, s/p FNB. Pathology of pancreas + for adenocarcinoma. She is also s/p CT guided liver biopsy 12/1/20: pathology + metastatic adenocarcinoma. \par \par Patient reports she is no longer having diarrhea. She is not on pancreas enzyme replacement.  \par She reports being able to walk up stairs very slowly. She has b/l LE edema that has increased post hospital d/c. Patient saw her PCP last week and is going for LE dopplers. She denies shortness of breath with activity.  Lives alone. States she no longer drives.    \par \par Family history:  Sister (liver cancer)\par \par Recent Labs:\par 11/2020  Ca 19-9:  < 2       CEA  201\par 12/2/20     Tbili  0.2  |AST  32 |  ALT 40   |\par \par PCP: Dr Li ([facility]) [TextBox_5] : 12/15/20 [TextBox_38] : < 2 [TextBox_40] : 11/22/20

## 2020-12-24 ENCOUNTER — NON-APPOINTMENT (OUTPATIENT)
Age: 70
End: 2020-12-24

## 2020-12-24 RX ORDER — ELECTROLYTES/DEXTROSE
32G X 4 MM SOLUTION, ORAL ORAL
Qty: 2 | Refills: 1 | Status: ACTIVE | COMMUNITY
Start: 2020-12-24 | End: 1900-01-01

## 2021-01-01 ENCOUNTER — INPATIENT (INPATIENT)
Facility: HOSPITAL | Age: 71
LOS: 4 days | DRG: 951 | End: 2021-09-28
Attending: FAMILY MEDICINE | Admitting: FAMILY MEDICINE
Payer: OTHER MISCELLANEOUS

## 2021-01-01 ENCOUNTER — INPATIENT (INPATIENT)
Facility: HOSPITAL | Age: 71
LOS: 0 days | Discharge: HOME | DRG: 951 | End: 2021-09-23
Attending: HOSPITALIST | Admitting: HOSPITALIST
Payer: MEDICARE

## 2021-01-01 VITALS
RESPIRATION RATE: 16 BRPM | HEART RATE: 73 BPM | TEMPERATURE: 97 F | DIASTOLIC BLOOD PRESSURE: 52 MMHG | SYSTOLIC BLOOD PRESSURE: 74 MMHG | OXYGEN SATURATION: 100 %

## 2021-01-01 VITALS
DIASTOLIC BLOOD PRESSURE: 43 MMHG | TEMPERATURE: 98 F | HEART RATE: 99 BPM | SYSTOLIC BLOOD PRESSURE: 77 MMHG | OXYGEN SATURATION: 79 % | RESPIRATION RATE: 13 BRPM

## 2021-01-01 VITALS — HEART RATE: 80 BPM | DIASTOLIC BLOOD PRESSURE: 52 MMHG | SYSTOLIC BLOOD PRESSURE: 68 MMHG | RESPIRATION RATE: 8 BRPM

## 2021-01-01 VITALS
HEIGHT: 63 IN | DIASTOLIC BLOOD PRESSURE: 41 MMHG | WEIGHT: 89.95 LBS | RESPIRATION RATE: 20 BRPM | HEART RATE: 99 BPM | OXYGEN SATURATION: 100 % | SYSTOLIC BLOOD PRESSURE: 56 MMHG

## 2021-01-01 DIAGNOSIS — E11.9 TYPE 2 DIABETES MELLITUS WITHOUT COMPLICATIONS: ICD-10-CM

## 2021-01-01 DIAGNOSIS — R62.7 ADULT FAILURE TO THRIVE: ICD-10-CM

## 2021-01-01 DIAGNOSIS — Z51.5 ENCOUNTER FOR PALLIATIVE CARE: ICD-10-CM

## 2021-01-01 DIAGNOSIS — E43 UNSPECIFIED SEVERE PROTEIN-CALORIE MALNUTRITION: ICD-10-CM

## 2021-01-01 DIAGNOSIS — R18.8 OTHER ASCITES: ICD-10-CM

## 2021-01-01 DIAGNOSIS — R53.2 FUNCTIONAL QUADRIPLEGIA: ICD-10-CM

## 2021-01-01 DIAGNOSIS — K11.7 DISTURBANCES OF SALIVARY SECRETION: ICD-10-CM

## 2021-01-01 DIAGNOSIS — E87.5 HYPERKALEMIA: ICD-10-CM

## 2021-01-01 DIAGNOSIS — C25.9 MALIGNANT NEOPLASM OF PANCREAS, UNSPECIFIED: ICD-10-CM

## 2021-01-01 DIAGNOSIS — Z29.9 ENCOUNTER FOR PROPHYLACTIC MEASURES, UNSPECIFIED: ICD-10-CM

## 2021-01-01 DIAGNOSIS — R53.81 OTHER MALAISE: ICD-10-CM

## 2021-01-01 DIAGNOSIS — A41.9 SEPSIS, UNSPECIFIED ORGANISM: ICD-10-CM

## 2021-01-01 DIAGNOSIS — J96.01 ACUTE RESPIRATORY FAILURE WITH HYPOXIA: ICD-10-CM

## 2021-01-01 DIAGNOSIS — R09.89 OTHER SPECIFIED SYMPTOMS AND SIGNS INVOLVING THE CIRCULATORY AND RESPIRATORY SYSTEMS: ICD-10-CM

## 2021-01-01 LAB
-  AMPICILLIN/SULBACTAM: SIGNIFICANT CHANGE UP
-  CEFAZOLIN: SIGNIFICANT CHANGE UP
-  CLINDAMYCIN: SIGNIFICANT CHANGE UP
-  ERYTHROMYCIN: SIGNIFICANT CHANGE UP
-  GENTAMICIN: SIGNIFICANT CHANGE UP
-  OXACILLIN: SIGNIFICANT CHANGE UP
-  PENICILLIN: SIGNIFICANT CHANGE UP
-  RIFAMPIN: SIGNIFICANT CHANGE UP
-  STAPHYLOCOCCUS EPIDERMIDIS, METHICILLIN RESISTANT: SIGNIFICANT CHANGE UP
-  STAPHYLOCOCCUS EPIDERMIDIS, METHICILLIN RESISTANT: SIGNIFICANT CHANGE UP
-  TETRACYCLINE: SIGNIFICANT CHANGE UP
-  TRIMETHOPRIM/SULFAMETHOXAZOLE: SIGNIFICANT CHANGE UP
-  VANCOMYCIN: SIGNIFICANT CHANGE UP
A1C WITH ESTIMATED AVERAGE GLUCOSE RESULT: 6.6 % — HIGH (ref 4–5.6)
ALBUMIN SERPL ELPH-MCNC: 1.7 G/DL — LOW (ref 3.5–5)
ALBUMIN SERPL ELPH-MCNC: 1.8 G/DL — LOW (ref 3.5–5)
ALP SERPL-CCNC: 77 U/L — SIGNIFICANT CHANGE UP (ref 40–120)
ALP SERPL-CCNC: 80 U/L — SIGNIFICANT CHANGE UP (ref 40–120)
ALT FLD-CCNC: 12 U/L DA — SIGNIFICANT CHANGE UP (ref 10–60)
ALT FLD-CCNC: 13 U/L DA — SIGNIFICANT CHANGE UP (ref 10–60)
ANION GAP SERPL CALC-SCNC: 13 MMOL/L — SIGNIFICANT CHANGE UP (ref 5–17)
ANION GAP SERPL CALC-SCNC: 14 MMOL/L — SIGNIFICANT CHANGE UP (ref 5–17)
APPEARANCE UR: CLEAR — SIGNIFICANT CHANGE UP
APTT BLD: 25.2 SEC — LOW (ref 27.5–35.5)
AST SERPL-CCNC: 17 U/L — SIGNIFICANT CHANGE UP (ref 10–40)
AST SERPL-CCNC: 18 U/L — SIGNIFICANT CHANGE UP (ref 10–40)
B CEREUS GROUP DNA BLD POS QL NAA+PROBE: SIGNIFICANT CHANGE UP
BASOPHILS # BLD AUTO: 0.02 K/UL — SIGNIFICANT CHANGE UP (ref 0–0.2)
BASOPHILS # BLD AUTO: 0.03 K/UL — SIGNIFICANT CHANGE UP (ref 0–0.2)
BASOPHILS NFR BLD AUTO: 0.3 % — SIGNIFICANT CHANGE UP (ref 0–2)
BASOPHILS NFR BLD AUTO: 0.5 % — SIGNIFICANT CHANGE UP (ref 0–2)
BILIRUB SERPL-MCNC: 0.3 MG/DL — SIGNIFICANT CHANGE UP (ref 0.2–1.2)
BILIRUB SERPL-MCNC: 0.3 MG/DL — SIGNIFICANT CHANGE UP (ref 0.2–1.2)
BILIRUB UR-MCNC: NEGATIVE — SIGNIFICANT CHANGE UP
BUN SERPL-MCNC: 116 MG/DL — HIGH (ref 7–18)
BUN SERPL-MCNC: 125 MG/DL — HIGH (ref 7–18)
CALCIUM SERPL-MCNC: 8.5 MG/DL — SIGNIFICANT CHANGE UP (ref 8.4–10.5)
CALCIUM SERPL-MCNC: 8.6 MG/DL — SIGNIFICANT CHANGE UP (ref 8.4–10.5)
CHLORIDE SERPL-SCNC: 109 MMOL/L — HIGH (ref 96–108)
CHLORIDE SERPL-SCNC: 112 MMOL/L — HIGH (ref 96–108)
CO2 SERPL-SCNC: 17 MMOL/L — LOW (ref 22–31)
CO2 SERPL-SCNC: 20 MMOL/L — LOW (ref 22–31)
COLOR SPEC: YELLOW — SIGNIFICANT CHANGE UP
COVID-19 SPIKE DOMAIN AB INTERP: NEGATIVE — SIGNIFICANT CHANGE UP
COVID-19 SPIKE DOMAIN ANTIBODY RESULT: 0.4 U/ML — SIGNIFICANT CHANGE UP
CREAT SERPL-MCNC: 1.47 MG/DL — HIGH (ref 0.5–1.3)
CREAT SERPL-MCNC: 1.56 MG/DL — HIGH (ref 0.5–1.3)
CULTURE RESULTS: SIGNIFICANT CHANGE UP
DIFF PNL FLD: ABNORMAL
EOSINOPHIL # BLD AUTO: 0.01 K/UL — SIGNIFICANT CHANGE UP (ref 0–0.5)
EOSINOPHIL # BLD AUTO: 0.03 K/UL — SIGNIFICANT CHANGE UP (ref 0–0.5)
EOSINOPHIL NFR BLD AUTO: 0.2 % — SIGNIFICANT CHANGE UP (ref 0–6)
EOSINOPHIL NFR BLD AUTO: 0.5 % — SIGNIFICANT CHANGE UP (ref 0–6)
ESTIMATED AVERAGE GLUCOSE: 143 MG/DL — HIGH (ref 68–114)
FLUAV AG NPH QL: SIGNIFICANT CHANGE UP
FLUBV AG NPH QL: SIGNIFICANT CHANGE UP
GLUCOSE BLDC GLUCOMTR-MCNC: 161 MG/DL — HIGH (ref 70–99)
GLUCOSE BLDC GLUCOMTR-MCNC: 211 MG/DL — HIGH (ref 70–99)
GLUCOSE BLDC GLUCOMTR-MCNC: 242 MG/DL — HIGH (ref 70–99)
GLUCOSE BLDC GLUCOMTR-MCNC: 265 MG/DL — HIGH (ref 70–99)
GLUCOSE SERPL-MCNC: 132 MG/DL — HIGH (ref 70–99)
GLUCOSE SERPL-MCNC: 138 MG/DL — HIGH (ref 70–99)
GLUCOSE UR QL: NEGATIVE — SIGNIFICANT CHANGE UP
GRAM STN FLD: SIGNIFICANT CHANGE UP
HCT VFR BLD CALC: 30.7 % — LOW (ref 34.5–45)
HCT VFR BLD CALC: 33.1 % — LOW (ref 34.5–45)
HCV AB S/CO SERPL IA: 0.35 S/CO — SIGNIFICANT CHANGE UP (ref 0–0.99)
HCV AB SERPL-IMP: SIGNIFICANT CHANGE UP
HGB BLD-MCNC: 9 G/DL — LOW (ref 11.5–15.5)
HGB BLD-MCNC: 9.7 G/DL — LOW (ref 11.5–15.5)
IMM GRANULOCYTES NFR BLD AUTO: 0.3 % — SIGNIFICANT CHANGE UP (ref 0–1.5)
IMM GRANULOCYTES NFR BLD AUTO: 0.3 % — SIGNIFICANT CHANGE UP (ref 0–1.5)
INR BLD: 0.94 RATIO — SIGNIFICANT CHANGE UP (ref 0.88–1.16)
KETONES UR-MCNC: ABNORMAL
LACTATE SERPL-SCNC: 1.7 MMOL/L — SIGNIFICANT CHANGE UP (ref 0.7–2)
LEUKOCYTE ESTERASE UR-ACNC: ABNORMAL
LYMPHOCYTES # BLD AUTO: 0.33 K/UL — LOW (ref 1–3.3)
LYMPHOCYTES # BLD AUTO: 0.54 K/UL — LOW (ref 1–3.3)
LYMPHOCYTES # BLD AUTO: 5.5 % — LOW (ref 13–44)
LYMPHOCYTES # BLD AUTO: 9.4 % — LOW (ref 13–44)
MAGNESIUM SERPL-MCNC: 3.9 MG/DL — HIGH (ref 1.6–2.6)
MCHC RBC-ENTMCNC: 24.9 PG — LOW (ref 27–34)
MCHC RBC-ENTMCNC: 25.3 PG — LOW (ref 27–34)
MCHC RBC-ENTMCNC: 29.3 GM/DL — LOW (ref 32–36)
MCHC RBC-ENTMCNC: 29.3 GM/DL — LOW (ref 32–36)
MCV RBC AUTO: 84.8 FL — SIGNIFICANT CHANGE UP (ref 80–100)
MCV RBC AUTO: 86.4 FL — SIGNIFICANT CHANGE UP (ref 80–100)
METHOD TYPE: SIGNIFICANT CHANGE UP
MONOCYTES # BLD AUTO: 0.26 K/UL — SIGNIFICANT CHANGE UP (ref 0–0.9)
MONOCYTES # BLD AUTO: 0.27 K/UL — SIGNIFICANT CHANGE UP (ref 0–0.9)
MONOCYTES NFR BLD AUTO: 4.5 % — SIGNIFICANT CHANGE UP (ref 2–14)
MONOCYTES NFR BLD AUTO: 4.5 % — SIGNIFICANT CHANGE UP (ref 2–14)
NEUTROPHILS # BLD AUTO: 4.86 K/UL — SIGNIFICANT CHANGE UP (ref 1.8–7.4)
NEUTROPHILS # BLD AUTO: 5.34 K/UL — SIGNIFICANT CHANGE UP (ref 1.8–7.4)
NEUTROPHILS NFR BLD AUTO: 85 % — HIGH (ref 43–77)
NEUTROPHILS NFR BLD AUTO: 89 % — HIGH (ref 43–77)
NITRITE UR-MCNC: NEGATIVE — SIGNIFICANT CHANGE UP
NRBC # BLD: 0 /100 WBCS — SIGNIFICANT CHANGE UP (ref 0–0)
NRBC # BLD: 0 /100 WBCS — SIGNIFICANT CHANGE UP (ref 0–0)
ORGANISM # SPEC MICROSCOPIC CNT: SIGNIFICANT CHANGE UP
PH UR: 5 — SIGNIFICANT CHANGE UP (ref 5–8)
PHOSPHATE SERPL-MCNC: 5.7 MG/DL — HIGH (ref 2.5–4.5)
PLATELET # BLD AUTO: 215 K/UL — SIGNIFICANT CHANGE UP (ref 150–400)
PLATELET # BLD AUTO: 238 K/UL — SIGNIFICANT CHANGE UP (ref 150–400)
POTASSIUM SERPL-MCNC: 5.5 MMOL/L — HIGH (ref 3.5–5.3)
POTASSIUM SERPL-MCNC: 5.6 MMOL/L — HIGH (ref 3.5–5.3)
POTASSIUM SERPL-SCNC: 5.5 MMOL/L — HIGH (ref 3.5–5.3)
POTASSIUM SERPL-SCNC: 5.6 MMOL/L — HIGH (ref 3.5–5.3)
PROCALCITONIN SERPL-MCNC: 0.52 NG/ML — HIGH (ref 0.02–0.1)
PROT SERPL-MCNC: 7 G/DL — SIGNIFICANT CHANGE UP (ref 6–8.3)
PROT SERPL-MCNC: 7 G/DL — SIGNIFICANT CHANGE UP (ref 6–8.3)
PROT UR-MCNC: 500 MG/DL
PROTHROM AB SERPL-ACNC: 11.2 SEC — SIGNIFICANT CHANGE UP (ref 10.6–13.6)
RAPID RVP RESULT: SIGNIFICANT CHANGE UP
RBC # BLD: 3.62 M/UL — LOW (ref 3.8–5.2)
RBC # BLD: 3.83 M/UL — SIGNIFICANT CHANGE UP (ref 3.8–5.2)
RBC # FLD: 15.9 % — HIGH (ref 10.3–14.5)
RBC # FLD: 16.1 % — HIGH (ref 10.3–14.5)
SARS-COV-2 IGG+IGM SERPL QL IA: 0.4 U/ML — SIGNIFICANT CHANGE UP
SARS-COV-2 IGG+IGM SERPL QL IA: NEGATIVE — SIGNIFICANT CHANGE UP
SARS-COV-2 RNA SPEC QL NAA+PROBE: SIGNIFICANT CHANGE UP
SODIUM SERPL-SCNC: 142 MMOL/L — SIGNIFICANT CHANGE UP (ref 135–145)
SODIUM SERPL-SCNC: 143 MMOL/L — SIGNIFICANT CHANGE UP (ref 135–145)
SP GR SPEC: 1.02 — SIGNIFICANT CHANGE UP (ref 1.01–1.02)
SPECIMEN SOURCE: SIGNIFICANT CHANGE UP
TSH SERPL-MCNC: 4.06 UU/ML — SIGNIFICANT CHANGE UP (ref 0.34–4.82)
UROBILINOGEN FLD QL: NEGATIVE — SIGNIFICANT CHANGE UP
WBC # BLD: 5.73 K/UL — SIGNIFICANT CHANGE UP (ref 3.8–10.5)
WBC # BLD: 6 K/UL — SIGNIFICANT CHANGE UP (ref 3.8–10.5)
WBC # FLD AUTO: 5.73 K/UL — SIGNIFICANT CHANGE UP (ref 3.8–10.5)
WBC # FLD AUTO: 6 K/UL — SIGNIFICANT CHANGE UP (ref 3.8–10.5)

## 2021-01-01 PROCEDURE — 99497 ADVNCD CARE PLAN 30 MIN: CPT | Mod: 25

## 2021-01-01 PROCEDURE — 99223 1ST HOSP IP/OBS HIGH 75: CPT

## 2021-01-01 PROCEDURE — 99291 CRITICAL CARE FIRST HOUR: CPT

## 2021-01-01 PROCEDURE — 12345: CPT | Mod: NC

## 2021-01-01 PROCEDURE — 87186 SC STD MICRODIL/AGAR DIL: CPT

## 2021-01-01 PROCEDURE — 83605 ASSAY OF LACTIC ACID: CPT

## 2021-01-01 PROCEDURE — 87635 SARS-COV-2 COVID-19 AMP PRB: CPT

## 2021-01-01 PROCEDURE — 84100 ASSAY OF PHOSPHORUS: CPT

## 2021-01-01 PROCEDURE — 85730 THROMBOPLASTIN TIME PARTIAL: CPT

## 2021-01-01 PROCEDURE — 87040 BLOOD CULTURE FOR BACTERIA: CPT

## 2021-01-01 PROCEDURE — 80053 COMPREHEN METABOLIC PANEL: CPT

## 2021-01-01 PROCEDURE — 81001 URINALYSIS AUTO W/SCOPE: CPT

## 2021-01-01 PROCEDURE — 93005 ELECTROCARDIOGRAM TRACING: CPT

## 2021-01-01 PROCEDURE — 82962 GLUCOSE BLOOD TEST: CPT

## 2021-01-01 PROCEDURE — 86769 SARS-COV-2 COVID-19 ANTIBODY: CPT

## 2021-01-01 PROCEDURE — 84443 ASSAY THYROID STIM HORMONE: CPT

## 2021-01-01 PROCEDURE — 93010 ELECTROCARDIOGRAM REPORT: CPT

## 2021-01-01 PROCEDURE — 36415 COLL VENOUS BLD VENIPUNCTURE: CPT

## 2021-01-01 PROCEDURE — 71045 X-RAY EXAM CHEST 1 VIEW: CPT

## 2021-01-01 PROCEDURE — 87150 DNA/RNA AMPLIFIED PROBE: CPT

## 2021-01-01 PROCEDURE — 96374 THER/PROPH/DIAG INJ IV PUSH: CPT

## 2021-01-01 PROCEDURE — 99233 SBSQ HOSP IP/OBS HIGH 50: CPT

## 2021-01-01 PROCEDURE — 0225U NFCT DS DNA&RNA 21 SARSCOV2: CPT

## 2021-01-01 PROCEDURE — 84145 PROCALCITONIN (PCT): CPT

## 2021-01-01 PROCEDURE — 83735 ASSAY OF MAGNESIUM: CPT

## 2021-01-01 PROCEDURE — 87077 CULTURE AEROBIC IDENTIFY: CPT

## 2021-01-01 PROCEDURE — 85025 COMPLETE CBC W/AUTO DIFF WBC: CPT

## 2021-01-01 PROCEDURE — 71045 X-RAY EXAM CHEST 1 VIEW: CPT | Mod: 26

## 2021-01-01 PROCEDURE — 83036 HEMOGLOBIN GLYCOSYLATED A1C: CPT

## 2021-01-01 PROCEDURE — 87086 URINE CULTURE/COLONY COUNT: CPT

## 2021-01-01 PROCEDURE — 86803 HEPATITIS C AB TEST: CPT

## 2021-01-01 PROCEDURE — 85610 PROTHROMBIN TIME: CPT

## 2021-01-01 PROCEDURE — 99239 HOSP IP/OBS DSCHRG MGMT >30: CPT | Mod: GC

## 2021-01-01 PROCEDURE — 87637 SARSCOV2&INF A&B&RSV AMP PRB: CPT

## 2021-01-01 RX ORDER — POLYETHYLENE GLYCOL 3350 17 G/17G
17 POWDER, FOR SOLUTION ORAL
Qty: 0 | Refills: 0 | DISCHARGE

## 2021-01-01 RX ORDER — THIAMINE MONONITRATE (VIT B1) 100 MG
1 TABLET ORAL
Qty: 0 | Refills: 0 | DISCHARGE

## 2021-01-01 RX ORDER — INSULIN LISPRO 100/ML
0 VIAL (ML) SUBCUTANEOUS
Qty: 0 | Refills: 0 | DISCHARGE

## 2021-01-01 RX ORDER — CEFEPIME 1 G/1
1000 INJECTION, POWDER, FOR SOLUTION INTRAMUSCULAR; INTRAVENOUS ONCE
Refills: 0 | Status: COMPLETED | OUTPATIENT
Start: 2021-01-01 | End: 2021-01-01

## 2021-01-01 RX ORDER — CEFTRIAXONE 500 MG/1
0 INJECTION, POWDER, FOR SOLUTION INTRAMUSCULAR; INTRAVENOUS
Qty: 0 | Refills: 0 | DISCHARGE

## 2021-01-01 RX ORDER — FENTANYL CITRATE 50 UG/ML
1 INJECTION INTRAVENOUS
Qty: 0 | Refills: 0 | DISCHARGE

## 2021-01-01 RX ORDER — SIMETHICONE 80 MG/1
1 TABLET, CHEWABLE ORAL
Qty: 0 | Refills: 0 | DISCHARGE

## 2021-01-01 RX ORDER — HYDROMORPHONE HYDROCHLORIDE 2 MG/ML
0.5 INJECTION INTRAMUSCULAR; INTRAVENOUS; SUBCUTANEOUS
Refills: 0 | Status: DISCONTINUED | OUTPATIENT
Start: 2021-01-01 | End: 2021-01-01

## 2021-01-01 RX ORDER — CEFTRIAXONE 500 MG/1
1000 INJECTION, POWDER, FOR SOLUTION INTRAMUSCULAR; INTRAVENOUS EVERY 24 HOURS
Refills: 0 | Status: DISCONTINUED | OUTPATIENT
Start: 2021-01-01 | End: 2021-01-01

## 2021-01-01 RX ORDER — SENNA PLUS 8.6 MG/1
2 TABLET ORAL
Qty: 0 | Refills: 0 | DISCHARGE

## 2021-01-01 RX ORDER — FLUCONAZOLE 150 MG/1
1 TABLET ORAL
Qty: 0 | Refills: 0 | DISCHARGE

## 2021-01-01 RX ORDER — ACETAMINOPHEN 500 MG
2 TABLET ORAL
Qty: 0 | Refills: 0 | DISCHARGE

## 2021-01-01 RX ORDER — MIRTAZAPINE 45 MG/1
1 TABLET, ORALLY DISINTEGRATING ORAL
Qty: 0 | Refills: 0 | DISCHARGE

## 2021-01-01 RX ORDER — DRONABINOL 2.5 MG
1 CAPSULE ORAL
Qty: 0 | Refills: 0 | DISCHARGE

## 2021-01-01 RX ORDER — HYDROMORPHONE HYDROCHLORIDE 2 MG/ML
1 INJECTION INTRAMUSCULAR; INTRAVENOUS; SUBCUTANEOUS EVERY 6 HOURS
Refills: 0 | Status: DISCONTINUED | OUTPATIENT
Start: 2021-01-01 | End: 2021-01-01

## 2021-01-01 RX ORDER — GABAPENTIN 400 MG/1
1 CAPSULE ORAL
Qty: 0 | Refills: 0 | DISCHARGE

## 2021-01-01 RX ORDER — ACETAMINOPHEN 500 MG
650 TABLET ORAL EVERY 8 HOURS
Refills: 0 | Status: DISCONTINUED | OUTPATIENT
Start: 2021-01-01 | End: 2021-01-01

## 2021-01-01 RX ORDER — ROBINUL 0.2 MG/ML
0.4 INJECTION INTRAMUSCULAR; INTRAVENOUS EVERY 6 HOURS
Refills: 0 | Status: DISCONTINUED | OUTPATIENT
Start: 2021-01-01 | End: 2021-01-01

## 2021-01-01 RX ORDER — HEPARIN SODIUM 5000 [USP'U]/ML
5000 INJECTION INTRAVENOUS; SUBCUTANEOUS EVERY 8 HOURS
Refills: 0 | Status: DISCONTINUED | OUTPATIENT
Start: 2021-01-01 | End: 2021-01-01

## 2021-01-01 RX ORDER — HYDROMORPHONE HYDROCHLORIDE 2 MG/ML
0.5 INJECTION INTRAMUSCULAR; INTRAVENOUS; SUBCUTANEOUS EVERY 4 HOURS
Refills: 0 | Status: DISCONTINUED | OUTPATIENT
Start: 2021-01-01 | End: 2021-01-01

## 2021-01-01 RX ORDER — INSULIN LISPRO 100/ML
VIAL (ML) SUBCUTANEOUS EVERY 6 HOURS
Refills: 0 | Status: DISCONTINUED | OUTPATIENT
Start: 2021-01-01 | End: 2021-01-01

## 2021-01-01 RX ORDER — ASCORBIC ACID 60 MG
5 TABLET,CHEWABLE ORAL
Qty: 0 | Refills: 0 | DISCHARGE

## 2021-01-01 RX ORDER — SODIUM CHLORIDE 9 MG/ML
1000 INJECTION, SOLUTION INTRAVENOUS
Refills: 0 | Status: DISCONTINUED | OUTPATIENT
Start: 2021-01-01 | End: 2021-01-01

## 2021-01-01 RX ORDER — AZITHROMYCIN 500 MG/1
500 TABLET, FILM COATED ORAL EVERY 24 HOURS
Refills: 0 | Status: DISCONTINUED | OUTPATIENT
Start: 2021-01-01 | End: 2021-01-01

## 2021-01-01 RX ORDER — SODIUM CHLORIDE 9 MG/ML
1250 INJECTION INTRAMUSCULAR; INTRAVENOUS; SUBCUTANEOUS ONCE
Refills: 0 | Status: COMPLETED | OUTPATIENT
Start: 2021-01-01 | End: 2021-01-01

## 2021-01-01 RX ORDER — LANOLIN ALCOHOL/MO/W.PET/CERES
1 CREAM (GRAM) TOPICAL
Qty: 0 | Refills: 0 | DISCHARGE

## 2021-01-01 RX ORDER — ACETAMINOPHEN 500 MG
650 TABLET ORAL EVERY 6 HOURS
Refills: 0 | Status: DISCONTINUED | OUTPATIENT
Start: 2021-01-01 | End: 2021-01-01

## 2021-01-01 RX ORDER — HYDROMORPHONE HYDROCHLORIDE 2 MG/ML
3 INJECTION INTRAMUSCULAR; INTRAVENOUS; SUBCUTANEOUS
Qty: 0 | Refills: 0 | DISCHARGE

## 2021-01-01 RX ORDER — LACTULOSE 10 G/15ML
30 SOLUTION ORAL
Qty: 0 | Refills: 0 | DISCHARGE

## 2021-01-01 RX ADMIN — HYDROMORPHONE HYDROCHLORIDE 0.5 MILLIGRAM(S): 2 INJECTION INTRAMUSCULAR; INTRAVENOUS; SUBCUTANEOUS at 12:20

## 2021-01-01 RX ADMIN — ROBINUL 0.4 MILLIGRAM(S): 0.2 INJECTION INTRAMUSCULAR; INTRAVENOUS at 12:02

## 2021-01-01 RX ADMIN — Medication 1 MILLIGRAM(S): at 06:31

## 2021-01-01 RX ADMIN — Medication 1: at 06:25

## 2021-01-01 RX ADMIN — HYDROMORPHONE HYDROCHLORIDE 0.5 MILLIGRAM(S): 2 INJECTION INTRAMUSCULAR; INTRAVENOUS; SUBCUTANEOUS at 20:45

## 2021-01-01 RX ADMIN — HYDROMORPHONE HYDROCHLORIDE 0.5 MILLIGRAM(S): 2 INJECTION INTRAMUSCULAR; INTRAVENOUS; SUBCUTANEOUS at 06:14

## 2021-01-01 RX ADMIN — HYDROMORPHONE HYDROCHLORIDE 0.5 MILLIGRAM(S): 2 INJECTION INTRAMUSCULAR; INTRAVENOUS; SUBCUTANEOUS at 16:53

## 2021-01-01 RX ADMIN — HYDROMORPHONE HYDROCHLORIDE 0.5 MILLIGRAM(S): 2 INJECTION INTRAMUSCULAR; INTRAVENOUS; SUBCUTANEOUS at 11:07

## 2021-01-01 RX ADMIN — HYDROMORPHONE HYDROCHLORIDE 0.5 MILLIGRAM(S): 2 INJECTION INTRAMUSCULAR; INTRAVENOUS; SUBCUTANEOUS at 12:02

## 2021-01-01 RX ADMIN — HYDROMORPHONE HYDROCHLORIDE 0.5 MILLIGRAM(S): 2 INJECTION INTRAMUSCULAR; INTRAVENOUS; SUBCUTANEOUS at 13:41

## 2021-01-01 RX ADMIN — HYDROMORPHONE HYDROCHLORIDE 0.5 MILLIGRAM(S): 2 INJECTION INTRAMUSCULAR; INTRAVENOUS; SUBCUTANEOUS at 11:39

## 2021-01-01 RX ADMIN — CEFEPIME 1000 MILLIGRAM(S): 1 INJECTION, POWDER, FOR SOLUTION INTRAMUSCULAR; INTRAVENOUS at 04:44

## 2021-01-01 RX ADMIN — AZITHROMYCIN 255 MILLIGRAM(S): 500 TABLET, FILM COATED ORAL at 05:47

## 2021-01-01 RX ADMIN — HEPARIN SODIUM 5000 UNIT(S): 5000 INJECTION INTRAVENOUS; SUBCUTANEOUS at 14:12

## 2021-01-01 RX ADMIN — CEFEPIME 100 MILLIGRAM(S): 1 INJECTION, POWDER, FOR SOLUTION INTRAMUSCULAR; INTRAVENOUS at 02:57

## 2021-01-01 RX ADMIN — HYDROMORPHONE HYDROCHLORIDE 0.5 MILLIGRAM(S): 2 INJECTION INTRAMUSCULAR; INTRAVENOUS; SUBCUTANEOUS at 05:59

## 2021-01-01 RX ADMIN — HYDROMORPHONE HYDROCHLORIDE 0.5 MILLIGRAM(S): 2 INJECTION INTRAMUSCULAR; INTRAVENOUS; SUBCUTANEOUS at 06:31

## 2021-01-01 RX ADMIN — Medication 2: at 13:30

## 2021-01-01 RX ADMIN — HEPARIN SODIUM 5000 UNIT(S): 5000 INJECTION INTRAVENOUS; SUBCUTANEOUS at 05:57

## 2021-01-01 RX ADMIN — HYDROMORPHONE HYDROCHLORIDE 0.5 MILLIGRAM(S): 2 INJECTION INTRAMUSCULAR; INTRAVENOUS; SUBCUTANEOUS at 05:51

## 2021-01-01 RX ADMIN — HYDROMORPHONE HYDROCHLORIDE 0.5 MILLIGRAM(S): 2 INJECTION INTRAMUSCULAR; INTRAVENOUS; SUBCUTANEOUS at 21:52

## 2021-01-01 RX ADMIN — SODIUM CHLORIDE 1250 MILLILITER(S): 9 INJECTION INTRAMUSCULAR; INTRAVENOUS; SUBCUTANEOUS at 02:58

## 2021-01-01 RX ADMIN — HYDROMORPHONE HYDROCHLORIDE 0.5 MILLIGRAM(S): 2 INJECTION INTRAMUSCULAR; INTRAVENOUS; SUBCUTANEOUS at 22:11

## 2021-01-01 RX ADMIN — HYDROMORPHONE HYDROCHLORIDE 0.5 MILLIGRAM(S): 2 INJECTION INTRAMUSCULAR; INTRAVENOUS; SUBCUTANEOUS at 16:45

## 2021-01-01 RX ADMIN — HYDROMORPHONE HYDROCHLORIDE 0.5 MILLIGRAM(S): 2 INJECTION INTRAMUSCULAR; INTRAVENOUS; SUBCUTANEOUS at 06:01

## 2021-01-01 RX ADMIN — HYDROMORPHONE HYDROCHLORIDE 0.5 MILLIGRAM(S): 2 INJECTION INTRAMUSCULAR; INTRAVENOUS; SUBCUTANEOUS at 17:02

## 2021-01-01 RX ADMIN — Medication 1 MILLIGRAM(S): at 19:36

## 2021-01-01 RX ADMIN — CEFTRIAXONE 100 MILLIGRAM(S): 500 INJECTION, POWDER, FOR SOLUTION INTRAMUSCULAR; INTRAVENOUS at 04:28

## 2021-01-01 RX ADMIN — HYDROMORPHONE HYDROCHLORIDE 0.5 MILLIGRAM(S): 2 INJECTION INTRAMUSCULAR; INTRAVENOUS; SUBCUTANEOUS at 01:20

## 2021-01-01 RX ADMIN — HYDROMORPHONE HYDROCHLORIDE 0.5 MILLIGRAM(S): 2 INJECTION INTRAMUSCULAR; INTRAVENOUS; SUBCUTANEOUS at 00:55

## 2021-01-01 RX ADMIN — AZITHROMYCIN 255 MILLIGRAM(S): 500 TABLET, FILM COATED ORAL at 05:57

## 2021-01-01 RX ADMIN — CEFTRIAXONE 100 MILLIGRAM(S): 500 INJECTION, POWDER, FOR SOLUTION INTRAMUSCULAR; INTRAVENOUS at 05:47

## 2021-01-01 RX ADMIN — SODIUM CHLORIDE 40 MILLILITER(S): 9 INJECTION, SOLUTION INTRAVENOUS at 06:13

## 2021-01-01 RX ADMIN — HYDROMORPHONE HYDROCHLORIDE 0.5 MILLIGRAM(S): 2 INJECTION INTRAMUSCULAR; INTRAVENOUS; SUBCUTANEOUS at 12:17

## 2021-01-01 RX ADMIN — HYDROMORPHONE HYDROCHLORIDE 0.5 MILLIGRAM(S): 2 INJECTION INTRAMUSCULAR; INTRAVENOUS; SUBCUTANEOUS at 12:00

## 2021-01-01 RX ADMIN — HYDROMORPHONE HYDROCHLORIDE 0.5 MILLIGRAM(S): 2 INJECTION INTRAMUSCULAR; INTRAVENOUS; SUBCUTANEOUS at 13:10

## 2021-01-01 RX ADMIN — HYDROMORPHONE HYDROCHLORIDE 0.5 MILLIGRAM(S): 2 INJECTION INTRAMUSCULAR; INTRAVENOUS; SUBCUTANEOUS at 20:46

## 2021-01-01 RX ADMIN — HYDROMORPHONE HYDROCHLORIDE 0.5 MILLIGRAM(S): 2 INJECTION INTRAMUSCULAR; INTRAVENOUS; SUBCUTANEOUS at 19:36

## 2021-01-01 RX ADMIN — HYDROMORPHONE HYDROCHLORIDE 0.5 MILLIGRAM(S): 2 INJECTION INTRAMUSCULAR; INTRAVENOUS; SUBCUTANEOUS at 12:14

## 2021-01-01 RX ADMIN — HYDROMORPHONE HYDROCHLORIDE 0.5 MILLIGRAM(S): 2 INJECTION INTRAMUSCULAR; INTRAVENOUS; SUBCUTANEOUS at 19:52

## 2021-01-05 ENCOUNTER — RESULT REVIEW (OUTPATIENT)
Age: 71
End: 2021-01-05

## 2021-01-05 ENCOUNTER — APPOINTMENT (OUTPATIENT)
Dept: HEMATOLOGY ONCOLOGY | Facility: CLINIC | Age: 71
End: 2021-01-05
Payer: MEDICARE

## 2021-01-05 VITALS
TEMPERATURE: 98.1 F | RESPIRATION RATE: 16 BRPM | WEIGHT: 94.8 LBS | BODY MASS INDEX: 16.8 KG/M2 | DIASTOLIC BLOOD PRESSURE: 83 MMHG | SYSTOLIC BLOOD PRESSURE: 126 MMHG | HEIGHT: 62.99 IN | HEART RATE: 82 BPM | OXYGEN SATURATION: 99 %

## 2021-01-05 LAB
BASOPHILS # BLD AUTO: 0.03 K/UL — SIGNIFICANT CHANGE UP (ref 0–0.2)
BASOPHILS NFR BLD AUTO: 0.4 % — SIGNIFICANT CHANGE UP (ref 0–2)
EOSINOPHIL # BLD AUTO: 0.01 K/UL — SIGNIFICANT CHANGE UP (ref 0–0.5)
EOSINOPHIL NFR BLD AUTO: 0.1 % — SIGNIFICANT CHANGE UP (ref 0–6)
HCT VFR BLD CALC: 42.8 % — SIGNIFICANT CHANGE UP (ref 34.5–45)
HGB BLD-MCNC: 13.5 G/DL — SIGNIFICANT CHANGE UP (ref 11.5–15.5)
IMM GRANULOCYTES NFR BLD AUTO: 0.5 % — SIGNIFICANT CHANGE UP (ref 0–1.5)
LYMPHOCYTES # BLD AUTO: 1.05 K/UL — SIGNIFICANT CHANGE UP (ref 1–3.3)
LYMPHOCYTES # BLD AUTO: 14.2 % — SIGNIFICANT CHANGE UP (ref 13–44)
MCHC RBC-ENTMCNC: 31.5 G/DL — LOW (ref 32–36)
MCHC RBC-ENTMCNC: 31.5 PG — SIGNIFICANT CHANGE UP (ref 27–34)
MCV RBC AUTO: 99.8 FL — SIGNIFICANT CHANGE UP (ref 80–100)
MONOCYTES # BLD AUTO: 0.4 K/UL — SIGNIFICANT CHANGE UP (ref 0–0.9)
MONOCYTES NFR BLD AUTO: 5.4 % — SIGNIFICANT CHANGE UP (ref 2–14)
NEUTROPHILS # BLD AUTO: 5.86 K/UL — SIGNIFICANT CHANGE UP (ref 1.8–7.4)
NEUTROPHILS NFR BLD AUTO: 79.4 % — HIGH (ref 43–77)
NRBC # BLD: 0 /100 WBCS — SIGNIFICANT CHANGE UP (ref 0–0)
PLATELET # BLD AUTO: 198 K/UL — SIGNIFICANT CHANGE UP (ref 150–400)
RBC # BLD: 4.29 M/UL — SIGNIFICANT CHANGE UP (ref 3.8–5.2)
RBC # FLD: 14.4 % — SIGNIFICANT CHANGE UP (ref 10.3–14.5)
WBC # BLD: 7.39 K/UL — SIGNIFICANT CHANGE UP (ref 3.8–10.5)
WBC # FLD AUTO: 7.39 K/UL — SIGNIFICANT CHANGE UP (ref 3.8–10.5)

## 2021-01-05 PROCEDURE — 99072 ADDL SUPL MATRL&STAF TM PHE: CPT

## 2021-01-05 PROCEDURE — 99213 OFFICE O/P EST LOW 20 MIN: CPT

## 2021-01-09 LAB
ALBUMIN SERPL ELPH-MCNC: 4.3 G/DL
ALP BLD-CCNC: 155 U/L
ALT SERPL-CCNC: 7 U/L
ANION GAP SERPL CALC-SCNC: 13 MMOL/L
AST SERPL-CCNC: 14 U/L
BILIRUB SERPL-MCNC: 0.2 MG/DL
BUN SERPL-MCNC: 14 MG/DL
CALCIUM SERPL-MCNC: 10 MG/DL
CANCER AG19-9 SERPL-ACNC: 3 U/ML
CEA SERPL-MCNC: 153 NG/ML
CHLORIDE SERPL-SCNC: 102 MMOL/L
CO2 SERPL-SCNC: 26 MMOL/L
CREAT SERPL-MCNC: 0.62 MG/DL
GLUCOSE SERPL-MCNC: 201 MG/DL
POTASSIUM SERPL-SCNC: 3.9 MMOL/L
PROT SERPL-MCNC: 7.5 G/DL
SODIUM SERPL-SCNC: 141 MMOL/L

## 2021-01-10 LAB
DPYD GENOTYPE: NORMAL
DPYD PHENOTYPE: NORMAL
DPYD SPECIMEN: NORMAL
FULL GENE SEQUENCE RESULT: NORMAL
INTERPRETATION PGDFRB: NORMAL
Lab: NEGATIVE
REF LAB TEST METHOD: NORMAL
REVIEWED BY: NORMAL
TA REPEAT RESULT: NORMAL
TEST PERFORMANCE INFO SPEC: NORMAL

## 2021-01-10 NOTE — HISTORY OF PRESENT ILLNESS
[Disease: _____________________] : Disease: [unfilled] [de-identified] : 71 yo female with PMHX of spinal stenosis c/o weight loss of 30 lbs over 6 months which began in May 2020. In August, she started having persistent diarrhea, and progressive weakness. She admits to decreased appetite and nausea. Denies any vomiting, tea color urine or pale stools. On November 22, 2020,  she went to [facility] ED for evaluation. She was found with glucose greater than 600, and patient was admitted to the hospital. During her hospital admission, CT-Scan was done which revealed a suspicious obstructive neoplasm in the pancreatic neck/body and 2 indeterminate liver lesions.\par \par On November 25, 2020, MRI of abdomen was done which showed: Hypo enhancing mass in the pancreatic tail/body measuring 6.2 X 2.7 cm associated 1.8 cm cystic component. Two rim enhancing liver lesions compatible with metastases. On December 1, 2020, IR performed core needle biopsy of the liver lesion. December 1, 2020, pathology report showed: Metastatic adenocarcinoma.   \par \par Patient is single woman with no children. She lives alone. Her family has positive history of cancer with sister at age 50 years colon cancer and then later, in early 60s liver cancer. 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 She does not drink alcohol.  [de-identified] : Invasive moderately-differentiated adenocarcinoma

## 2021-01-10 NOTE — PHYSICAL EXAM
[Ambulatory and capable of all self care but unable to carry out any work activities] : Status 2- Ambulatory and capable of all self care but unable to carry out any work activities. Up and about more than 50% of waking hours [Thin] : thin [Normal] : affect appropriate [de-identified] : +Frail frame, AOX3, NAD [de-identified] : 2+ edema, + pitting

## 2021-01-10 NOTE — ASSESSMENT
[Palliative] : Goals of care discussed with patient: Palliative [Palliative Care Plan] : not applicable at this time [FreeTextEntry1] : A discussion took place with the patient and her sister on teleconference regarding further management.  The patient has evidence of pancreatic mass and liver metastases which on biopsy are consistent with metastatic pancreatic carcinoma.  We discussed options for chemotherapy including FOLFIRINOX, oxaliplatin, 5-FU, leucovorin and irinotecan versus Gemzar and Abraxane versus Xeloda chemotherapy.  Side effects of treatment were discussed in detail.  Informed consent was obtained.  The patient currently is considering oral chemotherapy rather than IV at this time.  She is concerned about side effects and toxicity.  The patient would require 3 months of treatment and then repeat CAT scans to assess for response or progression of disease.\par \par The side effects of treatment would include nausea, vomiting, hair loss, octavio blood counts and life-threatening infection, memory changes, tearing of the eyes, mouth sores, neuropathy, cold intolerance, diarrhea, fatigue, hand-foot irritation, fever, flulike reaction, ankle edema, change in taste and other side effects.  We also discussed about the importance of nutrition including caloric intake and food supplements.  We also encouraged physical activity with walking to maintain strength during the treatment.  The patient will be considering her options for treatment either oral therapy or IV chemotherapy and she will let us know her decision.  She will also be attempting to arrange transportation as she lives in [place] and travel to our office may be difficult.  Once we have more information final recommendations will be made.

## 2021-01-10 NOTE — CONSULT LETTER
[Dear  ___] : Dear  [unfilled], [Consult Letter:] : I had the pleasure of evaluating your patient, [unfilled]. [Please see my note below.] : Please see my note below. [Consult Closing:] : Thank you very much for allowing me to participate in the care of this patient.  If you have any questions, please do not hesitate to contact me. [Sincerely,] : Sincerely, [FreeTextEntry2] : Dr. Sendy Li [FreeTextEntry3] : [facility]\par [facility] [place]\par [facility]

## 2021-01-10 NOTE — REVIEW OF SYSTEMS
[Fatigue] : fatigue [Recent Change In Weight] : ~T recent weight change [Diarrhea] : diarrhea [Muscle Weakness] : muscle weakness [Negative] : Allergic/Immunologic [Fever] : no fever [Abdominal Pain] : no abdominal pain [Vomiting] : no vomiting [Constipation] : no constipation [Joint Pain] : no joint pain [Joint Stiffness] : no joint stiffness [Muscle Pain] : no muscle pain [FreeTextEntry2] : + Weakness/decreased strength, and weight loss of 30 lbs over 6 months [FreeTextEntry7] : H/o nausea and decrease taste, persistent diarrhea [FreeTextEntry9] : + Swelling to lower extremities since November 2020

## 2021-01-10 NOTE — REASON FOR VISIT
[Initial Consultation] : an initial consultation [Family Member] : family member [Other: _____] : [unfilled] [FreeTextEntry2] : Pancreatic Cancer

## 2021-01-13 ENCOUNTER — APPOINTMENT (OUTPATIENT)
Dept: INFUSION THERAPY | Facility: HOSPITAL | Age: 71
End: 2021-01-13

## 2021-01-16 ENCOUNTER — OUTPATIENT (OUTPATIENT)
Dept: OUTPATIENT SERVICES | Facility: HOSPITAL | Age: 71
LOS: 1 days | Discharge: HOME | End: 2021-01-16

## 2021-01-16 DIAGNOSIS — C25.9 MALIGNANT NEOPLASM OF PANCREAS, UNSPECIFIED: ICD-10-CM

## 2021-01-16 DIAGNOSIS — Z98.890 OTHER SPECIFIED POSTPROCEDURAL STATES: Chronic | ICD-10-CM

## 2021-01-22 ENCOUNTER — LABORATORY RESULT (OUTPATIENT)
Age: 71
End: 2021-01-22

## 2021-01-22 ENCOUNTER — APPOINTMENT (OUTPATIENT)
Dept: INFUSION THERAPY | Facility: HOSPITAL | Age: 71
End: 2021-01-22

## 2021-01-25 ENCOUNTER — APPOINTMENT (OUTPATIENT)
Dept: INFUSION THERAPY | Facility: HOSPITAL | Age: 71
End: 2021-01-25

## 2021-01-25 ENCOUNTER — RESULT REVIEW (OUTPATIENT)
Age: 71
End: 2021-01-25

## 2021-01-25 ENCOUNTER — LABORATORY RESULT (OUTPATIENT)
Age: 71
End: 2021-01-25

## 2021-01-25 LAB
BASOPHILS # BLD AUTO: 0.02 K/UL — SIGNIFICANT CHANGE UP (ref 0–0.2)
BASOPHILS NFR BLD AUTO: 0.2 % — SIGNIFICANT CHANGE UP (ref 0–2)
EOSINOPHIL # BLD AUTO: 0.03 K/UL — SIGNIFICANT CHANGE UP (ref 0–0.5)
EOSINOPHIL NFR BLD AUTO: 0.3 % — SIGNIFICANT CHANGE UP (ref 0–6)
HCT VFR BLD CALC: 42.7 % — SIGNIFICANT CHANGE UP (ref 34.5–45)
HGB BLD-MCNC: 14.3 G/DL — SIGNIFICANT CHANGE UP (ref 11.5–15.5)
IMM GRANULOCYTES NFR BLD AUTO: 0.6 % — SIGNIFICANT CHANGE UP (ref 0–1.5)
LYMPHOCYTES # BLD AUTO: 1.41 K/UL — SIGNIFICANT CHANGE UP (ref 1–3.3)
LYMPHOCYTES # BLD AUTO: 13.7 % — SIGNIFICANT CHANGE UP (ref 13–44)
MCHC RBC-ENTMCNC: 31.2 PG — SIGNIFICANT CHANGE UP (ref 27–34)
MCHC RBC-ENTMCNC: 33.5 G/DL — SIGNIFICANT CHANGE UP (ref 32–36)
MCV RBC AUTO: 93.2 FL — SIGNIFICANT CHANGE UP (ref 80–100)
MONOCYTES # BLD AUTO: 0.87 K/UL — SIGNIFICANT CHANGE UP (ref 0–0.9)
MONOCYTES NFR BLD AUTO: 8.5 % — SIGNIFICANT CHANGE UP (ref 2–14)
NEUTROPHILS # BLD AUTO: 7.88 K/UL — HIGH (ref 1.8–7.4)
NEUTROPHILS NFR BLD AUTO: 76.7 % — SIGNIFICANT CHANGE UP (ref 43–77)
NRBC # BLD: 0 /100 WBCS — SIGNIFICANT CHANGE UP (ref 0–0)
PLATELET # BLD AUTO: 160 K/UL — SIGNIFICANT CHANGE UP (ref 150–400)
RBC # BLD: 4.58 M/UL — SIGNIFICANT CHANGE UP (ref 3.8–5.2)
RBC # FLD: 12.8 % — SIGNIFICANT CHANGE UP (ref 10.3–14.5)
WBC # BLD: 10.27 K/UL — SIGNIFICANT CHANGE UP (ref 3.8–10.5)
WBC # FLD AUTO: 10.27 K/UL — SIGNIFICANT CHANGE UP (ref 3.8–10.5)

## 2021-01-26 ENCOUNTER — NON-APPOINTMENT (OUTPATIENT)
Age: 71
End: 2021-01-26

## 2021-01-26 DIAGNOSIS — R11.2 NAUSEA WITH VOMITING, UNSPECIFIED: ICD-10-CM

## 2021-01-26 DIAGNOSIS — Z51.11 ENCOUNTER FOR ANTINEOPLASTIC CHEMOTHERAPY: ICD-10-CM

## 2021-02-08 ENCOUNTER — LABORATORY RESULT (OUTPATIENT)
Age: 71
End: 2021-02-08

## 2021-02-08 ENCOUNTER — RESULT REVIEW (OUTPATIENT)
Age: 71
End: 2021-02-08

## 2021-02-08 ENCOUNTER — APPOINTMENT (OUTPATIENT)
Dept: INFUSION THERAPY | Facility: HOSPITAL | Age: 71
End: 2021-02-08

## 2021-02-08 LAB
BASOPHILS # BLD AUTO: 0.03 K/UL — SIGNIFICANT CHANGE UP (ref 0–0.2)
BASOPHILS NFR BLD AUTO: 0.5 % — SIGNIFICANT CHANGE UP (ref 0–2)
EOSINOPHIL # BLD AUTO: 0.02 K/UL — SIGNIFICANT CHANGE UP (ref 0–0.5)
EOSINOPHIL NFR BLD AUTO: 0.3 % — SIGNIFICANT CHANGE UP (ref 0–6)
HCT VFR BLD CALC: 37.7 % — SIGNIFICANT CHANGE UP (ref 34.5–45)
HGB BLD-MCNC: 12.7 G/DL — SIGNIFICANT CHANGE UP (ref 11.5–15.5)
IMM GRANULOCYTES NFR BLD AUTO: 2.5 % — HIGH (ref 0–1.5)
LYMPHOCYTES # BLD AUTO: 1.27 K/UL — SIGNIFICANT CHANGE UP (ref 1–3.3)
LYMPHOCYTES # BLD AUTO: 19.7 % — SIGNIFICANT CHANGE UP (ref 13–44)
MCHC RBC-ENTMCNC: 31.1 PG — SIGNIFICANT CHANGE UP (ref 27–34)
MCHC RBC-ENTMCNC: 33.7 G/DL — SIGNIFICANT CHANGE UP (ref 32–36)
MCV RBC AUTO: 92.4 FL — SIGNIFICANT CHANGE UP (ref 80–100)
MONOCYTES # BLD AUTO: 0.52 K/UL — SIGNIFICANT CHANGE UP (ref 0–0.9)
MONOCYTES NFR BLD AUTO: 8 % — SIGNIFICANT CHANGE UP (ref 2–14)
NEUTROPHILS # BLD AUTO: 4.46 K/UL — SIGNIFICANT CHANGE UP (ref 1.8–7.4)
NEUTROPHILS NFR BLD AUTO: 69 % — SIGNIFICANT CHANGE UP (ref 43–77)
NRBC # BLD: 0 /100 WBCS — SIGNIFICANT CHANGE UP (ref 0–0)
PLATELET # BLD AUTO: 282 K/UL — SIGNIFICANT CHANGE UP (ref 150–400)
RBC # BLD: 4.08 M/UL — SIGNIFICANT CHANGE UP (ref 3.8–5.2)
RBC # FLD: 13.1 % — SIGNIFICANT CHANGE UP (ref 10.3–14.5)
WBC # BLD: 6.46 K/UL — SIGNIFICANT CHANGE UP (ref 3.8–10.5)
WBC # FLD AUTO: 6.46 K/UL — SIGNIFICANT CHANGE UP (ref 3.8–10.5)

## 2021-02-09 NOTE — HISTORY OF PRESENT ILLNESS
[Disease: _____________________] : Disease: [unfilled] [de-identified] : 71 yo female with PMHX of spinal stenosis c/o weight loss of 30 lbs over 6 months which began in May 2020. In August, she started having persistent diarrhea, and progressive weakness. She admits to decreased appetite and nausea. Denies any vomiting, tea color urine or pale stools. On November 22, 2020,  she went to [facility] ED for evaluation. She was found with glucose greater than 600, and patient was admitted to the hospital. During her hospital admission, CT-Scan was done which revealed a suspicious obstructive neoplasm in the pancreatic neck/body and 2 indeterminate liver lesions.\par \par On November 25, 2020, MRI of abdomen was done which showed: Hypo enhancing mass in the pancreatic tail/body measuring 6.2 X 2.7 cm associated 1.8 cm cystic component. Two rim enhancing liver lesions compatible with metastases. On December 1, 2020, IR performed core needle biopsy of the liver lesion. December 1, 2020, pathology report showed: Metastatic adenocarcinoma.   \par \par Patient is single woman with no children. She lives alone. Her family has positive history of cancer with sister at age 50 years colon cancer and then later, in early 60s liver cancer. 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 She does not drink alcohol.  [de-identified] : Invasive moderately-differentiated adenocarcinoma [de-identified] : 12/15/2020:UGT1A1- No reportable variants were detected in the UGT1A1 gene by sequencing. \par 12/15/202: -No dihydropyrimidine dehydrogenase (DPYD) gene variants were detected in this individual suggesting the presence of *1 functional alleles. This  result predicts normal DPD enzymatic inactivation of administered 5 -Florouracil and normal risk for 5 FU toxicity. [de-identified] : Since the last visit the pt has developed left hip and left leg pain at night not responding to nsaid.  The patient returns to discuss her chemotherapy.

## 2021-02-09 NOTE — REASON FOR VISIT
[Follow-Up Visit] : a follow-up [Initial Consultation] : an initial consultation [Family Member] : family member [Other: _____] : [unfilled] [FreeTextEntry2] : Pancreatic Cancer

## 2021-02-09 NOTE — ASSESSMENT
[Palliative] : Goals of care discussed with patient: Palliative [Palliative Care Plan] : not applicable at this time [FreeTextEntry1] : A discussion took place regarding further treatment.  We again discussed the combinations of FOLFIRINOX versus gemzar and Abraxane versus Xeloda.  The patient is now agreeable to begin chemotherapy with Gemzar and Abraxane and side effects were again discussed in detail and informed consent was obtained.  The patient will be monitored for side effects and toxicity.  She will receive weekly chemotherapy for 3 weeks in a row 1 week off and to 3 cycles of treatment over 3 months.  Follow-up CAT scans will be performed to assess for response or progression of her disease.  She will return in 1 month for follow-up exam or sooner as needed.  We also discussed nutrition consultation as she has been losing weight.

## 2021-02-10 ENCOUNTER — NON-APPOINTMENT (OUTPATIENT)
Age: 71
End: 2021-02-10

## 2021-02-11 ENCOUNTER — OUTPATIENT (OUTPATIENT)
Dept: OUTPATIENT SERVICES | Facility: HOSPITAL | Age: 71
LOS: 1 days | Discharge: HOME | End: 2021-02-11

## 2021-02-11 DIAGNOSIS — C25.9 MALIGNANT NEOPLASM OF PANCREAS, UNSPECIFIED: ICD-10-CM

## 2021-02-11 DIAGNOSIS — Z98.890 OTHER SPECIFIED POSTPROCEDURAL STATES: Chronic | ICD-10-CM

## 2021-02-12 PROBLEM — E11.9 TYPE 2 DIABETES MELLITUS WITHOUT COMPLICATIONS: Chronic | Status: ACTIVE | Noted: 2021-01-22

## 2021-02-16 ENCOUNTER — RESULT REVIEW (OUTPATIENT)
Age: 71
End: 2021-02-16

## 2021-02-16 ENCOUNTER — APPOINTMENT (OUTPATIENT)
Dept: INFUSION THERAPY | Facility: HOSPITAL | Age: 71
End: 2021-02-16

## 2021-02-16 ENCOUNTER — LABORATORY RESULT (OUTPATIENT)
Age: 71
End: 2021-02-16

## 2021-02-16 LAB
BASOPHILS # BLD AUTO: 0.01 K/UL — SIGNIFICANT CHANGE UP (ref 0–0.2)
BASOPHILS NFR BLD AUTO: 0.2 % — SIGNIFICANT CHANGE UP (ref 0–2)
EOSINOPHIL # BLD AUTO: 0.01 K/UL — SIGNIFICANT CHANGE UP (ref 0–0.5)
EOSINOPHIL NFR BLD AUTO: 0.2 % — SIGNIFICANT CHANGE UP (ref 0–6)
HCT VFR BLD CALC: 37 % — SIGNIFICANT CHANGE UP (ref 34.5–45)
HGB BLD-MCNC: 12.4 G/DL — SIGNIFICANT CHANGE UP (ref 11.5–15.5)
IMM GRANULOCYTES NFR BLD AUTO: 2.6 % — HIGH (ref 0–1.5)
LYMPHOCYTES # BLD AUTO: 1.29 K/UL — SIGNIFICANT CHANGE UP (ref 1–3.3)
LYMPHOCYTES # BLD AUTO: 27.9 % — SIGNIFICANT CHANGE UP (ref 13–44)
MCHC RBC-ENTMCNC: 30.8 PG — SIGNIFICANT CHANGE UP (ref 27–34)
MCHC RBC-ENTMCNC: 33.5 G/DL — SIGNIFICANT CHANGE UP (ref 32–36)
MCV RBC AUTO: 91.8 FL — SIGNIFICANT CHANGE UP (ref 80–100)
MONOCYTES # BLD AUTO: 0.36 K/UL — SIGNIFICANT CHANGE UP (ref 0–0.9)
MONOCYTES NFR BLD AUTO: 7.8 % — SIGNIFICANT CHANGE UP (ref 2–14)
NEUTROPHILS # BLD AUTO: 2.83 K/UL — SIGNIFICANT CHANGE UP (ref 1.8–7.4)
NEUTROPHILS NFR BLD AUTO: 61.3 % — SIGNIFICANT CHANGE UP (ref 43–77)
NRBC # BLD: 0 /100 WBCS — SIGNIFICANT CHANGE UP (ref 0–0)
PLATELET # BLD AUTO: 206 K/UL — SIGNIFICANT CHANGE UP (ref 150–400)
RBC # BLD: 4.03 M/UL — SIGNIFICANT CHANGE UP (ref 3.8–5.2)
RBC # FLD: 13.1 % — SIGNIFICANT CHANGE UP (ref 10.3–14.5)
WBC # BLD: 4.62 K/UL — SIGNIFICANT CHANGE UP (ref 3.8–10.5)
WBC # FLD AUTO: 4.62 K/UL — SIGNIFICANT CHANGE UP (ref 3.8–10.5)

## 2021-02-17 DIAGNOSIS — R11.2 NAUSEA WITH VOMITING, UNSPECIFIED: ICD-10-CM

## 2021-02-17 DIAGNOSIS — E86.0 DEHYDRATION: ICD-10-CM

## 2021-02-17 DIAGNOSIS — Z51.11 ENCOUNTER FOR ANTINEOPLASTIC CHEMOTHERAPY: ICD-10-CM

## 2021-02-22 ENCOUNTER — RESULT REVIEW (OUTPATIENT)
Age: 71
End: 2021-02-22

## 2021-02-22 ENCOUNTER — APPOINTMENT (OUTPATIENT)
Dept: HEMATOLOGY ONCOLOGY | Facility: CLINIC | Age: 71
End: 2021-02-22
Payer: MEDICARE

## 2021-02-22 ENCOUNTER — APPOINTMENT (OUTPATIENT)
Dept: INFUSION THERAPY | Facility: HOSPITAL | Age: 71
End: 2021-02-22

## 2021-02-22 ENCOUNTER — LABORATORY RESULT (OUTPATIENT)
Age: 71
End: 2021-02-22

## 2021-02-22 VITALS
TEMPERATURE: 97.8 F | DIASTOLIC BLOOD PRESSURE: 83 MMHG | RESPIRATION RATE: 17 BRPM | BODY MASS INDEX: 17.38 KG/M2 | OXYGEN SATURATION: 100 % | SYSTOLIC BLOOD PRESSURE: 147 MMHG | HEART RATE: 75 BPM | WEIGHT: 98.08 LBS | HEIGHT: 62.99 IN

## 2021-02-22 LAB
BASOPHILS # BLD AUTO: 0 K/UL — SIGNIFICANT CHANGE UP (ref 0–0.2)
BASOPHILS NFR BLD AUTO: 0 % — SIGNIFICANT CHANGE UP (ref 0–2)
EOSINOPHIL # BLD AUTO: 0 K/UL — SIGNIFICANT CHANGE UP (ref 0–0.5)
EOSINOPHIL NFR BLD AUTO: 0 % — SIGNIFICANT CHANGE UP (ref 0–6)
HCT VFR BLD CALC: 33.9 % — LOW (ref 34.5–45)
HGB BLD-MCNC: 11.4 G/DL — LOW (ref 11.5–15.5)
LYMPHOCYTES # BLD AUTO: 0.79 K/UL — LOW (ref 1–3.3)
LYMPHOCYTES # BLD AUTO: 18 % — SIGNIFICANT CHANGE UP (ref 13–44)
MCHC RBC-ENTMCNC: 30.6 PG — SIGNIFICANT CHANGE UP (ref 27–34)
MCHC RBC-ENTMCNC: 33.6 G/DL — SIGNIFICANT CHANGE UP (ref 32–36)
MCV RBC AUTO: 91.1 FL — SIGNIFICANT CHANGE UP (ref 80–100)
MONOCYTES # BLD AUTO: 0.04 K/UL — SIGNIFICANT CHANGE UP (ref 0–0.9)
MONOCYTES NFR BLD AUTO: 1 % — LOW (ref 2–14)
NEUTROPHILS # BLD AUTO: 3.57 K/UL — SIGNIFICANT CHANGE UP (ref 1.8–7.4)
NEUTROPHILS NFR BLD AUTO: 81 % — HIGH (ref 43–77)
NRBC # BLD: 0 /100 — SIGNIFICANT CHANGE UP (ref 0–0)
NRBC # BLD: SIGNIFICANT CHANGE UP /100 WBCS (ref 0–0)
PLAT MORPH BLD: NORMAL — SIGNIFICANT CHANGE UP
PLATELET # BLD AUTO: 58 K/UL — LOW (ref 150–400)
RBC # BLD: 3.72 M/UL — LOW (ref 3.8–5.2)
RBC # FLD: 13.3 % — SIGNIFICANT CHANGE UP (ref 10.3–14.5)
RBC BLD AUTO: SIGNIFICANT CHANGE UP
WBC # BLD: 4.41 K/UL — SIGNIFICANT CHANGE UP (ref 3.8–10.5)
WBC # FLD AUTO: 4.41 K/UL — SIGNIFICANT CHANGE UP (ref 3.8–10.5)

## 2021-02-22 PROCEDURE — 99072 ADDL SUPL MATRL&STAF TM PHE: CPT

## 2021-02-22 PROCEDURE — 99213 OFFICE O/P EST LOW 20 MIN: CPT

## 2021-02-23 ENCOUNTER — NON-APPOINTMENT (OUTPATIENT)
Age: 71
End: 2021-02-23

## 2021-03-01 ENCOUNTER — RESULT REVIEW (OUTPATIENT)
Age: 71
End: 2021-03-01

## 2021-03-01 ENCOUNTER — LABORATORY RESULT (OUTPATIENT)
Age: 71
End: 2021-03-01

## 2021-03-01 ENCOUNTER — APPOINTMENT (OUTPATIENT)
Dept: INFUSION THERAPY | Facility: HOSPITAL | Age: 71
End: 2021-03-01

## 2021-03-01 LAB
BASOPHILS # BLD AUTO: 0.04 K/UL — SIGNIFICANT CHANGE UP (ref 0–0.2)
BASOPHILS NFR BLD AUTO: 0.4 % — SIGNIFICANT CHANGE UP (ref 0–2)
BUN SERPL-MCNC: 14 MG/DL — SIGNIFICANT CHANGE UP (ref 7–23)
CA-I BLDA-SCNC: 1.1 MMOL/L — LOW (ref 1.12–1.3)
CHLORIDE SERPL-SCNC: 98 MMOL/L — SIGNIFICANT CHANGE UP (ref 96–108)
CO2 SERPL-SCNC: 37 MMOL/L — HIGH (ref 22–31)
CREAT SERPL-MCNC: 0.4 MG/DL — LOW (ref 0.5–1.3)
EOSINOPHIL # BLD AUTO: 0.02 K/UL — SIGNIFICANT CHANGE UP (ref 0–0.5)
EOSINOPHIL NFR BLD AUTO: 0.2 % — SIGNIFICANT CHANGE UP (ref 0–6)
GLUCOSE SERPL-MCNC: 168 MG/DL — HIGH (ref 70–99)
HCT VFR BLD CALC: 36.8 % — SIGNIFICANT CHANGE UP (ref 34.5–45)
HGB BLD-MCNC: 12.5 G/DL — SIGNIFICANT CHANGE UP (ref 11.5–15.5)
IMM GRANULOCYTES NFR BLD AUTO: 1.7 % — HIGH (ref 0–1.5)
LYMPHOCYTES # BLD AUTO: 1.03 K/UL — SIGNIFICANT CHANGE UP (ref 1–3.3)
LYMPHOCYTES # BLD AUTO: 10.1 % — LOW (ref 13–44)
MCHC RBC-ENTMCNC: 31 PG — SIGNIFICANT CHANGE UP (ref 27–34)
MCHC RBC-ENTMCNC: 34 G/DL — SIGNIFICANT CHANGE UP (ref 32–36)
MCV RBC AUTO: 91.3 FL — SIGNIFICANT CHANGE UP (ref 80–100)
MONOCYTES # BLD AUTO: 1.06 K/UL — HIGH (ref 0–0.9)
MONOCYTES NFR BLD AUTO: 10.4 % — SIGNIFICANT CHANGE UP (ref 2–14)
NEUTROPHILS # BLD AUTO: 7.87 K/UL — HIGH (ref 1.8–7.4)
NEUTROPHILS NFR BLD AUTO: 77.2 % — HIGH (ref 43–77)
NRBC # BLD: 0 /100 WBCS — SIGNIFICANT CHANGE UP (ref 0–0)
PLATELET # BLD AUTO: 276 K/UL — SIGNIFICANT CHANGE UP (ref 150–400)
POTASSIUM SERPL-MCNC: 2.9 MMOL/L — CRITICAL LOW (ref 3.5–5.3)
POTASSIUM SERPL-SCNC: 2.9 MMOL/L — CRITICAL LOW (ref 3.5–5.3)
RBC # BLD: 4.03 M/UL — SIGNIFICANT CHANGE UP (ref 3.8–5.2)
RBC # FLD: 14.7 % — HIGH (ref 10.3–14.5)
SODIUM SERPL-SCNC: 141 MMOL/L — SIGNIFICANT CHANGE UP (ref 135–145)
WBC # BLD: 10.19 K/UL — SIGNIFICANT CHANGE UP (ref 3.8–10.5)
WBC # FLD AUTO: 10.19 K/UL — SIGNIFICANT CHANGE UP (ref 3.8–10.5)

## 2021-03-02 ENCOUNTER — RX RENEWAL (OUTPATIENT)
Age: 71
End: 2021-03-02

## 2021-03-02 NOTE — ASSESSMENT
[Palliative] : Goals of care discussed with patient: Palliative [Palliative Care Plan] : not applicable at this time [FreeTextEntry1] : S/p C1 D15 Gemzar/Abraxane. Patient has low platelet count today =89. Will reschedule chemotherapy for 1 week.  Will monitor for side effects/toxicities.  Rx Oxycodone 5 mg every 6 hours ordered for pain. Ordered PET-Scan and Bone scan to evaluate for progression of metastatic disease. RX for ThrushL: Nystatin [id number] unit/ml: 5 ml swish and swallow QID x 14 days. ora  RTO in 3 weeks.

## 2021-03-02 NOTE — HISTORY OF PRESENT ILLNESS
[Disease: _____________________] : Disease: [unfilled] [de-identified] : 69 yo female with PMHX of spinal stenosis c/o weight loss of 30 lbs over 6 months which began in May 2020. In August, she started having persistent diarrhea, and progressive weakness. She admits to decreased appetite and nausea. Denies any vomiting, tea color urine or pale stools. On November 22, 2020,  she went to [facility] ED for evaluation. She was found with glucose greater than 600, and patient was admitted to the hospital. During her hospital admission, CT-Scan was done which revealed a suspicious obstructive neoplasm in the pancreatic neck/body and 2 indeterminate liver lesions.\par \par On November 25, 2020, MRI of abdomen was done which showed: Hypo enhancing mass in the pancreatic tail/body measuring 6.2 X 2.7 cm associated 1.8 cm cystic component. Two rim enhancing liver lesions compatible with metastases. On December 1, 2020, IR performed core needle biopsy of the liver lesion. December 1, 2020, pathology report showed: Metastatic adenocarcinoma.   \par \par Patient is single woman with no children. She lives alone. Her family has positive history of cancer with sister at age 50 years colon cancer and then later, in early 60s liver cancer. 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 She does not drink alcohol.  [de-identified] : Invasive moderately-differentiated adenocarcinoma [de-identified] : 12/15/2020:UGT1A1- No reportable variants were detected in the UGT1A1 gene by sequencing. \par 12/15/202: -No dihydropyrimidine dehydrogenase (DPYD) gene variants were detected in this individual suggesting the presence of *1 functional alleles. This  result predicts normal DPD enzymatic inactivation of administered 5 -Florouracil and normal risk for 5 FU toxicity. [de-identified] : Patient is here for f/u s/p C1 D8 Gemzar/Abraxane. Patient c/o  persistent left thigh pain that radiates to above left knee. She tried using Tramadol 50 mg every 8 hours gives little relief. Denies any mouth sores, nausea, vomiting or abdominal pain. Appetite is decreased 2nd to bad taste in mouth. She has normal BM every other day.

## 2021-03-02 NOTE — PHYSICAL EXAM
[Restricted in physically strenuous activity but ambulatory and able to carry out work of a light or sedentary nature] : Status 1- Restricted in physically strenuous activity but ambulatory and able to carry out work of a light or sedentary nature, e.g., light house work, office work [Thin] : thin [Thrush] : thrush [Normal] : grossly intact [de-identified] : AOX#, NAD [de-identified] : Thrush to tongue. No mucositis [de-identified] : +discomfort to palpation to left thigh. +FROM

## 2021-03-02 NOTE — REASON FOR VISIT
[Follow-Up Visit] : a follow-up [Family Member] : family member [Other: _____] : [unfilled] [FreeTextEntry2] : Pancreatic Cancer

## 2021-03-06 ENCOUNTER — RESULT REVIEW (OUTPATIENT)
Age: 71
End: 2021-03-06

## 2021-03-08 ENCOUNTER — RESULT REVIEW (OUTPATIENT)
Age: 71
End: 2021-03-08

## 2021-03-08 ENCOUNTER — LABORATORY RESULT (OUTPATIENT)
Age: 71
End: 2021-03-08

## 2021-03-08 ENCOUNTER — APPOINTMENT (OUTPATIENT)
Dept: INFUSION THERAPY | Facility: HOSPITAL | Age: 71
End: 2021-03-08

## 2021-03-08 LAB
BASOPHILS # BLD AUTO: 0.03 K/UL — SIGNIFICANT CHANGE UP (ref 0–0.2)
BASOPHILS NFR BLD AUTO: 0.6 % — SIGNIFICANT CHANGE UP (ref 0–2)
BUN SERPL-MCNC: 17 MG/DL — SIGNIFICANT CHANGE UP (ref 7–23)
CA-I BLDA-SCNC: 1.15 MMOL/L — SIGNIFICANT CHANGE UP (ref 1.12–1.3)
CHLORIDE SERPL-SCNC: 101 MMOL/L — SIGNIFICANT CHANGE UP (ref 96–108)
CO2 SERPL-SCNC: 29 MMOL/L — SIGNIFICANT CHANGE UP (ref 22–31)
CREAT SERPL-MCNC: 0.4 MG/DL — LOW (ref 0.5–1.3)
EOSINOPHIL # BLD AUTO: 0.01 K/UL — SIGNIFICANT CHANGE UP (ref 0–0.5)
EOSINOPHIL NFR BLD AUTO: 0.2 % — SIGNIFICANT CHANGE UP (ref 0–6)
GLUCOSE SERPL-MCNC: 154 MG/DL — HIGH (ref 70–99)
HCT VFR BLD CALC: 33.6 % — LOW (ref 34.5–45)
HGB BLD-MCNC: 11.5 G/DL — SIGNIFICANT CHANGE UP (ref 11.5–15.5)
IMM GRANULOCYTES NFR BLD AUTO: 2.4 % — HIGH (ref 0–1.5)
LYMPHOCYTES # BLD AUTO: 1.2 K/UL — SIGNIFICANT CHANGE UP (ref 1–3.3)
LYMPHOCYTES # BLD AUTO: 24.1 % — SIGNIFICANT CHANGE UP (ref 13–44)
MCHC RBC-ENTMCNC: 31.1 PG — SIGNIFICANT CHANGE UP (ref 27–34)
MCHC RBC-ENTMCNC: 34.2 G/DL — SIGNIFICANT CHANGE UP (ref 32–36)
MCV RBC AUTO: 90.8 FL — SIGNIFICANT CHANGE UP (ref 80–100)
MONOCYTES # BLD AUTO: 0.51 K/UL — SIGNIFICANT CHANGE UP (ref 0–0.9)
MONOCYTES NFR BLD AUTO: 10.3 % — SIGNIFICANT CHANGE UP (ref 2–14)
NEUTROPHILS # BLD AUTO: 3.1 K/UL — SIGNIFICANT CHANGE UP (ref 1.8–7.4)
NEUTROPHILS NFR BLD AUTO: 62.4 % — SIGNIFICANT CHANGE UP (ref 43–77)
NRBC # BLD: 0 /100 WBCS — SIGNIFICANT CHANGE UP (ref 0–0)
PLATELET # BLD AUTO: 399 K/UL — SIGNIFICANT CHANGE UP (ref 150–400)
POTASSIUM SERPL-MCNC: 4 MMOL/L — SIGNIFICANT CHANGE UP (ref 3.5–5.3)
POTASSIUM SERPL-SCNC: 4 MMOL/L — SIGNIFICANT CHANGE UP (ref 3.5–5.3)
RBC # BLD: 3.7 M/UL — LOW (ref 3.8–5.2)
RBC # FLD: 14.6 % — HIGH (ref 10.3–14.5)
SODIUM SERPL-SCNC: 136 MMOL/L — SIGNIFICANT CHANGE UP (ref 135–145)
WBC # BLD: 4.97 K/UL — SIGNIFICANT CHANGE UP (ref 3.8–10.5)
WBC # FLD AUTO: 4.97 K/UL — SIGNIFICANT CHANGE UP (ref 3.8–10.5)

## 2021-03-09 ENCOUNTER — APPOINTMENT (OUTPATIENT)
Dept: ENDOCRINOLOGY | Facility: CLINIC | Age: 71
End: 2021-03-09
Payer: MEDICARE

## 2021-03-09 VITALS — DIASTOLIC BLOOD PRESSURE: 80 MMHG | HEART RATE: 105 BPM | SYSTOLIC BLOOD PRESSURE: 136 MMHG | TEMPERATURE: 97.6 F

## 2021-03-09 DIAGNOSIS — E78.5 HYPERLIPIDEMIA, UNSPECIFIED: ICD-10-CM

## 2021-03-09 DIAGNOSIS — E08.9 OTHER SPECIFIED DISEASES OF PANCREAS: ICD-10-CM

## 2021-03-09 DIAGNOSIS — I10 ESSENTIAL (PRIMARY) HYPERTENSION: ICD-10-CM

## 2021-03-09 DIAGNOSIS — K86.89 OTHER SPECIFIED DISEASES OF PANCREAS: ICD-10-CM

## 2021-03-09 LAB — HBA1C MFR BLD HPLC: 6.4

## 2021-03-09 PROCEDURE — 83036 HEMOGLOBIN GLYCOSYLATED A1C: CPT | Mod: QW

## 2021-03-09 PROCEDURE — 99072 ADDL SUPL MATRL&STAF TM PHE: CPT

## 2021-03-09 PROCEDURE — 99215 OFFICE O/P EST HI 40 MIN: CPT | Mod: 25

## 2021-03-09 RX ORDER — BLOOD SUGAR DIAGNOSTIC
STRIP MISCELLANEOUS
Qty: 100 | Refills: 3 | Status: ACTIVE | COMMUNITY
Start: 2020-12-01 | End: 1900-01-01

## 2021-03-09 RX ORDER — INSULIN GLARGINE 100 [IU]/ML
100 INJECTION, SOLUTION SUBCUTANEOUS DAILY
Qty: 1 | Refills: 0 | Status: ACTIVE | COMMUNITY
Start: 2020-12-01 | End: 1900-01-01

## 2021-03-09 RX ORDER — LANCETS
EACH MISCELLANEOUS
Qty: 2 | Refills: 3 | Status: ACTIVE | COMMUNITY
Start: 2021-03-09 | End: 1900-01-01

## 2021-03-09 NOTE — HISTORY OF PRESENT ILLNESS
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 She was hospitalized at [facility] in 11/2020, was found to have new DM at that time with A1c > 18% and was also diagnosed with pancreatic adenocarcinoma (metastatic). C-peptide was found to be low at 0.4 in 11/2020. She was seen by the endocrine service and discharged home on basal bolus. She saw CDE in 12/2021. She is currently on Lantus 9 units daily at 12 pm. Also takes Novolog 11 units before breakfast, 8 units before lunch and dinner. Checks BG 4x a day. AM BG ranges from low 100's to low 200's. BG before lunch ranges from the 80's to low 300's. BG before lunch generally high. BG before dinner ranges from 50's mid 100's. She has fairly frequent hypoglycemia before dinner. She also reports waking up in the middle of the night, diaphoretic, with low BG. She has not had a dilated eye exam. No known nephropathy. No sx of neuropathy. \par \par Breakfast: coffee, piece of cake; sometimes has elkins, eggs, grits\par Lunch: turkey sandwich, soup, left overs from dinner\par Dinner: chicken, mashes potatoes, vegetables, pork chops, spaghetti \par \par Not on medications for HTN or HLD. LDL 64 in 11/2020. \par \par She is accompanied by her niece Iris today. She has been overwhelmed by her recent diagnosis of pancreatic cancer. She is receiving chemotherapy for the pancreatic cancer. \par \par A1c today 6.4%, no recent pRBC transfusion.

## 2021-03-09 NOTE — ASSESSMENT
[Action and use of Insulin] : action and use of short and long-acting insulin [Self Monitoring of Blood Glucose] : self monitoring of blood glucose [Retinopathy Screening] : Patient was referred to ophthalmology for retinopathy screening [FreeTextEntry1] : 70 year old woman with secondary diabetes mellitus, HTN, HLD\par \par 1. Secondary diabetes mellitus: due to pancreatic insufficiency from pancreatic cancer\par - A1c 6.4% today, suspect not accurate\par - given overnight hypoglycemia, decrease Lantus to 8 units at 12 pm\par - adjust Novolog to 12/6/8 before meals\par - will obtain dexcom for patient \par - encouraged optho follow up this year for dilated eye exam\par - unable to provide urine sample today, check urine microalbumin/creatinine at next visit\par - saw CDE in 12/2021\par \par 2. HTN: BP at goal today, will continue to monitor. Goal < 140/90. \par \par 3. HLD: LDL 64 in 11/2020, statin declined.\par \par Return visit in 3 months

## 2021-03-09 NOTE — REVIEW OF SYSTEMS
[Recent Weight Loss (___ Lbs)] : recent weight loss: [unfilled] lbs [Polyuria] : polyuria [Polydipsia] : polydipsia [All other systems negative] : All other systems negative [Recent Weight Gain (___ Lbs)] : no recent weight gain [Fever] : no fever [Chills] : no chills [Blurred Vision] : no blurred vision [Dysphagia] : no dysphagia [Neck Pain] : no neck pain [Dysphonia] : no dysphonia [Chest Pain] : no chest pain [Palpitations] : no palpitations [Lower Ext Edema] : no lower extremity edema [Shortness Of Breath] : no shortness of breath [Cough] : no cough [Nausea] : no nausea [Constipation] : no constipation [Abdominal Pain] : no abdominal pain [Vomiting] : no vomiting [Diarrhea] : no diarrhea

## 2021-03-09 NOTE — PHYSICAL EXAM
[Alert] : alert [No Acute Distress] : no acute distress [Normal Sclera/Conjunctiva] : normal sclera/conjunctiva [No Proptosis] : no proptosis [Normal Outer Ear/Nose] : the ears and nose were normal in appearance [Normal Hearing] : hearing was normal [No Neck Mass] : no neck mass was observed [Supple] : the neck was supple [Thyroid Not Enlarged] : the thyroid was not enlarged [No Respiratory Distress] : no respiratory distress [No Accessory Muscle Use] : no accessory muscle use [Normal Rate and Effort] : normal respiratory rate and effort [Clear to Auscultation] : lungs were clear to auscultation bilaterally [Normal S1, S2] : normal S1 and S2 [Normal Rate] : heart rate was normal [Regular Rhythm] : with a regular rhythm [No Edema] : no peripheral edema [Pedal Pulses Normal] : the pedal pulses are present [Normal Bowel Sounds] : normal bowel sounds [Not Tender] : non-tender [Not Distended] : not distended [Soft] : abdomen soft [No Clubbing, Cyanosis] : no clubbing  or cyanosis of the fingernails [No Involuntary Movements] : no involuntary movements were seen [Right Foot Was Examined] : right foot ~C was examined [Left Foot Was Examined] : left foot ~C was examined [2+] : 2+ in the dorsalis pedis [Oriented x3] : oriented to person, place, and time [Normal Affect] : the affect was normal [Normal Insight/Judgement] : insight and judgment were intact [Normal Mood] : the mood was normal [Normal Sensation on Monofilament Testing] : normal sensation on monofilament testing of lower extremities [Foot Ulcers] : no foot ulcers [Diminished Throughout Both Feet] : normal tactile sensation with monofilament testing throughout both feet [#1 Diminished] : number 1 was normal [#2 Diminished] : number 2 was normal [#3 Diminished] : number 3 was normal [#4 Diminished] : number 4 was normal [#5 Diminished] : number 5 was normal [#6 Diminished] : number 6 was normal [#7 Diminished] : number 7 was normal [#8 Diminished] : number 8 was normal [#9 Diminished] : number 9 was normal [#10 Diminished] : number 10 was normal [de-identified] : thin woman

## 2021-03-10 ENCOUNTER — OUTPATIENT (OUTPATIENT)
Dept: OUTPATIENT SERVICES | Facility: HOSPITAL | Age: 71
LOS: 1 days | Discharge: HOME | End: 2021-03-10

## 2021-03-10 DIAGNOSIS — C25.9 MALIGNANT NEOPLASM OF PANCREAS, UNSPECIFIED: ICD-10-CM

## 2021-03-10 DIAGNOSIS — Z98.890 OTHER SPECIFIED POSTPROCEDURAL STATES: Chronic | ICD-10-CM

## 2021-03-10 RX ORDER — BLOOD-GLUCOSE,RECEIVER,CONT
EACH MISCELLANEOUS
Qty: 1 | Refills: 1 | Status: ACTIVE | COMMUNITY
Start: 2021-03-10

## 2021-03-10 RX ORDER — BLOOD-GLUCOSE TRANSMITTER
EACH MISCELLANEOUS
Qty: 1 | Refills: 3 | Status: ACTIVE | COMMUNITY
Start: 2021-03-10 | End: 1900-01-01

## 2021-03-11 ENCOUNTER — OUTPATIENT (OUTPATIENT)
Dept: OUTPATIENT SERVICES | Facility: HOSPITAL | Age: 71
LOS: 1 days | End: 2021-03-11
Payer: MEDICARE

## 2021-03-11 ENCOUNTER — APPOINTMENT (OUTPATIENT)
Dept: NUCLEAR MEDICINE | Facility: IMAGING CENTER | Age: 71
End: 2021-03-11
Payer: MEDICARE

## 2021-03-11 DIAGNOSIS — Z98.890 OTHER SPECIFIED POSTPROCEDURAL STATES: Chronic | ICD-10-CM

## 2021-03-11 DIAGNOSIS — C25.9 MALIGNANT NEOPLASM OF PANCREAS, UNSPECIFIED: ICD-10-CM

## 2021-03-11 PROCEDURE — 78815 PET IMAGE W/CT SKULL-THIGH: CPT | Mod: 26,PS

## 2021-03-11 PROCEDURE — 78815 PET IMAGE W/CT SKULL-THIGH: CPT

## 2021-03-11 PROCEDURE — A9552: CPT

## 2021-03-15 ENCOUNTER — APPOINTMENT (OUTPATIENT)
Dept: HEMATOLOGY ONCOLOGY | Facility: CLINIC | Age: 71
End: 2021-03-15
Payer: MEDICARE

## 2021-03-15 ENCOUNTER — APPOINTMENT (OUTPATIENT)
Dept: INFUSION THERAPY | Facility: HOSPITAL | Age: 71
End: 2021-03-15

## 2021-03-15 ENCOUNTER — RESULT REVIEW (OUTPATIENT)
Age: 71
End: 2021-03-15

## 2021-03-15 ENCOUNTER — LABORATORY RESULT (OUTPATIENT)
Age: 71
End: 2021-03-15

## 2021-03-15 VITALS
BODY MASS INDEX: 16.99 KG/M2 | WEIGHT: 95.9 LBS | OXYGEN SATURATION: 99 % | RESPIRATION RATE: 16 BRPM | HEART RATE: 88 BPM | TEMPERATURE: 97.2 F | DIASTOLIC BLOOD PRESSURE: 70 MMHG | SYSTOLIC BLOOD PRESSURE: 104 MMHG

## 2021-03-15 DIAGNOSIS — R62.51 FAILURE TO THRIVE (CHILD): ICD-10-CM

## 2021-03-15 DIAGNOSIS — R63.0 ANOREXIA: ICD-10-CM

## 2021-03-15 LAB
BASOPHILS # BLD AUTO: 0.01 K/UL — SIGNIFICANT CHANGE UP (ref 0–0.2)
BASOPHILS NFR BLD AUTO: 0.2 % — SIGNIFICANT CHANGE UP (ref 0–2)
EOSINOPHIL # BLD AUTO: 0.01 K/UL — SIGNIFICANT CHANGE UP (ref 0–0.5)
EOSINOPHIL NFR BLD AUTO: 0.2 % — SIGNIFICANT CHANGE UP (ref 0–6)
HCT VFR BLD CALC: 30.2 % — LOW (ref 34.5–45)
HGB BLD-MCNC: 10.3 G/DL — LOW (ref 11.5–15.5)
IMM GRANULOCYTES NFR BLD AUTO: 0.8 % — SIGNIFICANT CHANGE UP (ref 0–1.5)
LYMPHOCYTES # BLD AUTO: 0.91 K/UL — LOW (ref 1–3.3)
LYMPHOCYTES # BLD AUTO: 14.1 % — SIGNIFICANT CHANGE UP (ref 13–44)
MCHC RBC-ENTMCNC: 31.1 PG — SIGNIFICANT CHANGE UP (ref 27–34)
MCHC RBC-ENTMCNC: 34.1 G/DL — SIGNIFICANT CHANGE UP (ref 32–36)
MCV RBC AUTO: 91.2 FL — SIGNIFICANT CHANGE UP (ref 80–100)
MONOCYTES # BLD AUTO: 0.3 K/UL — SIGNIFICANT CHANGE UP (ref 0–0.9)
MONOCYTES NFR BLD AUTO: 4.7 % — SIGNIFICANT CHANGE UP (ref 2–14)
NEUTROPHILS # BLD AUTO: 5.17 K/UL — SIGNIFICANT CHANGE UP (ref 1.8–7.4)
NEUTROPHILS NFR BLD AUTO: 80 % — HIGH (ref 43–77)
NRBC # BLD: 0 /100 WBCS — SIGNIFICANT CHANGE UP (ref 0–0)
PLATELET # BLD AUTO: 114 K/UL — LOW (ref 150–400)
RBC # BLD: 3.31 M/UL — LOW (ref 3.8–5.2)
RBC # FLD: 15.5 % — HIGH (ref 10.3–14.5)
WBC # BLD: 6.45 K/UL — SIGNIFICANT CHANGE UP (ref 3.8–10.5)
WBC # FLD AUTO: 6.45 K/UL — SIGNIFICANT CHANGE UP (ref 3.8–10.5)

## 2021-03-15 PROCEDURE — 99214 OFFICE O/P EST MOD 30 MIN: CPT

## 2021-03-15 PROCEDURE — 99072 ADDL SUPL MATRL&STAF TM PHE: CPT

## 2021-03-15 RX ORDER — FENTANYL 12 UG/H
12 PATCH, EXTENDED RELEASE TRANSDERMAL
Qty: 10 | Refills: 0 | Status: ACTIVE | COMMUNITY
Start: 2021-03-15 | End: 1900-01-01

## 2021-03-15 NOTE — HISTORY OF PRESENT ILLNESS
[de-identified] : The patient has a history of a pancreatic mass noted on CAT scan and MRI along with liver lesions both of which were biopsied and are consistent with metastatic pancreatic carcinoma.  A discussion took place regarding treatment including chemotherapy with FOLFIRINOX versus Gemzar and Abraxane versus Xeloda took place.  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  Currently she has been noticing weight loss but not much abdominal pain. [de-identified] : Since the last visit the pt continues to do poorly with left thigh pain responding to dilaudid.  The patient has completed 7 cycles of chemo with Gemzar and Abraxane and returns to discuss results of recent PET/CT.

## 2021-03-15 NOTE — ASSESSMENT
[FreeTextEntry1] : The patient's recent PET/CT shows decrease in pancreatic mass but slight increase in 2 of 3 liver nodules although comparison could not be made because of lack of contrast and the PET/CT testing.  As a result it was recommended that the patient undergo repeat MRI of the abdomen to assess for liver lesions and also to see if there has been change in the pancreatic mass.  There does not appear to be any evidence of bone involvement on the PET/CT but the patient will undergo bone scan for further assessment of her left thigh and leg pain.  The patient will continue her current treatment at this time and be monitored for side effects and toxicity.

## 2021-03-15 NOTE — REVIEW OF SYSTEMS
[Negative] : Allergic/Immunologic [FreeTextEntry8] : nocturia x 203 x [FreeTextEntry9] : left leg and thigh pain

## 2021-03-16 ENCOUNTER — NON-APPOINTMENT (OUTPATIENT)
Age: 71
End: 2021-03-16

## 2021-03-16 ENCOUNTER — APPOINTMENT (OUTPATIENT)
Dept: NUCLEAR MEDICINE | Facility: IMAGING CENTER | Age: 71
End: 2021-03-16

## 2021-03-16 DIAGNOSIS — R11.2 NAUSEA WITH VOMITING, UNSPECIFIED: ICD-10-CM

## 2021-03-16 DIAGNOSIS — Z51.11 ENCOUNTER FOR ANTINEOPLASTIC CHEMOTHERAPY: ICD-10-CM

## 2021-03-16 PROBLEM — R62.51 FAILURE TO THRIVE (0-17): Status: ACTIVE | Noted: 2021-03-16

## 2021-03-16 PROBLEM — R63.0 POOR APPETITE: Status: ACTIVE | Noted: 2021-03-16

## 2021-03-21 ENCOUNTER — EMERGENCY (EMERGENCY)
Facility: HOSPITAL | Age: 71
LOS: 1 days | Discharge: HOME | End: 2021-03-21
Attending: EMERGENCY MEDICINE | Admitting: EMERGENCY MEDICINE
Payer: MEDICARE

## 2021-03-21 VITALS
DIASTOLIC BLOOD PRESSURE: 61 MMHG | HEIGHT: 62 IN | RESPIRATION RATE: 18 BRPM | OXYGEN SATURATION: 100 % | TEMPERATURE: 99 F | HEART RATE: 76 BPM | SYSTOLIC BLOOD PRESSURE: 125 MMHG

## 2021-03-21 VITALS
SYSTOLIC BLOOD PRESSURE: 120 MMHG | TEMPERATURE: 98 F | HEART RATE: 76 BPM | RESPIRATION RATE: 18 BRPM | OXYGEN SATURATION: 98 % | DIASTOLIC BLOOD PRESSURE: 49 MMHG

## 2021-03-21 DIAGNOSIS — Z98.890 OTHER SPECIFIED POSTPROCEDURAL STATES: Chronic | ICD-10-CM

## 2021-03-21 LAB
ALBUMIN SERPL ELPH-MCNC: 3.5 G/DL — SIGNIFICANT CHANGE UP (ref 3.3–5)
ALP SERPL-CCNC: 100 U/L — SIGNIFICANT CHANGE UP (ref 40–120)
ALT FLD-CCNC: 16 U/L — SIGNIFICANT CHANGE UP (ref 4–33)
ANION GAP SERPL CALC-SCNC: 11 MMOL/L — SIGNIFICANT CHANGE UP (ref 7–14)
AST SERPL-CCNC: 20 U/L — SIGNIFICANT CHANGE UP (ref 4–32)
BASE EXCESS BLDV CALC-SCNC: 3.1 MMOL/L — HIGH (ref -3–2)
BILIRUB SERPL-MCNC: 0.2 MG/DL — SIGNIFICANT CHANGE UP (ref 0.2–1.2)
BLOOD GAS VENOUS - CREATININE: 0.5 MG/DL — SIGNIFICANT CHANGE UP (ref 0.5–1.3)
BLOOD GAS VENOUS COMPREHENSIVE RESULT: SIGNIFICANT CHANGE UP
BLOOD GAS VENOUS COMPREHENSIVE RESULT: SIGNIFICANT CHANGE UP
BUN SERPL-MCNC: 21 MG/DL — SIGNIFICANT CHANGE UP (ref 7–23)
CALCIUM SERPL-MCNC: 9.3 MG/DL — SIGNIFICANT CHANGE UP (ref 8.4–10.5)
CHLORIDE BLDV-SCNC: 113 MMOL/L — HIGH (ref 96–108)
CHLORIDE SERPL-SCNC: 104 MMOL/L — SIGNIFICANT CHANGE UP (ref 98–107)
CO2 SERPL-SCNC: 26 MMOL/L — SIGNIFICANT CHANGE UP (ref 22–31)
CREAT SERPL-MCNC: 0.49 MG/DL — LOW (ref 0.5–1.3)
GAS PNL BLDV: 138 MMOL/L — SIGNIFICANT CHANGE UP (ref 136–146)
GLUCOSE BLDV-MCNC: 300 MG/DL — HIGH (ref 70–99)
GLUCOSE SERPL-MCNC: 305 MG/DL — HIGH (ref 70–99)
HCO3 BLDV-SCNC: 26 MMOL/L — SIGNIFICANT CHANGE UP (ref 20–27)
HCT VFR BLD CALC: 29.6 % — LOW (ref 34.5–45)
HCT VFR BLDA CALC: 31.4 % — LOW (ref 34.5–46.5)
HGB BLD CALC-MCNC: 10.2 G/DL — LOW (ref 11.5–15.5)
HGB BLD-MCNC: 9.5 G/DL — LOW (ref 11.5–15.5)
LACTATE BLDV-MCNC: 2.2 MMOL/L — HIGH (ref 0.5–2)
LIDOCAIN IGE QN: 16 U/L — SIGNIFICANT CHANGE UP (ref 7–60)
MACROCYTES BLD QL: SLIGHT — SIGNIFICANT CHANGE UP
MANUAL SMEAR VERIFICATION: SIGNIFICANT CHANGE UP
MCHC RBC-ENTMCNC: 29.7 PG — SIGNIFICANT CHANGE UP (ref 27–34)
MCHC RBC-ENTMCNC: 32.1 GM/DL — SIGNIFICANT CHANGE UP (ref 32–36)
MCV RBC AUTO: 92.5 FL — SIGNIFICANT CHANGE UP (ref 80–100)
MICROCYTES BLD QL: SLIGHT — SIGNIFICANT CHANGE UP
NRBC # BLD: 0 /100 WBCS — SIGNIFICANT CHANGE UP
NRBC # FLD: 0 K/UL — SIGNIFICANT CHANGE UP
OVALOCYTES BLD QL SMEAR: SLIGHT — SIGNIFICANT CHANGE UP
PCO2 BLDV: 52 MMHG — HIGH (ref 41–51)
PH BLDV: 7.36 — SIGNIFICANT CHANGE UP (ref 7.32–7.43)
PLAT MORPH BLD: NORMAL — SIGNIFICANT CHANGE UP
PLATELET # BLD AUTO: 43 K/UL — LOW (ref 150–400)
PLATELET COUNT - ESTIMATE: ABNORMAL
PO2 BLDV: <24 MMHG — LOW (ref 35–40)
POTASSIUM BLDV-SCNC: 3.4 MMOL/L — SIGNIFICANT CHANGE UP (ref 3.4–4.5)
POTASSIUM SERPL-MCNC: 3.6 MMOL/L — SIGNIFICANT CHANGE UP (ref 3.5–5.3)
POTASSIUM SERPL-SCNC: 3.6 MMOL/L — SIGNIFICANT CHANGE UP (ref 3.5–5.3)
PROT SERPL-MCNC: 7.3 G/DL — SIGNIFICANT CHANGE UP (ref 6–8.3)
RBC # BLD: 3.2 M/UL — LOW (ref 3.8–5.2)
RBC # FLD: 16.2 % — HIGH (ref 10.3–14.5)
RBC BLD AUTO: NORMAL — SIGNIFICANT CHANGE UP
SAO2 % BLDV: 28.5 % — LOW (ref 60–85)
SARS-COV-2 RNA SPEC QL NAA+PROBE: SIGNIFICANT CHANGE UP
SODIUM SERPL-SCNC: 141 MMOL/L — SIGNIFICANT CHANGE UP (ref 135–145)
VARIANT LYMPHS # BLD: 1.8 % — SIGNIFICANT CHANGE UP (ref 0–6)
WBC # BLD: 4.1 K/UL — SIGNIFICANT CHANGE UP (ref 3.8–10.5)
WBC # FLD AUTO: 4.1 K/UL — SIGNIFICANT CHANGE UP (ref 3.8–10.5)

## 2021-03-21 PROCEDURE — 99284 EMERGENCY DEPT VISIT MOD MDM: CPT

## 2021-03-21 RX ORDER — INSULIN LISPRO 100/ML
4 VIAL (ML) SUBCUTANEOUS ONCE
Refills: 0 | Status: DISCONTINUED | OUTPATIENT
Start: 2021-03-21 | End: 2021-03-21

## 2021-03-21 RX ORDER — SODIUM CHLORIDE 9 MG/ML
1000 INJECTION, SOLUTION INTRAVENOUS ONCE
Refills: 0 | Status: COMPLETED | OUTPATIENT
Start: 2021-03-21 | End: 2021-03-21

## 2021-03-21 RX ADMIN — SODIUM CHLORIDE 1000 MILLILITER(S): 9 INJECTION, SOLUTION INTRAVENOUS at 16:55

## 2021-03-21 RX ADMIN — SODIUM CHLORIDE 1000 MILLILITER(S): 9 INJECTION, SOLUTION INTRAVENOUS at 18:47

## 2021-03-21 NOTE — ED PROVIDER NOTE - OBJECTIVE STATEMENT
Pt is a 71 yo F with DM and pancreatic cancer diagnosed in Nov 2020 now on chemo since Jan last dose 3/15 who presents with diarrhea. 3 days diarrhea, 9 episodes a day, watery stool, no blood. Denies fever, chills, n/v, chest pain, sob. Glucose at home 56 so didn't take insulin, in ED 300s but rpt after fluids 200s. denies abd pain. no travel or sick contacts. no recent abx

## 2021-03-21 NOTE — ED ADULT NURSE NOTE - CHIEF COMPLAINT QUOTE
Pt c/o having diarrhea X3 days. Denies recent abx use. Also endorses worsening weakness. Pt was dx with DM & pancreatic CA in November. Last chemo tx was 3/15. Pt states her FS this AM was 56 and did not take her insulin,  in triage.     Liv Biggs, niece: [phone]  Zulema Morin, sister: [phone]

## 2021-03-21 NOTE — ED ADULT NURSE NOTE - OBJECTIVE STATEMENT
Jayden RN: 59 yo female, aox4, hx of pancreatic CA (last chemo 3/15/21), DM (on insulin, not taken today), chronic thigh pain (wearing Fentanyl patch to R upper back), c/o generalized weakness that has gotten progressively worse over last 5 days, diarrhea x 3 days. pt denies fever, cough, chest pain, SOB, n/v, urinary sx. abdomen soft, non tender. breathing even, non labored. pt educated not to get out of bed without assistance due to weakness. pt oriented to call bell. pt on CCM. 20g iv established to R arm, labs sent as ordered. pt endorses daily tobacco use.  handoff report given to primary RN.

## 2021-03-21 NOTE — ED ADULT TRIAGE NOTE - CHIEF COMPLAINT QUOTE
Pt c/o having diarrhea X3 days. Denies recent abx use. Also endorses worsening weakness. Pt was dx with DM & pancreatic CA in November. Last chemo tx was 3/15. Pt states her FS this AM was 56 and did not take her insulin,  in triage.     Liv Biggs, niece: [phone]  Zulema Morin, sister: [phone] Pt c/o having diarrhea X3 days. Denies recent abx use. Also endorses worsening weakness. Pt was dx with DM & pancreatic CA in November. Last chemo tx was 3/15. Pt states her FS this AM was 56 and did not take her insulin,  in triage.     Liv Biggs, niece: [phone]  Zulema Morin, sister: [phone]

## 2021-03-21 NOTE — ED ADULT NURSE NOTE - ISOLATION TYPE:
NEPHROLOGY PROGRESS NOTE    Gregory Goddard 55 y o  male MRN: [id number]  Unit/Bed#: ICU 04 Encounter: [id number]  Reason for Consult:  Acute renal failure    The patient seems to have had some respiratory decline overnight  He was given some diuretic had broadening of his antibiotics and is on supportive care  This morning he will not really communicate with me but will open his eyes when I examined him  ASSESSMENT/PLAN:  1  Renal  The patient has acute renal failure likely due to ATN from gram-negative sepsis  He is hemodynamically stable presently  Creatinine is slightly higher and urine output is low despite being given diuretic  At this point just continue supportive care and monitor  Agree with discontinuation of IV fluids given potential for volume overload if this continued  Lasix 80 mg IV  Monitor    2  Respiratory  Patient's respiratory difficulties multifactorial with pneumonia and potentially superimposed volume overload  Will give dose of diuretic and according to ICU team antibiotics have been broadened  3  Gram-negative septicemia  The patient has urinary tract infection but a different bacteria in his blood  On antibiotics and ICU team is managing  SUBJECTIVE:  ROS  Patient unable to provide any review of system for me  OBJECTIVE:  Current Weight: Weight - Scale: 61 kg (134 lb 7 7 oz)  Vitals:Temp (24hrs), Av 5 °F (36 4 °C), Min:97 °F (36 1 °C), Max:98 2 °F (36 8 °C)  Current: Temperature: (!) 97 °F (36 1 °C)   Blood pressure 103/64, pulse 74, temperature (!) 97 °F (36 1 °C), temperature source Temporal, resp  rate (!) 28, height 5' 6" (1 676 m), weight 61 kg (134 lb 7 7 oz), SpO2 97 %  , Body mass index is 21 71 kg/m²        Intake/Output Summary (Last 24 hours) at 10/05/18 0821  Last data filed at 10/05/18 0800   Gross per 24 hour   Intake               60 ml   Output             1210 ml   Net            -1150 ml       Physical Exam: /64   Pulse 74   Temp (!) 97 °F (36 1 °C) (Temporal)   Resp (!) 28   Ht 5' 6" (1 676 m)   Wt 61 kg (134 lb 7 7 oz)   SpO2 97%   BMI 21 71 kg/m²   Physical Exam   Constitutional:   Mildly distressed  Eyes: No scleral icterus  Neck: No JVD present  Cardiovascular: Normal rate and regular rhythm  Exam reveals no friction rub  Pulmonary/Chest: He has wheezes  Decreased breath sounds bases  Abdominal: Soft  He exhibits no distension  There is no tenderness         Medications:    Current Facility-Administered Medications:     acetylcysteine (MUCOMYST) 200 mg/mL inhalation solution 600 mg, 3 mL, Nebulization, Q8H, REMEDIOS Mccoy, 3 mL at 10/05/18 0698    bisacodyl (DULCOLAX) rectal suppository 10 mg, 10 mg, Rectal, Daily PRN, Lucian Maldonado MD    cefepime (MAXIPIME) IVPB (premix) 1,000 mg, 1,000 mg, Intravenous, Q24H, Lucian Maldonado MD, Last Rate: 100 mL/hr at 10/04/18 1302, 1,000 mg at 10/04/18 1302    famotidine (PEPCID) oral suspension 40 mg, 40 mg, Per G Tube, BID, Lucian Maldonado MD, Stopped at 10/04/18 1819    heparin (porcine) subcutaneous injection 5,000 Units, 5,000 Units, Subcutaneous, Q8H [facility], Lucian Maldonado MD, 5,000 Units at 10/05/18 0523    ipratropium-albuterol (DUO-NEB) 0 5-2 5 mg/3 mL inhalation solution 3 mL, 3 mL, Nebulization, Q6H, Sky Winter MD, 3 mL at 10/05/18 0723    lacosamide (VIMPAT) tablet 100 mg, 100 mg, Per G Tube, Q12H [facility], uLcian Maldonado MD, 100 mg at 10/05/18 0816    lactobacillus acidophilus-bulgaricus [facility]) packet 1 packet, 1 packet, Oral, BID, Lucian Maldonado MD, 1 packet at 10/05/18 0816    melatonin tablet 6 mg, 6 mg, Oral, HS, Neetu Melvin PA-C, 6 mg at 10/04/18 2214    metroNIDAZOLE (FLAGYL) IVPB (premix) 500 mg, 500 mg, Intravenous, Q8H, Neetu Melvin PA-C, Last Rate: 200 mL/hr at 10/05/18 0638, 500 mg at 10/05/18 0638    mupirocin (BACTROBAN) 2 % nasal ointment, , Nasal, Q12H [facility], Lucian Maldonado MD, 1 application at 37/92/14 2212    polyethylene glycol (MIRALAX) packet 17 g, 17 g, Per G Tube, Daily, Delphine Lux MD, 17 g at 10/05/18 0816    QUEtiapine (SEROquel) tablet 25 mg, 25 mg, Per G Tube, QAM, Delphine Lux MD, 25 mg at 10/05/18 0816    QUEtiapine (SEROquel) tablet 50 mg, 50 mg, Per G Tube, HS, Delphine Lux MD, 50 mg at 10/04/18 6735    Laboratory Results:  Lab Results   Component Value Date    WBC 12 85 (H) 10/05/2018    HGB 7 0 (L) 10/05/2018    HCT 22 3 (L) 10/05/2018     (H) 10/05/2018    PLT 99 (L) 10/05/2018     Lab Results   Component Value Date    CALCIUM 8 0 (L) 10/05/2018     10/05/2018    K 4 5 10/05/2018    CO2 28 10/05/2018     10/05/2018    BUN 61 (H) 10/05/2018    CREATININE 2 94 (H) 10/05/2018     Lab Results   Component Value Date    CALCIUM 8 0 (L) 10/05/2018    PHOS 4 8 (H) 10/05/2018     No results found for: LABPROT None

## 2021-03-21 NOTE — ED ADULT NURSE NOTE - NSIMPLEMENTINTERV_GEN_ALL_ED
Implemented All Fall Risk Interventions:  [place] to call system. Call bell, personal items and telephone within reach. Instruct patient to call for assistance. Room bathroom lighting operational. Non-slip footwear when patient is off stretcher. Physically safe environment: no spills, clutter or unnecessary equipment. Stretcher in lowest position, wheels locked, appropriate side rails in place. Provide visual cue, wrist band, yellow gown, etc. Monitor gait and stability. Monitor for mental status changes and reorient to person, place, and time. Review medications for side effects contributing to fall risk. Reinforce activity limits and safety measures with patient and family.

## 2021-03-21 NOTE — ED PROVIDER NOTE - NSFOLLOWUPINSTRUCTIONS_ED_ALL_ED_FT
Drink plenty of fluids, avoid milk products for a few days  return for nausea vomiting or inability to stay hydrated  your stool cultures are pending, you will be called if c-dif or stoot culture is pos  Follow up with your oncology.

## 2021-03-21 NOTE — ED PROVIDER NOTE - PATIENT PORTAL LINK FT
You can access the FollowMyHealth Patient Portal offered by [facility] by registering at the following website: [email or web address]. By joining [employer]’s FollowMyHealth portal, you will also be able to view your health information using other applications (apps) compatible with our system.

## 2021-03-21 NOTE — ED PROVIDER NOTE - ATTENDING CONTRIBUTION TO CARE
DR. BLOCH, ATTENDING MD-  I performed a face to face bedside interview with patient regarding history of present illness, review of symptoms and past medical history. I completed an independent physical exam.  I have discussed patient's plan of care with the resident.  Patient cachectic, no jaundice, dehydrated, neck supple, heart sounds nml lungs clear, abd soft nontender, extrem no edema, pulses intact, , neuro nonfocal.

## 2021-03-21 NOTE — ED PROVIDER NOTE - PHYSICAL EXAMINATION
Pretty Bedoya MD  GENERAL: Patient awake alert NAD.  HEENT: NC/AT, Moist mucous membranes, PERRL, EOMI.  LUNGS: CTAB, no wheezes or crackles.   CARDIAC: RRR, no m/r/g.    ABDOMEN: Soft, NT, ND, No rebound, guarding. No CVA tenderness.   EXT: No edema. No calf tenderness.   MSK: no pain with movement, no deformities.  NEURO: A&Ox3. Moving all extremities.  SKIN: Warm and dry. No rash.  PSYCH: Normal affect.

## 2021-03-21 NOTE — ED PROVIDER NOTE - CLINICAL SUMMARY MEDICAL DECISION MAKING FREE TEXT BOX
Aureliano: pt is a 71 yo F with DM and pancreatic cancer diagnosed in Nov 2020 now on chemo since Jan last dose 3/15 who presents with diarrhea. No recent abx use. C. diff still possible vs chemo reaction. will send labs, c dif cx, stool pcr, stool cx.   progress - Have spoken to on call onc fellow and he is okay with sending pt home without abx and f/u with onc outpt

## 2021-03-21 NOTE — ED PROVIDER NOTE - NS ED ROS FT
GENERAL: No fever, chills  EYES: no vision changes, no discharge.   ENT: no difficulty swallowing or speaking   CARDIAC: no chest pain/pressure, SOB, lower extremity swelling  PULMONARY: no cough, SOB  GI: no abdominal pain, n/v. + diarrhea   : no dysuria  SKIN: no rashes  NEURO: no headache, lightheadedness, paresthesia  MSK: No joint pain, myalgia, weakness.

## 2021-03-22 LAB
CULTURE RESULTS: SIGNIFICANT CHANGE UP
SPECIMEN SOURCE: SIGNIFICANT CHANGE UP

## 2021-03-23 ENCOUNTER — NON-APPOINTMENT (OUTPATIENT)
Age: 71
End: 2021-03-23

## 2021-03-24 LAB
CULTURE RESULTS: SIGNIFICANT CHANGE UP
SPECIMEN SOURCE: SIGNIFICANT CHANGE UP

## 2021-03-25 ENCOUNTER — RESULT REVIEW (OUTPATIENT)
Age: 71
End: 2021-03-25

## 2021-03-25 ENCOUNTER — LABORATORY RESULT (OUTPATIENT)
Age: 71
End: 2021-03-25

## 2021-03-25 ENCOUNTER — APPOINTMENT (OUTPATIENT)
Dept: HEMATOLOGY ONCOLOGY | Facility: CLINIC | Age: 71
End: 2021-03-25
Payer: MEDICARE

## 2021-03-25 ENCOUNTER — OUTPATIENT (OUTPATIENT)
Dept: OUTPATIENT SERVICES | Facility: HOSPITAL | Age: 71
LOS: 1 days | End: 2021-03-25
Payer: MEDICARE

## 2021-03-25 ENCOUNTER — APPOINTMENT (OUTPATIENT)
Dept: NUCLEAR MEDICINE | Facility: IMAGING CENTER | Age: 71
End: 2021-03-25

## 2021-03-25 VITALS
SYSTOLIC BLOOD PRESSURE: 100 MMHG | DIASTOLIC BLOOD PRESSURE: 60 MMHG | WEIGHT: 93.23 LBS | TEMPERATURE: 97.2 F | RESPIRATION RATE: 14 BRPM | HEIGHT: 62 IN | OXYGEN SATURATION: 97 % | BODY MASS INDEX: 17.16 KG/M2 | HEART RATE: 88 BPM

## 2021-03-25 DIAGNOSIS — Z98.890 OTHER SPECIFIED POSTPROCEDURAL STATES: Chronic | ICD-10-CM

## 2021-03-25 DIAGNOSIS — C25.9 MALIGNANT NEOPLASM OF PANCREAS, UNSPECIFIED: ICD-10-CM

## 2021-03-25 LAB
BASOPHILS # BLD AUTO: 0.01 K/UL — SIGNIFICANT CHANGE UP (ref 0–0.2)
BASOPHILS NFR BLD AUTO: 0.1 % — SIGNIFICANT CHANGE UP (ref 0–2)
EOSINOPHIL # BLD AUTO: 0.01 K/UL — SIGNIFICANT CHANGE UP (ref 0–0.5)
EOSINOPHIL NFR BLD AUTO: 0.1 % — SIGNIFICANT CHANGE UP (ref 0–6)
HCT VFR BLD CALC: 29.1 % — LOW (ref 34.5–45)
HGB BLD-MCNC: 9.6 G/DL — LOW (ref 11.5–15.5)
IMM GRANULOCYTES NFR BLD AUTO: 2.2 % — HIGH (ref 0–1.5)
LYMPHOCYTES # BLD AUTO: 1.19 K/UL — SIGNIFICANT CHANGE UP (ref 1–3.3)
LYMPHOCYTES # BLD AUTO: 17.5 % — SIGNIFICANT CHANGE UP (ref 13–44)
MCHC RBC-ENTMCNC: 30.9 PG — SIGNIFICANT CHANGE UP (ref 27–34)
MCHC RBC-ENTMCNC: 33 G/DL — SIGNIFICANT CHANGE UP (ref 32–36)
MCV RBC AUTO: 93.6 FL — SIGNIFICANT CHANGE UP (ref 80–100)
MONOCYTES # BLD AUTO: 0.67 K/UL — SIGNIFICANT CHANGE UP (ref 0–0.9)
MONOCYTES NFR BLD AUTO: 9.9 % — SIGNIFICANT CHANGE UP (ref 2–14)
NEUTROPHILS # BLD AUTO: 4.76 K/UL — SIGNIFICANT CHANGE UP (ref 1.8–7.4)
NEUTROPHILS NFR BLD AUTO: 70.2 % — SIGNIFICANT CHANGE UP (ref 43–77)
NRBC # BLD: 0 /100 WBCS — SIGNIFICANT CHANGE UP (ref 0–0)
PLATELET # BLD AUTO: 55 K/UL — LOW (ref 150–400)
RBC # BLD: 3.11 M/UL — LOW (ref 3.8–5.2)
RBC # FLD: 17.4 % — HIGH (ref 10.3–14.5)
WBC # BLD: 6.79 K/UL — SIGNIFICANT CHANGE UP (ref 3.8–10.5)
WBC # FLD AUTO: 6.79 K/UL — SIGNIFICANT CHANGE UP (ref 3.8–10.5)

## 2021-03-25 PROCEDURE — 78830 RP LOCLZJ TUM SPECT W/CT 1: CPT

## 2021-03-25 PROCEDURE — 78306 BONE IMAGING WHOLE BODY: CPT

## 2021-03-25 PROCEDURE — 78306 BONE IMAGING WHOLE BODY: CPT | Mod: 26

## 2021-03-25 PROCEDURE — A9561: CPT

## 2021-03-25 PROCEDURE — 99072 ADDL SUPL MATRL&STAF TM PHE: CPT

## 2021-03-25 PROCEDURE — 99213 OFFICE O/P EST LOW 20 MIN: CPT

## 2021-03-25 PROCEDURE — 78830 RP LOCLZJ TUM SPECT W/CT 1: CPT | Mod: 26

## 2021-03-25 RX ORDER — HYDROMORPHONE HYDROCHLORIDE 2 MG/1
2 TABLET ORAL
Qty: 120 | Refills: 0 | Status: ACTIVE | COMMUNITY
Start: 2021-03-08 | End: 1900-01-01

## 2021-03-25 NOTE — ASSESSMENT
[FreeTextEntry1] : The patient's recent PET/CT shows decrease in pancreatic mass but slight increase in 2 of 3 liver nodules although comparison could not be made because of lack of contrast and the PET/CT testing.  As a result it was recommended that the patient undergo repeat MRI of the abdomen to assess for liver lesions and also to see if there has been change in the pancreatic mass.  There does not appear to be any evidence of bone involvement on the PET/CT but the patient will undergo bone scan for further assessment of her left thigh and leg pain.  The patient will continue her current treatment at this time and be monitored for side effects and toxicity.\par  Patient is has poor appetite. Advised to start Marinol 2.5 mg tid. Persistent pain, pt will continue Dilaudid 2 mg every 6 hours prn. Genetic testing ordered vis [facility] f/uMomo SIERRA in 1 month.

## 2021-03-25 NOTE — HISTORY OF PRESENT ILLNESS
[de-identified] : The patient has a history of a pancreatic mass noted on CAT scan and MRI along with liver lesions both of which were biopsied and are consistent with metastatic pancreatic carcinoma.  A discussion took place regarding treatment including chemotherapy with FOLFIRINOX versus Gemzar and Abraxane versus Xeloda took place.  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  Currently she has been noticing weight loss but not much abdominal pain. [de-identified] : Patient is here today to do genetic testing. She still has pain and poor appetite. She continues to loss weight. She has not started the Marinol yet because insurance just approved medication.

## 2021-03-29 ENCOUNTER — LABORATORY RESULT (OUTPATIENT)
Age: 71
End: 2021-03-29

## 2021-03-29 ENCOUNTER — RESULT REVIEW (OUTPATIENT)
Age: 71
End: 2021-03-29

## 2021-03-29 ENCOUNTER — APPOINTMENT (OUTPATIENT)
Dept: INFUSION THERAPY | Facility: HOSPITAL | Age: 71
End: 2021-03-29

## 2021-03-29 LAB
ANISOCYTOSIS BLD QL: SLIGHT — SIGNIFICANT CHANGE UP
BASOPHILS # BLD AUTO: 0 K/UL — SIGNIFICANT CHANGE UP (ref 0–0.2)
BASOPHILS NFR BLD AUTO: 0 % — SIGNIFICANT CHANGE UP (ref 0–2)
EOSINOPHIL # BLD AUTO: 0.13 K/UL — SIGNIFICANT CHANGE UP (ref 0–0.5)
EOSINOPHIL NFR BLD AUTO: 1 % — SIGNIFICANT CHANGE UP (ref 0–6)
HCT VFR BLD CALC: 29.2 % — LOW (ref 34.5–45)
HGB BLD-MCNC: 9.8 G/DL — LOW (ref 11.5–15.5)
LYMPHOCYTES # BLD AUTO: 0.92 K/UL — LOW (ref 1–3.3)
LYMPHOCYTES # BLD AUTO: 7 % — LOW (ref 13–44)
MCHC RBC-ENTMCNC: 31.8 PG — SIGNIFICANT CHANGE UP (ref 27–34)
MCHC RBC-ENTMCNC: 33.6 G/DL — SIGNIFICANT CHANGE UP (ref 32–36)
MCV RBC AUTO: 94.8 FL — SIGNIFICANT CHANGE UP (ref 80–100)
MONOCYTES # BLD AUTO: 2.09 K/UL — HIGH (ref 0–0.9)
MONOCYTES NFR BLD AUTO: 16 % — HIGH (ref 2–14)
NEUTROPHILS # BLD AUTO: 9.94 K/UL — HIGH (ref 1.8–7.4)
NEUTROPHILS NFR BLD AUTO: 76 % — SIGNIFICANT CHANGE UP (ref 43–77)
NRBC # BLD: 0 /100 — SIGNIFICANT CHANGE UP (ref 0–0)
NRBC # BLD: SIGNIFICANT CHANGE UP /100 WBCS (ref 0–0)
PLAT MORPH BLD: NORMAL — SIGNIFICANT CHANGE UP
PLATELET # BLD AUTO: 259 K/UL — SIGNIFICANT CHANGE UP (ref 150–400)
POIKILOCYTOSIS BLD QL AUTO: SLIGHT — SIGNIFICANT CHANGE UP
RBC # BLD: 3.08 M/UL — LOW (ref 3.8–5.2)
RBC # FLD: 21.4 % — HIGH (ref 10.3–14.5)
RBC BLD AUTO: ABNORMAL
WBC # BLD: 13.08 K/UL — HIGH (ref 3.8–10.5)
WBC # FLD AUTO: 13.08 K/UL — HIGH (ref 3.8–10.5)

## 2021-03-30 ENCOUNTER — NON-APPOINTMENT (OUTPATIENT)
Age: 71
End: 2021-03-30

## 2021-03-30 DIAGNOSIS — E86.0 DEHYDRATION: ICD-10-CM

## 2021-04-01 ENCOUNTER — APPOINTMENT (OUTPATIENT)
Dept: INFUSION THERAPY | Facility: HOSPITAL | Age: 71
End: 2021-04-01

## 2021-04-05 ENCOUNTER — LABORATORY RESULT (OUTPATIENT)
Age: 71
End: 2021-04-05

## 2021-04-05 ENCOUNTER — RESULT REVIEW (OUTPATIENT)
Age: 71
End: 2021-04-05

## 2021-04-05 ENCOUNTER — APPOINTMENT (OUTPATIENT)
Dept: HEMATOLOGY ONCOLOGY | Facility: CLINIC | Age: 71
End: 2021-04-05
Payer: MEDICARE

## 2021-04-05 ENCOUNTER — APPOINTMENT (OUTPATIENT)
Dept: INFUSION THERAPY | Facility: HOSPITAL | Age: 71
End: 2021-04-05

## 2021-04-05 DIAGNOSIS — Z79.899 OTHER LONG TERM (CURRENT) DRUG THERAPY: ICD-10-CM

## 2021-04-05 DIAGNOSIS — R29.898 OTHER SYMPTOMS AND SIGNS INVOLVING THE MUSCULOSKELETAL SYSTEM: ICD-10-CM

## 2021-04-05 DIAGNOSIS — R60.0 LOCALIZED EDEMA: ICD-10-CM

## 2021-04-05 LAB
BASOPHILS # BLD AUTO: 0 K/UL — SIGNIFICANT CHANGE UP (ref 0–0.2)
BASOPHILS NFR BLD AUTO: 0 % — SIGNIFICANT CHANGE UP (ref 0–2)
EOSINOPHIL # BLD AUTO: 0 K/UL — SIGNIFICANT CHANGE UP (ref 0–0.5)
EOSINOPHIL NFR BLD AUTO: 0 % — SIGNIFICANT CHANGE UP (ref 0–6)
HCT VFR BLD CALC: 26 % — LOW (ref 34.5–45)
HGB BLD-MCNC: 8.8 G/DL — LOW (ref 11.5–15.5)
LYMPHOCYTES # BLD AUTO: 1.1 K/UL — SIGNIFICANT CHANGE UP (ref 1–3.3)
LYMPHOCYTES # BLD AUTO: 26 % — SIGNIFICANT CHANGE UP (ref 13–44)
MCHC RBC-ENTMCNC: 31.8 PG — SIGNIFICANT CHANGE UP (ref 27–34)
MCHC RBC-ENTMCNC: 33.8 G/DL — SIGNIFICANT CHANGE UP (ref 32–36)
MCV RBC AUTO: 93.9 FL — SIGNIFICANT CHANGE UP (ref 80–100)
MONOCYTES # BLD AUTO: 0.25 K/UL — SIGNIFICANT CHANGE UP (ref 0–0.9)
MONOCYTES NFR BLD AUTO: 6 % — SIGNIFICANT CHANGE UP (ref 2–14)
NEUTROPHILS # BLD AUTO: 2.87 K/UL — SIGNIFICANT CHANGE UP (ref 1.8–7.4)
NEUTROPHILS NFR BLD AUTO: 68 % — SIGNIFICANT CHANGE UP (ref 43–77)
NRBC # BLD: 3 /100 — HIGH (ref 0–0)
NRBC # BLD: SIGNIFICANT CHANGE UP /100 WBCS (ref 0–0)
PLAT MORPH BLD: NORMAL — SIGNIFICANT CHANGE UP
PLATELET # BLD AUTO: 311 K/UL — SIGNIFICANT CHANGE UP (ref 150–400)
RBC # BLD: 2.77 M/UL — LOW (ref 3.8–5.2)
RBC # FLD: 20.5 % — HIGH (ref 10.3–14.5)
RBC BLD AUTO: SIGNIFICANT CHANGE UP
WBC # BLD: 4.22 K/UL — SIGNIFICANT CHANGE UP (ref 3.8–10.5)
WBC # FLD AUTO: 4.22 K/UL — SIGNIFICANT CHANGE UP (ref 3.8–10.5)

## 2021-04-05 PROCEDURE — 99213 OFFICE O/P EST LOW 20 MIN: CPT

## 2021-04-05 PROCEDURE — 99072 ADDL SUPL MATRL&STAF TM PHE: CPT

## 2021-04-07 ENCOUNTER — OUTPATIENT (OUTPATIENT)
Dept: OUTPATIENT SERVICES | Facility: HOSPITAL | Age: 71
LOS: 1 days | Discharge: HOME | End: 2021-04-07

## 2021-04-07 DIAGNOSIS — C25.9 MALIGNANT NEOPLASM OF PANCREAS, UNSPECIFIED: ICD-10-CM

## 2021-04-07 DIAGNOSIS — Z98.890 OTHER SPECIFIED POSTPROCEDURAL STATES: Chronic | ICD-10-CM

## 2021-04-08 ENCOUNTER — NON-APPOINTMENT (OUTPATIENT)
Age: 71
End: 2021-04-08

## 2021-04-08 ENCOUNTER — APPOINTMENT (OUTPATIENT)
Dept: INFUSION THERAPY | Facility: HOSPITAL | Age: 71
End: 2021-04-08

## 2021-04-08 ENCOUNTER — INPATIENT (INPATIENT)
Facility: HOSPITAL | Age: 71
LOS: 25 days | Discharge: NURSING, REHAB OR LONG-TERM CARE | End: 2021-05-04
Attending: STUDENT IN AN ORGANIZED HEALTH CARE EDUCATION/TRAINING PROGRAM | Admitting: STUDENT IN AN ORGANIZED HEALTH CARE EDUCATION/TRAINING PROGRAM
Payer: MEDICARE

## 2021-04-08 VITALS
SYSTOLIC BLOOD PRESSURE: 91 MMHG | RESPIRATION RATE: 17 BRPM | HEART RATE: 93 BPM | DIASTOLIC BLOOD PRESSURE: 48 MMHG | TEMPERATURE: 98 F | HEIGHT: 62 IN | OXYGEN SATURATION: 100 %

## 2021-04-08 DIAGNOSIS — Z98.890 OTHER SPECIFIED POSTPROCEDURAL STATES: Chronic | ICD-10-CM

## 2021-04-08 PROBLEM — R29.898 LOWER EXTREMITY WEAKNESS: Status: ACTIVE | Noted: 2021-04-08

## 2021-04-08 PROBLEM — Z79.899 ON ANTINEOPLASTIC CHEMOTHERAPY: Status: ACTIVE | Noted: 2021-02-22

## 2021-04-08 PROBLEM — R60.0 LOWER LEG EDEMA: Status: ACTIVE | Noted: 2021-04-08

## 2021-04-08 LAB
ALBUMIN SERPL ELPH-MCNC: 3.3 G/DL — SIGNIFICANT CHANGE UP (ref 3.3–5)
ALP SERPL-CCNC: 101 U/L — SIGNIFICANT CHANGE UP (ref 40–120)
ALT FLD-CCNC: 29 U/L — SIGNIFICANT CHANGE UP (ref 4–33)
ANION GAP SERPL CALC-SCNC: 11 MMOL/L — SIGNIFICANT CHANGE UP (ref 7–14)
APTT BLD: 26.1 SEC — LOW (ref 27–36.3)
AST SERPL-CCNC: 43 U/L — HIGH (ref 4–32)
BILIRUB SERPL-MCNC: 0.5 MG/DL — SIGNIFICANT CHANGE UP (ref 0.2–1.2)
BLD GP AB SCN SERPL QL: NEGATIVE — SIGNIFICANT CHANGE UP
BUN SERPL-MCNC: 30 MG/DL — HIGH (ref 7–23)
CALCIUM SERPL-MCNC: 8.7 MG/DL — SIGNIFICANT CHANGE UP (ref 8.4–10.5)
CHLORIDE SERPL-SCNC: 103 MMOL/L — SIGNIFICANT CHANGE UP (ref 98–107)
CO2 SERPL-SCNC: 27 MMOL/L — SIGNIFICANT CHANGE UP (ref 22–31)
CREAT SERPL-MCNC: 0.69 MG/DL — SIGNIFICANT CHANGE UP (ref 0.5–1.3)
GLUCOSE SERPL-MCNC: 224 MG/DL — HIGH (ref 70–99)
HCT VFR BLD CALC: 27.4 % — LOW (ref 34.5–45)
HGB BLD-MCNC: 8.8 G/DL — LOW (ref 11.5–15.5)
INR BLD: 1.02 RATIO — SIGNIFICANT CHANGE UP (ref 0.88–1.16)
MCHC RBC-ENTMCNC: 31.1 PG — SIGNIFICANT CHANGE UP (ref 27–34)
MCHC RBC-ENTMCNC: 32.1 GM/DL — SIGNIFICANT CHANGE UP (ref 32–36)
MCV RBC AUTO: 96.8 FL — SIGNIFICANT CHANGE UP (ref 80–100)
PLATELET # BLD AUTO: 215 K/UL — SIGNIFICANT CHANGE UP (ref 150–400)
POTASSIUM SERPL-MCNC: 3.5 MMOL/L — SIGNIFICANT CHANGE UP (ref 3.5–5.3)
POTASSIUM SERPL-SCNC: 3.5 MMOL/L — SIGNIFICANT CHANGE UP (ref 3.5–5.3)
PROT SERPL-MCNC: 7 G/DL — SIGNIFICANT CHANGE UP (ref 6–8.3)
PROTHROM AB SERPL-ACNC: 11.7 SEC — SIGNIFICANT CHANGE UP (ref 10.6–13.6)
RBC # BLD: 2.83 M/UL — LOW (ref 3.8–5.2)
RBC # FLD: 20.9 % — HIGH (ref 10.3–14.5)
RH IG SCN BLD-IMP: POSITIVE — SIGNIFICANT CHANGE UP
SODIUM SERPL-SCNC: 141 MMOL/L — SIGNIFICANT CHANGE UP (ref 135–145)
WBC # BLD: 9.79 K/UL — SIGNIFICANT CHANGE UP (ref 3.8–10.5)
WBC # FLD AUTO: 9.79 K/UL — SIGNIFICANT CHANGE UP (ref 3.8–10.5)

## 2021-04-08 PROCEDURE — 99285 EMERGENCY DEPT VISIT HI MDM: CPT

## 2021-04-08 RX ORDER — SODIUM CHLORIDE 9 MG/ML
1000 INJECTION INTRAMUSCULAR; INTRAVENOUS; SUBCUTANEOUS ONCE
Refills: 0 | Status: COMPLETED | OUTPATIENT
Start: 2021-04-08 | End: 2021-04-08

## 2021-04-08 RX ADMIN — SODIUM CHLORIDE 1000 MILLILITER(S): 9 INJECTION INTRAMUSCULAR; INTRAVENOUS; SUBCUTANEOUS at 21:40

## 2021-04-08 NOTE — ED PROVIDER NOTE - ATTENDING CONTRIBUTION TO CARE
69 yo female with PMH DM and pacreative cancer with mets to lung and liver presents to ED for evaluation of generalized weakness, particularly BL LE with swelling. Pt reports she gets biweekly chemo, last thursday was given fluid infusion. States ever since she has had BL LE swelling and progressively worsening weakness and difficulty ambulating. Reports that she is compliant with her home dose of lasix but swelling is unimproved. Also endorses some difficulty urinating. Denies dysuria or hematura.   Gen: no acute distress, well appearing, awake, alert and oriented x 3  Cardiac: regular rate and rhythm, +S1S2  Pulm: Clear to auscultation bilaterally  Abd: soft, nontender, nondistended, no guarding  Back: neg CVA ttp, nontender spine  Extremity: +BL LE pitting edmea up to the knees, no deformity, warm and well perfused  Neuro: awake, alert, oriented x 3, sensorimotor intact  A/P r/o chf, urinary symptom milkd suspicion for cord compression but pt still with ability to control urine and sensorimotor intact - labs, imaging, ekg, adm

## 2021-04-08 NOTE — ED ADULT NURSE NOTE - OBJECTIVE STATEMENT
Patient received with c/o worsening weakness and b/l foot swelling since having chemo treatment on Monday 4/5. Denies any chest pain or SOB. Denies any fevers or chills. States she ambulates unassisted prior to starting chemo, now she ambulates with a walker. Labs sent. 20g angiocath placed on L AC.

## 2021-04-08 NOTE — ED PROVIDER NOTE - OBJECTIVE STATEMENT
70y F w/ PMHx DM and pancreatic cancer (diagnosed November 2020) w/ mets to lung and liver on chemo, last dose 4/5 (9th treatment) presenting with weakness. Patient states since her chemo treatment on Monday she endorses progressive worsening weakness in her B/L LE to the point where she cannot ambulate independently. Patient states she ambulated without assistance and without difficulty prior to Monday, now relies on walker and assitance from her sister to move from bed to commode. States her legs feel "heavy" and also endorsing increased swelling to her lower extremities, despite taking 20mg Lasix QOD. Also endorses increased difficulty urinating since Monday, but states she is still able to fully empty her bladder. Denies saddle anesthesia, fecal or urinary incontinence. Denies recent falls, fever, chills, cp ,sob, dizziness, abd pain, n/v, dysuria, hematuria, bloody stools, focal numbness/tingling, or recent rash or sick contacts. Denies hx of IV drug use, ETOH or tobacco.     Oncologist:  Dr. Amaury Vieira

## 2021-04-08 NOTE — ED PROVIDER NOTE - CLINICAL SUMMARY MEDICAL DECISION MAKING FREE TEXT BOX
70y F w/ PMHx DM and pancreatic cancer (diagnosed November 2020) w/ mets to lung and liver on chemo, last dose 4/5 (9th treatment) presenting with weakness. Hypotensive on arrival but mentating appropriately, VS otherwise WNL, pt dry appearing, significant weakness to B/L LE and pelvic girdle and pitting edema in B/L LE L>R. Patient reporting difficulty urinating, c/f mets to spine with potential cord compression although normal rectal tone. Will obtain labs, UA/UCx, CT T/L spine to assess for possible cord compression, COVID, DVT study w/ recent worsening swellling in B/L LE. TBA.

## 2021-04-08 NOTE — ASSESSMENT
[FreeTextEntry1] : Pt will continue Abraxane/Gemzar chemotherapy. Will monitor for side effects toxicities. Pt with edema to lower extremities. Will give Lasix 20 mg every other day. Advised pt to elevated feet at night, f/u. Patient will need evaluation for home services. PT/OT to recondition and strength lower extremities. Also, she needs assistance with all ADL's because she lives alone. Will get SW involved.  RTO in 1 month.

## 2021-04-08 NOTE — PHYSICAL EXAM
[Thin] : thin [Normal] : affect appropriate [Capable of only limited self care, confined to bed or chair more than 50% of waking hours] : Status 3- Capable of only limited self care, confined to bed or chair more than 50% of waking hours [de-identified] : Patient is very frail and thin. She requires wheelchair for ambulation [de-identified] : +2 edema with pitting to bilateral lower extremities. No erythema. + decreased muscle strength 3/5 bilateral

## 2021-04-08 NOTE — ED ADULT NURSE NOTE - NSIMPLEMENTINTERV_GEN_ALL_ED
Implemented All Universal Safety Interventions:  [place] to call system. Call bell, personal items and telephone within reach. Instruct patient to call for assistance. Room bathroom lighting operational. Non-slip footwear when patient is off stretcher. Physically safe environment: no spills, clutter or unnecessary equipment. Stretcher in lowest position, wheels locked, appropriate side rails in place.

## 2021-04-08 NOTE — ED PROVIDER NOTE - PHYSICAL EXAMINATION
Physical Exam:  Gen: NAD, AOx3, non-toxic appearing  Head: NCAT  HEENT: normal conjunctiva, tongue midline, oral mucosa moist  Lung: CTAB, no respiratory distress, no wheezes/rhonchi/rales B/L, speaking in full sentences  CV: RRR, no murmurs, rubs or gallops  Abd: soft, NT, ND, no guarding, no rigidity, no rebound tenderness, no CVA tenderness   : normal rectal tone   MSK: no visible deformities, no back pain  Neuro: No focal sensory deficits, MS B/L LE 2/5 lower extremities and hip girdle, MS UE 5/5   Skin: Warm, well perfused, no rash, 2+pitting edema to B/L LE to mid calf   Psych: normal affect, calm  Corin Leonardo D.O. -Resident

## 2021-04-08 NOTE — ED ADULT TRIAGE NOTE - CHIEF COMPLAINT QUOTE
pt arrives w/ c/o weakness and difficulty ambulating since chemo session on Monday for pancreatic cancer. pt states was told her wbc and rbcs were low not sure the number. pt denies any active bleeding but BP noted to be on lower side. pmh DM

## 2021-04-08 NOTE — REVIEW OF SYSTEMS
[Negative] : Allergic/Immunologic [FreeTextEntry2] : Poor appetite [FreeTextEntry9] : swelling to bilateral lower extremities.

## 2021-04-08 NOTE — ED PROVIDER NOTE - NS ED ROS FT
CONSTITUTIONAL: No fevers, no chills, no lightheadedness, no dizziness  EYES: no visual changes, no eye pain  EARS: no ear drainage, no ear pain, no change in hearing  NOSE: no nasal congestion  MOUTH/THROAT: no sore throat  CV: No chest pain, no palpitations  RESP: No SOB, +chronic cough  GI: No n/v/d, no abd pain  : no dysuria, no hematuria, no flank pain  MSK: no back pain, +B/L lower extremity pain   SKIN: no rashes, +swelling to legs   NEURO: no headache, + weakness to legs, no decreased sensation/paresthesias   PSYCHIATRIC: no known mental health issues

## 2021-04-09 ENCOUNTER — NON-APPOINTMENT (OUTPATIENT)
Age: 71
End: 2021-04-09

## 2021-04-09 DIAGNOSIS — Z02.9 ENCOUNTER FOR ADMINISTRATIVE EXAMINATIONS, UNSPECIFIED: ICD-10-CM

## 2021-04-09 DIAGNOSIS — C25.9 MALIGNANT NEOPLASM OF PANCREAS, UNSPECIFIED: ICD-10-CM

## 2021-04-09 DIAGNOSIS — R60.0 LOCALIZED EDEMA: ICD-10-CM

## 2021-04-09 DIAGNOSIS — R53.81 OTHER MALAISE: ICD-10-CM

## 2021-04-09 DIAGNOSIS — Z29.9 ENCOUNTER FOR PROPHYLACTIC MEASURES, UNSPECIFIED: ICD-10-CM

## 2021-04-09 DIAGNOSIS — R29.898 OTHER SYMPTOMS AND SIGNS INVOLVING THE MUSCULOSKELETAL SYSTEM: ICD-10-CM

## 2021-04-09 DIAGNOSIS — Z51.5 ENCOUNTER FOR PALLIATIVE CARE: ICD-10-CM

## 2021-04-09 DIAGNOSIS — Z79.899 OTHER LONG TERM (CURRENT) DRUG THERAPY: ICD-10-CM

## 2021-04-09 DIAGNOSIS — K59.00 CONSTIPATION, UNSPECIFIED: ICD-10-CM

## 2021-04-09 DIAGNOSIS — E11.9 TYPE 2 DIABETES MELLITUS WITHOUT COMPLICATIONS: ICD-10-CM

## 2021-04-09 DIAGNOSIS — R52 PAIN, UNSPECIFIED: ICD-10-CM

## 2021-04-09 DIAGNOSIS — K59.03 DRUG INDUCED CONSTIPATION: ICD-10-CM

## 2021-04-09 DIAGNOSIS — N13.30 UNSPECIFIED HYDRONEPHROSIS: ICD-10-CM

## 2021-04-09 DIAGNOSIS — R53.1 WEAKNESS: ICD-10-CM

## 2021-04-09 LAB
ANION GAP SERPL CALC-SCNC: 12 MMOL/L — SIGNIFICANT CHANGE UP (ref 7–14)
ANION GAP SERPL CALC-SCNC: 7 MMOL/L — SIGNIFICANT CHANGE UP (ref 7–14)
APPEARANCE UR: CLEAR — SIGNIFICANT CHANGE UP
BASOPHILS # BLD AUTO: 0 K/UL — SIGNIFICANT CHANGE UP (ref 0–0.2)
BASOPHILS NFR BLD AUTO: 0 % — SIGNIFICANT CHANGE UP (ref 0–2)
BILIRUB UR-MCNC: NEGATIVE — SIGNIFICANT CHANGE UP
BUN SERPL-MCNC: 21 MG/DL — SIGNIFICANT CHANGE UP (ref 7–23)
BUN SERPL-MCNC: 23 MG/DL — SIGNIFICANT CHANGE UP (ref 7–23)
CALCIUM SERPL-MCNC: 8.4 MG/DL — SIGNIFICANT CHANGE UP (ref 8.4–10.5)
CALCIUM SERPL-MCNC: 8.5 MG/DL — SIGNIFICANT CHANGE UP (ref 8.4–10.5)
CHLORIDE SERPL-SCNC: 106 MMOL/L — SIGNIFICANT CHANGE UP (ref 98–107)
CHLORIDE SERPL-SCNC: 108 MMOL/L — HIGH (ref 98–107)
CO2 SERPL-SCNC: 19 MMOL/L — LOW (ref 22–31)
CO2 SERPL-SCNC: 27 MMOL/L — SIGNIFICANT CHANGE UP (ref 22–31)
COLOR SPEC: YELLOW — SIGNIFICANT CHANGE UP
CREAT SERPL-MCNC: 0.47 MG/DL — LOW (ref 0.5–1.3)
CREAT SERPL-MCNC: 0.49 MG/DL — LOW (ref 0.5–1.3)
DIFF PNL FLD: NEGATIVE — SIGNIFICANT CHANGE UP
EOSINOPHIL # BLD AUTO: 0.09 K/UL — SIGNIFICANT CHANGE UP (ref 0–0.5)
EOSINOPHIL NFR BLD AUTO: 0.9 % — SIGNIFICANT CHANGE UP (ref 0–6)
GLUCOSE SERPL-MCNC: 193 MG/DL — HIGH (ref 70–99)
GLUCOSE SERPL-MCNC: 27 MG/DL — CRITICAL LOW (ref 70–99)
GLUCOSE UR QL: NEGATIVE — SIGNIFICANT CHANGE UP
IANC: 8.92 K/UL — HIGH (ref 1.5–8.5)
KETONES UR-MCNC: NEGATIVE — SIGNIFICANT CHANGE UP
LEUKOCYTE ESTERASE UR-ACNC: ABNORMAL
LYMPHOCYTES # BLD AUTO: 0.68 K/UL — LOW (ref 1–3.3)
LYMPHOCYTES # BLD AUTO: 6.9 % — LOW (ref 13–44)
MAGNESIUM SERPL-MCNC: 2.4 MG/DL — SIGNIFICANT CHANGE UP (ref 1.6–2.6)
MAGNESIUM SERPL-MCNC: 2.5 MG/DL — SIGNIFICANT CHANGE UP (ref 1.6–2.6)
MONOCYTES # BLD AUTO: 0 K/UL — SIGNIFICANT CHANGE UP (ref 0–0.9)
MONOCYTES NFR BLD AUTO: 0 % — LOW (ref 2–14)
NEUTROPHILS # BLD AUTO: 9.03 K/UL — HIGH (ref 1.8–7.4)
NEUTROPHILS NFR BLD AUTO: 89.6 % — HIGH (ref 43–77)
NITRITE UR-MCNC: NEGATIVE — SIGNIFICANT CHANGE UP
PH UR: 6 — SIGNIFICANT CHANGE UP (ref 5–8)
PHOSPHATE SERPL-MCNC: 2.6 MG/DL — SIGNIFICANT CHANGE UP (ref 2.5–4.5)
PHOSPHATE SERPL-MCNC: 2.8 MG/DL — SIGNIFICANT CHANGE UP (ref 2.5–4.5)
POTASSIUM SERPL-MCNC: 3.6 MMOL/L — SIGNIFICANT CHANGE UP (ref 3.5–5.3)
POTASSIUM SERPL-MCNC: 3.9 MMOL/L — SIGNIFICANT CHANGE UP (ref 3.5–5.3)
POTASSIUM SERPL-SCNC: 3.6 MMOL/L — SIGNIFICANT CHANGE UP (ref 3.5–5.3)
POTASSIUM SERPL-SCNC: 3.9 MMOL/L — SIGNIFICANT CHANGE UP (ref 3.5–5.3)
PROT UR-MCNC: ABNORMAL
SARS-COV-2 RNA SPEC QL NAA+PROBE: SIGNIFICANT CHANGE UP
SODIUM SERPL-SCNC: 139 MMOL/L — SIGNIFICANT CHANGE UP (ref 135–145)
SODIUM SERPL-SCNC: 140 MMOL/L — SIGNIFICANT CHANGE UP (ref 135–145)
SP GR SPEC: >1.05 (ref 1.01–1.02)
UROBILINOGEN FLD QL: ABNORMAL

## 2021-04-09 PROCEDURE — 99223 1ST HOSP IP/OBS HIGH 75: CPT

## 2021-04-09 PROCEDURE — 72132 CT LUMBAR SPINE W/DYE: CPT | Mod: 26

## 2021-04-09 PROCEDURE — 72157 MRI CHEST SPINE W/O & W/DYE: CPT | Mod: 26

## 2021-04-09 PROCEDURE — 72158 MRI LUMBAR SPINE W/O & W/DYE: CPT | Mod: 26

## 2021-04-09 PROCEDURE — 99358 PROLONG SERVICE W/O CONTACT: CPT | Mod: 25

## 2021-04-09 PROCEDURE — 93970 EXTREMITY STUDY: CPT | Mod: 26

## 2021-04-09 PROCEDURE — 72129 CT CHEST SPINE W/DYE: CPT | Mod: 26

## 2021-04-09 RX ORDER — FENTANYL CITRATE 50 UG/ML
1 INJECTION INTRAVENOUS
Refills: 0 | Status: DISCONTINUED | OUTPATIENT
Start: 2021-04-09 | End: 2021-04-09

## 2021-04-09 RX ORDER — INSULIN LISPRO 100/ML
VIAL (ML) SUBCUTANEOUS AT BEDTIME
Refills: 0 | Status: DISCONTINUED | OUTPATIENT
Start: 2021-04-09 | End: 2021-04-28

## 2021-04-09 RX ORDER — HYDROMORPHONE HYDROCHLORIDE 2 MG/ML
4 INJECTION INTRAMUSCULAR; INTRAVENOUS; SUBCUTANEOUS EVERY 4 HOURS
Refills: 0 | Status: DISCONTINUED | OUTPATIENT
Start: 2021-04-09 | End: 2021-04-15

## 2021-04-09 RX ORDER — HYDROMORPHONE HYDROCHLORIDE 2 MG/ML
0.5 INJECTION INTRAMUSCULAR; INTRAVENOUS; SUBCUTANEOUS EVERY 6 HOURS
Refills: 0 | Status: DISCONTINUED | OUTPATIENT
Start: 2021-04-09 | End: 2021-04-15

## 2021-04-09 RX ORDER — FENTANYL CITRATE 50 UG/ML
1 INJECTION INTRAVENOUS
Refills: 0 | Status: DISCONTINUED | OUTPATIENT
Start: 2021-04-09 | End: 2021-04-11

## 2021-04-09 RX ORDER — DEXTROSE 50 % IN WATER 50 %
25 SYRINGE (ML) INTRAVENOUS ONCE
Refills: 0 | Status: DISCONTINUED | OUTPATIENT
Start: 2021-04-09 | End: 2021-04-28

## 2021-04-09 RX ORDER — ENOXAPARIN SODIUM 100 MG/ML
40 INJECTION SUBCUTANEOUS DAILY
Refills: 0 | Status: DISCONTINUED | OUTPATIENT
Start: 2021-04-09 | End: 2021-04-25

## 2021-04-09 RX ORDER — DEXTROSE 50 % IN WATER 50 %
15 SYRINGE (ML) INTRAVENOUS ONCE
Refills: 0 | Status: DISCONTINUED | OUTPATIENT
Start: 2021-04-09 | End: 2021-04-28

## 2021-04-09 RX ORDER — INSULIN LISPRO 100/ML
VIAL (ML) SUBCUTANEOUS
Refills: 0 | Status: DISCONTINUED | OUTPATIENT
Start: 2021-04-09 | End: 2021-04-28

## 2021-04-09 RX ORDER — SODIUM CHLORIDE 9 MG/ML
1000 INJECTION, SOLUTION INTRAVENOUS
Refills: 0 | Status: DISCONTINUED | OUTPATIENT
Start: 2021-04-09 | End: 2021-04-28

## 2021-04-09 RX ORDER — SENNA PLUS 8.6 MG/1
2 TABLET ORAL AT BEDTIME
Refills: 0 | Status: DISCONTINUED | OUTPATIENT
Start: 2021-04-09 | End: 2021-04-09

## 2021-04-09 RX ORDER — INSULIN LISPRO 100/ML
2 VIAL (ML) SUBCUTANEOUS ONCE
Refills: 0 | Status: COMPLETED | OUTPATIENT
Start: 2021-04-09 | End: 2021-04-09

## 2021-04-09 RX ORDER — DEXTROSE 50 % IN WATER 50 %
12.5 SYRINGE (ML) INTRAVENOUS ONCE
Refills: 0 | Status: DISCONTINUED | OUTPATIENT
Start: 2021-04-09 | End: 2021-04-28

## 2021-04-09 RX ORDER — GLUCAGON INJECTION, SOLUTION 0.5 MG/.1ML
1 INJECTION, SOLUTION SUBCUTANEOUS ONCE
Refills: 0 | Status: DISCONTINUED | OUTPATIENT
Start: 2021-04-09 | End: 2021-04-28

## 2021-04-09 RX ORDER — SENNA PLUS 8.6 MG/1
2 TABLET ORAL
Refills: 0 | Status: DISCONTINUED | OUTPATIENT
Start: 2021-04-09 | End: 2021-04-18

## 2021-04-09 RX ORDER — HYDROMORPHONE HYDROCHLORIDE 2 MG/ML
6 INJECTION INTRAMUSCULAR; INTRAVENOUS; SUBCUTANEOUS EVERY 4 HOURS
Refills: 0 | Status: DISCONTINUED | OUTPATIENT
Start: 2021-04-09 | End: 2021-04-15

## 2021-04-09 RX ORDER — DRONABINOL 2.5 MG
2.5 CAPSULE ORAL THREE TIMES A DAY
Refills: 0 | Status: DISCONTINUED | OUTPATIENT
Start: 2021-04-09 | End: 2021-04-15

## 2021-04-09 RX ORDER — POLYETHYLENE GLYCOL 3350 17 G/17G
17 POWDER, FOR SOLUTION ORAL DAILY
Refills: 0 | Status: DISCONTINUED | OUTPATIENT
Start: 2021-04-09 | End: 2021-04-13

## 2021-04-09 RX ORDER — INSULIN LISPRO 100/ML
6 VIAL (ML) SUBCUTANEOUS
Refills: 0 | Status: DISCONTINUED | OUTPATIENT
Start: 2021-04-09 | End: 2021-04-10

## 2021-04-09 RX ORDER — POTASSIUM CHLORIDE 20 MEQ
40 PACKET (EA) ORAL ONCE
Refills: 0 | Status: COMPLETED | OUTPATIENT
Start: 2021-04-09 | End: 2021-04-09

## 2021-04-09 RX ORDER — DEXTROSE 50 % IN WATER 50 %
12.5 SYRINGE (ML) INTRAVENOUS ONCE
Refills: 0 | Status: COMPLETED | OUTPATIENT
Start: 2021-04-09 | End: 2021-04-09

## 2021-04-09 RX ORDER — INSULIN GLARGINE 100 [IU]/ML
7 INJECTION, SOLUTION SUBCUTANEOUS AT BEDTIME
Refills: 0 | Status: DISCONTINUED | OUTPATIENT
Start: 2021-04-09 | End: 2021-04-10

## 2021-04-09 RX ADMIN — FENTANYL CITRATE 1 PATCH: 50 INJECTION INTRAVENOUS at 10:42

## 2021-04-09 RX ADMIN — Medication 2 UNIT(S): at 09:11

## 2021-04-09 RX ADMIN — POLYETHYLENE GLYCOL 3350 17 GRAM(S): 17 POWDER, FOR SOLUTION ORAL at 12:23

## 2021-04-09 RX ADMIN — ENOXAPARIN SODIUM 40 MILLIGRAM(S): 100 INJECTION SUBCUTANEOUS at 12:23

## 2021-04-09 RX ADMIN — INSULIN GLARGINE 7 UNIT(S): 100 INJECTION, SOLUTION SUBCUTANEOUS at 22:15

## 2021-04-09 RX ADMIN — Medication 3: at 12:23

## 2021-04-09 RX ADMIN — Medication 2.5 MILLIGRAM(S): at 15:37

## 2021-04-09 RX ADMIN — FENTANYL CITRATE 1 PATCH: 50 INJECTION INTRAVENOUS at 15:38

## 2021-04-09 RX ADMIN — Medication 2.5 MILLIGRAM(S): at 22:15

## 2021-04-09 RX ADMIN — Medication 12.5 GRAM(S): at 18:07

## 2021-04-09 RX ADMIN — SENNA PLUS 2 TABLET(S): 8.6 TABLET ORAL at 17:48

## 2021-04-09 RX ADMIN — Medication 6 UNIT(S): at 12:23

## 2021-04-09 RX ADMIN — Medication 40 MILLIEQUIVALENT(S): at 17:48

## 2021-04-09 RX ADMIN — HYDROMORPHONE HYDROCHLORIDE 4 MILLIGRAM(S): 2 INJECTION INTRAMUSCULAR; INTRAVENOUS; SUBCUTANEOUS at 11:40

## 2021-04-09 RX ADMIN — FENTANYL CITRATE 1 PATCH: 50 INJECTION INTRAVENOUS at 18:01

## 2021-04-09 RX ADMIN — HYDROMORPHONE HYDROCHLORIDE 4 MILLIGRAM(S): 2 INJECTION INTRAMUSCULAR; INTRAVENOUS; SUBCUTANEOUS at 10:43

## 2021-04-09 NOTE — H&P ADULT - NSHPPHYSICALEXAM_GEN_ALL_CORE
VITAL SIGNS:  T(F): 99 (04-09-21 @ 06:02), Max: 99 (04-09-21 @ 06:02)  HR: 81 (04-09-21 @ 06:02) (72 - 93)  BP: 138/58 (04-09-21 @ 06:02) (91/48 - 138/58)  BP(mean): --  RR: 18 (04-09-21 @ 06:02) (17 - 20)  SpO2: 99% (04-09-21 @ 06:02) (99% - 100%)    PHYSICAL EXAM:  Constitutional: Thin appearing middle aged woman appears older than stated age, eating, comfortable in bed   HEENT: anicteric sclera, no nasal discharge; no oropharyngeal erythema or exudates; slightly dry mucous membranes   Neck: supple; no JVD   Respiratory: CTA B/L; no W/R/R, no retractions  Cardiac: +S1/S2; RRR; no M/R/G   Gastrointestinal: soft, NT/ND; no rebound or guarding; +BSx4  Genitourinary: no suprapubic tenderness, no CVA tenderness   Back: spine midline, no paraspinal or spinal tenderness   Extremities: 2+ ankle and foot edema   Musculoskeletal: no joint swelling, erythema  Vascular: 2+ radial   Dermatologic: skin warm, dry and intact; no rashes, wounds, or scars  Lymphatic: no submandibular or cervical LAD  Neurologic: AAOx3; CNII-XII grossly intact; 2/5 strength bilateral lower extremities proximally, 1/5 strength left lower extremity distally, 2/5 right lower extremity distally   Psychiatric: affect and characteristics of appearance, verbalizations, behaviors are appropriate

## 2021-04-09 NOTE — H&P ADULT - PROBLEM SELECTOR PLAN 2
Patient w/ type II diabetes uncontrolled, last A1C 21.5   - per [facility] patient takes 9u of basaglar, novolog 8u before meals   - will reduce home dose given likely will not eat the same way here as at home. Will dose for basaglar 7u and novolog 6U TID. TERI    - A1C in AM   - lipid profile in AM

## 2021-04-09 NOTE — CONSULT NOTE ADULT - ASSESSMENT
Patient is a 70 year old female with past medical history of diabetes, pancreatic cancer w/ mets to lung and liver on chemo currently admitted for LE pain and weakness. Palliative Medicine was consulted to evaluate and manage complex symptomatology related to the patient's life-limiting illness

## 2021-04-09 NOTE — CONSULT NOTE ADULT - PROBLEM SELECTOR RECOMMENDATION 6
Thank you for allowing us to participate in your patient's care. We will continue to follow with you. Please page [phone] or contact us via Microsoft Teams for any q's or c's.     Mehul Day  Palliative Medicine

## 2021-04-09 NOTE — CONSULT NOTE ADULT - SUBJECTIVE AND OBJECTIVE BOX
HPI:  Patient is a 70 year old female with past medical history of diabetes, pancreatic cancer w/ mets to lung and liver on chemo currently admitted for LE pain and weakness. Palliative Medicine was consulted to evaluate and manage complex symptomatology related to the patient's life-limiting illness    =======================================================  4/9/2021    - Chart reviewed. Patient seen and examined.  - The patient reported pain in her legs, which is new since last week. This was associated with weakness.  - Discussed Fentanyl patch (which she had been on) and increasing Dilaudid PO. She understands and agrees with pain management plan  - All her q's and 'cs addressed at bedside    =======================================================  ----- SYMPTOM ASSESSMENT:   [ ]Unable to obtain due to poor mentation: information obtained from team/ contact    PAIN ASSESSMENT:   Site- BOTH LEGS FROM KNEES DOWN  Onset- ACUTE   Character- NUMBNESS  Radiation- NONE  Associated symptoms- NONE  Timing and duration- INTERMITTENT SPIKES THROUGHOUT THE DAY  Exacerbating factors  Severity- MOD    Effect on QOL- SIGNIFICANTLY INTERFERES WITH ADL'S  PAIN AD Score: 0    Dyspnea:  Yes [ ] No [X ] - [ ]Mild [ ]Moderate [ ]Severe  Anxiety:    Yes [ ] No [ X] - [ ]Mild [ ]Moderate [ ]Severe  Fatigue:    Yes [ ] No [X ] - [ ]Mild [ ]Moderate [ ]Severe  Nausea:    Yes [ ] No [ X] - [ ]Mild [ ]Moderate [ ]Severe                         Loss of appetite: Yes [ ] No [X ] - [ ]Mild [ ]Moderate [ ]Severe             Constipation:  Yes [ ] No [X ] - [ ]Mild [ ]Moderate [ ]Severe  Grief: Yes [ ] No [X ]     [X ]All other review of systems negative, including: weakness, cough, colds, blurry vision, headaches, dysuria, pruritus, palpitations, muscle cramps, easy bruising, epistaxis, rashes    =======================================================  ----- PERTINENT PMH/ SXH/ FHX  Type 2 diabetes mellitus    Pancreatic cancer      S/P tendon repair      FAMILY HISTORY:  Family history of liver cancer  Sister    Family history of cardiac arrest (Sibling)    FH: aneurysm (Sibling)        ----- SOCIAL HISTORY:   [ ] Unable to elicit  Significant other/partner:    Children:   Zoroastrian/Spirituality:  Substance hx: Yes[ ]  No [ ]   Tobacco hx:  Yes [ ] No [ ]   Alcohol hx: Yes [ ] No [ ]   Home Opioid hx:  [ ] I-Stop Reference No:  Living Situation: [x ]Home  [ ]Long term care  [ ]Rehab [ ]Other    ----- ADVANCE DIRECTIVES:    DNR  MOLST  [ ]  Living Will  [ ]   DECISION MAKER(s):  [ ] Health Care Proxy(s)  [ ] Surrogate(s)  [ ] Guardian           Name(s): Phone Number(s):    ----- BASELINE (I)ADL(s) (prior to admission):  [place]: [ ]Total  [ ] Moderate [ ]Dependent  Palliative Performance Status Version 2:  60      =======================================================  -----MEDICATIONS AND ALLERGIES:  Allergies    No Known Allergies    Intolerances    MEDICATIONS  (STANDING):  dextrose 40% Gel 15 Gram(s) Oral once  dextrose 5%. 1000 milliLiter(s) (50 mL/Hr) IV Continuous <Continuous>  dextrose 5%. 1000 milliLiter(s) (100 mL/Hr) IV Continuous <Continuous>  dextrose 50% Injectable 25 Gram(s) IV Push once  dextrose 50% Injectable 12.5 Gram(s) IV Push once  dextrose 50% Injectable 25 Gram(s) IV Push once  dronabinol 2.5 milliGRAM(s) Oral three times a day  enoxaparin Injectable 40 milliGRAM(s) SubCutaneous daily  fentaNYL   Patch  12 MICROgram(s)/Hr 1 Patch Transdermal every 72 hours  glucagon  Injectable 1 milliGRAM(s) IntraMuscular once  insulin glargine Injectable (LANTUS) 7 Unit(s) SubCutaneous at bedtime  insulin lispro (ADMELOG) corrective regimen sliding scale   SubCutaneous three times a day before meals  insulin lispro (ADMELOG) corrective regimen sliding scale   SubCutaneous at bedtime  insulin lispro Injectable (ADMELOG) 6 Unit(s) SubCutaneous three times a day before meals  polyethylene glycol 3350 17 Gram(s) Oral daily  senna 2 Tablet(s) Oral at bedtime    MEDICATIONS  (PRN):  HYDROmorphone   Tablet 4 milliGRAM(s) Oral every 4 hours PRN Moderate Pain (4 - 6)  HYDROmorphone   Tablet 6 milliGRAM(s) Oral every 4 hours PRN Severe Pain (7 - 10)  HYDROmorphone  Injectable 0.5 milliGRAM(s) IV Push every 6 hours PRN breakthrough pain in case PO dilaudid does not work      =======================================================  ----- PHYSICAL EXAM:  Vital Signs Last 24 Hrs  T(C): 37.2 (09 Apr 2021 06:02), Max: 37.2 (09 Apr 2021 06:02)  T(F): 99 (09 Apr 2021 06:02), Max: 99 (09 Apr 2021 06:02)  HR: 81 (09 Apr 2021 06:02) (72 - 93)  BP: 138/58 (09 Apr 2021 06:02) (91/48 - 138/58)  BP(mean): --  RR: 18 (09 Apr 2021 06:02) (17 - 20)  SpO2: 99% (09 Apr 2021 06:02) (99% - 100%) I&O's Summary    09 Apr 2021 07:01  -  09 Apr 2021 11:49  --------------------------------------------------------  IN: 0 mL / OUT: 1500 mL / NET: -1500 mL    GEN: Awake, alert, NAD, CACHECTIC  HEAD: Normocephalic and atraumatic,   EYES: Anicteric sclerae, EOM's grossly intact  NECK: No JVD, no thyromegaly  PULM: Comfortable work of breathing, clear BS  CV: Pulses 2+ symmetric bilaterally, regular rate and rhythm  ABD: Not distended, soft, non-tender,   EXT: +TENDERNESS TO PALPATION BOTH LEGS, +1 EDEMA, MMT 2/5  PSYCH: Appropriate mood and affect, no suicidal ideations elicited  NEURO: No facial asymmetry, no tremors, no observed movement disorders  SKIN: No rashes or lesions on exposed skin, No jaundice    =======================================================  ----- LABS:                        8.8    9.79  )-----------( 215      ( 08 Apr 2021 22:39 )             27.4   04-08    141  |  103  |  30<H>  ----------------------------<  224<H>  3.5   |  27  |  0.69    Ca    8.7      08 Apr 2021 21:49    TPro  7.0  /  Alb  3.3  /  TBili  0.5  /  DBili  x   /  AST  43<H>  /  ALT  29  /  AlkPhos  101  04-08  PT/INR - ( 08 Apr 2021 21:49 )   PT: 11.7 sec;   INR: 1.02 ratio         PTT - ( 08 Apr 2021 21:49 )  PTT:26.1 sec  Urinalysis Basic - ( 09 Apr 2021 07:24 )    Color: Yellow / Appearance: Clear / SG: >1.050 / pH: x  Gluc: x / Ketone: Negative  / Bili: Negative / Urobili: 3 mg/dL   Blood: x / Protein: 30 mg/dL / Nitrite: Negative   Leuk Esterase: Moderate / RBC: 3 /HPF / WBC 44 /HPF   Sq Epi: x / Non Sq Epi: 1 /HPF / Bacteria: Negative        -----RADIOLOGY & ADDITIONAL STUDIES:    PROTEIN CALORIE MALNUTRITION PRESENT: [ ] Yes [ ] No  [ ] PPSV2 < or = to 30% [ ] significant weight loss  [ ] poor nutritional intake [ ] catabolic state [ ] anasarca     Albumin, Serum: 3.3 g/dL (04-08-21 @ 21:49)      REFERRALS:   [ ]Chaplaincy  [ ] Hospice  [ ]Child Life  [ ]Social Work  [ ]Case management [ ]Holistic Therapy   Goals of Care Discussion Document:     ************************************************************************  PALLIATIVE MEDICINE COORDINATION OF CARE DOCUMENTATION  [x] Inpatient Consult  [ ] Other:  ************************************************************************  MEDICATION REVIEW:  --- Pls refer to current medicatons in the body of this note  ISTOP REFERENCE:  [id number]  --- PRN usage:   ------------------------------------------------------------------------  COORDINATION OF CARE:  --- Palliative Care consulted for: PAIN MANAGEMENT  --- Patient assessed: 4/9  --- Patient previously seen by Palliative Care service: NO  ADVANCE CARE PLANNING  --- Code status: FULL  --- MOLST reviewed in chart: NONE  --- HCP/ Surrogate:   --- GOC document found in [facility]: NONE  --- HCP/ Living will/ Other advanced directives in [facility]: NONE  ------------------------------------------------------------------------  CARE PROVIDER DOCUMENTATION:  --- Discussed in 8N rounds  --- CT reviewed  --- ONC: Pending  PLAN OF CARE  --- Known admissions in past year: 2  --- Current admit date: 4/9  --- LOS: 0  --- LACE score: 10  --- Current dispo plan: TO BE DETERMINED  ------------------------------------------------------------------------  --- Chart reviewed: 30 Minutes [including time used to gather, review and transfer data to this note]  --- Start: 10:00  --- End: 10:30  Prolonged services rendered, as part of this patient's care provided by Palliative Medicine, include: i.chart review for provider and ancillary service documentation, ii.pertinent diagnostics including laboratory and imaging studies,iii. medication review including PRN use, iv. admission history including previous palliative care encounters and C notes, v.advance care planning documents including HCP and MOLST forms in [facility]. Part of Palliative Medicine extended evaluation and management also involves coordination of care with our IDT, the primary and consulting jing, and unit CM/SW and Hospice if eligible. Recommendations based on the information gathered and discussed are outline in the AP of our notes.    ************************************************************************

## 2021-04-09 NOTE — H&P ADULT - NSICDXPASTSURGICALHX_GEN_ALL_CORE_FT
Called pharmacy and clarified that prescription was ok'd by Disha. Pharmacist did verbalize understanding and agreement.   PAST SURGICAL HISTORY:  S/P tendon repair R foot, R elbow

## 2021-04-09 NOTE — CONSULT NOTE ADULT - ASSESSMENT
69 yo F with pancreatic cancer admitted for workup of new lower extremity weakness found to have severe left hydroureteronephrosis and incomplete emptying of bladder    - PVR >1300, place joy catheter  - continue joy catheter drainage, strict I/O's  - meets criteria if UOP >200/hr for 3 hours  - Repeat BMP q6 hours and replete electrolytes as needed  - Drink to thrist, place 2 liters of water at bedside at all times  - avoid normal saline, if IVF required, prefer hypotonic solutions and replacement of only 1/2 outputs  - follow-up MRI for possible spinal cause  - treat constipation as possible cause

## 2021-04-09 NOTE — H&P ADULT - PROBLEM SELECTOR PLAN 6
Lovenox DVT prophylaxis patient w/ new ankle and foot edema unclear etiology.  Doppler w/o DVT.   - abraxane, gemzar can cause lower extremity edema   - will get BNP in AM, lungs clear. If BNP elevated will order TTE.  - incidentally discovered partial fem art occlusion, VA duplex arterial pending

## 2021-04-09 NOTE — CHART NOTE - NSCHARTNOTEFT_GEN_A_CORE
Pt with asymptomatic hypoglycemia, blood glucose 53, received admelog 2unit @0854, ?poor PO intake, was off floor for testing. Pt received Oral juice, blood glucose improved to 66, s/p 12.5g D50W IV. Pt with asymptomatic hypoglycemia, blood glucose 53, received admelog 2unit @0854, ?poor PO intake, was off floor for testing. Pt received Oral juice, blood glucose improved to 66, s/p 12.5g D50W IV, blood glucose improved to 211. Will continue to monitor.

## 2021-04-09 NOTE — H&P ADULT - PROBLEM SELECTOR PLAN 3
Patient w/ pancreatic cancer per outpatient notes and patient was pending outpatient MRI abdomen to assess liver mets.   - will order inpatient given patient to have MRI thoracic/lumbar spine inpatient   - oncology notified of admission

## 2021-04-09 NOTE — H&P ADULT - HISTORY OF PRESENT ILLNESS
Patient is a 70 year old female with past medical history of diabetes, pancreatic cancer w/ mets to lung and liver on chemo last session Monday (9th session, abraxane/gemzar) coming in w/ inability to ambulate and independently take care of ADLs since Monday. Patient reports she ambulates w/ a walker at baseline, however she does so independently. Patient now w/ issues even standing up with the walker. States her weakness is associated w/ b/l lower extremity pain. Pain is 9/10 in severity and is aching. Patient was taking her home fentanyl and Dilaudid however this did not help symptoms. Patient states she lives alone w/o HHA. Patient states she has had some difficulty completely emptying her bladder however denies any urinary incontinence. Patient denies any fecal incontinence. States she is moving her bowels however her BMs have been small. Patient denies any saddle anesthesia.   ED Course: T 98.2, R 17, HR 93, BP 91/48. Patient Labs notable for BUN 30. Patient hyperglycemic on FSG. Patient AST midly elevated 43. Patient Hemoglobin 8.8 at baseline. Patient urine concentrated 1.050 otherwise negative for UTI. Patient had a CT thoracic and lumbar spine, with chronic severe left hydronephrosis. Also w/ large stool burden otherwise unremarkable. Patient given 1L NS. Patient admitted for evaluation of lower extremity weakness and PT eval for inability to ambulate.

## 2021-04-09 NOTE — H&P ADULT - ASSESSMENT
Patient is a 70 year old female with past medical history of diabetes, pancreatic cancer w/ mets to lung and liver on chemo last session Monday (9th session, abraxane/gemzar) coming in w/ inability to ambulate and independently take care of ADLs since Monday.

## 2021-04-09 NOTE — H&P ADULT - NSICDXFAMILYHX_GEN_ALL_CORE_FT
FAMILY HISTORY:  Family history of liver cancer, Sister    Sibling  Still living? Unknown  Family history of cardiac arrest, Age at diagnosis: Age Unknown  FH: aneurysm, Age at diagnosis: Age Unknown

## 2021-04-09 NOTE — H&P ADULT - PROBLEM SELECTOR PLAN 4
Patient w/ large stool burden on CT likely in setting of opioid prescription.   - miralax daily   - senna 2 tabs at night   - CTM

## 2021-04-09 NOTE — CONSULT NOTE ADULT - PROBLEM SELECTOR RECOMMENDATION 9
.  Home meds (as per pt):  Fentanyl patch 12mcg  Dilaudid 2mg PO q6h PRN    > I will take the liberty of ordering: Fent patch 12.5mcg/hr + Dilaudid 4-6mg PO q4h PRN + Clinician bolus Dilaudid 0.5mg IV q6h PRN  > The above plan was discussed with patient

## 2021-04-09 NOTE — CONSULT NOTE ADULT - PROBLEM SELECTOR RECOMMENDATION 2
.  Large stool burden on CT    > Senna 2 tabs PO BID  > Pls offer suppository or enema of no BM by tomorrow

## 2021-04-09 NOTE — H&P ADULT - NSHPLABSRESULTS_GEN_ALL_CORE
LABS:                         8.8    9.79  )-----------( 215      ( 08 Apr 2021 22:39 )             27.4     04-08    141  |  103  |  30<H>  ----------------------------<  224<H>  3.5   |  27  |  0.69    Ca    8.7      08 Apr 2021 21:49    TPro  7.0  /  Alb  3.3  /  TBili  0.5  /  DBili  x   /  AST  43<H>  /  ALT  29  /  AlkPhos  101  04-08    PT/INR - ( 08 Apr 2021 21:49 )   PT: 11.7 sec;   INR: 1.02 ratio         PTT - ( 08 Apr 2021 21:49 )  PTT:26.1 sec  Urinalysis Basic - ( 09 Apr 2021 07:24 )    Color: Yellow / Appearance: Clear / SG: >1.050 / pH: x  Gluc: x / Ketone: Negative  / Bili: Negative / Urobili: 3 mg/dL   Blood: x / Protein: 30 mg/dL / Nitrite: Negative   Leuk Esterase: Moderate / RBC: 3 /HPF / WBC 44 /HPF   Sq Epi: x / Non Sq Epi: 1 /HPF / Bacteria: Negative                RADIOLOGY, EKG & ADDITIONAL TESTS: Reviewed.     CT Thoracic/Lumbar:  Generalized osteopenia.  No suspicious focal lytic or sclerotic bone lesions.  The thoracic kyphosis and lumbar lordosis is maintained. No prevertebral soft tissue swelling.  No central canal stenosis within the limitations of CT.  Spondylosis of the thoracic and lumbar spine characterized by scattered osteophyte formation.  Vertebral body heights and alignment are maintained without compression or subluxation.  Mild bilateral facet hypertrophy without dislocation of the thoracic or lumbar spine. No neural foraminal narrowing.  Atherosclerotic calcification of the abdominal aorta and major branch vessels is noted.  Evaluation of the intrathoracic and intra-abdominal contents significant for chronic left hydronephrosis to the level of a dilated and thickened urinary bladder.  Large stool in the rectum.  IMPRESSION:  No acute displaced fracture or traumatic malalignment.  No suspicious focal lytic or sclerotic lesions.  Chronic severe left hydronephrosis. Thickened urinary bladder likely related to chronic outlet obstruction.    US Duplex:   COMPARISON: None available.  TECHNIQUE: Duplex sonography of the BILATERAL LOWER extremity veins with color and spectral Doppler, with and without compression.  FINDINGS:  RIGHT:  Normal compressibility of the RIGHT common femoral, femoral and popliteal veins.  Doppler examination shows normal spontaneous and phasic flow.  No RIGHT calf vein thrombosis is detected.  LEFT:  Normal compressibility of the LEFT common femoral, femoral and popliteal veins.  Doppler examination shows normal spontaneous and phasic flow.  No LEFT calf vein thrombosis is detected.  Incidental note of partially occlusive bilateral femoral artery thromboses. Additional hyperechoic foci in the walls of the femoral arteries may be related to stents however may also represent calcified arterial walls.  IMPRESSION:  No evidence of deep venous thrombosis in either lower extremity.  Incidental note of partially thrombosed bilateral femoral arteries. Question stents versus circumferential arterial wall calcification.  Correlation with physical exam is recommended. Further evaluation with dedicated arterial Doppler as clinically warranted.

## 2021-04-09 NOTE — CONSULT NOTE ADULT - SUBJECTIVE AND OBJECTIVE BOX
Patient is a 70y old  Female who presents with a chief complaint of Lower Extremity Weakness, Inability to Ambulate (09 Apr 2021 11:04)      HPI:  Patient is a 70 year old female with past medical history of diabetes, pancreatic cancer w/ mets to lung and liver on chemo last session Monday (9th session, abraxane/gemzar) coming in w/ inability to ambulate and independently take care of ADLs since Monday. Patient reports she ambulates w/ a walker at baseline, however she does so independently. Patient now w/ issues even standing up with the walker. States her weakness is associated w/ b/l lower extremity pain. Pain is 9/10 in severity and is aching. Patient was taking her home fentanyl and Dilaudid however this did not help symptoms. Patient states she lives alone w/o HHA. Patient states she has had some difficulty completely emptying her bladder however denies any urinary incontinence. Patient denies any fecal incontinence. States she is moving her bowels however her BMs have been small. Patient denies any saddle anesthesia.   ED Course: T 98.2, R 17, HR 93, BP 91/48. Patient Labs notable for BUN 30. Patient hyperglycemic on FSG. Patient AST midly elevated 43. Patient Hemoglobin 8.8 at baseline. Patient urine concentrated 1.050 otherwise negative for UTI. Patient had a CT thoracic and lumbar spine, with chronic severe left hydronephrosis. Also w/ large stool burden otherwise unremarkable. Patient given 1L NS. Patient admitted for evaluation of lower extremity weakness and PT eval for inability to ambulate.  (09 Apr 2021 09:30)       ROS: as above     PAST MEDICAL & SURGICAL HISTORY:  Type 2 diabetes mellitus    Pancreatic cancer  mets to lung and liver    S/P tendon repair  R foot, R elbow        SOCIAL HISTORY:    FAMILY HISTORY:  Family history of liver cancer  Sister    Family history of cardiac arrest (Sibling)    FH: aneurysm (Sibling)        MEDICATIONS  (STANDING):  dextrose 40% Gel 15 Gram(s) Oral once  dextrose 5%. 1000 milliLiter(s) (50 mL/Hr) IV Continuous <Continuous>  dextrose 5%. 1000 milliLiter(s) (100 mL/Hr) IV Continuous <Continuous>  dextrose 50% Injectable 25 Gram(s) IV Push once  dextrose 50% Injectable 12.5 Gram(s) IV Push once  dextrose 50% Injectable 25 Gram(s) IV Push once  dronabinol 2.5 milliGRAM(s) Oral three times a day  enoxaparin Injectable 40 milliGRAM(s) SubCutaneous daily  fentaNYL   Patch  12 MICROgram(s)/Hr 1 Patch Transdermal every 72 hours  glucagon  Injectable 1 milliGRAM(s) IntraMuscular once  insulin glargine Injectable (LANTUS) 7 Unit(s) SubCutaneous at bedtime  insulin lispro (ADMELOG) corrective regimen sliding scale   SubCutaneous three times a day before meals  insulin lispro (ADMELOG) corrective regimen sliding scale   SubCutaneous at bedtime  insulin lispro Injectable (ADMELOG) 6 Unit(s) SubCutaneous three times a day before meals  polyethylene glycol 3350 17 Gram(s) Oral daily  senna 2 Tablet(s) Oral at bedtime    MEDICATIONS  (PRN):  HYDROmorphone   Tablet 4 milliGRAM(s) Oral every 4 hours PRN Moderate Pain (4 - 6)  HYDROmorphone   Tablet 6 milliGRAM(s) Oral every 4 hours PRN Severe Pain (7 - 10)  HYDROmorphone  Injectable 0.5 milliGRAM(s) IV Push every 6 hours PRN breakthrough pain in case PO dilaudid does not work      Allergies    No Known Allergies    Intolerances        Vital Signs Last 24 Hrs  T(C): 37.2 (09 Apr 2021 06:02), Max: 37.2 (09 Apr 2021 06:02)  T(F): 99 (09 Apr 2021 06:02), Max: 99 (09 Apr 2021 06:02)  HR: 81 (09 Apr 2021 06:02) (72 - 93)  BP: 138/58 (09 Apr 2021 06:02) (91/48 - 138/58)  BP(mean): --  RR: 18 (09 Apr 2021 06:02) (17 - 20)  SpO2: 99% (09 Apr 2021 06:02) (99% - 100%)    LABS:                          8.8    9.79  )-----------( 215      ( 08 Apr 2021 22:39 )             27.4         Mean Cell Volume : 96.8 fL  Mean Cell Hemoglobin : 31.1 pg  Mean Cell Hemoglobin Concentration : 32.1 gm/dL  Auto Neutrophil # : 9.03 K/uL  Auto Lymphocyte # : 0.68 K/uL  Auto Monocyte # : 0.00 K/uL  Auto Eosinophil # : 0.09 K/uL  Auto Basophil # : 0.00 K/uL  Auto Neutrophil % : 89.6 %  Auto Lymphocyte % : 6.9 %  Auto Monocyte % : 0.0 %  Auto Eosinophil % : 0.9 %  Auto Basophil % : 0.0 %      Serial CBC's  04-08 @ 22:39  Hct-27.4 / Hgb-8.8 / Plat-215 / RBC-2.83 / WBC-9.79  Serial CBC's  04-05 @ 13:57  Hct-26.0 / Hgb-8.8 / Plat-311 / RBC-2.77 / WBC-4.22      04-08    141  |  103  |  30<H>  ----------------------------<  224<H>  3.5   |  27  |  0.69    Ca    8.7      08 Apr 2021 21:49    TPro  7.0  /  Alb  3.3  /  TBili  0.5  /  DBili  x   /  AST  43<H>  /  ALT  29  /  AlkPhos  101  04-08      PT/INR - ( 08 Apr 2021 21:49 )   PT: 11.7 sec;   INR: 1.02 ratio         PTT - ( 08 Apr 2021 21:49 )  PTT:26.1 sec                RADIOLOGY & ADDITIONAL STUDIES:

## 2021-04-09 NOTE — H&P ADULT - PROBLEM SELECTOR PLAN 1
Patient presenting w/ lower extremity weakness L>R distally in the setting of metastatic pancreatic cancer. Notable Left foot drop. W/o known spinal mets. No spinal mets seen on CT.   - given asymmetry of weakness MR spine thoracic/lumbar w/ and w/o urgent to assess for spinal cord compression   - PT eval   - neuro vs neuro surg eval pending MRI spine results   - given associated pain w/ weakness pal consult for assistance w/ pain control in the setting of malignancy Patient presenting w/ lower extremity weakness L>R distally in the setting of metastatic pancreatic cancer. Notable Left foot drop. W/o known spinal mets. No spinal mets seen on CT.   - given asymmetry of weakness MR spine thoracic/lumbar w/ and w/o urgent to assess for spinal cord compression   - PT eval   - neuro vs neuro surg eval pending MRI spine results   - given associated pain w/ weakness pal consult for assistance w/ pain control in the setting of malignancy  - TSH ordered for AM

## 2021-04-09 NOTE — H&P ADULT - NSHPREVIEWOFSYSTEMS_GEN_ALL_CORE
REVIEW OF SYSTEMS:  CONSTITUTIONAL: Patient denies weakness, fevers or chills  EYES/ENT: Patient denies visual changes;  denies vertigo or throat pain   NECK: Patient denies pain or stiffness  RESPIRATORY: Patient denies cough, wheezing, hemoptysis; denies shortness of breath  CARDIOVASCULAR: Patient denies chest pain or palpitations  GASTROINTESTINAL: Patient denies abdominal or epigastric pain, nausea, vomiting, or hematemesis, diarrhea or constipation, melena or hematochezia.  GENITOURINARY: Patient denies dysuria, frequency or hematuria  NEUROLOGICAL: Patient denies numbness or weakness  SKIN: Patient denies itching, burning, rashes, or lesions   All other review of systems is negative unless indicated above. REVIEW OF SYSTEMS:  CONSTITUTIONAL: + lower extremity weakness, denies fevers or chills  EYES/ENT: Patient denies visual changes;  denies vertigo or throat pain   NECK: Patient denies pain or stiffness  RESPIRATORY: Patient denies cough, wheezing, hemoptysis; denies shortness of breath  CARDIOVASCULAR: Patient denies chest pain or palpitations. + lower extremity edema  GASTROINTESTINAL: Patient denies abdominal or epigastric pain, nausea, vomiting, or hematemesis, diarrhea, melena or hematochezia. + small BM's, constipation   GENITOURINARY: Patient denies dysuria, frequency or hematuria, +incomplete emptying   NEUROLOGICAL: Patient denies numbness, + lower extremity weakness   SKIN: Patient denies itching, burning, rashes, or lesions   All other review of systems is negative unless indicated above.

## 2021-04-09 NOTE — CONSULT NOTE ADULT - SUBJECTIVE AND OBJECTIVE BOX
HPI  70 year old female with past medical history of diabetes, pancreatic cancer w/ mets to lung and liver on chemo last session Monday (9th session, abraxane/gemzar) coming in w/ inability to ambulate and independently take care of ADLs since Monday. Patient reports she ambulates w/ a walker at baseline, however she does so independently. Patient now w/ issues even standing up with the walker. States her weakness is associated w/ b/l lower extremity pain. Pain is 9/10 in severity and is aching. Patient was taking her home fentanyl and Dilaudid however this did not help symptoms. Patient states she lives alone w/o HHA. Patient states she has had some difficulty completely emptying her bladder however denies any urinary incontinence. Patient denies any fecal incontinence. States she is moving her bowels however her BMs have been small. Patient denies any saddle anesthesia.     PAST MEDICAL & SURGICAL HISTORY:  Type 2 diabetes mellitus    Pancreatic cancer  mets to lung and liver    S/P tendon repair  R foot, R elbow        MEDICATIONS  (STANDING):  dextrose 40% Gel 15 Gram(s) Oral once  dextrose 5%. 1000 milliLiter(s) (50 mL/Hr) IV Continuous <Continuous>  dextrose 5%. 1000 milliLiter(s) (100 mL/Hr) IV Continuous <Continuous>  dextrose 50% Injectable 25 Gram(s) IV Push once  dextrose 50% Injectable 12.5 Gram(s) IV Push once  dextrose 50% Injectable 25 Gram(s) IV Push once  dronabinol 2.5 milliGRAM(s) Oral three times a day  enoxaparin Injectable 40 milliGRAM(s) SubCutaneous daily  fentaNYL   Patch  12 MICROgram(s)/Hr 1 Patch Transdermal every 72 hours  glucagon  Injectable 1 milliGRAM(s) IntraMuscular once  insulin glargine Injectable (LANTUS) 7 Unit(s) SubCutaneous at bedtime  insulin lispro (ADMELOG) corrective regimen sliding scale   SubCutaneous three times a day before meals  insulin lispro (ADMELOG) corrective regimen sliding scale   SubCutaneous at bedtime  insulin lispro Injectable (ADMELOG) 6 Unit(s) SubCutaneous three times a day before meals  polyethylene glycol 3350 17 Gram(s) Oral daily  senna 2 Tablet(s) Oral at bedtime    MEDICATIONS  (PRN):  HYDROmorphone   Tablet 4 milliGRAM(s) Oral every 4 hours PRN Moderate Pain (4 - 6)  HYDROmorphone   Tablet 6 milliGRAM(s) Oral every 4 hours PRN Severe Pain (7 - 10)  HYDROmorphone  Injectable 0.5 milliGRAM(s) IV Push every 6 hours PRN breakthrough pain in case PO dilaudid does not work      FAMILY HISTORY:  Family history of liver cancer  Sister    Family history of cardiac arrest (Sibling)    FH: aneurysm (Sibling)        Allergies    No Known Allergies    Intolerances        SOCIAL HISTORY:    REVIEW OF SYSTEMS: Otherwise negative as stated in HPI    Physical Exam  Vital signs  T(C): 37.2 (04-09-21 @ 06:02), Max: 37.2 (04-09-21 @ 06:02)  HR: 81 (04-09-21 @ 06:02)  BP: 138/58 (04-09-21 @ 06:02)  SpO2: 99% (04-09-21 @ 06:02)  Wt(kg): --    Output    UOP    Gen:  NAD    Pulm:  No respiratory distress  	  CV:  RRR    GI:  S/ND/NT    :      MSK:      LABS:      04-08 @ 22:39    WBC 9.79  / Hct 27.4  / SCr --       04-08 @ 21:49    WBC --    / Hct --    / SCr 0.69     04-08    141  |  103  |  30<H>  ----------------------------<  224<H>  3.5   |  27  |  0.69    Ca    8.7      08 Apr 2021 21:49    TPro  7.0  /  Alb  3.3  /  TBili  0.5  /  DBili  x   /  AST  43<H>  /  ALT  29  /  AlkPhos  101  04-08    PT/INR - ( 08 Apr 2021 21:49 )   PT: 11.7 sec;   INR: 1.02 ratio         PTT - ( 08 Apr 2021 21:49 )  PTT:26.1 sec  Urinalysis Basic - ( 09 Apr 2021 07:24 )    Color: Yellow / Appearance: Clear / SG: >1.050 / pH: x  Gluc: x / Ketone: Negative  / Bili: Negative / Urobili: 3 mg/dL   Blood: x / Protein: 30 mg/dL / Nitrite: Negative   Leuk Esterase: Moderate / RBC: 3 /HPF / WBC 44 /HPF   Sq Epi: x / Non Sq Epi: 1 /HPF / Bacteria: Negative        Urine Cx:  Blood Cx:    RADIOLOGY:     HPI  70 year old female with past medical history of diabetes, pancreatic cancer w/ mets to lung and liver on chemo last session Monday (9th session, abraxane/gemzar) coming in w/ inability to ambulate and independently take care of ADLs since Monday. Patient reports she ambulates w/ a walker at baseline, however she does so independently. Patient now w/ issues even standing up with the walker. States her weakness is associated w/ b/l lower extremity pain. Pain is 9/10 in severity and is aching. Patient was taking her home fentanyl and Dilaudid however this did not help symptoms. Patient states she lives alone w/o HHA. Patient states she has had some difficulty completely emptying her bladder however denies any urinary incontinence. Patient denies any fecal incontinence. States she is moving her bowels however her BMs have been small. Patient denies any saddle anesthesia.     PAST MEDICAL & SURGICAL HISTORY:  Type 2 diabetes mellitus    Pancreatic cancer  mets to lung and liver    S/P tendon repair  R foot, R elbow        MEDICATIONS  (STANDING):  dextrose 40% Gel 15 Gram(s) Oral once  dextrose 5%. 1000 milliLiter(s) (50 mL/Hr) IV Continuous <Continuous>  dextrose 5%. 1000 milliLiter(s) (100 mL/Hr) IV Continuous <Continuous>  dextrose 50% Injectable 25 Gram(s) IV Push once  dextrose 50% Injectable 12.5 Gram(s) IV Push once  dextrose 50% Injectable 25 Gram(s) IV Push once  dronabinol 2.5 milliGRAM(s) Oral three times a day  enoxaparin Injectable 40 milliGRAM(s) SubCutaneous daily  fentaNYL   Patch  12 MICROgram(s)/Hr 1 Patch Transdermal every 72 hours  glucagon  Injectable 1 milliGRAM(s) IntraMuscular once  insulin glargine Injectable (LANTUS) 7 Unit(s) SubCutaneous at bedtime  insulin lispro (ADMELOG) corrective regimen sliding scale   SubCutaneous three times a day before meals  insulin lispro (ADMELOG) corrective regimen sliding scale   SubCutaneous at bedtime  insulin lispro Injectable (ADMELOG) 6 Unit(s) SubCutaneous three times a day before meals  polyethylene glycol 3350 17 Gram(s) Oral daily  senna 2 Tablet(s) Oral at bedtime    MEDICATIONS  (PRN):  HYDROmorphone   Tablet 4 milliGRAM(s) Oral every 4 hours PRN Moderate Pain (4 - 6)  HYDROmorphone   Tablet 6 milliGRAM(s) Oral every 4 hours PRN Severe Pain (7 - 10)  HYDROmorphone  Injectable 0.5 milliGRAM(s) IV Push every 6 hours PRN breakthrough pain in case PO dilaudid does not work      FAMILY HISTORY:  Family history of liver cancer  Sister    Family history of cardiac arrest (Sibling)    FH: aneurysm (Sibling)        Allergies    No Known Allergies    Intolerances        SOCIAL HISTORY:    REVIEW OF SYSTEMS: Otherwise negative as stated in HPI    Physical Exam  Vital signs  T(C): 37.2 (04-09-21 @ 06:02), Max: 37.2 (04-09-21 @ 06:02)  HR: 81 (04-09-21 @ 06:02)  BP: 138/58 (04-09-21 @ 06:02)  SpO2: 99% (04-09-21 @ 06:02)  Wt(kg): --    Output    UOP >500cc yellow    Gen:  NAD    Pulm:  No respiratory distress  	  CV:  RR    GI:  S/ND/NT; No CVAT    :  joy in place; No decreased sensation in groin or perineal area    MSK / Neuro   + weakness of b/l lower ext;  L > R ;  + L foot drop;  pulses 2+ b/l  + decreased sensation of L upper thigh      LABS:      04-08 @ 22:39    WBC 9.79  / Hct 27.4  / SCr --       04-08 @ 21:49    WBC --    / Hct --    / SCr 0.69     04-08    141  |  103  |  30<H>  ----------------------------<  224<H>  3.5   |  27  |  0.69    Ca    8.7      08 Apr 2021 21:49    TPro  7.0  /  Alb  3.3  /  TBili  0.5  /  DBili  x   /  AST  43<H>  /  ALT  29  /  AlkPhos  101  04-08    PT/INR - ( 08 Apr 2021 21:49 )   PT: 11.7 sec;   INR: 1.02 ratio         PTT - ( 08 Apr 2021 21:49 )  PTT:26.1 sec  Urinalysis Basic - ( 09 Apr 2021 07:24 )    Color: Yellow / Appearance: Clear / SG: >1.050 / pH: x  Gluc: x / Ketone: Negative  / Bili: Negative / Urobili: 3 mg/dL   Blood: x / Protein: 30 mg/dL / Nitrite: Negative   Leuk Esterase: Moderate / RBC: 3 /HPF / WBC 44 /HPF   Sq Epi: x / Non Sq Epi: 1 /HPF / Bacteria: Negative        Urine Cx: p  Blood Cx: p    RADIOLOGY:    < from: CT Lumbar Spine w/ IV Cont (04.09.21 @ 01:52) >    EXAM:  CT THORACIC SPINE IC      EXAM:  CT LUMBAR SPINE IC        PROCEDURE DATE:  Apr 9 2021         INTERPRETATION:  HISTORY: Bilateral lower extremity weakness with new bilateral lower extremity weakness.    COMPARISON: PET/CT 3/11/2021.    TECHNIQUE: Axial contrast enhanced CT images of the thoracic and lumbar spine were obtained. Coronal and sagittal reconstructions were performed. Bone and soft tissue windows were evaluated. 90 cc of Omnipaque 350 administered. 10 cc discarded.    FINDINGS:    Generalized osteopenia.  No suspicious focal lytic or sclerotic bone lesions.    The thoracic kyphosis and lumbar lordosis is maintained. No prevertebral soft tissue swelling.    No central canal stenosis within the limitations of CT.    Spondylosis of the thoracic and lumbar spine characterized by scattered osteophyte formation.    Vertebral body heights and alignment are maintained without compression or subluxation.    Mild bilateral facet hypertrophy without dislocation of the thoracic or lumbar spine. No neural foraminal narrowing.    Atherosclerotic calcification of the abdominal aorta and major branch vessels is noted.    Evaluation of the intrathoracic and intra-abdominal contents significant for chronic left hydronephrosis to the level of a dilated and thickened urinary bladder.    Large stool in the rectum.    IMPRESSION:    No acute displaced fracture or traumatic malalignment.  No suspicious focal lytic or sclerotic lesions.    Chronic severe left hydronephrosis. Thickened urinary bladder likely related to chronic outlet obstruction.            ONEL CAVAZOS MD; Resident Interventional Radiology  This document has been electronically signed.  GABBIE GARZA MD; Attending Radiologist  This document has been electronicallysigned. Apr 9 2021  3:23AM    < end of copied text >

## 2021-04-09 NOTE — CONSULT NOTE ADULT - ASSESSMENT
70f with metastatic pancreatic with  mets to lung and liver, on chemotherapy with gemcitabine and abraxane, presenting with LE swelling, heaviness and inability to walk since 2 days PTA.   LE doppers negative for VTE, however with ?arterial thrombus. PT and DP pulses not palpable. Legs are warm. She is not able to move leges antigravity.   spine CT with contrast without signs of spinal mets or cord compression.   Recent bone scan 3/25/2021 and recent PET/CT 3/11/2021 without evidence of bone mets. Given all the above the likelihood of spinal mets and cord compression is minimal.     -Arterial duplex pending  -Vascular consult  -Urology input appreciated  -Pal care input appreciated  -Consider neurology consult for LE weakness  -Daily labs  -Supportive care, pain control, Nutrition, PT, DVT ppx  -Outpatient oncology f/u    Will follow. Please do not hesitate to call back with questions.     Glenys Monique MD  Medical Oncology Attending  C: [phone]

## 2021-04-09 NOTE — H&P ADULT - PROBLEM SELECTOR PLAN 5
Patient w/ severe left hydronephrosis seen on CT, patient reports difficulty emptying bladder.   - 1352 in bladder, placing joy per urology  - urology consulted

## 2021-04-10 LAB
A1C WITH ESTIMATED AVERAGE GLUCOSE RESULT: 6.1 % — HIGH (ref 4–5.6)
ALBUMIN SERPL ELPH-MCNC: 2.7 G/DL — LOW (ref 3.3–5)
ALP SERPL-CCNC: 96 U/L — SIGNIFICANT CHANGE UP (ref 40–120)
ALT FLD-CCNC: 27 U/L — SIGNIFICANT CHANGE UP (ref 4–33)
ANION GAP SERPL CALC-SCNC: 9 MMOL/L — SIGNIFICANT CHANGE UP (ref 7–14)
ANISOCYTOSIS BLD QL: SLIGHT — SIGNIFICANT CHANGE UP
AST SERPL-CCNC: 36 U/L — HIGH (ref 4–32)
BASOPHILS # BLD AUTO: 0 K/UL — SIGNIFICANT CHANGE UP (ref 0–0.2)
BASOPHILS NFR BLD AUTO: 0 % — SIGNIFICANT CHANGE UP (ref 0–2)
BILIRUB SERPL-MCNC: 0.4 MG/DL — SIGNIFICANT CHANGE UP (ref 0.2–1.2)
BUN SERPL-MCNC: 18 MG/DL — SIGNIFICANT CHANGE UP (ref 7–23)
CALCIUM SERPL-MCNC: 8.5 MG/DL — SIGNIFICANT CHANGE UP (ref 8.4–10.5)
CHLORIDE SERPL-SCNC: 109 MMOL/L — HIGH (ref 98–107)
CHOLEST SERPL-MCNC: 103 MG/DL — SIGNIFICANT CHANGE UP
CO2 SERPL-SCNC: 25 MMOL/L — SIGNIFICANT CHANGE UP (ref 22–31)
COVID-19 SPIKE DOMAIN AB INTERP: NEGATIVE — SIGNIFICANT CHANGE UP
COVID-19 SPIKE DOMAIN ANTIBODY RESULT: 0.4 U/ML — SIGNIFICANT CHANGE UP
CREAT SERPL-MCNC: 0.44 MG/DL — LOW (ref 0.5–1.3)
EOSINOPHIL # BLD AUTO: 0 K/UL — SIGNIFICANT CHANGE UP (ref 0–0.5)
EOSINOPHIL NFR BLD AUTO: 0 % — SIGNIFICANT CHANGE UP (ref 0–6)
ESTIMATED AVERAGE GLUCOSE: 128 — SIGNIFICANT CHANGE UP
GIANT PLATELETS BLD QL SMEAR: PRESENT — SIGNIFICANT CHANGE UP
GLUCOSE SERPL-MCNC: 31 MG/DL — CRITICAL LOW (ref 70–99)
HCT VFR BLD CALC: 23.3 % — LOW (ref 34.5–45)
HDLC SERPL-MCNC: 65 MG/DL — SIGNIFICANT CHANGE UP
HGB BLD-MCNC: 7.4 G/DL — LOW (ref 11.5–15.5)
IANC: 3.25 K/UL — SIGNIFICANT CHANGE UP (ref 1.5–8.5)
LIPID PNL WITH DIRECT LDL SERPL: 28 MG/DL — SIGNIFICANT CHANGE UP
LYMPHOCYTES # BLD AUTO: 0.56 K/UL — LOW (ref 1–3.3)
LYMPHOCYTES # BLD AUTO: 14.8 % — SIGNIFICANT CHANGE UP (ref 13–44)
MACROCYTES BLD QL: SLIGHT — SIGNIFICANT CHANGE UP
MAGNESIUM SERPL-MCNC: 2.4 MG/DL — SIGNIFICANT CHANGE UP (ref 1.6–2.6)
MANUAL SMEAR VERIFICATION: SIGNIFICANT CHANGE UP
MCHC RBC-ENTMCNC: 30.8 PG — SIGNIFICANT CHANGE UP (ref 27–34)
MCHC RBC-ENTMCNC: 31.8 GM/DL — LOW (ref 32–36)
MCV RBC AUTO: 97.1 FL — SIGNIFICANT CHANGE UP (ref 80–100)
MONOCYTES # BLD AUTO: 0.03 K/UL — SIGNIFICANT CHANGE UP (ref 0–0.9)
MONOCYTES NFR BLD AUTO: 0.9 % — LOW (ref 2–14)
NEUTROPHILS # BLD AUTO: 3.19 K/UL — SIGNIFICANT CHANGE UP (ref 1.8–7.4)
NEUTROPHILS NFR BLD AUTO: 82.6 % — HIGH (ref 43–77)
NEUTS BAND # BLD: 1.7 % — SIGNIFICANT CHANGE UP (ref 0–6)
NON HDL CHOLESTEROL: 38 MG/DL — SIGNIFICANT CHANGE UP
NT-PROBNP SERPL-SCNC: 1107 PG/ML — HIGH
OVALOCYTES BLD QL SMEAR: SLIGHT — SIGNIFICANT CHANGE UP
PHOSPHATE SERPL-MCNC: 2.5 MG/DL — SIGNIFICANT CHANGE UP (ref 2.5–4.5)
PLAT MORPH BLD: NORMAL — SIGNIFICANT CHANGE UP
PLATELET # BLD AUTO: 128 K/UL — LOW (ref 150–400)
PLATELET COUNT - ESTIMATE: ABNORMAL
POIKILOCYTOSIS BLD QL AUTO: SLIGHT — SIGNIFICANT CHANGE UP
POTASSIUM SERPL-MCNC: 3.6 MMOL/L — SIGNIFICANT CHANGE UP (ref 3.5–5.3)
POTASSIUM SERPL-SCNC: 3.6 MMOL/L — SIGNIFICANT CHANGE UP (ref 3.5–5.3)
PROT SERPL-MCNC: 6.4 G/DL — SIGNIFICANT CHANGE UP (ref 6–8.3)
RBC # BLD: 2.4 M/UL — LOW (ref 3.8–5.2)
RBC # FLD: 20.4 % — HIGH (ref 10.3–14.5)
RBC BLD AUTO: ABNORMAL
SARS-COV-2 IGG+IGM SERPL QL IA: 0.4 U/ML — SIGNIFICANT CHANGE UP
SARS-COV-2 IGG+IGM SERPL QL IA: NEGATIVE — SIGNIFICANT CHANGE UP
SODIUM SERPL-SCNC: 143 MMOL/L — SIGNIFICANT CHANGE UP (ref 135–145)
TRIGL SERPL-MCNC: 52 MG/DL — SIGNIFICANT CHANGE UP
TSH SERPL-MCNC: 2.15 UIU/ML — SIGNIFICANT CHANGE UP (ref 0.27–4.2)
WBC # BLD: 3.78 K/UL — LOW (ref 3.8–10.5)
WBC # FLD AUTO: 3.78 K/UL — LOW (ref 3.8–10.5)

## 2021-04-10 PROCEDURE — 93925 LOWER EXTREMITY STUDY: CPT | Mod: 26

## 2021-04-10 PROCEDURE — 99233 SBSQ HOSP IP/OBS HIGH 50: CPT

## 2021-04-10 RX ORDER — DEXTROSE 50 % IN WATER 50 %
15 SYRINGE (ML) INTRAVENOUS ONCE
Refills: 0 | Status: COMPLETED | OUTPATIENT
Start: 2021-04-10 | End: 2021-04-10

## 2021-04-10 RX ORDER — DEXTROSE 50 % IN WATER 50 %
12.5 SYRINGE (ML) INTRAVENOUS ONCE
Refills: 0 | Status: COMPLETED | OUTPATIENT
Start: 2021-04-10 | End: 2021-04-10

## 2021-04-10 RX ADMIN — FENTANYL CITRATE 1 PATCH: 50 INJECTION INTRAVENOUS at 07:18

## 2021-04-10 RX ADMIN — ENOXAPARIN SODIUM 40 MILLIGRAM(S): 100 INJECTION SUBCUTANEOUS at 13:33

## 2021-04-10 RX ADMIN — HYDROMORPHONE HYDROCHLORIDE 4 MILLIGRAM(S): 2 INJECTION INTRAMUSCULAR; INTRAVENOUS; SUBCUTANEOUS at 22:29

## 2021-04-10 RX ADMIN — SENNA PLUS 2 TABLET(S): 8.6 TABLET ORAL at 17:35

## 2021-04-10 RX ADMIN — Medication 2.5 MILLIGRAM(S): at 05:07

## 2021-04-10 RX ADMIN — HYDROMORPHONE HYDROCHLORIDE 6 MILLIGRAM(S): 2 INJECTION INTRAMUSCULAR; INTRAVENOUS; SUBCUTANEOUS at 06:07

## 2021-04-10 RX ADMIN — HYDROMORPHONE HYDROCHLORIDE 4 MILLIGRAM(S): 2 INJECTION INTRAMUSCULAR; INTRAVENOUS; SUBCUTANEOUS at 23:29

## 2021-04-10 RX ADMIN — Medication 2.5 MILLIGRAM(S): at 13:33

## 2021-04-10 RX ADMIN — FENTANYL CITRATE 1 PATCH: 50 INJECTION INTRAVENOUS at 18:02

## 2021-04-10 RX ADMIN — POLYETHYLENE GLYCOL 3350 17 GRAM(S): 17 POWDER, FOR SOLUTION ORAL at 13:30

## 2021-04-10 RX ADMIN — Medication 2.5 MILLIGRAM(S): at 22:29

## 2021-04-10 RX ADMIN — Medication 12.5 GRAM(S): at 07:21

## 2021-04-10 RX ADMIN — Medication 15 GRAM(S): at 06:47

## 2021-04-10 RX ADMIN — HYDROMORPHONE HYDROCHLORIDE 6 MILLIGRAM(S): 2 INJECTION INTRAMUSCULAR; INTRAVENOUS; SUBCUTANEOUS at 05:07

## 2021-04-10 RX ADMIN — SENNA PLUS 2 TABLET(S): 8.6 TABLET ORAL at 05:07

## 2021-04-10 NOTE — DIETITIAN INITIAL EVALUATION ADULT. - SIGNS/SYMPTOMS
Physical findings of muscle and fat loss; weight loss >10% < 6 months, <75% nutrition needs >1 month

## 2021-04-10 NOTE — CONSULT NOTE ADULT - ATTENDING COMMENTS
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 Patient is non-verbal. Per daughter, pt started to have rapid, shallow breathing this AM without any cough or vomiting. She was recently admitted at [facility] for UGIB but pt was high-risk so medically managed as per GI. Pt was discharged home (24hr HHA) as per daughter's request. Pt continued to have black, tarry stool at home since discharge. Daughter also reports thrush on tongue and buccal mucosa for past couple of days      Neurosurgery consult, recs appreciated   - DVT ppx  - PT/OT  - Labs: Vitamin B12, MMA, Homocysteine, Vitamin B6, Thiamine, Folate, Vitamin E, Copper, Ceruloplasmin, Ammonia, SPEP, UPEP, TSH, T4, Lyme Serology, CHARBEL  - Rest of care per primary team Patient is a 70 year old female with past medical history of diabetes, pancreatic cancer w/ mets to lung and liver on chemo last session Monday (9th session, abraxane/gemzar) coming in w/ inability to ambulate     Neurosurgery consult appreciated   - DVT ppx  - PT/OT  - Labs: Vitamin B12, MMA, Homocysteine, Vitamin B6, Thiamine, Folate, Vitamin E, Copper, Ceruloplasmin, Ammonia, SPEP, UPEP, TSH, T4, Lyme Serology, CHARBEL

## 2021-04-10 NOTE — PROGRESS NOTE ADULT - PROBLEM SELECTOR PLAN 1
Patient presenting w/ lower extremity weakness L>R distally in the setting of metastatic pancreatic cancer. Notable Left foot drop. W/o known spinal mets. No spinal mets seen on CT.   - given asymmetry of weakness MR spine thoracic/lumbar w/ and w/o urgent to assess for spinal cord compression   - PT eval   - MRI reviewed - would obtain neuro eval   - given associated pain w/ weakness pal consult for assistance w/ pain control in the setting of malignancy

## 2021-04-10 NOTE — PHYSICAL THERAPY INITIAL EVALUATION ADULT - PATIENT PROFILE REVIEW, REHAB EVAL
PT orders received, ambulate with assist. Consulted with EARLE Hall, pt may participate in PT evaluation./yes

## 2021-04-10 NOTE — DIETITIAN INITIAL EVALUATION ADULT. - PROBLEM SELECTOR PLAN 6
patient w/ new ankle and foot edema unclear etiology.  Doppler w/o DVT.   - abraxane, gemzar can cause lower extremity edema   - will get BNP in AM, lungs clear. If BNP elevated will order TTE.  - incidentally discovered partial fem art occlusion, VA duplex arterial pending

## 2021-04-10 NOTE — PROGRESS NOTE ADULT - SUBJECTIVE AND OBJECTIVE BOX
UROLOGY Progress Note  MARIA NICOLAS    S: Patient seen at bedside. Reports she is tired after receiving pain medications this morning.     O:  T(C): 36.7 (04-10-21 @ 05:10), Max: 36.9 (04-09-21 @ 21:12)  HR: 72 (04-10-21 @ 05:10) (72 - 78)  BP: 110/50 (04-10-21 @ 05:10) (110/50 - 128/58)  RR: 18 (04-10-21 @ 05:10) (17 - 20)  SpO2: 100% (04-10-21 @ 05:10) (96% - 100%)        Physical Exam:  Gen: Lethargic, cachectic     Pulm: No respiratory distress    GI: S/ND/NT; No CVAT    : Borden in place, clear yellow urine; No decreased sensation in groin or perineal area    MSK / Neuro: + weakness of b/l lower ext;  L > R ;  + L foot drop;  pulses 2+ b/l  + decreased sensation of L upper thigh      Labs:  CBC (04-10 @ 05:10)                              7.4<L>                         3.78<L>  )----------------(  128<L>     82.6<H>% Neutrophils, 14.8  % Lymphocytes, ANC: 3.19                                23.3<L>              CBC (04-08 @ 22:39)                              8.8<L>                         9.79    )----------------(  215        89.6<H>% Neutrophils, 6.9<L>% Lymphocytes, ANC: 9.03<H>                              27.4<L>                BMP (04-10 @ 05:10)             143     |  109<H>  |  18    		Ca++ --      Ca 8.5                ---------------------------------( 31<LL>		Mg 2.4                3.6     |  25      |  0.44<L>			Ph 2.5     BMP (04-09 @ 22:48)             140     |  106     |  21    		Ca++ --      Ca 8.4                ---------------------------------( 193<H>		Mg 2.4                3.9     |  27      |  0.49<L>			Ph 2.8       LFTs (04-10 @ 05:10)      TPro 6.4 / Alb 2.7<L> / TBili 0.4 / DBili -- / AST 36<H> / ALT 27 / AlkPhos 96  LFTs (04-08 @ 21:49)      TPro 7.0 / Alb 3.3 / TBili 0.5 / DBili -- / AST 43<H> / ALT 29 / AlkPhos 101    Coags (04-08 @ 21:49)  aPTT 26.1<L> / INR 1.02 / PT 11.7        Radiology:  < from: CT Thoracic Spine w/ IV Cont (04.09.21 @ 01:53) >    Evaluation of the intrathoracic and intra-abdominal contents significant for chronic left hydronephrosis to the level of a dilated and thickened urinary bladder.    Large stool in the rectum.    IMPRESSION:    No acute displaced fracture or traumatic malalignment.  No suspicious focal lytic or sclerotic lesions.    Chronic severe left hydronephrosis. Thickened urinary bladder likely related to chronic outlet obstruction.

## 2021-04-10 NOTE — PROGRESS NOTE ADULT - PROBLEM SELECTOR PLAN 2
Patient w/ type II diabetes uncontrolled, last A1C 21.5   - per [facility] patient takes 9u of basaglar, novolog 8u before meals   - pt with episodes of hypoglycemia on 4/9 and 4/10 - would d/c lantus and premeal and only continue with ISS

## 2021-04-10 NOTE — PHYSICAL THERAPY INITIAL EVALUATION ADULT - MANUAL MUSCLE TESTING RESULTS, REHAB EVAL
right lower extremity grossly 3+/5, left hip flexion 2+/5, left knee extension 3/5, left ankle plantar/dorsiflexion 1/5.

## 2021-04-10 NOTE — PROGRESS NOTE ADULT - PROBLEM SELECTOR PLAN 3
Patient w/ pancreatic cancer per outpatient notes and patient was pending  - oncology notified of admission

## 2021-04-10 NOTE — PHYSICAL THERAPY INITIAL EVALUATION ADULT - GAIT DEVIATIONS NOTED, PT EVAL
Notable left lower extremity knee buckling./footdrop/decreased step length/decreased stride length/increased stride width/decreased weight-shifting ability

## 2021-04-10 NOTE — CONSULT NOTE ADULT - ASSESSMENT
Patient is a 70 year old female with past medical history of diabetes, pancreatic cancer w/ mets to lung and liver on chemo last session Monday (9th session, abraxane/gemzar) coming in w/ inability to ambulate and independently take care of ADLs since Monday. Neurology consulted for concern of foot drop. Patient with progressive worsening of LE weakness x 1 month. Now unable to ambulate. Neuro exam as above with LE weakness, L > R and distal > proximal. Patient with urinary retention requiring joy. decreased sensation of all modalities in LEs. MR thoracic and lumbar spine     Impression     **INCOMPLETE Patient is a 70 year old female with past medical history of diabetes, pancreatic cancer w/ mets to lung and liver on chemo last session Monday (9th session, abraxane/gemzar) coming in w/ inability to ambulate and independently take care of ADLs since Monday. Neurology consulted for concern of foot drop. Patient with progressive worsening of LE weakness x 1 month. Now unable to ambulate. Neuro exam as above with LE weakness, L > R and distal > proximal. Patient with urinary retention requiring joy. decreased sensation of all modalities in LEs. b/l LEs warm to touch. b/l distal LE edema noted. MR thoracic and lumbar spine shows  degenerative disc disease at L3-4 and L4-5 with loss of signal on the T2-weighted images. No cord or cauda equina compression noted.     Impression   Progressive b/l LE weakness possibly 2/2 loss signal at L3/4 and L4/5 in the setting of degenerative disk disease vs infectious etiology in the setting of warm edematous LEs    Recommendations  - Neurosurgery consult, recs appreciated   - DVT ppx  - PT/OT  - Labs: Vitamin B12, MMA, Homocysteine, Vitamin B6, Thiamine, Folate, Vitamin E, Copper, Ceruloplasmin, Ammonia, SPEP, UPEP, TSH, T4, Lyme Serology, CHARBEL  - Rest of care per primary team     Case to be discussed with neurology attending Dr. Schoenberg

## 2021-04-10 NOTE — DIETITIAN INITIAL EVALUATION ADULT. - OTHER INFO
RD visited with patient for requested consultation - unintentional weight loss PTA.    Patient with weight loss of 4.8kg since December 2020 (12/15/20 - 47kg - obtained via allscripts) / 10.2% (x4 months) and endorses appetite has been very poor over the past several months.  Overall weight loss trajectory per chart review.  Patient is cachectic appearing.  RD obtained food preferences. Patient amenable to Glucerna supplements to optimize nutrition, stated she was drinking at home 1-2 cans daily PTA. Stated her appetite is mostly poor, reviewed some food preferences to aid in optimizing nutritional intake.  Patient is filling out menus to best meet preferences. Requesting for seasonings to be added with her meals to enhance flavor of meals.      Hypoglycemia noted earlier today; insulin noted to adjusted.  Currently, patient with edema, likely masking further weight loss.    Patient denies nausea, vomiting, diarrhea. No reported chewing or swallowing difficulties. Denies food allergies.

## 2021-04-10 NOTE — PHYSICAL THERAPY INITIAL EVALUATION ADULT - ADDITIONAL COMMENTS
Pt reports living alone in a house, 3 steps to enter through the back or 6 steps to enter through the front, 15 stairs inside. Pt was ambulating with a cane, however recently had to start using a rolling walker. Pt was independently until recently needing assistance from sister and niece for self-care and ADL's. Pt reports living alone in a house, 3 steps to enter through the back or 6 steps to enter through the front, 15 stairs inside. Pt was ambulating with a cane, however recently had to start using a rolling walker. Pt was independent in ADLs until recently needing assistance from sister and niece.

## 2021-04-10 NOTE — DIETITIAN INITIAL EVALUATION ADULT. - ADD RECOMMEND
1) Monitor weights, PO intake/diet tolerance, skin integrity, pertinent labs. 2) Honor food preferences to optimize nutritional intake.  3) Provide additional items via meal trays as discussed with patient.

## 2021-04-10 NOTE — PHYSICAL THERAPY INITIAL EVALUATION ADULT - PERTINENT HX OF CURRENT PROBLEM, REHAB EVAL
Pt is a 70 year old female presenting to [facility] with inability to ambulate and independently take care of ADLs since Monday. Pt states weakness is associated w/ bilateral lower extremity pain. Pt found to have weakness found to have severe left hydroureteronephrosis and incomplete emptying of bladder. Spine CT with contrast without signs of spinal mets or cord compression. PMH: metastatic pancreatic with  mets to lung and liver, on chemotherapy

## 2021-04-10 NOTE — CHART NOTE - NSCHARTNOTEFT_GEN_A_CORE
Pt with Persistent hypoglycemic episodes, likely due to pancreatic insufficiency, Pt with asymptomatic hypoglycemia yesterday and today down to 52, s/p D50 IVP, improved to 161. HgbA1c 6.1 today. Pt noted with HgbA1c >18 on 11/2020, was seen by endocrinology, determined and was managed with basal +bolus. Pt was on Basaglar 9units q hs at home, received a reduced dose of 7units last night. Will hold insulin for now. Discussed w/ attending Dr. Mallory, will need to consider endocrinology consult. Attending will review pt.

## 2021-04-10 NOTE — DIETITIAN INITIAL EVALUATION ADULT. - PERTINENT MEDS FT
MEDICATIONS  (STANDING):  dextrose 40% Gel 15 Gram(s) Oral once  dextrose 5%. 1000 milliLiter(s) (50 mL/Hr) IV Continuous <Continuous>  dextrose 5%. 1000 milliLiter(s) (100 mL/Hr) IV Continuous <Continuous>  dextrose 50% Injectable 25 Gram(s) IV Push once  dextrose 50% Injectable 12.5 Gram(s) IV Push once  dextrose 50% Injectable 25 Gram(s) IV Push once  dronabinol 2.5 milliGRAM(s) Oral three times a day  enoxaparin Injectable 40 milliGRAM(s) SubCutaneous daily  fentaNYL   Patch  12 MICROgram(s)/Hr 1 Patch Transdermal every 72 hours  glucagon  Injectable 1 milliGRAM(s) IntraMuscular once  insulin lispro (ADMELOG) corrective regimen sliding scale   SubCutaneous three times a day before meals  insulin lispro (ADMELOG) corrective regimen sliding scale   SubCutaneous at bedtime  polyethylene glycol 3350 17 Gram(s) Oral daily  senna 2 Tablet(s) Oral two times a day    MEDICATIONS  (PRN):  HYDROmorphone   Tablet 4 milliGRAM(s) Oral every 4 hours PRN Moderate Pain (4 - 6)  HYDROmorphone   Tablet 6 milliGRAM(s) Oral every 4 hours PRN Severe Pain (7 - 10)  HYDROmorphone  Injectable 0.5 milliGRAM(s) IV Push every 6 hours PRN breakthrough pain in case PO dilaudid does not work

## 2021-04-10 NOTE — CONSULT NOTE ADULT - SUBJECTIVE AND OBJECTIVE BOX
HPI:  Patient is a 70 year old female with past medical history of diabetes, pancreatic cancer w/ mets to lung and liver on chemo last session Monday (9th session, abraxane/gemzar) coming in w/ inability to ambulate and independently take care of ADLs since Monday. Neurology consulted for concern of foot drop. Patient reports onset of LE weakness 3-4 weeks ago but significant worsening in the past 1 week. Prior to this month, patient was able to ambulated independently without a walker or cane. She has required a cane in the past few weeks.   States her weakness is associated w/ b/l lower extremity pain. Pain is 9/10 in severity and is aching. Patient reports symptoms are worse in her LLE compared to her RLE. Patient describes a band like tightening sensation around her LLE particularly. Patient was taking her home fentanyl and Dilaudid however this did not help symptoms. Patient states she lives alone w/o HHA. Patient states she has had some difficulty completely emptying her bladder however denies any urinary incontinence. Patient denies any fecal incontinence. States she is moving her bowels however her BMs have been small. Patient denies any saddle anesthesia. Patient currently with joy for urinary retention. Denies HA, vision changes, n/v, dizziness, cp, sob. Reports n/t of LEs.     ROS: A 10-system ROS was performed and is negative except for those items noted above and/or in the HPI.    PAST MEDICAL & SURGICAL HISTORY:  Type 2 diabetes mellitus    Pancreatic cancer  mets to lung and liver    S/P tendon repair  R foot, R elbow      FAMILY HISTORY:  Family history of liver cancer  Sister    Family history of cardiac arrest (Sibling)    FH: aneurysm (Sibling)        SOCIAL HISTORY: SOCIAL HISTORY:     Marital Status: (  )   (  ) Single  (  )   (  )      Occupation:      Lives: (  ) alone  (  ) with children   (  ) with spouse  (  ) with parents  (  ) other     Illicit Drug Use: (  ) never used  (  ) other _____     Tobacco Use:  (  ) never smoked  (  ) former smoker  (  ) current smoker  (  ) pack year  (  ) last cigarette date     Alcohol Use:      Sexual History:        MEDICATIONS  Home Medications:  dronabinol 2.5 mg oral capsule: 1 cap(s) orally 3 times a day (09 Apr 2021 10:42)  fentaNYL 12 mcg/hr transdermal film, extended release: 1 film(s) transdermal every 72 hours (09 Apr 2021 10:43)  HYDROmorphone 2 mg oral tablet: 1 tab(s) orally every 4 hours, As Needed (09 Apr 2021 10:43)      MEDICATIONS  (STANDING):  dextrose 40% Gel 15 Gram(s) Oral once  dextrose 5%. 1000 milliLiter(s) (50 mL/Hr) IV Continuous <Continuous>  dextrose 5%. 1000 milliLiter(s) (100 mL/Hr) IV Continuous <Continuous>  dextrose 50% Injectable 25 Gram(s) IV Push once  dextrose 50% Injectable 12.5 Gram(s) IV Push once  dextrose 50% Injectable 25 Gram(s) IV Push once  dronabinol 2.5 milliGRAM(s) Oral three times a day  enoxaparin Injectable 40 milliGRAM(s) SubCutaneous daily  fentaNYL   Patch  12 MICROgram(s)/Hr 1 Patch Transdermal every 72 hours  glucagon  Injectable 1 milliGRAM(s) IntraMuscular once  insulin lispro (ADMELOG) corrective regimen sliding scale   SubCutaneous three times a day before meals  insulin lispro (ADMELOG) corrective regimen sliding scale   SubCutaneous at bedtime  polyethylene glycol 3350 17 Gram(s) Oral daily  senna 2 Tablet(s) Oral two times a day    MEDICATIONS  (PRN):  HYDROmorphone   Tablet 4 milliGRAM(s) Oral every 4 hours PRN Moderate Pain (4 - 6)  HYDROmorphone   Tablet 6 milliGRAM(s) Oral every 4 hours PRN Severe Pain (7 - 10)  HYDROmorphone  Injectable 0.5 milliGRAM(s) IV Push every 6 hours PRN breakthrough pain in case PO dilaudid does not work      ALLERGIES/INTOLERANCES:  Allergies  No Known Allergies    Intolerances      OBJECTIVE:  VITALS   Vital Signs Last 24 Hrs  T(C): 36.7 (10 Apr 2021 05:10), Max: 36.9 (09 Apr 2021 21:12)  T(F): 98 (10 Apr 2021 05:10), Max: 98.5 (09 Apr 2021 21:12)  HR: 72 (10 Apr 2021 05:10) (72 - 78)  BP: 110/50 (10 Apr 2021 05:10) (110/50 - 128/58)  BP(mean): --  RR: 18 (10 Apr 2021 05:10) (18 - 20)  SpO2: 100% (10 Apr 2021 05:10) (96% - 100%)    PHYSICAL EXAM:  Neurological Exam:  Mental Status: Orientated to self, date and place.  Attention intact.  No dysarthria, aphasia or neglect.  Knowledge intact.  Registration intact.  Short and long term memory grossly intact.    Cranial Nerves: PERRL, EOMI, VFF, no nystagmus or diplopia.  CN V1-3 intact to light touch and pinprick.  No facial asymmetry.  Hearing intact.  Tongue midline.  Sternocleidomastoid and Trapezius intact bilaterally.  Motor:   Tone: normal            Strength:     Upper extremity                      Delt       Bicep    Tricep                                               R         5/5        5/5        5/5       5/5                                            L          5/5        5/5        5/5       5/5  Lower extremity                       HF          KE          KF        DF         PF     HAb    HAdd                                              R  5/5        5/5        5/5       5/5       5/5       4-/5     4+/5                                            L  3/5        4/5       4-/5       0/5        0/5  Pronator drift: none                 Dysmetria: None to finger-nose-finger   Tremor: No resting, postural or action tremor.  No myoclonus.  Sensation: decreased sensation to light touch, pinprick, vibration and proprioception b/l LEs (L > R and distal >proximal).   Deep Tendon Reflexes: 1+ bilateral biceps, triceps, brachioradialis, Trace knee and mute ankle  Toes mute bilaterally  Gait: Unable to assess    LABORATORY:  CBC                       7.4    3.78  )-----------( 128      ( 10 Apr 2021 05:10 )             23.3     Chem 04-10    143  |  109<H>  |  18  ----------------------------<  31<LL>  3.6   |  25  |  0.44<L>    Ca    8.5      10 Apr 2021 05:10  Phos  2.5     04-10  Mg     2.4     04-10    TPro  6.4  /  Alb  2.7<L>  /  TBili  0.4  /  DBili  x   /  AST  36<H>  /  ALT  27  /  AlkPhos  96  04-10    LFTs LIVER FUNCTIONS - ( 10 Apr 2021 05:10 )  Alb: 2.7 g/dL / Pro: 6.4 g/dL / ALK PHOS: 96 U/L / ALT: 27 U/L / AST: 36 U/L / GGT: x           Coagulopathy PT/INR - ( 08 Apr 2021 21:49 )   PT: 11.7 sec;   INR: 1.02 ratio         PTT - ( 08 Apr 2021 21:49 )  PTT:26.1 sec  Lipid Panel 04-10 Chol 103 LDL -- HDL 65 Trig 52  A1c   Cardiac enzymes     U/A Urinalysis Basic - ( 09 Apr 2021 07:24 )    Color: Yellow / Appearance: Clear / SG: >1.050 / pH: x  Gluc: x / Ketone: Negative  / Bili: Negative / Urobili: 3 mg/dL   Blood: x / Protein: 30 mg/dL / Nitrite: Negative   Leuk Esterase: Moderate / RBC: 3 /HPF / WBC 44 /HPF   Sq Epi: x / Non Sq Epi: 1 /HPF / Bacteria: Negative      CSF  Immunological  Other    STUDIES & IMAGING:  Studies (EKG, EEG, EMG, etc):     Radiology (XR, CT, MR, U/S, TTE/BRANDON):

## 2021-04-10 NOTE — DIETITIAN INITIAL EVALUATION ADULT. - PROBLEM SELECTOR PLAN 1
Patient presenting w/ lower extremity weakness L>R distally in the setting of metastatic pancreatic cancer. Notable Left foot drop. W/o known spinal mets. No spinal mets seen on CT.   - given asymmetry of weakness MR spine thoracic/lumbar w/ and w/o urgent to assess for spinal cord compression   - PT eval   - neuro vs neuro surg eval pending MRI spine results   - given associated pain w/ weakness pal consult for assistance w/ pain control in the setting of malignancy  - TSH ordered for AM

## 2021-04-10 NOTE — PROGRESS NOTE ADULT - ASSESSMENT
69 yo F with pancreatic cancer admitted for workup of new lower extremity weakness found to have severe left hydroureteronephrosis and incomplete emptying of bladder    - PVR >1300, joy placed; continue to monitor strict I&Os and monitor for postobstructive diuresis  - meets criteria for POD if UOP >200/hr for 3 hours  - Trend BMP and replete electrolytes as needed  - Drink to thirst, place 2 liters of water at bedside at all times  - avoid normal saline, if IVF required, prefer hypotonic solutions and replacement of only 1/2 outputs  - MRI without cord compression  - treat constipation as possible cause for retention, large stool burden seen on CT

## 2021-04-10 NOTE — PROGRESS NOTE ADULT - SUBJECTIVE AND OBJECTIVE BOX
[facility] Division of Hospital Medicine  Melida Mallory MD  Pager (M-F, 8A-5P): [phone]  Other Times:  [phone]    Patient is a 70y old  Female who presents with a chief complaint of Lower Extremity Weakness, Inability to Ambulate (10 Apr 2021 13:54)    SUBJECTIVE / OVERNIGHT EVENTS:  pt complaining of pain in feet.  She states she is eating.  denies feeling of weakness or diaphoresis or change in mental status this am.      MEDICATIONS  (STANDING):  dextrose 40% Gel 15 Gram(s) Oral once  dextrose 5%. 1000 milliLiter(s) (50 mL/Hr) IV Continuous <Continuous>  dextrose 5%. 1000 milliLiter(s) (100 mL/Hr) IV Continuous <Continuous>  dextrose 50% Injectable 25 Gram(s) IV Push once  dextrose 50% Injectable 12.5 Gram(s) IV Push once  dextrose 50% Injectable 25 Gram(s) IV Push once  dronabinol 2.5 milliGRAM(s) Oral three times a day  enoxaparin Injectable 40 milliGRAM(s) SubCutaneous daily  fentaNYL   Patch  12 MICROgram(s)/Hr 1 Patch Transdermal every 72 hours  glucagon  Injectable 1 milliGRAM(s) IntraMuscular once  insulin lispro (ADMELOG) corrective regimen sliding scale   SubCutaneous three times a day before meals  insulin lispro (ADMELOG) corrective regimen sliding scale   SubCutaneous at bedtime  polyethylene glycol 3350 17 Gram(s) Oral daily  senna 2 Tablet(s) Oral two times a day    MEDICATIONS  (PRN):  HYDROmorphone   Tablet 4 milliGRAM(s) Oral every 4 hours PRN Moderate Pain (4 - 6)  HYDROmorphone   Tablet 6 milliGRAM(s) Oral every 4 hours PRN Severe Pain (7 - 10)  HYDROmorphone  Injectable 0.5 milliGRAM(s) IV Push every 6 hours PRN breakthrough pain in case PO dilaudid does not work      CAPILLARY BLOOD GLUCOSE      POCT Blood Glucose.: 111 mg/dL (10 Apr 2021 12:13)  POCT Blood Glucose.: 161 mg/dL (10 Apr 2021 07:47)  POCT Blood Glucose.: 59 mg/dL (10 Apr 2021 07:11)  POCT Blood Glucose.: 58 mg/dL (10 Apr 2021 06:43)  POCT Blood Glucose.: 52 mg/dL (10 Apr 2021 06:33)  POCT Blood Glucose.: 216 mg/dL (09 Apr 2021 22:09)  POCT Blood Glucose.: 211 mg/dL (09 Apr 2021 18:34)  POCT Blood Glucose.: 204 mg/dL (09 Apr 2021 18:33)  POCT Blood Glucose.: 66 mg/dL (09 Apr 2021 18:01)  POCT Blood Glucose.: 59 mg/dL (09 Apr 2021 17:45)  POCT Blood Glucose.: 51 mg/dL (09 Apr 2021 17:22)  POCT Blood Glucose.: 53 mg/dL (09 Apr 2021 17:20)    I&O's Summary    09 Apr 2021 07:01  -  10 Apr 2021 07:00  --------------------------------------------------------  IN: 200 mL / OUT: 2350 mL / NET: -2150 mL        PHYSICAL EXAM:  Vital Signs Last 24 Hrs  T(C): 36.7 (10 Apr 2021 05:10), Max: 36.9 (09 Apr 2021 21:12)  T(F): 98 (10 Apr 2021 05:10), Max: 98.5 (09 Apr 2021 21:12)  HR: 72 (10 Apr 2021 05:10) (72 - 78)  BP: 110/50 (10 Apr 2021 05:10) (110/50 - 128/58)  BP(mean): --  RR: 18 (10 Apr 2021 05:10) (18 - 20)  SpO2: 100% (10 Apr 2021 05:10) (96% - 100%)    CONSTITUTIONAL: NAD, well-developed, well-groomed  EYES: EOMI; conjunctiva and sclera clear  ENMT: Moist oral mucosa  RESPIRATORY: Normal respiratory effort; lungs are clear to auscultation bilaterally  CARDIOVASCULAR: Regular rate and rhythm, normal S1 and S2, no murmur; No lower extremity edema  ABDOMEN: Nontender to palpation, normoactive bowel sounds, no rebound/guarding  MUSCULOSKELETAL:   no clubbing or cyanosis of digits; no joint swelling or tenderness to palpation  PSYCH: A+O to person, place, and time; affect appropriate  NEUROLOGY: CN 2-12 are intact and symmetric; no gross sensory deficits   SKIN: No rashes; no palpable lesions    LABS:                        7.4    3.78  )-----------( 128      ( 10 Apr 2021 05:10 )             23.3     04-10    143  |  109<H>  |  18  ----------------------------<  31<LL>  3.6   |  25  |  0.44<L>    Ca    8.5      10 Apr 2021 05:10  Phos  2.5     04-10  Mg     2.4     04-10    TPro  6.4  /  Alb  2.7<L>  /  TBili  0.4  /  DBili  x   /  AST  36<H>  /  ALT  27  /  AlkPhos  96  04-10    PT/INR - ( 08 Apr 2021 21:49 )   PT: 11.7 sec;   INR: 1.02 ratio         PTT - ( 08 Apr 2021 21:49 )  PTT:26.1 sec      Urinalysis Basic - ( 09 Apr 2021 07:24 )    Color: Yellow / Appearance: Clear / SG: >1.050 / pH: x  Gluc: x / Ketone: Negative  / Bili: Negative / Urobili: 3 mg/dL   Blood: x / Protein: 30 mg/dL / Nitrite: Negative   Leuk Esterase: Moderate / RBC: 3 /HPF / WBC 44 /HPF   Sq Epi: x / Non Sq Epi: 1 /HPF / Bacteria: Negative        RADIOLOGY & ADDITIONAL TESTS:  Results Reviewed:   Imaging Personally Reviewed:  Electrocardiogram Personally Reviewed:    COORDINATION OF CARE:  Care Discussed with Consultants/Other Providers [Y/N]: Y  Prior or Outpatient Records Reviewed [Y/N]: Y

## 2021-04-10 NOTE — PROVIDER CONTACT NOTE (CRITICAL VALUE NOTIFICATION) - ASSESSMENT
Pt alert and responsive. FS checked to be 52. Apple juice/ marta crackers administered. FS rechecked to be 58. Hypoglycemia protocol initiated- glutose gel to be given.

## 2021-04-10 NOTE — DIETITIAN INITIAL EVALUATION ADULT. - PERTINENT LABORATORY DATA
04-10 Na143 mmol/L Glu 31 mg/dL<LL> K+ 3.6 mmol/L Cr  0.44 mg/dL<L> BUN 18 mg/dL 04-10 Phos 2.5 mg/dL 04-10 Alb 2.7 g/dL<L> 04-10 Chol 103 mg/dL LDL --    HDL 65 mg/dL Trig 52 mg/dL    A1C with Estimated Average Glucose (04.10.21 @ 05:10)    A1C with Estimated Average Glucose Result: 6.1: High Risk (prediabetic)    5.7 - 6.4 %  Diabetic, diagnostic           > 6.5 %  ADA diabetic treatment goal    < 7.0 %  HbA1C values may not accurately reflect mean blood glucose in patients  with Hb variants.  Suggest clinical correlation. %    Estimated Average Glucose: 128    CAPILLARY BLOOD GLUCOSE  POCT Blood Glucose.: 111 mg/dL (10 Apr 2021 12:13)  POCT Blood Glucose.: 161 mg/dL (10 Apr 2021 07:47)  POCT Blood Glucose.: 59 mg/dL (10 Apr 2021 07:11)  POCT Blood Glucose.: 58 mg/dL (10 Apr 2021 06:43)  POCT Blood Glucose.: 52 mg/dL (10 Apr 2021 06:33)  POCT Blood Glucose.: 216 mg/dL (09 Apr 2021 22:09)  POCT Blood Glucose.: 211 mg/dL (09 Apr 2021 18:34)  POCT Blood Glucose.: 204 mg/dL (09 Apr 2021 18:33)  POCT Blood Glucose.: 66 mg/dL (09 Apr 2021 18:01)  POCT Blood Glucose.: 59 mg/dL (09 Apr 2021 17:45)  POCT Blood Glucose.: 51 mg/dL (09 Apr 2021 17:22)  POCT Blood Glucose.: 53 mg/dL (09 Apr 2021 17:20)

## 2021-04-11 LAB
AMMONIA BLD-MCNC: 26 UMOL/L — SIGNIFICANT CHANGE UP (ref 11–55)
B BURGDOR C6 AB SER-ACNC: NEGATIVE — SIGNIFICANT CHANGE UP
B BURGDOR IGG+IGM SER-ACNC: 0.15 INDEX — SIGNIFICANT CHANGE UP (ref 0.01–0.89)
CERULOPLASMIN SERPL-MCNC: 32 MG/DL — SIGNIFICANT CHANGE UP (ref 16–45)
FOLATE SERPL-MCNC: 9.3 NG/ML — SIGNIFICANT CHANGE UP (ref 3.1–17.5)
HCYS SERPL-MCNC: 8.1 UMOL/L — SIGNIFICANT CHANGE UP
PROT ?TM UR-MCNC: 120 MG/DL — SIGNIFICANT CHANGE UP
PROT SERPL-MCNC: 6.2 G/DL — SIGNIFICANT CHANGE UP (ref 6–8.3)
T4 AB SER-ACNC: 7.93 UG/DL — SIGNIFICANT CHANGE UP (ref 5.1–13)
TSH SERPL-MCNC: 2.15 UIU/ML — SIGNIFICANT CHANGE UP (ref 0.27–4.2)
VIT B12 SERPL-MCNC: 901 PG/ML — HIGH (ref 200–900)

## 2021-04-11 PROCEDURE — 84165 PROTEIN E-PHORESIS SERUM: CPT | Mod: 26

## 2021-04-11 PROCEDURE — 99233 SBSQ HOSP IP/OBS HIGH 50: CPT

## 2021-04-11 PROCEDURE — 74182 MRI ABDOMEN W/CONTRAST: CPT | Mod: 26

## 2021-04-11 PROCEDURE — 84166 PROTEIN E-PHORESIS/URINE/CSF: CPT | Mod: 26

## 2021-04-11 RX ORDER — FENTANYL CITRATE 50 UG/ML
1 INJECTION INTRAVENOUS
Refills: 0 | Status: DISCONTINUED | OUTPATIENT
Start: 2021-04-11 | End: 2021-04-15

## 2021-04-11 RX ADMIN — FENTANYL CITRATE 1 PATCH: 50 INJECTION INTRAVENOUS at 18:10

## 2021-04-11 RX ADMIN — SENNA PLUS 2 TABLET(S): 8.6 TABLET ORAL at 05:22

## 2021-04-11 RX ADMIN — POLYETHYLENE GLYCOL 3350 17 GRAM(S): 17 POWDER, FOR SOLUTION ORAL at 12:10

## 2021-04-11 RX ADMIN — Medication 2.5 MILLIGRAM(S): at 13:13

## 2021-04-11 RX ADMIN — Medication 1: at 18:10

## 2021-04-11 RX ADMIN — SENNA PLUS 2 TABLET(S): 8.6 TABLET ORAL at 18:10

## 2021-04-11 RX ADMIN — FENTANYL CITRATE 1 PATCH: 50 INJECTION INTRAVENOUS at 13:10

## 2021-04-11 RX ADMIN — FENTANYL CITRATE 1 PATCH: 50 INJECTION INTRAVENOUS at 19:00

## 2021-04-11 RX ADMIN — Medication 2.5 MILLIGRAM(S): at 05:22

## 2021-04-11 RX ADMIN — HYDROMORPHONE HYDROCHLORIDE 4 MILLIGRAM(S): 2 INJECTION INTRAMUSCULAR; INTRAVENOUS; SUBCUTANEOUS at 06:48

## 2021-04-11 RX ADMIN — HYDROMORPHONE HYDROCHLORIDE 4 MILLIGRAM(S): 2 INJECTION INTRAMUSCULAR; INTRAVENOUS; SUBCUTANEOUS at 05:48

## 2021-04-11 RX ADMIN — Medication 1: at 21:49

## 2021-04-11 RX ADMIN — ENOXAPARIN SODIUM 40 MILLIGRAM(S): 100 INJECTION SUBCUTANEOUS at 12:10

## 2021-04-11 RX ADMIN — HYDROMORPHONE HYDROCHLORIDE 4 MILLIGRAM(S): 2 INJECTION INTRAMUSCULAR; INTRAVENOUS; SUBCUTANEOUS at 21:49

## 2021-04-11 RX ADMIN — HYDROMORPHONE HYDROCHLORIDE 4 MILLIGRAM(S): 2 INJECTION INTRAMUSCULAR; INTRAVENOUS; SUBCUTANEOUS at 22:49

## 2021-04-11 RX ADMIN — FENTANYL CITRATE 1 PATCH: 50 INJECTION INTRAVENOUS at 06:44

## 2021-04-11 RX ADMIN — Medication 2.5 MILLIGRAM(S): at 21:49

## 2021-04-11 NOTE — PROVIDER CONTACT NOTE (MEDICATION) - SITUATION
Fentanyl patch 12mcg placed on left arm on 4/9. To be replaced tomorrow but MRI to be done today and patch cannot be on arm.

## 2021-04-11 NOTE — PROGRESS NOTE ADULT - PROBLEM SELECTOR PLAN 1
Patient presenting w/ lower extremity weakness L>R distally in the setting of metastatic pancreatic cancer. Notable Left foot drop. W/o known spinal mets. No spinal mets seen on CT.   - given asymmetry of weakness MR spine thoracic/lumbar w/ and w/o urgent to assess for spinal cord compression   - PT eval   - MRI reviewed - appreciate neuro eval - will send requested labs  - given associated pain w/ weakness pal consult for assistance w/ pain control in the setting of malignancy

## 2021-04-11 NOTE — PROGRESS NOTE ADULT - SUBJECTIVE AND OBJECTIVE BOX
[facility] Division of Hospital Medicine  Melida Mallory MD  Pager (M-F, 8A-5P): [phone]  Other Times:  [phone]    Patient is a 70y old  Female who presents with a chief complaint of Lower Extremity Weakness, Inability to Ambulate (10 Apr 2021 16:31)    SUBJECTIVE / OVERNIGHT EVENTS:  pt continues to have weakness in lower extremities.     MEDICATIONS  (STANDING):  dextrose 40% Gel 15 Gram(s) Oral once  dextrose 5%. 1000 milliLiter(s) (50 mL/Hr) IV Continuous <Continuous>  dextrose 5%. 1000 milliLiter(s) (100 mL/Hr) IV Continuous <Continuous>  dextrose 50% Injectable 25 Gram(s) IV Push once  dextrose 50% Injectable 12.5 Gram(s) IV Push once  dextrose 50% Injectable 25 Gram(s) IV Push once  dronabinol 2.5 milliGRAM(s) Oral three times a day  enoxaparin Injectable 40 milliGRAM(s) SubCutaneous daily  glucagon  Injectable 1 milliGRAM(s) IntraMuscular once  insulin lispro (ADMELOG) corrective regimen sliding scale   SubCutaneous three times a day before meals  insulin lispro (ADMELOG) corrective regimen sliding scale   SubCutaneous at bedtime  polyethylene glycol 3350 17 Gram(s) Oral daily  senna 2 Tablet(s) Oral two times a day    MEDICATIONS  (PRN):  HYDROmorphone   Tablet 4 milliGRAM(s) Oral every 4 hours PRN Moderate Pain (4 - 6)  HYDROmorphone   Tablet 6 milliGRAM(s) Oral every 4 hours PRN Severe Pain (7 - 10)  HYDROmorphone  Injectable 0.5 milliGRAM(s) IV Push every 6 hours PRN breakthrough pain in case PO dilaudid does not work      CAPILLARY BLOOD GLUCOSE      POCT Blood Glucose.: 126 mg/dL (11 Apr 2021 12:15)  POCT Blood Glucose.: 120 mg/dL (11 Apr 2021 08:21)  POCT Blood Glucose.: 93 mg/dL (10 Apr 2021 22:27)  POCT Blood Glucose.: 93 mg/dL (10 Apr 2021 17:48)    I&O's Summary    10 Apr 2021 07:01  -  11 Apr 2021 07:00  --------------------------------------------------------  IN: 0 mL / OUT: 975 mL / NET: -975 mL    11 Apr 2021 07:01  -  11 Apr 2021 13:55  --------------------------------------------------------  IN: 0 mL / OUT: 380 mL / NET: -380 mL        PHYSICAL EXAM:  Vital Signs Last 24 Hrs  T(C): 37.2 (11 Apr 2021 05:25), Max: 37.2 (10 Apr 2021 22:30)  T(F): 98.9 (11 Apr 2021 05:25), Max: 98.9 (10 Apr 2021 22:30)  HR: 90 (11 Apr 2021 05:25) (88 - 90)  BP: 131/53 (11 Apr 2021 05:25) (124/59 - 131/53)  BP(mean): --  RR: 18 (11 Apr 2021 05:25) (18 - 19)  SpO2: 100% (11 Apr 2021 05:25) (97% - 100%)    CONSTITUTIONAL: NAD, well-developed, well-groomed  EYES: EOMI; conjunctiva and sclera clear  ENMT: Moist oral mucosa  RESPIRATORY: Normal respiratory effort; lungs are clear to auscultation bilaterally  CARDIOVASCULAR: Regular rate and rhythm, normal S1 and S2, no murmur; No lower extremity edema  ABDOMEN: Nontender to palpation, normoactive bowel sounds, no rebound/guarding  MUSCULOSKELETAL:   no clubbing or cyanosis of digits; no joint swelling or tenderness to palpation  PSYCH: A+O to person, place, and time; affect appropriate  NEUROLOGY: LLE 4/5  SKIN: No rashes; no palpable lesions    LABS:                        7.4    3.78  )-----------( 128      ( 10 Apr 2021 05:10 )             23.3     04-11    138  |  104  |  14  ----------------------------<  117<H>  4.1   |  24  |  0.45<L>    Ca    8.2<L>      11 Apr 2021 07:36  Phos  2.5     04-10  Mg     2.4     04-10    TPro  5.7<L>  /  Alb  2.7<L>  /  TBili  0.4  /  DBili  x   /  AST  27  /  ALT  23  /  AlkPhos  91  04-11        RADIOLOGY & ADDITIONAL TESTS:  Results Reviewed:   Imaging Personally Reviewed:  Electrocardiogram Personally Reviewed:    COORDINATION OF CARE:  Care Discussed with Consultants/Other Providers [Y/N]: Y  Prior or Outpatient Records Reviewed [Y/N]: Y

## 2021-04-12 ENCOUNTER — LABORATORY RESULT (OUTPATIENT)
Age: 71
End: 2021-04-12

## 2021-04-12 ENCOUNTER — APPOINTMENT (OUTPATIENT)
Dept: HEMATOLOGY ONCOLOGY | Facility: CLINIC | Age: 71
End: 2021-04-12

## 2021-04-12 ENCOUNTER — APPOINTMENT (OUTPATIENT)
Dept: INFUSION THERAPY | Facility: HOSPITAL | Age: 71
End: 2021-04-12

## 2021-04-12 LAB
ALBUMIN SERPL ELPH-MCNC: 2.8 G/DL — LOW (ref 3.3–5)
ALP SERPL-CCNC: 95 U/L — SIGNIFICANT CHANGE UP (ref 40–120)
ALT FLD-CCNC: 22 U/L — SIGNIFICANT CHANGE UP (ref 4–33)
ANION GAP SERPL CALC-SCNC: 8 MMOL/L — SIGNIFICANT CHANGE UP (ref 7–14)
AST SERPL-CCNC: 26 U/L — SIGNIFICANT CHANGE UP (ref 4–32)
BILIRUB SERPL-MCNC: 0.3 MG/DL — SIGNIFICANT CHANGE UP (ref 0.2–1.2)
BLD GP AB SCN SERPL QL: NEGATIVE — SIGNIFICANT CHANGE UP
BUN SERPL-MCNC: 14 MG/DL — SIGNIFICANT CHANGE UP (ref 7–23)
CALCIUM SERPL-MCNC: 8.3 MG/DL — LOW (ref 8.4–10.5)
CHLORIDE SERPL-SCNC: 102 MMOL/L — SIGNIFICANT CHANGE UP (ref 98–107)
CO2 SERPL-SCNC: 27 MMOL/L — SIGNIFICANT CHANGE UP (ref 22–31)
CREAT SERPL-MCNC: 0.5 MG/DL — SIGNIFICANT CHANGE UP (ref 0.5–1.3)
GLUCOSE BLDC GLUCOMTR-MCNC: 136 MG/DL — HIGH (ref 70–99)
GLUCOSE BLDC GLUCOMTR-MCNC: 160 MG/DL — HIGH (ref 70–99)
GLUCOSE BLDC GLUCOMTR-MCNC: 174 MG/DL — HIGH (ref 70–99)
GLUCOSE SERPL-MCNC: 143 MG/DL — HIGH (ref 70–99)
HCT VFR BLD CALC: 21.6 % — LOW (ref 34.5–45)
HCT VFR BLD CALC: 21.6 % — LOW (ref 34.5–45)
HGB BLD-MCNC: 7.2 G/DL — LOW (ref 11.5–15.5)
HGB BLD-MCNC: 7.2 G/DL — LOW (ref 11.5–15.5)
MCHC RBC-ENTMCNC: 31.9 PG — SIGNIFICANT CHANGE UP (ref 27–34)
MCHC RBC-ENTMCNC: 32.1 PG — SIGNIFICANT CHANGE UP (ref 27–34)
MCHC RBC-ENTMCNC: 33.3 GM/DL — SIGNIFICANT CHANGE UP (ref 32–36)
MCHC RBC-ENTMCNC: 33.3 GM/DL — SIGNIFICANT CHANGE UP (ref 32–36)
MCV RBC AUTO: 95.6 FL — SIGNIFICANT CHANGE UP (ref 80–100)
MCV RBC AUTO: 96.4 FL — SIGNIFICANT CHANGE UP (ref 80–100)
NRBC # BLD: 0 /100 WBCS — SIGNIFICANT CHANGE UP
NRBC # BLD: 0 /100 WBCS — SIGNIFICANT CHANGE UP
NRBC # FLD: 0 K/UL — SIGNIFICANT CHANGE UP
NRBC # FLD: 0 K/UL — SIGNIFICANT CHANGE UP
PLATELET # BLD AUTO: 67 K/UL — LOW (ref 150–400)
PLATELET # BLD AUTO: 79 K/UL — LOW (ref 150–400)
POTASSIUM SERPL-MCNC: 4 MMOL/L — SIGNIFICANT CHANGE UP (ref 3.5–5.3)
POTASSIUM SERPL-SCNC: 4 MMOL/L — SIGNIFICANT CHANGE UP (ref 3.5–5.3)
PROT SERPL-MCNC: 5.9 G/DL — LOW (ref 6–8.3)
RBC # BLD: 2.24 M/UL — LOW (ref 3.8–5.2)
RBC # BLD: 2.26 M/UL — LOW (ref 3.8–5.2)
RBC # FLD: 19.5 % — HIGH (ref 10.3–14.5)
RBC # FLD: 19.8 % — HIGH (ref 10.3–14.5)
RH IG SCN BLD-IMP: POSITIVE — SIGNIFICANT CHANGE UP
SODIUM SERPL-SCNC: 137 MMOL/L — SIGNIFICANT CHANGE UP (ref 135–145)
WBC # BLD: 4.8 K/UL — SIGNIFICANT CHANGE UP (ref 3.8–10.5)
WBC # BLD: 6.26 K/UL — SIGNIFICANT CHANGE UP (ref 3.8–10.5)
WBC # FLD AUTO: 4.8 K/UL — SIGNIFICANT CHANGE UP (ref 3.8–10.5)
WBC # FLD AUTO: 6.26 K/UL — SIGNIFICANT CHANGE UP (ref 3.8–10.5)

## 2021-04-12 PROCEDURE — 99233 SBSQ HOSP IP/OBS HIGH 50: CPT

## 2021-04-12 PROCEDURE — 99232 SBSQ HOSP IP/OBS MODERATE 35: CPT

## 2021-04-12 RX ADMIN — HYDROMORPHONE HYDROCHLORIDE 4 MILLIGRAM(S): 2 INJECTION INTRAMUSCULAR; INTRAVENOUS; SUBCUTANEOUS at 02:24

## 2021-04-12 RX ADMIN — HYDROMORPHONE HYDROCHLORIDE 4 MILLIGRAM(S): 2 INJECTION INTRAMUSCULAR; INTRAVENOUS; SUBCUTANEOUS at 10:09

## 2021-04-12 RX ADMIN — Medication 1: at 12:39

## 2021-04-12 RX ADMIN — Medication 2.5 MILLIGRAM(S): at 21:24

## 2021-04-12 RX ADMIN — SENNA PLUS 2 TABLET(S): 8.6 TABLET ORAL at 17:48

## 2021-04-12 RX ADMIN — Medication 2.5 MILLIGRAM(S): at 06:25

## 2021-04-12 RX ADMIN — SENNA PLUS 2 TABLET(S): 8.6 TABLET ORAL at 06:25

## 2021-04-12 RX ADMIN — HYDROMORPHONE HYDROCHLORIDE 4 MILLIGRAM(S): 2 INJECTION INTRAMUSCULAR; INTRAVENOUS; SUBCUTANEOUS at 17:48

## 2021-04-12 RX ADMIN — POLYETHYLENE GLYCOL 3350 17 GRAM(S): 17 POWDER, FOR SOLUTION ORAL at 12:39

## 2021-04-12 RX ADMIN — FENTANYL CITRATE 1 PATCH: 50 INJECTION INTRAVENOUS at 17:49

## 2021-04-12 RX ADMIN — FENTANYL CITRATE 1 PATCH: 50 INJECTION INTRAVENOUS at 06:25

## 2021-04-12 RX ADMIN — ENOXAPARIN SODIUM 40 MILLIGRAM(S): 100 INJECTION SUBCUTANEOUS at 12:39

## 2021-04-12 RX ADMIN — HYDROMORPHONE HYDROCHLORIDE 4 MILLIGRAM(S): 2 INJECTION INTRAMUSCULAR; INTRAVENOUS; SUBCUTANEOUS at 03:24

## 2021-04-12 RX ADMIN — HYDROMORPHONE HYDROCHLORIDE 4 MILLIGRAM(S): 2 INJECTION INTRAMUSCULAR; INTRAVENOUS; SUBCUTANEOUS at 11:00

## 2021-04-12 RX ADMIN — Medication 2.5 MILLIGRAM(S): at 14:13

## 2021-04-12 RX ADMIN — HYDROMORPHONE HYDROCHLORIDE 4 MILLIGRAM(S): 2 INJECTION INTRAMUSCULAR; INTRAVENOUS; SUBCUTANEOUS at 18:40

## 2021-04-12 NOTE — PROGRESS NOTE ADULT - ASSESSMENT
70fwith past medical history of diabetes, pancreatic cancer w/ mets to lung and liver on chemo last session Monday (9th session, abraxane/gemzar) coming in w/ inability to ambulate and independently take care of ADLs since Monday.

## 2021-04-12 NOTE — ADVANCED PRACTICE NURSE CONSULT - ASSESSMENT
A&Ox4, severe cachexia with temporal wasting, chair bedbound, assisted back to bed with difficulty secondary to weakness. Continent of urine and stool. Indwelling catheter in place.     Risk for moisture associated dermatitis  in bilateral buttocks. Intact skin.     Left foot with motor changes and prominent hallux. Mild claw toes with hyperpigmentation of dorsal toes. Thickened hyperpigmented overgrown toenails. Mild warmth to bilateral legs and feet. + 2 Edema. Capillary refill <3 seconds. DP/PT pulses 1+. Patient reports neuropathy of legs and foot.     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 Tissue type presenting as 100% deep purple maroon discoloration. Mild bogginess of hallux. Periwound skin intact. No increased warmth, no erythema, no induration. Goals of care: monitor for tissue type changes, offload.  A&Ox4, severe cachexia with temporal wasting, chair bedbound, assisted back to bed with difficulty secondary to weakness. Continent of urine and stool. Indwelling catheter in place.     Risk for moisture associated dermatitis  in bilateral buttocks. Intact skin.     Left foot with motor neuropathic changes and prominent hallux. Mild claw toes with hyperpigmentation of dorsal toes. Thickened hyperpigmented overgrown toenails. Mild warmth to bilateral legs and feet. + 2 Edema. Capillary refill <3 seconds. DP/PT pulses 1+. Patient reports neuropathy of legs and foot.     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 Tissue type presenting as 100% deep purple maroon discoloration. Mild bogginess of hallux. Periwound skin intact. No increased warmth, no erythema, no induration. Goals of care: monitor for tissue type changes, offload.     Findings discussed with SHILPA Garcia

## 2021-04-12 NOTE — PROGRESS NOTE ADULT - SUBJECTIVE AND OBJECTIVE BOX
HPI:  Patient is a 70 year old female with past medical history of diabetes, pancreatic cancer w/ mets to lung and liver on chemo currently admitted for LE pain and weakness. Palliative Medicine was consulted to evaluate and manage complex symptomatology related to the patient's life-limiting illness    =======================================================  4/12/2021    - Chart reviewed. Patient seen and examined.  - Patient comfortable during my visit  - She says, however, that the pain meds are not affecting here. I am unsure if this is accurate based on my exam or if she is just frustrated as she says no one is telling her what is causing the weakness  - No BM since admission    =======================================================  ----- SYMPTOM ASSESSMENT:   [ ]Unable to obtain due to poor mentation: information obtained from team/ contact    PAIN ASSESSMENT:   Site- BOTH LEGS FROM KNEES DOWN  Onset- ACUTE   Character- NUMBNESS  Radiation- NONE  Associated symptoms- NONE  Timing and duration- INTERMITTENT SPIKES THROUGHOUT THE DAY  Exacerbating factors  Severity- MOD    Effect on QOL- SIGNIFICANTLY INTERFERES WITH ADL'S  PAIN AD Score: 0    Dyspnea:  Yes [ ] No [X ] - [ ]Mild [ ]Moderate [ ]Severe  Anxiety:    Yes [ ] No [ X] - [ ]Mild [ ]Moderate [ ]Severe  Fatigue:    Yes [ ] No [X ] - [ ]Mild [ ]Moderate [ ]Severe  Nausea:    Yes [ ] No [ X] - [ ]Mild [ ]Moderate [ ]Severe                         Loss of appetite: Yes [ ] No [X ] - [ ]Mild [ ]Moderate [ ]Severe             Constipation:  Yes [ ] No [X ] - [ ]Mild [ ]Moderate [ ]Severe  Grief: Yes [ ] No [X ]     [X ]All other review of systems negative, including: weakness, cough, colds, blurry vision, headaches, dysuria, pruritus, palpitations, muscle cramps, easy bruising, epistaxis, rashes    =======================================================  ----- PERTINENT PMH/ SXH/ FHX  Type 2 diabetes mellitus    Pancreatic cancer      S/P tendon repair      FAMILY HISTORY:  Family history of liver cancer  Sister    Family history of cardiac arrest (Sibling)    FH: aneurysm (Sibling)    ----- SOCIAL HISTORY:   [ ] Unable to elicit  Significant other/partner:    Children:   Amish/Spirituality:  Substance hx: Yes[ ]  No [ ]   Tobacco hx:  Yes [ ] No [ ]   Alcohol hx: Yes [ ] No [ ]   Home Opioid hx:  [ ] I-Stop Reference No:  Living Situation: [x ]Home  [ ]Long term care  [ ]Rehab [ ]Other    ----- ADVANCE DIRECTIVES:    DNR  MOLST  [ ]  Living Will  [ ]   DECISION MAKER(s):  [ ] Health Care Proxy(s)  [ ] Surrogate(s)  [ ] Guardian           Name(s): Phone Number(s):    ----- BASELINE (I)ADL(s) (prior to admission):  [place]: [ ]Total  [ ] Moderate [ ]Dependent  Palliative Performance Status Version 2:  60      =======================================================  -----MEDICATIONS AND ALLERGIES:  Allergies    No Known Allergies    Intolerances    MEDICATIONS  (STANDING):  dextrose 40% Gel 15 Gram(s) Oral once  dextrose 5%. 1000 milliLiter(s) (50 mL/Hr) IV Continuous <Continuous>  dextrose 5%. 1000 milliLiter(s) (100 mL/Hr) IV Continuous <Continuous>  dextrose 50% Injectable 25 Gram(s) IV Push once  dextrose 50% Injectable 12.5 Gram(s) IV Push once  dextrose 50% Injectable 25 Gram(s) IV Push once  dronabinol 2.5 milliGRAM(s) Oral three times a day  enoxaparin Injectable 40 milliGRAM(s) SubCutaneous daily  fentaNYL   Patch  12 MICROgram(s)/Hr 1 Patch Transdermal every 72 hours  glucagon  Injectable 1 milliGRAM(s) IntraMuscular once  insulin lispro (ADMELOG) corrective regimen sliding scale   SubCutaneous three times a day before meals  insulin lispro (ADMELOG) corrective regimen sliding scale   SubCutaneous at bedtime  polyethylene glycol 3350 17 Gram(s) Oral daily  senna 2 Tablet(s) Oral two times a day    MEDICATIONS  (PRN):  HYDROmorphone   Tablet 4 milliGRAM(s) Oral every 4 hours PRN Moderate Pain (4 - 6)  HYDROmorphone   Tablet 6 milliGRAM(s) Oral every 4 hours PRN Severe Pain (7 - 10)  HYDROmorphone  Injectable 0.5 milliGRAM(s) IV Push every 6 hours PRN breakthrough pain in case PO dilaudid does not work      =======================================================  ----- PHYSICAL EXAM:  Vital Signs Last 24 Hrs  T(C): 36.7 (12 Apr 2021 13:20), Max: 36.8 (12 Apr 2021 06:27)  T(F): 98.1 (12 Apr 2021 13:20), Max: 98.2 (12 Apr 2021 06:27)  HR: 72 (12 Apr 2021 13:20) (72 - 84)  BP: 126/68 (12 Apr 2021 13:20) (126/68 - 149/64)  BP(mean): --  RR: 18 (12 Apr 2021 13:20) (18 - 18)  SpO2: 99% (12 Apr 2021 13:20) (99% - 100%)    GEN: Awake, alert, NAD, CACHECTIC  HEAD: Normocephalic and atraumatic,   EYES: Anicteric sclerae, EOM's grossly intact  NECK: No JVD, no thyromegaly  PULM: Comfortable work of breathing, clear BS  CV: Pulses 2+ symmetric bilaterally, regular rate and rhythm  ABD: Not distended, soft, non-tender,   EXT: +TENDERNESS TO PALPATION BOTH LEGS, +1 EDEMA, MMT 2/5  PSYCH: Appropriate mood and affect, no suicidal ideations elicited  NEURO: No facial asymmetry, no tremors, no observed movement disorders  SKIN: No rashes or lesions on exposed skin, No jaundice    =======================================================  ----- LABS:                04-12    137  |  102  |  14  ----------------------------<  143<H>  4.0   |  27  |  0.50    Ca    8.3<L>      12 Apr 2021 09:22    TPro  5.9<L>  /  Alb  2.8<L>  /  TBili  0.3  /  DBili  x   /  AST  26  /  ALT  22  /  AlkPhos  95  04-12

## 2021-04-12 NOTE — PROGRESS NOTE ADULT - PROBLEM SELECTOR PLAN 1
.  BLE pain    > Continue Fent patch 12mcg + Dilaudid 2mg PO q6h PRN for now  > She has just been using 2-3 PRN's daily and seems comfortable

## 2021-04-12 NOTE — PROGRESS NOTE ADULT - SUBJECTIVE AND OBJECTIVE BOX
INTERVAL HPI/OVERNIGHT EVENTS:  Patient seen at bedside.  PAtient endorsing that she feels that her LE weakness  is worsening, pt feels that weakness now is going  up beyond her legs  L > R weakness        VITAL SIGNS:  T(F): 98.1 (04-12-21 @ 13:20)  HR: 72 (04-12-21 @ 13:20)  BP: 126/68 (04-12-21 @ 13:20)  RR: 18 (04-12-21 @ 13:20)  SpO2: 99% (04-12-21 @ 13:20)  Wt(kg): --    PHYSICAL EXAM:    In accordance with current standard limiting patient contact, deferred physical exam  2/2 COVID pandemic  Please refer to physical exam of primary team.    MEDICATIONS  (STANDING):  dextrose 40% Gel 15 Gram(s) Oral once  dextrose 5%. 1000 milliLiter(s) (50 mL/Hr) IV Continuous <Continuous>  dextrose 5%. 1000 milliLiter(s) (100 mL/Hr) IV Continuous <Continuous>  dextrose 50% Injectable 25 Gram(s) IV Push once  dextrose 50% Injectable 12.5 Gram(s) IV Push once  dextrose 50% Injectable 25 Gram(s) IV Push once  dronabinol 2.5 milliGRAM(s) Oral three times a day  enoxaparin Injectable 40 milliGRAM(s) SubCutaneous daily  fentaNYL   Patch  12 MICROgram(s)/Hr 1 Patch Transdermal every 72 hours  glucagon  Injectable 1 milliGRAM(s) IntraMuscular once  insulin lispro (ADMELOG) corrective regimen sliding scale   SubCutaneous three times a day before meals  insulin lispro (ADMELOG) corrective regimen sliding scale   SubCutaneous at bedtime  polyethylene glycol 3350 17 Gram(s) Oral daily  senna 2 Tablet(s) Oral two times a day    MEDICATIONS  (PRN):  HYDROmorphone   Tablet 4 milliGRAM(s) Oral every 4 hours PRN Moderate Pain (4 - 6)  HYDROmorphone   Tablet 6 milliGRAM(s) Oral every 4 hours PRN Severe Pain (7 - 10)  HYDROmorphone  Injectable 0.5 milliGRAM(s) IV Push every 6 hours PRN breakthrough pain in case PO dilaudid does not work      Allergies    No Known Allergies    Intolerances        LABS:                        7.2    4.80  )-----------( 79       ( 12 Apr 2021 09:22 )             21.6     04-12    137  |  102  |  14  ----------------------------<  143<H>  4.0   |  27  |  0.50    Ca    8.3<L>      12 Apr 2021 09:22    TPro  5.9<L>  /  Alb  2.8<L>  /  TBili  0.3  /  DBili  x   /  AST  26  /  ALT  22  /  AlkPhos  95  04-12          RADIOLOGY & ADDITIONAL TESTS:  Studies reviewed.

## 2021-04-12 NOTE — PROGRESS NOTE ADULT - PROBLEM SELECTOR PLAN 2
a1c 6.1  - per [facility] patient takes 9u of basaglar, novolog 8u before meals   - pt with episodes of hypoglycemia on 4/9 and 4/10 - would d/c lantus and premeal and only continue with ISS

## 2021-04-12 NOTE — PROGRESS NOTE ADULT - ASSESSMENT
70f with metastatic pancreatic with  mets to lung and liver, on chemotherapy with gemcitabine and abraxane, presenting with LE swelling, heaviness and inability to walk since 2 days PTA.   LE doppers negative for VTE, however with ?arterial thrombus. PT and DP pulses not palpable. Legs are warm. She is not able to move leges antigravity.   spine CT with contrast without signs of spinal mets or cord compression.   Recent bone scan 3/25/2021 and recent PET/CT 3/11/2021 without evidence of bone mets. Given all the above the likelihood of spinal mets and cord compression is minimal.     -S/p Arterial duplex, BLE of mild femoropopliteal arterial disease noted  -No Vascular intervention indicated at this time  -Urology input appreciated, patient with joy , no intervention indicated for chronic hydronephrosis for  now  -Pal care input appreciated  -s/p MR thoracic and lumbar spine shows  degenerative disc disease at L3-4 and L4-5 with loss of signal on the T2-weighted images. No cord or cauda equina compression noted.   Progressive b/l LE weakness possibly 2/2 loss signal at L3/4 and L4/5 in the setting of degenerative disk disease vs infectious etiology in the setting of warm edematous LEs  - will followup with Neurology consult for further  recs  -Rest of care as per primary team  -Patient to followup with Dr. Vieira ([facility]) upon discharge  -C/w Supportive care, pain control, Nutrition, PT, DVT ppx  -Oncology will continue to follow with you      Case d/w Dr. Eneida ETIENNE  Oncology Physician Assistant  [facility]  Pager [phone]    If after 5pm or weekends please page On-call Oncology Fellow

## 2021-04-12 NOTE — PROGRESS NOTE ADULT - ATTENDING COMMENTS
Patient seen. Case reviewed and discussed with LOWELL Samano. Plan as above.   Feels weakness is worsening.   Neuro consult.   Pancytopenia is in the setting of chemotherapy with gemzar and abraxane 4/5/2021.

## 2021-04-12 NOTE — ADVANCED PRACTICE NURSE CONSULT - RECOMMEDATIONS
Recommend X rays of bilateral feet to evaluate soft tissue (r/o gouty changes in foot?)  Recommend nutrition consult to optimize nutrition (BMI 16kg/m2).   If tissue type changes/deteriorates  consider podiatry consult for left hallux wound     Topical Recommendations     Left hallux and bilateral heels: Apply Liquid barrier film daily.     Continue low air loss bed therapy, heel elevation with Z-flex fluidized positioning boots while in bed, continue to turn & reposition q2h with Z-sachi fluidized positioning device, soft pillow between bony prominences, continue moisture management with barrier creams & single breathable pad, continue measures to decrease friction/shear/pressure. Continue with nutritional support as per dietary/orders.    Please contact Wound Care Service Line if we can be of further assistance (ext [phone]).

## 2021-04-12 NOTE — PROGRESS NOTE ADULT - PROBLEM SELECTOR PLAN 1
Patient presenting w/ lower extremity weakness L>R distally in the setting of metastatic pancreatic cancer. Notable Left foot drop. W/o known spinal mets. No spinal mets seen on CT.   -MR spine thoracic/lumbar w/ and w/o neg for spinal cord compression   will get MRI brain and c spine w and w/o   need f/u from neuro  - PT eval

## 2021-04-12 NOTE — ADVANCED PRACTICE NURSE CONSULT - REASON FOR CONSULT
Patient seen on skin care rounds after wound care referral received for assessment of skin impairment and recommendations of topical management. Chart reviewed: [place] 17, VA duplex of lower extremities SAL of 0.84, VA duplex of lower extremities negative for DVT, MR of the Abdomen reports increase in size of hepatic metastasis, BMI 16 kg/m2, Hemoglobin/HCT; 7.4/23.3, wbc 3.78, Hemoglobin A1C 6.1%. Patient H/O of  past medical history of diabetes, pancreatic cancer w/ mets to lung and liver on chemo last session Monday (9th session, abraxane/gemzar) coming in w/ inability to ambulate and independently take care of ADLs since Monday. Patient reports she ambulates w/ a walker at baseline, however she does so independently. Patient now w/ issues even standing up with the walker. States her weakness is associated w/ b/l lower extremity pain. Pain is 9/10 in severity and is aching. Patient was taking her home fentanyl and Dilaudid however this did not help symptoms. Patient states she lives alone w/o HHA. Patient states she has had some difficulty completely emptying her bladder however denies any urinary incontinence. Patient denies any fecal incontinence. States she is moving her bowels however her BMs have been small. Patient denies any saddle anesthesia.   ED Course: T 98.2, R 17, HR 93, BP 91/48. Patient Labs notable for BUN 30. Patient hyperglycemic on FSG. Patient AST midly elevated 43. Patient Hemoglobin 8.8 at baseline. Patient urine concentrated 1.050 otherwise negative for UTI. Patient had a CT thoracic and lumbar spine, with chronic severe left hydronephrosis. Also w/ large stool burden otherwise unremarkable. Patient given 1L NS. Patient admitted for evaluation of lower extremity weakness and PT eval for inability to ambulate. Patient has being seeing by neurology for leg weakness secondary to degenerative disks disease vs. infectious etiology.

## 2021-04-12 NOTE — PROGRESS NOTE ADULT - PROBLEM SELECTOR PLAN 3
.  Pls prescribe Naloxone for here and upon discharge. Patients with 50mg OME have 4x risk of OD and those with 100mg OME have 9x risk (prescribetoprevent.org)    Inpatient: Naloxone 0.4 to 2mg IV q2-3mins PRN (max 10mg)  Upon discharge: Naloxone 4mg/0.1 ml nasal spray, 1 spray q2-3mins alternating between nostrils     Pls provide patient education prior to discharge. Resource: https://www.health.[place].gov/diseases/aids/general/opioid_overdose_prevention/overdose_facts.htm

## 2021-04-12 NOTE — PROGRESS NOTE ADULT - SUBJECTIVE AND OBJECTIVE BOX
[facility] Division of Hospital Medicine  Vida Carney MD  Pager [phone]    Patient is a 70y old  Female who presents with a chief complaint of Lower Extremity Weakness, Inability to Ambulate (12 Apr 2021 16:07)      SUBJECTIVE / OVERNIGHT EVENTS: pt seen and examined at 155p- worried about her LE weakness   ADDITIONAL REVIEW OF SYSTEMS:    MEDICATIONS  (STANDING):  dextrose 40% Gel 15 Gram(s) Oral once  dextrose 5%. 1000 milliLiter(s) (50 mL/Hr) IV Continuous <Continuous>  dextrose 5%. 1000 milliLiter(s) (100 mL/Hr) IV Continuous <Continuous>  dextrose 50% Injectable 25 Gram(s) IV Push once  dextrose 50% Injectable 12.5 Gram(s) IV Push once  dextrose 50% Injectable 25 Gram(s) IV Push once  dronabinol 2.5 milliGRAM(s) Oral three times a day  enoxaparin Injectable 40 milliGRAM(s) SubCutaneous daily  fentaNYL   Patch  12 MICROgram(s)/Hr 1 Patch Transdermal every 72 hours  glucagon  Injectable 1 milliGRAM(s) IntraMuscular once  insulin lispro (ADMELOG) corrective regimen sliding scale   SubCutaneous three times a day before meals  insulin lispro (ADMELOG) corrective regimen sliding scale   SubCutaneous at bedtime  polyethylene glycol 3350 17 Gram(s) Oral daily  senna 2 Tablet(s) Oral two times a day    MEDICATIONS  (PRN):  HYDROmorphone   Tablet 4 milliGRAM(s) Oral every 4 hours PRN Moderate Pain (4 - 6)  HYDROmorphone   Tablet 6 milliGRAM(s) Oral every 4 hours PRN Severe Pain (7 - 10)  HYDROmorphone  Injectable 0.5 milliGRAM(s) IV Push every 6 hours PRN breakthrough pain in case PO dilaudid does not work      CAPILLARY BLOOD GLUCOSE      POCT Blood Glucose.: 174 mg/dL (12 Apr 2021 22:11)  POCT Blood Glucose.: 136 mg/dL (12 Apr 2021 17:29)  POCT Blood Glucose.: 160 mg/dL (12 Apr 2021 12:10)  POCT Blood Glucose.: 149 mg/dL (12 Apr 2021 08:19)    I&O's Summary    11 Apr 2021 07:01  -  12 Apr 2021 07:00  --------------------------------------------------------  IN: 0 mL / OUT: 795 mL / NET: -795 mL    12 Apr 2021 07:01  -  12 Apr 2021 23:26  --------------------------------------------------------  IN: 0 mL / OUT: 325 mL / NET: -325 mL        PHYSICAL EXAM:  Vital Signs Last 24 Hrs  T(C): 36.8 (12 Apr 2021 21:34), Max: 36.8 (12 Apr 2021 06:27)  T(F): 98.3 (12 Apr 2021 21:34), Max: 98.3 (12 Apr 2021 21:34)  HR: 74 (12 Apr 2021 21:34) (72 - 81)  BP: 124/57 (12 Apr 2021 21:34) (124/57 - 149/64)  BP(mean): --  RR: 17 (12 Apr 2021 21:34) (17 - 18)  SpO2: 96% (12 Apr 2021 21:34) (96% - 100%)  CONSTITUTIONAL: NAD, appears weak  RESPIRATORY: Normal respiratory effort; lungs are clear to auscultation bilaterally  CARDIOVASCULAR: Regular rate and rhythm, normal S1 and S2, no murmur/rub/gallop; +1 pedal edema left    ABDOMEN: Nontender to palpation, normoactive bowel sounds, not distended;   MUSCULOSKELETAL:  no clubbing or cyanosis of digits; no joint swelling or tenderness to palpation  PSYCH: A+O to person, place, and time; affect appropriate  NEURO: decreased strength LE's- RLE 5-/5 LLE-3/5       LABS:                        7.2    6.26  )-----------( 67       ( 12 Apr 2021 19:31 )             21.6     04-12    137  |  102  |  14  ----------------------------<  143<H>  4.0   |  27  |  0.50    Ca    8.3<L>      12 Apr 2021 09:22    TPro  5.9<L>  /  Alb  2.8<L>  /  TBili  0.3  /  DBili  x   /  AST  26  /  ALT  22  /  AlkPhos  95  04-12                RADIOLOGY & ADDITIONAL TESTS:  Results Reviewed:   Imaging Personally Reviewed:  Electrocardiogram Personally Reviewed:    COORDINATION OF CARE:  Care Discussed with Consultants/Other Providers [Y/N]:  Prior or Outpatient Records Reviewed [Y/N]:

## 2021-04-13 LAB
ALBUMIN SERPL ELPH-MCNC: 2.5 G/DL — LOW (ref 3.3–5)
ALP SERPL-CCNC: 90 U/L — SIGNIFICANT CHANGE UP (ref 40–120)
ALT FLD-CCNC: 20 U/L — SIGNIFICANT CHANGE UP (ref 4–33)
ANION GAP SERPL CALC-SCNC: 9 MMOL/L — SIGNIFICANT CHANGE UP (ref 7–14)
ANISOCYTOSIS BLD QL: SLIGHT — SIGNIFICANT CHANGE UP
AST SERPL-CCNC: 25 U/L — SIGNIFICANT CHANGE UP (ref 4–32)
BASOPHILS # BLD AUTO: 0 K/UL — SIGNIFICANT CHANGE UP (ref 0–0.2)
BASOPHILS NFR BLD AUTO: 0 % — SIGNIFICANT CHANGE UP (ref 0–2)
BILIRUB SERPL-MCNC: 0.2 MG/DL — SIGNIFICANT CHANGE UP (ref 0.2–1.2)
BLD GP AB SCN SERPL QL: NEGATIVE — SIGNIFICANT CHANGE UP
BUN SERPL-MCNC: 13 MG/DL — SIGNIFICANT CHANGE UP (ref 7–23)
CALCIUM SERPL-MCNC: 8.4 MG/DL — SIGNIFICANT CHANGE UP (ref 8.4–10.5)
CHLORIDE SERPL-SCNC: 99 MMOL/L — SIGNIFICANT CHANGE UP (ref 98–107)
CO2 SERPL-SCNC: 26 MMOL/L — SIGNIFICANT CHANGE UP (ref 22–31)
CREAT SERPL-MCNC: 0.38 MG/DL — LOW (ref 0.5–1.3)
DACRYOCYTES BLD QL SMEAR: SLIGHT — SIGNIFICANT CHANGE UP
EOSINOPHIL # BLD AUTO: 0.14 K/UL — SIGNIFICANT CHANGE UP (ref 0–0.5)
EOSINOPHIL NFR BLD AUTO: 2.6 % — SIGNIFICANT CHANGE UP (ref 0–6)
GLUCOSE BLDC GLUCOMTR-MCNC: 105 MG/DL — HIGH (ref 70–99)
GLUCOSE BLDC GLUCOMTR-MCNC: 128 MG/DL — HIGH (ref 70–99)
GLUCOSE BLDC GLUCOMTR-MCNC: 237 MG/DL — HIGH (ref 70–99)
GLUCOSE BLDC GLUCOMTR-MCNC: 269 MG/DL — HIGH (ref 70–99)
GLUCOSE SERPL-MCNC: 130 MG/DL — HIGH (ref 70–99)
HCT VFR BLD CALC: 21.3 % — LOW (ref 34.5–45)
HCT VFR BLD CALC: 27.9 % — LOW (ref 34.5–45)
HGB BLD-MCNC: 6.8 G/DL — CRITICAL LOW (ref 11.5–15.5)
HGB BLD-MCNC: 9.1 G/DL — LOW (ref 11.5–15.5)
IANC: 4.48 K/UL — SIGNIFICANT CHANGE UP (ref 1.5–8.5)
LYMPHOCYTES # BLD AUTO: 0.62 K/UL — LOW (ref 1–3.3)
LYMPHOCYTES # BLD AUTO: 11.3 % — LOW (ref 13–44)
MACROCYTES BLD QL: SLIGHT — SIGNIFICANT CHANGE UP
MAGNESIUM SERPL-MCNC: 2 MG/DL — SIGNIFICANT CHANGE UP (ref 1.6–2.6)
MANUAL SMEAR VERIFICATION: SIGNIFICANT CHANGE UP
MCHC RBC-ENTMCNC: 29.9 PG — SIGNIFICANT CHANGE UP (ref 27–34)
MCHC RBC-ENTMCNC: 30.6 PG — SIGNIFICANT CHANGE UP (ref 27–34)
MCHC RBC-ENTMCNC: 31.9 GM/DL — LOW (ref 32–36)
MCHC RBC-ENTMCNC: 32.6 GM/DL — SIGNIFICANT CHANGE UP (ref 32–36)
MCV RBC AUTO: 91.8 FL — SIGNIFICANT CHANGE UP (ref 80–100)
MCV RBC AUTO: 95.9 FL — SIGNIFICANT CHANGE UP (ref 80–100)
MONOCYTES # BLD AUTO: 0.19 K/UL — SIGNIFICANT CHANGE UP (ref 0–0.9)
MONOCYTES NFR BLD AUTO: 3.5 % — SIGNIFICANT CHANGE UP (ref 2–14)
NEUTROPHILS # BLD AUTO: 4.51 K/UL — SIGNIFICANT CHANGE UP (ref 1.8–7.4)
NEUTROPHILS NFR BLD AUTO: 82.6 % — HIGH (ref 43–77)
NRBC # BLD: 0 /100 WBCS — SIGNIFICANT CHANGE UP
NRBC # FLD: 0.02 K/UL — HIGH
OVALOCYTES BLD QL SMEAR: SLIGHT — SIGNIFICANT CHANGE UP
PHOSPHATE SERPL-MCNC: 2.8 MG/DL — SIGNIFICANT CHANGE UP (ref 2.5–4.5)
PLAT MORPH BLD: NORMAL — SIGNIFICANT CHANGE UP
PLATELET # BLD AUTO: 50 K/UL — LOW (ref 150–400)
PLATELET # BLD AUTO: 57 K/UL — LOW (ref 150–400)
PLATELET COUNT - ESTIMATE: ABNORMAL
POIKILOCYTOSIS BLD QL AUTO: SLIGHT — SIGNIFICANT CHANGE UP
POLYCHROMASIA BLD QL SMEAR: SLIGHT — SIGNIFICANT CHANGE UP
POTASSIUM SERPL-MCNC: 3.9 MMOL/L — SIGNIFICANT CHANGE UP (ref 3.5–5.3)
POTASSIUM SERPL-SCNC: 3.9 MMOL/L — SIGNIFICANT CHANGE UP (ref 3.5–5.3)
PROT SERPL-MCNC: 5.9 G/DL — LOW (ref 6–8.3)
RBC # BLD: 2.22 M/UL — LOW (ref 3.8–5.2)
RBC # BLD: 3.04 M/UL — LOW (ref 3.8–5.2)
RBC # FLD: 18.4 % — HIGH (ref 10.3–14.5)
RBC # FLD: 19.2 % — HIGH (ref 10.3–14.5)
RBC BLD AUTO: ABNORMAL
RH IG SCN BLD-IMP: POSITIVE — SIGNIFICANT CHANGE UP
SODIUM SERPL-SCNC: 134 MMOL/L — LOW (ref 135–145)
WBC # BLD: 5.46 K/UL — SIGNIFICANT CHANGE UP (ref 3.8–10.5)
WBC # BLD: 6 K/UL — SIGNIFICANT CHANGE UP (ref 3.8–10.5)
WBC # FLD AUTO: 5.46 K/UL — SIGNIFICANT CHANGE UP (ref 3.8–10.5)
WBC # FLD AUTO: 6 K/UL — SIGNIFICANT CHANGE UP (ref 3.8–10.5)

## 2021-04-13 PROCEDURE — 99232 SBSQ HOSP IP/OBS MODERATE 35: CPT

## 2021-04-13 PROCEDURE — 99233 SBSQ HOSP IP/OBS HIGH 50: CPT

## 2021-04-13 RX ORDER — POLYETHYLENE GLYCOL 3350 17 G/17G
17 POWDER, FOR SOLUTION ORAL
Refills: 0 | Status: DISCONTINUED | OUTPATIENT
Start: 2021-04-13 | End: 2021-04-18

## 2021-04-13 RX ORDER — GLYCERIN ADULT
1 SUPPOSITORY, RECTAL RECTAL DAILY
Refills: 0 | Status: DISCONTINUED | OUTPATIENT
Start: 2021-04-13 | End: 2021-04-15

## 2021-04-13 RX ADMIN — Medication 2.5 MILLIGRAM(S): at 06:21

## 2021-04-13 RX ADMIN — FENTANYL CITRATE 1 PATCH: 50 INJECTION INTRAVENOUS at 06:21

## 2021-04-13 RX ADMIN — HYDROMORPHONE HYDROCHLORIDE 4 MILLIGRAM(S): 2 INJECTION INTRAMUSCULAR; INTRAVENOUS; SUBCUTANEOUS at 21:08

## 2021-04-13 RX ADMIN — HYDROMORPHONE HYDROCHLORIDE 4 MILLIGRAM(S): 2 INJECTION INTRAMUSCULAR; INTRAVENOUS; SUBCUTANEOUS at 03:00

## 2021-04-13 RX ADMIN — ENOXAPARIN SODIUM 40 MILLIGRAM(S): 100 INJECTION SUBCUTANEOUS at 13:15

## 2021-04-13 RX ADMIN — HYDROMORPHONE HYDROCHLORIDE 4 MILLIGRAM(S): 2 INJECTION INTRAMUSCULAR; INTRAVENOUS; SUBCUTANEOUS at 06:21

## 2021-04-13 RX ADMIN — Medication 2.5 MILLIGRAM(S): at 21:08

## 2021-04-13 RX ADMIN — Medication 3: at 13:16

## 2021-04-13 RX ADMIN — HYDROMORPHONE HYDROCHLORIDE 4 MILLIGRAM(S): 2 INJECTION INTRAMUSCULAR; INTRAVENOUS; SUBCUTANEOUS at 07:20

## 2021-04-13 RX ADMIN — SENNA PLUS 2 TABLET(S): 8.6 TABLET ORAL at 06:21

## 2021-04-13 RX ADMIN — Medication 2.5 MILLIGRAM(S): at 13:15

## 2021-04-13 RX ADMIN — HYDROMORPHONE HYDROCHLORIDE 4 MILLIGRAM(S): 2 INJECTION INTRAMUSCULAR; INTRAVENOUS; SUBCUTANEOUS at 02:04

## 2021-04-13 RX ADMIN — POLYETHYLENE GLYCOL 3350 17 GRAM(S): 17 POWDER, FOR SOLUTION ORAL at 13:15

## 2021-04-13 RX ADMIN — FENTANYL CITRATE 1 PATCH: 50 INJECTION INTRAVENOUS at 20:27

## 2021-04-13 RX ADMIN — HYDROMORPHONE HYDROCHLORIDE 4 MILLIGRAM(S): 2 INJECTION INTRAMUSCULAR; INTRAVENOUS; SUBCUTANEOUS at 22:08

## 2021-04-13 NOTE — PROGRESS NOTE ADULT - ASSESSMENT
70y R-HF with h/o DM2, pancreatic CA p/w proximal >distal LLE>RLE paraparesis, worsening over past month to point she is unable to walk.  On exam, she is cachectic with bit-temporal, B/L thenar and thigh eminence wasting, has hyporeflexia in both legs, weakness in left hip flexion, adduction, knee extension, foot dorsiflexion w/ associated foot drop, plantarflexion and inversion.  MRI L spine reviewed with L4/L5 bone marrow signal changes of anemia).  Intermittent R. thigh fasciculations noted.       IMPRESSION: Generalized cachexia and rapidly progressive asymmetric paraparesis L>R with left L3-S1 dermatomal sensory impairment and associated LMN signs concerning for either lumbar plexopathy vs. possible paraneoplastic process.     Plan  [] Trend CPK x 2 days.  Send myoglobin and paraneoplastic panel from serum  [] MRI pelvis w/wo to evaluate for lumbar plexopathy.  If positive, may consider short course of IV steroids x 3-5 days.   [] If no clear evidence on MRI then will discuss with neuromuscular team if PT can have inpatient EMG.   [] F/U results of remaining reversible neuropathy labs such as Cu, Vitamin.   [] No LP for now    PT seen, assessment and plan discussed with attending, Dr. Diane.     Chidi Chaudhari, DO  PGY-3 Neurology Service 70y R-HF with h/o DM2, pancreatic CA p/w proximal >distal LLE>RLE paraparesis, worsening over past month to point she is unable to walk.  On exam, she is cachectic with bit-temporal, B/L thenar and thigh eminence wasting, has hyporeflexia in both legs, weakness in left hip flexion, adduction, knee extension, foot dorsiflexion w/ associated foot drop, plantarflexion and inversion.  MRI L spine reviewed with L4/L5 bone marrow signal changes of anemia).  Intermittent R. thigh fasciculations noted.       IMPRESSION: Generalized cachexia and rapidly progressive asymmetric paraparesis L>R with left L3-S1 dermatomal sensory impairment and associated LMN signs concerning for either lumbar plexopathy vs. possible paraneoplastic mediated process.     Plan:    [] Trend CPK x 2 days.  Send myoglobin and paraneoplastic panel from serum  [] MRI pelvis w/wo to evaluate for lumbar plexopathy.  If positive, may consider short course of IV steroids x 3-5 days.   [] If no clear evidence on MRI then will discuss with neuromuscular team if PT can have inpatient EMG.   [] F/U results of remaining reversible neuropathy labs such as Cu, Vitamin B levels   [] No LP for now    PT seen, assessment and plan discussed with attending, Dr. Diane.     Chidi Chaudhari, DO  PGY-3 Neurology Service

## 2021-04-13 NOTE — PROGRESS NOTE ADULT - PROBLEM SELECTOR PLAN 1
Patient presenting w/ lower extremity weakness L>R distally in the setting of metastatic pancreatic cancer. Notable Left foot drop. W/o known spinal mets. No spinal mets seen on CT.   -MR spine thoracic/lumbar w/ and w/o neg for spinal cord compression   d/w Dr. Diane and Neuro team today- MRI pelvis r/o plexopathy  - PT eval

## 2021-04-13 NOTE — PROGRESS NOTE ADULT - SUBJECTIVE AND OBJECTIVE BOX
SUBJECTIVE/INTERVAL EVENTS/HOSPITAL COURSE UPDATES: PT notes no significant improvement in weakness.  Indicates it is persistent and has gotten worse.  Reports proximal >distal L>R weakness as well as numbness.  Describes as "tight"      Home Medications:  dronabinol 2.5 mg oral capsule: 1 cap(s) orally 3 times a day (09 Apr 2021 10:42)  fentaNYL 12 mcg/hr transdermal film, extended release: 1 film(s) transdermal every 72 hours (09 Apr 2021 10:43)  HYDROmorphone 2 mg oral tablet: 1 tab(s) orally every 4 hours, As Needed (09 Apr 2021 10:43)    MEDICATIONS  (STANDING):  dextrose 40% Gel 15 Gram(s) Oral once  dextrose 5%. 1000 milliLiter(s) (50 mL/Hr) IV Continuous <Continuous>  dextrose 5%. 1000 milliLiter(s) (100 mL/Hr) IV Continuous <Continuous>  dextrose 50% Injectable 25 Gram(s) IV Push once  dextrose 50% Injectable 12.5 Gram(s) IV Push once  dextrose 50% Injectable 25 Gram(s) IV Push once  dronabinol 2.5 milliGRAM(s) Oral three times a day  enoxaparin Injectable 40 milliGRAM(s) SubCutaneous daily  fentaNYL   Patch  12 MICROgram(s)/Hr 1 Patch Transdermal every 72 hours  glucagon  Injectable 1 milliGRAM(s) IntraMuscular once  glycerin Suppository - Adult 1 Suppository(s) Rectal daily  insulin lispro (ADMELOG) corrective regimen sliding scale   SubCutaneous three times a day before meals  insulin lispro (ADMELOG) corrective regimen sliding scale   SubCutaneous at bedtime  polyethylene glycol 3350 17 Gram(s) Oral <User Schedule>  senna 2 Tablet(s) Oral two times a day    MEDICATIONS  (PRN):  HYDROmorphone   Tablet 4 milliGRAM(s) Oral every 4 hours PRN Moderate Pain (4 - 6)  HYDROmorphone   Tablet 6 milliGRAM(s) Oral every 4 hours PRN Severe Pain (7 - 10)  HYDROmorphone  Injectable 0.5 milliGRAM(s) IV Push every 6 hours PRN breakthrough pain in case PO dilaudid does not work    OBJECTIVE:  VITAL SIGNS:   Vital Signs Last 24 Hrs  T(C): 36.7 (13 Apr 2021 12:43), Max: 36.8 (12 Apr 2021 21:34)  T(F): 98 (13 Apr 2021 12:43), Max: 98.3 (12 Apr 2021 21:34)  HR: 67 (13 Apr 2021 12:43) (67 - 76)  BP: 130/64 (13 Apr 2021 15:12) (124/48 - 130/64)  BP(mean): --  RR: 16 (13 Apr 2021 12:43) (16 - 18)  SpO2: 99% (13 Apr 2021 12:43) (96% - 99%)    EXAM  Mental Status: AxO to person, place, situation, time.   Language: Fluent with preserved naming, repetition and comprehension.  Follows all commands.  Cranial Nerves: PERRL (R = 3mm, L = 3mm).  EOMI no nystagmus. V1-3 intact to light touch.  No facial asymmetry b/l.  Hearing grossly normal to conversation.  Speech clear.  Symmetric palate elevation in midline.  Tongue midline, normal movements. No tongue fasciculations.     Cortical: Visual fields intact.  No extinction on double simultaneous touch and no signs of neglect.   Motor: Severely decreased muscle bulk with B/L thenar and temporal wasting.  Cachectic appearing.  Intermittent fasciculations seen in R. thigh.                                                                             Left              Right  Shoulder abduction; axillary (C5/C6)                        4-/5              4+/5   Elbow flexion; musculocutaneous (C5/C6)                 4+/5             4+/5   Elbow extension; radial (C6/C7/C8)                          4+/5             4+/5   Wrist extension; radial (C5/6)                                  5/5                5/5   Wrist flexion; ulnar & median (C7/C8/T1)                 5/5                 5/5     Hip flexion; femoral (L1/L2)                                     4-/5               4/5   Hip adduction; obturator (L2/L3)                             4-/5               4/5   Hip abduction; superior gluteal(L4/L5)                     4/5                4/5   Knee flexion sciatic (L5/S1)                                     4/5                 4/5   Knee extension; femoral (L3/L4)                              4/5                 4/5   Dorsiflexion; deep peroneal(L4/L5)                         3/5                 5/5   Plantarflexion; tibial (S1/S2)                                    4-/5                5/5   Eversion; superficial peroneal (L5/S1)                      4/5                 5/5                                         Inversion; tibial (L4/L5)                                            3/5                5/5   Sensation: Decreased sensation to pin prick and two point discrimination in L. leg in L4/L5 dermatomal distribution.    Reflexes: 2/4 B/L UE, 2/4 L. patellar, 1/4 R. patellar, 1/4 B/L achilles.  Plantar Responses are mute B/L.     LABS AND STUDIES:                        6.8    5.46  )-----------( 57       ( 13 Apr 2021 06:55 )             21.3     04-13  134<L>  |  99  |  13  ----------------------------<  130<H>  3.9   |  26  |  0.38<L>    Ca    8.4      13 Apr 2021 06:55  Phos  2.8     04-13  Mg     2.0     04-13    TPro  5.9<L>  /  Alb  2.5<L>  /  TBili  0.2  /  DBili  x   /  AST  25  /  ALT  20  /  AlkPhos  90  04-13    LIVER FUNCTIONS - ( 13 Apr 2021 06:55 )  Alb: 2.5 g/dL / Pro: 5.9 g/dL / ALK PHOS: 90 U/L / ALT: 20 U/L / AST: 25 U/L / GGT: x           04-10 Chol 103 LDL -- HDL 65 Trig 52

## 2021-04-13 NOTE — PROGRESS NOTE ADULT - ASSESSMENT
70fwith past medical history of diabetes, pancreatic cancer w/ mets to lung and liver on chemo (9th session, abraxane/gemzar) coming in w/ inability to ambulate- left leg weaker than right  seen by NEurology- MRI pelvis to r/o plexopathy (paraneoplastic)

## 2021-04-13 NOTE — PROGRESS NOTE ADULT - ASSESSMENT
70f with metastatic pancreatic with  mets to lung and liver, on chemotherapy with gemcitabine and abraxane, presenting with LE swelling, heaviness and inability to walk since 2 days PTA.   LE doppers negative for VTE, however with ?arterial thrombus. PT and DP pulses not palpable. Legs are warm. She is not able to move leges antigravity.   spine CT with contrast without signs of spinal mets or cord compression.   Recent bone scan 3/25/2021 and recent PET/CT 3/11/2021 without evidence of bone mets. Given all the above the likelihood of spinal mets and cord compression is minimal.     -S/p Arterial duplex, BLE of mild femoropopliteal arterial disease noted  -No Vascular intervention indicated at this time  -Urology input appreciated, patient with joy , no intervention indicated for chronic hydronephrosis for  now  -Pal care input appreciated for pain recs  -s/p MR thoracic and lumbar spine shows  degenerative disc disease at L3-4 and L4-5 with loss of signal on the T2-weighted images. No cord or cauda equina compression noted.   Progressive b/l LE weakness possibly 2/2 loss signal at L3/4 and L4/5 in the setting of degenerative disk disease vs infectious etiology in the setting of warm edematous LEs  - Appreciate Neurology consult, will followup MRI of pelvis  -Rest of care as per primary team  -Patient to followup with Dr. Vieira ([facility]) upon discharge  -C/w Supportive care, pain control, Nutrition, PT, DVT ppx  -Oncology will continue to follow with you      Case d/w Dr. Eneida ETIENNE  Oncology Physician Assistant  [facility]  Pager [phone]    If after 5pm or weekends please page On-call Oncology Fellow     70f with metastatic pancreatic with  mets to lung and liver, on chemotherapy with gemcitabine and abraxane, presenting with LE swelling, heaviness and inability to walk since 2 days PTA.   LE doppers negative for VTE, however with ?arterial thrombus. PT and DP pulses not palpable. Legs are warm. She is not able to move leges antigravity.   spine CT with contrast without signs of spinal mets or cord compression.   Recent bone scan 3/25/2021 and recent PET/CT 3/11/2021 without evidence of bone mets. Given all the above the likelihood of spinal mets and cord compression is minimal.     Hgb low at 6.8 today, please transfuse 1 unit of PRBC  -S/p Arterial duplex, BLE of mild femoropopliteal arterial disease noted  -No Vascular intervention indicated at this time  -Urology input appreciated, patient with joy , no intervention indicated for chronic hydronephrosis for  now  -Pal care input appreciated for pain recs  -s/p MR thoracic and lumbar spine shows  degenerative disc disease at L3-4 and L4-5 with loss of signal on the T2-weighted images. No cord or cauda equina compression noted.   Progressive b/l LE weakness possibly 2/2 loss signal at L3/4 and L4/5 in the setting of degenerative disk disease vs infectious etiology in the setting of warm edematous LEs  - Appreciate Neurology consult, will followup MRI of pelvis  -Rest of care as per primary team  -Patient to followup with Dr. Vieiar ([facility]) upon discharge  -C/w Supportive care, pain control, Nutrition, PT, DVT ppx  -Oncology will continue to follow with you      Case d/w Dr. Eneida ETIENNE  Oncology Physician Assistant  [facility]  Pager [phone]    If after 5pm or weekends please page On-call Oncology Fellow

## 2021-04-13 NOTE — PROGRESS NOTE ADULT - SUBJECTIVE AND OBJECTIVE BOX
[facility] Division of Hospital Medicine  Vida Carney MD  Pager [phone]    Patient is a 70y old  Female who presents with a chief complaint of Lower Extremity Weakness, Inability to Ambulate (13 Apr 2021 15:35)      SUBJECTIVE / OVERNIGHT EVENTS: pt seen and examined at 145p- still w/ left leg weakness- chronic cough  left message to update sister around 5p  ADDITIONAL REVIEW OF SYSTEMS:    MEDICATIONS  (STANDING):  dextrose 40% Gel 15 Gram(s) Oral once  dextrose 5%. 1000 milliLiter(s) (50 mL/Hr) IV Continuous <Continuous>  dextrose 5%. 1000 milliLiter(s) (100 mL/Hr) IV Continuous <Continuous>  dextrose 50% Injectable 25 Gram(s) IV Push once  dextrose 50% Injectable 12.5 Gram(s) IV Push once  dextrose 50% Injectable 25 Gram(s) IV Push once  dronabinol 2.5 milliGRAM(s) Oral three times a day  enoxaparin Injectable 40 milliGRAM(s) SubCutaneous daily  fentaNYL   Patch  12 MICROgram(s)/Hr 1 Patch Transdermal every 72 hours  glucagon  Injectable 1 milliGRAM(s) IntraMuscular once  glycerin Suppository - Adult 1 Suppository(s) Rectal daily  insulin lispro (ADMELOG) corrective regimen sliding scale   SubCutaneous three times a day before meals  insulin lispro (ADMELOG) corrective regimen sliding scale   SubCutaneous at bedtime  polyethylene glycol 3350 17 Gram(s) Oral <User Schedule>  senna 2 Tablet(s) Oral two times a day    MEDICATIONS  (PRN):  HYDROmorphone   Tablet 4 milliGRAM(s) Oral every 4 hours PRN Moderate Pain (4 - 6)  HYDROmorphone   Tablet 6 milliGRAM(s) Oral every 4 hours PRN Severe Pain (7 - 10)  HYDROmorphone  Injectable 0.5 milliGRAM(s) IV Push every 6 hours PRN breakthrough pain in case PO dilaudid does not work      CAPILLARY BLOOD GLUCOSE      POCT Blood Glucose.: 237 mg/dL (13 Apr 2021 22:49)  POCT Blood Glucose.: 105 mg/dL (13 Apr 2021 17:53)  POCT Blood Glucose.: 269 mg/dL (13 Apr 2021 12:07)  POCT Blood Glucose.: 128 mg/dL (13 Apr 2021 09:00)    I&O's Summary    12 Apr 2021 07:01  -  13 Apr 2021 07:00  --------------------------------------------------------  IN: 0 mL / OUT: 1025 mL / NET: -1025 mL        PHYSICAL EXAM:  Vital Signs Last 24 Hrs  T(C): 37.4 (13 Apr 2021 21:06), Max: 37.4 (13 Apr 2021 21:06)  T(F): 99.3 (13 Apr 2021 21:06), Max: 99.3 (13 Apr 2021 21:06)  HR: 81 (13 Apr 2021 21:06) (67 - 81)  BP: 148/76 (13 Apr 2021 21:06) (124/48 - 148/76)  BP(mean): --  RR: 16 (13 Apr 2021 21:06) (16 - 18)  SpO2: 100% (13 Apr 2021 21:06) (98% - 100%)  CONSTITUTIONAL: NAD,  RESPIRATORY: Normal respiratory effort; lungs are clear to auscultation bilaterally  CARDIOVASCULAR: Regular rate and rhythm, normal S1 and S2, no murmur/rub/gallop; No lower extremity edema;   ABDOMEN: Nontender to palpation, normoactive bowel sounds, not distended;   MUSCULOSKELETAL:  no clubbing or cyanosis of digits; no joint swelling or tenderness to palpation  PSYCH: A+O to person, place, and time; affect appropriate      LABS:                        9.1    6.00  )-----------( 50       ( 13 Apr 2021 20:02 )             27.9     04-13    134<L>  |  99  |  13  ----------------------------<  130<H>  3.9   |  26  |  0.38<L>    Ca    8.4      13 Apr 2021 06:55  Phos  2.8     04-13  Mg     2.0     04-13    TPro  5.9<L>  /  Alb  2.5<L>  /  TBili  0.2  /  DBili  x   /  AST  25  /  ALT  20  /  AlkPhos  90  04-13                RADIOLOGY & ADDITIONAL TESTS:  Results Reviewed:   Imaging Personally Reviewed:  Electrocardiogram Personally Reviewed:    COORDINATION OF CARE:  Care Discussed with Consultants/Other Providers [Y/N]:  Prior or Outpatient Records Reviewed [Y/N]:

## 2021-04-13 NOTE — CHART NOTE - NSCHARTNOTEFT_GEN_A_CORE
Contacted by Dr. Carney for review of MRI thoracic and lumbar imaging. No neurosurgical pathology seen on imaging. No cord compression or fracture or pathology to explain LLE weakness.   Abdnormal marrow signal in vertebral bodies can be seen with patients known anemia.

## 2021-04-13 NOTE — PROGRESS NOTE ADULT - ATTENDING COMMENTS
Patient seen and examined with neurology resident and above note reviewed in detail and I agree with assessment and plan as outlined. Hx as noted and patient with left foot drop along with increased weakness in left greater than right leg with limited mobility and atrophy along with sensory loss from L3-S1 distribution.  Reflexes are present at knee but absent at ankles.   She also has atrophy in bilateral arms and hand muscles.    MRI thoracic and lumbar reviewed in detail    Plan; Concerning for a lumbosacral plexopathy vs autoimmune or paraneoplastic mediated inflammatory process     1. Obtain MRI of the pelvis with and without contrast to assess lumbar plexus    2. Paraneoplastic panel from serum    3. Check CPK, aldolase, myomarker 3 panel, CHARBEL, ACE levels,  and will attempt to get EMG of the lower extremities     4. Continue PT and OT and medical mgt and supportive care

## 2021-04-13 NOTE — PROGRESS NOTE ADULT - PROBLEM SELECTOR PLAN 4
Patient w/ large stool burden on CT likely in setting of opioid prescription.   - miralax daily   - senna 2 tabs at night

## 2021-04-14 DIAGNOSIS — Z71.89 OTHER SPECIFIED COUNSELING: ICD-10-CM

## 2021-04-14 DIAGNOSIS — R53.2 FUNCTIONAL QUADRIPLEGIA: ICD-10-CM

## 2021-04-14 LAB
ANION GAP SERPL CALC-SCNC: 5 MMOL/L — LOW (ref 7–14)
BASOPHILS # BLD AUTO: 0.01 K/UL — SIGNIFICANT CHANGE UP (ref 0–0.2)
BASOPHILS NFR BLD AUTO: 0.2 % — SIGNIFICANT CHANGE UP (ref 0–2)
BUN SERPL-MCNC: 11 MG/DL — SIGNIFICANT CHANGE UP (ref 7–23)
CALCIUM SERPL-MCNC: 8.3 MG/DL — LOW (ref 8.4–10.5)
CHLORIDE SERPL-SCNC: 102 MMOL/L — SIGNIFICANT CHANGE UP (ref 98–107)
CO2 SERPL-SCNC: 28 MMOL/L — SIGNIFICANT CHANGE UP (ref 22–31)
COPPER SERPL-MCNC: 170 UG/DL — HIGH (ref 80–158)
CREAT SERPL-MCNC: 0.4 MG/DL — LOW (ref 0.5–1.3)
EOSINOPHIL # BLD AUTO: 0.05 K/UL — SIGNIFICANT CHANGE UP (ref 0–0.5)
EOSINOPHIL NFR BLD AUTO: 1 % — SIGNIFICANT CHANGE UP (ref 0–6)
GLUCOSE BLDC GLUCOMTR-MCNC: 144 MG/DL — HIGH (ref 70–99)
GLUCOSE BLDC GLUCOMTR-MCNC: 148 MG/DL — HIGH (ref 70–99)
GLUCOSE BLDC GLUCOMTR-MCNC: 228 MG/DL — HIGH (ref 70–99)
GLUCOSE BLDC GLUCOMTR-MCNC: 231 MG/DL — HIGH (ref 70–99)
GLUCOSE SERPL-MCNC: 138 MG/DL — HIGH (ref 70–99)
HCT VFR BLD CALC: 23.7 % — LOW (ref 34.5–45)
HGB BLD-MCNC: 8 G/DL — LOW (ref 11.5–15.5)
IANC: 3.76 K/UL — SIGNIFICANT CHANGE UP (ref 1.5–8.5)
IMM GRANULOCYTES NFR BLD AUTO: 1.4 % — SIGNIFICANT CHANGE UP (ref 0–1.5)
LYMPHOCYTES # BLD AUTO: 0.9 K/UL — LOW (ref 1–3.3)
LYMPHOCYTES # BLD AUTO: 17.5 % — SIGNIFICANT CHANGE UP (ref 13–44)
MAGNESIUM SERPL-MCNC: 2 MG/DL — SIGNIFICANT CHANGE UP (ref 1.6–2.6)
MCHC RBC-ENTMCNC: 31 PG — SIGNIFICANT CHANGE UP (ref 27–34)
MCHC RBC-ENTMCNC: 33.8 GM/DL — SIGNIFICANT CHANGE UP (ref 32–36)
MCV RBC AUTO: 91.9 FL — SIGNIFICANT CHANGE UP (ref 80–100)
MONOCYTES # BLD AUTO: 0.36 K/UL — SIGNIFICANT CHANGE UP (ref 0–0.9)
MONOCYTES NFR BLD AUTO: 7 % — SIGNIFICANT CHANGE UP (ref 2–14)
NEUTROPHILS # BLD AUTO: 3.76 K/UL — SIGNIFICANT CHANGE UP (ref 1.8–7.4)
NEUTROPHILS NFR BLD AUTO: 72.9 % — SIGNIFICANT CHANGE UP (ref 43–77)
NRBC # BLD: 0 /100 WBCS — SIGNIFICANT CHANGE UP
NRBC # FLD: 0 K/UL — SIGNIFICANT CHANGE UP
PHOSPHATE SERPL-MCNC: 2.6 MG/DL — SIGNIFICANT CHANGE UP (ref 2.5–4.5)
PLATELET # BLD AUTO: 42 K/UL — LOW (ref 150–400)
POTASSIUM SERPL-MCNC: 3.6 MMOL/L — SIGNIFICANT CHANGE UP (ref 3.5–5.3)
POTASSIUM SERPL-SCNC: 3.6 MMOL/L — SIGNIFICANT CHANGE UP (ref 3.5–5.3)
RBC # BLD: 2.58 M/UL — LOW (ref 3.8–5.2)
RBC # FLD: 18.8 % — HIGH (ref 10.3–14.5)
SODIUM SERPL-SCNC: 135 MMOL/L — SIGNIFICANT CHANGE UP (ref 135–145)
WBC # BLD: 5.15 K/UL — SIGNIFICANT CHANGE UP (ref 3.8–10.5)
WBC # FLD AUTO: 5.15 K/UL — SIGNIFICANT CHANGE UP (ref 3.8–10.5)

## 2021-04-14 PROCEDURE — 99497 ADVNCD CARE PLAN 30 MIN: CPT

## 2021-04-14 PROCEDURE — 99233 SBSQ HOSP IP/OBS HIGH 50: CPT

## 2021-04-14 PROCEDURE — 99232 SBSQ HOSP IP/OBS MODERATE 35: CPT

## 2021-04-14 RX ADMIN — Medication 2: at 18:28

## 2021-04-14 RX ADMIN — FENTANYL CITRATE 1 PATCH: 50 INJECTION INTRAVENOUS at 18:29

## 2021-04-14 RX ADMIN — FENTANYL CITRATE 1 PATCH: 50 INJECTION INTRAVENOUS at 07:04

## 2021-04-14 RX ADMIN — ENOXAPARIN SODIUM 40 MILLIGRAM(S): 100 INJECTION SUBCUTANEOUS at 12:45

## 2021-04-14 RX ADMIN — Medication 2.5 MILLIGRAM(S): at 06:51

## 2021-04-14 RX ADMIN — SENNA PLUS 2 TABLET(S): 8.6 TABLET ORAL at 06:51

## 2021-04-14 RX ADMIN — SENNA PLUS 2 TABLET(S): 8.6 TABLET ORAL at 18:29

## 2021-04-14 RX ADMIN — HYDROMORPHONE HYDROCHLORIDE 6 MILLIGRAM(S): 2 INJECTION INTRAMUSCULAR; INTRAVENOUS; SUBCUTANEOUS at 02:30

## 2021-04-14 RX ADMIN — HYDROMORPHONE HYDROCHLORIDE 6 MILLIGRAM(S): 2 INJECTION INTRAMUSCULAR; INTRAVENOUS; SUBCUTANEOUS at 12:45

## 2021-04-14 RX ADMIN — FENTANYL CITRATE 1 PATCH: 50 INJECTION INTRAVENOUS at 19:00

## 2021-04-14 RX ADMIN — POLYETHYLENE GLYCOL 3350 17 GRAM(S): 17 POWDER, FOR SOLUTION ORAL at 12:44

## 2021-04-14 RX ADMIN — HYDROMORPHONE HYDROCHLORIDE 6 MILLIGRAM(S): 2 INJECTION INTRAMUSCULAR; INTRAVENOUS; SUBCUTANEOUS at 13:45

## 2021-04-14 RX ADMIN — HYDROMORPHONE HYDROCHLORIDE 6 MILLIGRAM(S): 2 INJECTION INTRAMUSCULAR; INTRAVENOUS; SUBCUTANEOUS at 01:35

## 2021-04-14 RX ADMIN — Medication 2: at 12:45

## 2021-04-14 RX ADMIN — FENTANYL CITRATE 1 PATCH: 50 INJECTION INTRAVENOUS at 18:26

## 2021-04-14 RX ADMIN — Medication 2.5 MILLIGRAM(S): at 12:45

## 2021-04-14 NOTE — PROGRESS NOTE ADULT - SUBJECTIVE AND OBJECTIVE BOX
HPI:  Patient is a 70 year old female with past medical history of diabetes, pancreatic cancer w/ mets to lung and liver on chemo currently admitted for LE pain and weakness. Palliative Medicine was consulted to evaluate and manage complex symptomatology related to the patient's life-limiting illness    =======================================================  4/14/2021    - Chart reviewed. Patient seen and examined.  - Patient feeling tired, but "I'm always tired"  - Says pain is okay for now. Declined any adjustments to her pain medications.   - Expressed frustration at her weakness  - I broached GOC with her. In summary:    1) I asked her if she has ever thought that she will not get better because of her cancer. She said yes.  2) I asked what if the doctors can't fix her leg weakness- she said she does not want to think about that  3) She mentioned she lives by herself. I told her it is unlikely that she would go home given her weakness right now. She said she is open to possibly going to rehab  4) I talked about hospice with her for the future, as something to consider. She said she doesn't want to stay in the hospital but she is not "there yet" in terms of hospice      =======================================================  ----- SYMPTOM ASSESSMENT:   [ ]Unable to obtain due to poor mentation: information obtained from team/ contact    PAIN ASSESSMENT:   Site- BOTH LEGS FROM KNEES DOWN  Onset- ACUTE   Character- NUMBNESS  Radiation- NONE  Associated symptoms- NONE  Timing and duration- INTERMITTENT SPIKES THROUGHOUT THE DAY  Exacerbating factors  Severity- MOD    Effect on QOL- SIGNIFICANTLY INTERFERES WITH ADL'S  PAIN AD Score: 0    Dyspnea:  Yes [ ] No [X ] - [ ]Mild [ ]Moderate [ ]Severe  Anxiety:    Yes [ ] No [ X] - [ ]Mild [ ]Moderate [ ]Severe  Fatigue:    Yes [ ] No [X ] - [ ]Mild [ ]Moderate [ ]Severe  Nausea:    Yes [ ] No [ X] - [ ]Mild [ ]Moderate [ ]Severe                         Loss of appetite: Yes [ ] No [X ] - [ ]Mild [ ]Moderate [ ]Severe             Constipation:  Yes [ ] No [X ] - [ ]Mild [ ]Moderate [ ]Severe  Grief: Yes [ ] No [X ]     [X ]All other review of systems negative, including: weakness, cough, colds, blurry vision, headaches, dysuria, pruritus, palpitations, muscle cramps, easy bruising, epistaxis, rashes    =======================================================  ----- PERTINENT PMH/ SXH/ FHX  Type 2 diabetes mellitus    Pancreatic cancer      S/P tendon repair      FAMILY HISTORY:  Family history of liver cancer  Sister    Family history of cardiac arrest (Sibling)    FH: aneurysm (Sibling)        ----- SOCIAL HISTORY:   [ ] Unable to elicit  Significant other/partner:    Children:   Catholic/Spirituality:  Substance hx: Yes[ ]  No [ ]   Tobacco hx:  Yes [ ] No [ ]   Alcohol hx: Yes [ ] No [ ]   Home Opioid hx:  [ ] I-Stop Reference No:  Living Situation: [x ]Home  [ ]Long term care  [ ]Rehab [ ]Other  LIVES BY HERSELF  SISTER LIVES NEARBY    ----- ADVANCE DIRECTIVES:    DNR  MOLST  [ ]  Living Will  [ ]   DECISION MAKER(s):  [ ] Health Care Proxy(s)  [ ] Surrogate(s)  [ ] Guardian           Name(s): Phone Number(s):  SISTER IS HER MAIN CONTACT    ----- BASELINE (I)ADL(s) (prior to admission):  Baker: [ ]Total  [ ] Moderate [ ]Dependent  Palliative Performance Status Version 2:  40      =======================================================  -----MEDICATIONS AND ALLERGIES:  Allergies    No Known Allergies    Intolerances    MEDICATIONS  (STANDING):  dextrose 40% Gel 15 Gram(s) Oral once  dextrose 5%. 1000 milliLiter(s) (50 mL/Hr) IV Continuous <Continuous>  dextrose 5%. 1000 milliLiter(s) (100 mL/Hr) IV Continuous <Continuous>  dextrose 50% Injectable 25 Gram(s) IV Push once  dextrose 50% Injectable 12.5 Gram(s) IV Push once  dextrose 50% Injectable 25 Gram(s) IV Push once  dronabinol 2.5 milliGRAM(s) Oral three times a day  enoxaparin Injectable 40 milliGRAM(s) SubCutaneous daily  fentaNYL   Patch  12 MICROgram(s)/Hr 1 Patch Transdermal every 72 hours  glucagon  Injectable 1 milliGRAM(s) IntraMuscular once  glycerin Suppository - Adult 1 Suppository(s) Rectal daily  insulin lispro (ADMELOG) corrective regimen sliding scale   SubCutaneous three times a day before meals  insulin lispro (ADMELOG) corrective regimen sliding scale   SubCutaneous at bedtime  polyethylene glycol 3350 17 Gram(s) Oral <User Schedule>  senna 2 Tablet(s) Oral two times a day    MEDICATIONS  (PRN):  HYDROmorphone   Tablet 4 milliGRAM(s) Oral every 4 hours PRN Moderate Pain (4 - 6)  HYDROmorphone   Tablet 6 milliGRAM(s) Oral every 4 hours PRN Severe Pain (7 - 10)  HYDROmorphone  Injectable 0.5 milliGRAM(s) IV Push every 6 hours PRN breakthrough pain in case PO dilaudid does not work    =======================================================  ----- PHYSICAL EXAM:  Vital Signs Last 24 Hrs  T(C): 37.4 (14 Apr 2021 06:20), Max: 37.4 (13 Apr 2021 21:06)  T(F): 99.3 (14 Apr 2021 06:20), Max: 99.3 (13 Apr 2021 21:06)  HR: 79 (14 Apr 2021 06:20) (67 - 81)  BP: 155/65 (14 Apr 2021 06:20) (124/48 - 155/65)  BP(mean): --  RR: 18 (14 Apr 2021 06:20) (16 - 18)  SpO2: 95% (14 Apr 2021 06:20) (95% - 100%)    GEN: Awake, alert, NAD, CACHECTIC  HEAD: Normocephalic and atraumatic,   EYES: Anicteric sclerae, EOM's grossly intact  NECK: No JVD, no thyromegaly  PULM: Comfortable work of breathing, clear BS  CV: Pulses 2+ symmetric bilaterally, regular rate and rhythm  ABD: Not distended, soft, non-tender,   EXT: +TENDERNESS TO PALPATION BOTH LEGS, +1 EDEMA, MMT 2/5  PSYCH: Appropriate mood and affect, no suicidal ideations elicited  NEURO: No facial asymmetry, no tremors, no observed movement disorders  SKIN: No rashes or lesions on exposed skin, No jaundice    =======================================================  ----- LABS:                   04-14    135  |  102  |  11  ----------------------------<  138<H>  3.6   |  28  |  0.40<L>    Ca    8.3<L>      14 Apr 2021 07:01  Phos  2.6     04-14  Mg     2.0     04-14    TPro  5.9<L>  /  Alb  2.5<L>  /  TBili  0.2  /  DBili  x   /  AST  25  /  ALT  20  /  AlkPhos  90  04-13

## 2021-04-14 NOTE — PROGRESS NOTE ADULT - SUBJECTIVE AND OBJECTIVE BOX
[facility] Division of Hospital Medicine  Vida Carney MD  Pager [phone]    Patient is a 70y old  Female who presents with a chief complaint of Lower Extremity Weakness, Inability to Ambulate (14 Apr 2021 12:19)      SUBJECTIVE / OVERNIGHT EVENTS: pt seen and examined at 2p- not taking miralax- no bm- told pt she might need enema if no bm- she wasn't keen on that  worried about her left leg that it won't move    ADDITIONAL REVIEW OF SYSTEMS:    MEDICATIONS  (STANDING):  dextrose 40% Gel 15 Gram(s) Oral once  dextrose 5%. 1000 milliLiter(s) (50 mL/Hr) IV Continuous <Continuous>  dextrose 5%. 1000 milliLiter(s) (100 mL/Hr) IV Continuous <Continuous>  dextrose 50% Injectable 25 Gram(s) IV Push once  dextrose 50% Injectable 12.5 Gram(s) IV Push once  dextrose 50% Injectable 25 Gram(s) IV Push once  dronabinol 2.5 milliGRAM(s) Oral three times a day  enoxaparin Injectable 40 milliGRAM(s) SubCutaneous daily  fentaNYL   Patch  12 MICROgram(s)/Hr 1 Patch Transdermal every 72 hours  glucagon  Injectable 1 milliGRAM(s) IntraMuscular once  glycerin Suppository - Adult 1 Suppository(s) Rectal daily  insulin lispro (ADMELOG) corrective regimen sliding scale   SubCutaneous three times a day before meals  insulin lispro (ADMELOG) corrective regimen sliding scale   SubCutaneous at bedtime  polyethylene glycol 3350 17 Gram(s) Oral <User Schedule>  senna 2 Tablet(s) Oral two times a day    MEDICATIONS  (PRN):  HYDROmorphone   Tablet 4 milliGRAM(s) Oral every 4 hours PRN Moderate Pain (4 - 6)  HYDROmorphone   Tablet 6 milliGRAM(s) Oral every 4 hours PRN Severe Pain (7 - 10)  HYDROmorphone  Injectable 0.5 milliGRAM(s) IV Push every 6 hours PRN breakthrough pain in case PO dilaudid does not work      CAPILLARY BLOOD GLUCOSE      POCT Blood Glucose.: 148 mg/dL (14 Apr 2021 21:59)  POCT Blood Glucose.: 231 mg/dL (14 Apr 2021 18:09)  POCT Blood Glucose.: 228 mg/dL (14 Apr 2021 12:28)  POCT Blood Glucose.: 144 mg/dL (14 Apr 2021 08:13)    I&O's Summary    13 Apr 2021 07:01  -  14 Apr 2021 07:00  --------------------------------------------------------  IN: 0 mL / OUT: 800 mL / NET: -800 mL    14 Apr 2021 07:01  -  14 Apr 2021 23:19  --------------------------------------------------------  IN: 0 mL / OUT: 575 mL / NET: -575 mL        PHYSICAL EXAM:  Vital Signs Last 24 Hrs  T(C): 36.9 (14 Apr 2021 21:43), Max: 37.4 (14 Apr 2021 06:20)  T(F): 98.5 (14 Apr 2021 21:43), Max: 99.3 (14 Apr 2021 06:20)  HR: 63 (14 Apr 2021 21:43) (63 - 79)  BP: 150/62 (14 Apr 2021 21:43) (141/52 - 155/65)  BP(mean): --  RR: 17 (14 Apr 2021 21:43) (17 - 18)  SpO2: 100% (14 Apr 2021 21:43) (95% - 100%)  CONSTITUTIONAL: NAD, weak tired looking  RESPIRATORY: Normal respiratory effort; lungs are clear to auscultation bilaterally  CARDIOVASCULAR: Regular rate and rhythm, normal S1 and S2, no murmur/rub/gallop; No lower extremity edema;   ABDOMEN: Nontender to palpation, normoactive bowel sounds, not distended;   MUSCULOSKELETAL:   no clubbing or cyanosis of digits; no joint swelling or tenderness to palpation  PSYCH: A+O to person, place, and time; affect appropriate      LABS:                        8.0    5.15  )-----------( 42       ( 14 Apr 2021 07:01 )             23.7     04-14    135  |  102  |  11  ----------------------------<  138<H>  3.6   |  28  |  0.40<L>    Ca    8.3<L>      14 Apr 2021 07:01  Phos  2.6     04-14  Mg     2.0     04-14    TPro  5.9<L>  /  Alb  2.5<L>  /  TBili  0.2  /  DBili  x   /  AST  25  /  ALT  20  /  AlkPhos  90  04-13                RADIOLOGY & ADDITIONAL TESTS:  Results Reviewed:   Imaging Personally Reviewed:  Electrocardiogram Personally Reviewed:    COORDINATION OF CARE:  Care Discussed with Consultants/Other Providers [Y/N]:  Prior or Outpatient Records Reviewed [Y/N]:

## 2021-04-14 NOTE — PROGRESS NOTE ADULT - PROBLEM SELECTOR PLAN 1
.  BLE pain    > Stable for now. Patient declined any dose adjustments  > Continue Fent patch 12mcg + Dilaudid 4-6mg PO q6h PRN

## 2021-04-15 ENCOUNTER — APPOINTMENT (OUTPATIENT)
Dept: INFUSION THERAPY | Facility: HOSPITAL | Age: 71
End: 2021-04-15

## 2021-04-15 LAB
ANA PAT FLD IF-IMP: ABNORMAL
ANA TITR SER: ABNORMAL
ANION GAP SERPL CALC-SCNC: 8 MMOL/L — SIGNIFICANT CHANGE UP (ref 7–14)
BASOPHILS # BLD AUTO: 0.02 K/UL — SIGNIFICANT CHANGE UP (ref 0–0.2)
BASOPHILS NFR BLD AUTO: 0.3 % — SIGNIFICANT CHANGE UP (ref 0–2)
BUN SERPL-MCNC: 10 MG/DL — SIGNIFICANT CHANGE UP (ref 7–23)
CALCIUM SERPL-MCNC: 8.4 MG/DL — SIGNIFICANT CHANGE UP (ref 8.4–10.5)
CHLORIDE SERPL-SCNC: 101 MMOL/L — SIGNIFICANT CHANGE UP (ref 98–107)
CO2 SERPL-SCNC: 27 MMOL/L — SIGNIFICANT CHANGE UP (ref 22–31)
CREAT SERPL-MCNC: 0.39 MG/DL — LOW (ref 0.5–1.3)
EOSINOPHIL # BLD AUTO: 0.05 K/UL — SIGNIFICANT CHANGE UP (ref 0–0.5)
EOSINOPHIL NFR BLD AUTO: 0.7 % — SIGNIFICANT CHANGE UP (ref 0–6)
GLUCOSE BLDC GLUCOMTR-MCNC: 143 MG/DL — HIGH (ref 70–99)
GLUCOSE BLDC GLUCOMTR-MCNC: 156 MG/DL — HIGH (ref 70–99)
GLUCOSE BLDC GLUCOMTR-MCNC: 271 MG/DL — HIGH (ref 70–99)
GLUCOSE BLDC GLUCOMTR-MCNC: 286 MG/DL — HIGH (ref 70–99)
GLUCOSE SERPL-MCNC: 129 MG/DL — HIGH (ref 70–99)
HCT VFR BLD CALC: 25.7 % — LOW (ref 34.5–45)
HGB BLD-MCNC: 8.7 G/DL — LOW (ref 11.5–15.5)
IANC: 5.23 K/UL — SIGNIFICANT CHANGE UP (ref 1.5–8.5)
IMM GRANULOCYTES NFR BLD AUTO: 0.9 % — SIGNIFICANT CHANGE UP (ref 0–1.5)
LYMPHOCYTES # BLD AUTO: 0.74 K/UL — LOW (ref 1–3.3)
LYMPHOCYTES # BLD AUTO: 10.9 % — LOW (ref 13–44)
MAGNESIUM SERPL-MCNC: 2 MG/DL — SIGNIFICANT CHANGE UP (ref 1.6–2.6)
MCHC RBC-ENTMCNC: 31.6 PG — SIGNIFICANT CHANGE UP (ref 27–34)
MCHC RBC-ENTMCNC: 33.9 GM/DL — SIGNIFICANT CHANGE UP (ref 32–36)
MCV RBC AUTO: 93.5 FL — SIGNIFICANT CHANGE UP (ref 80–100)
MONOCYTES # BLD AUTO: 0.68 K/UL — SIGNIFICANT CHANGE UP (ref 0–0.9)
MONOCYTES NFR BLD AUTO: 10 % — SIGNIFICANT CHANGE UP (ref 2–14)
NEUTROPHILS # BLD AUTO: 5.23 K/UL — SIGNIFICANT CHANGE UP (ref 1.8–7.4)
NEUTROPHILS NFR BLD AUTO: 77.2 % — HIGH (ref 43–77)
NRBC # BLD: 0 /100 WBCS — SIGNIFICANT CHANGE UP
NRBC # FLD: 0.02 K/UL — HIGH
PHOSPHATE SERPL-MCNC: 2.8 MG/DL — SIGNIFICANT CHANGE UP (ref 2.5–4.5)
PLATELET # BLD AUTO: 45 K/UL — LOW (ref 150–400)
POTASSIUM SERPL-MCNC: 3.7 MMOL/L — SIGNIFICANT CHANGE UP (ref 3.5–5.3)
POTASSIUM SERPL-SCNC: 3.7 MMOL/L — SIGNIFICANT CHANGE UP (ref 3.5–5.3)
RBC # BLD: 2.75 M/UL — LOW (ref 3.8–5.2)
RBC # FLD: 19.6 % — HIGH (ref 10.3–14.5)
SODIUM SERPL-SCNC: 136 MMOL/L — SIGNIFICANT CHANGE UP (ref 135–145)
WBC # BLD: 6.78 K/UL — SIGNIFICANT CHANGE UP (ref 3.8–10.5)
WBC # FLD AUTO: 6.78 K/UL — SIGNIFICANT CHANGE UP (ref 3.8–10.5)

## 2021-04-15 PROCEDURE — 99497 ADVNCD CARE PLAN 30 MIN: CPT

## 2021-04-15 PROCEDURE — 72197 MRI PELVIS W/O & W/DYE: CPT | Mod: 26

## 2021-04-15 PROCEDURE — 99233 SBSQ HOSP IP/OBS HIGH 50: CPT

## 2021-04-15 PROCEDURE — 99232 SBSQ HOSP IP/OBS MODERATE 35: CPT

## 2021-04-15 RX ORDER — FENTANYL CITRATE 50 UG/ML
1 INJECTION INTRAVENOUS
Refills: 0 | Status: DISCONTINUED | OUTPATIENT
Start: 2021-04-15 | End: 2021-04-20

## 2021-04-15 RX ORDER — HYDROMORPHONE HYDROCHLORIDE 2 MG/ML
6 INJECTION INTRAMUSCULAR; INTRAVENOUS; SUBCUTANEOUS EVERY 4 HOURS
Refills: 0 | Status: DISCONTINUED | OUTPATIENT
Start: 2021-04-15 | End: 2021-04-22

## 2021-04-15 RX ORDER — HYDROMORPHONE HYDROCHLORIDE 2 MG/ML
4 INJECTION INTRAMUSCULAR; INTRAVENOUS; SUBCUTANEOUS EVERY 4 HOURS
Refills: 0 | Status: DISCONTINUED | OUTPATIENT
Start: 2021-04-15 | End: 2021-04-22

## 2021-04-15 RX ORDER — DRONABINOL 2.5 MG
2.5 CAPSULE ORAL THREE TIMES A DAY
Refills: 0 | Status: DISCONTINUED | OUTPATIENT
Start: 2021-04-15 | End: 2021-04-22

## 2021-04-15 RX ORDER — HYDROMORPHONE HYDROCHLORIDE 2 MG/ML
0.5 INJECTION INTRAMUSCULAR; INTRAVENOUS; SUBCUTANEOUS EVERY 6 HOURS
Refills: 0 | Status: DISCONTINUED | OUTPATIENT
Start: 2021-04-15 | End: 2021-04-22

## 2021-04-15 RX ADMIN — Medication 1 SUPPOSITORY(S): at 15:50

## 2021-04-15 RX ADMIN — HYDROMORPHONE HYDROCHLORIDE 6 MILLIGRAM(S): 2 INJECTION INTRAMUSCULAR; INTRAVENOUS; SUBCUTANEOUS at 01:17

## 2021-04-15 RX ADMIN — FENTANYL CITRATE 1 PATCH: 50 INJECTION INTRAVENOUS at 06:46

## 2021-04-15 RX ADMIN — FENTANYL CITRATE 1 PATCH: 50 INJECTION INTRAVENOUS at 22:05

## 2021-04-15 RX ADMIN — HYDROMORPHONE HYDROCHLORIDE 0.5 MILLIGRAM(S): 2 INJECTION INTRAMUSCULAR; INTRAVENOUS; SUBCUTANEOUS at 23:25

## 2021-04-15 RX ADMIN — HYDROMORPHONE HYDROCHLORIDE 6 MILLIGRAM(S): 2 INJECTION INTRAMUSCULAR; INTRAVENOUS; SUBCUTANEOUS at 06:46

## 2021-04-15 RX ADMIN — Medication 2.5 MILLIGRAM(S): at 13:33

## 2021-04-15 RX ADMIN — Medication 2.5 MILLIGRAM(S): at 22:07

## 2021-04-15 RX ADMIN — POLYETHYLENE GLYCOL 3350 17 GRAM(S): 17 POWDER, FOR SOLUTION ORAL at 19:38

## 2021-04-15 RX ADMIN — ENOXAPARIN SODIUM 40 MILLIGRAM(S): 100 INJECTION SUBCUTANEOUS at 13:34

## 2021-04-15 RX ADMIN — HYDROMORPHONE HYDROCHLORIDE 6 MILLIGRAM(S): 2 INJECTION INTRAMUSCULAR; INTRAVENOUS; SUBCUTANEOUS at 23:07

## 2021-04-15 RX ADMIN — Medication 2.5 MILLIGRAM(S): at 06:46

## 2021-04-15 RX ADMIN — HYDROMORPHONE HYDROCHLORIDE 6 MILLIGRAM(S): 2 INJECTION INTRAMUSCULAR; INTRAVENOUS; SUBCUTANEOUS at 22:07

## 2021-04-15 RX ADMIN — Medication 1: at 23:26

## 2021-04-15 RX ADMIN — Medication 10 MILLIGRAM(S): at 18:40

## 2021-04-15 RX ADMIN — POLYETHYLENE GLYCOL 3350 17 GRAM(S): 17 POWDER, FOR SOLUTION ORAL at 13:33

## 2021-04-15 RX ADMIN — SENNA PLUS 2 TABLET(S): 8.6 TABLET ORAL at 06:46

## 2021-04-15 RX ADMIN — Medication 3: at 18:40

## 2021-04-15 RX ADMIN — HYDROMORPHONE HYDROCHLORIDE 0.5 MILLIGRAM(S): 2 INJECTION INTRAMUSCULAR; INTRAVENOUS; SUBCUTANEOUS at 23:40

## 2021-04-15 RX ADMIN — HYDROMORPHONE HYDROCHLORIDE 6 MILLIGRAM(S): 2 INJECTION INTRAMUSCULAR; INTRAVENOUS; SUBCUTANEOUS at 07:46

## 2021-04-15 RX ADMIN — HYDROMORPHONE HYDROCHLORIDE 6 MILLIGRAM(S): 2 INJECTION INTRAMUSCULAR; INTRAVENOUS; SUBCUTANEOUS at 00:17

## 2021-04-15 RX ADMIN — FENTANYL CITRATE 1 PATCH: 50 INJECTION INTRAVENOUS at 18:40

## 2021-04-15 RX ADMIN — FENTANYL CITRATE 1 PATCH: 50 INJECTION INTRAVENOUS at 22:07

## 2021-04-15 RX ADMIN — Medication 1: at 08:42

## 2021-04-15 RX ADMIN — POLYETHYLENE GLYCOL 3350 17 GRAM(S): 17 POWDER, FOR SOLUTION ORAL at 08:42

## 2021-04-15 RX ADMIN — SENNA PLUS 2 TABLET(S): 8.6 TABLET ORAL at 18:40

## 2021-04-15 NOTE — PROGRESS NOTE ADULT - PROBLEM SELECTOR PLAN 1
.  BLE pain    > Stable for now. Patient declined any dose adjustments  > Continue Fent patch 12mcg + Dilaudid 4-6mg PO q6h PRN  > She agreed to tell the doctors if she wants a dose change

## 2021-04-15 NOTE — PROGRESS NOTE ADULT - SUBJECTIVE AND OBJECTIVE BOX
INTERVAL HPI/OVERNIGHT EVENTS:  Patient seen at bedside.  Patient with continued symptoms of pain  Frustrated that she is still unable to walk      VITAL SIGNS:  T(F): 98.7 (04-15-21 @ 14:05)  HR: 89 (04-15-21 @ 14:05)  BP: 129/57 (04-15-21 @ 14:05)  RR: 16 (04-15-21 @ 14:05)  SpO2: 100% (04-15-21 @ 14:05)  Wt(kg): --    PHYSICAL EXAM:    In accordance with current standard limiting patient contact, deferred physical exam  2/2 COVID pandemic  Please refer to physical exam of primary team.    MEDICATIONS  (STANDING):  bisacodyl Suppository 10 milliGRAM(s) Rectal once  dextrose 40% Gel 15 Gram(s) Oral once  dextrose 5%. 1000 milliLiter(s) (50 mL/Hr) IV Continuous <Continuous>  dextrose 5%. 1000 milliLiter(s) (100 mL/Hr) IV Continuous <Continuous>  dextrose 50% Injectable 25 Gram(s) IV Push once  dextrose 50% Injectable 12.5 Gram(s) IV Push once  dextrose 50% Injectable 25 Gram(s) IV Push once  dronabinol 2.5 milliGRAM(s) Oral three times a day  enoxaparin Injectable 40 milliGRAM(s) SubCutaneous daily  fentaNYL   Patch  12 MICROgram(s)/Hr 1 Patch Transdermal every 72 hours  glucagon  Injectable 1 milliGRAM(s) IntraMuscular once  glycerin Suppository - Adult 1 Suppository(s) Rectal daily  insulin lispro (ADMELOG) corrective regimen sliding scale   SubCutaneous three times a day before meals  insulin lispro (ADMELOG) corrective regimen sliding scale   SubCutaneous at bedtime  polyethylene glycol 3350 17 Gram(s) Oral <User Schedule>  senna 2 Tablet(s) Oral two times a day    MEDICATIONS  (PRN):  HYDROmorphone   Tablet 4 milliGRAM(s) Oral every 4 hours PRN Moderate Pain (4 - 6)  HYDROmorphone   Tablet 6 milliGRAM(s) Oral every 4 hours PRN Severe Pain (7 - 10)  HYDROmorphone  Injectable 0.5 milliGRAM(s) IV Push every 6 hours PRN breakthrough pain in case PO dilaudid does not work      Allergies    No Known Allergies    Intolerances        LABS:                        8.7    6.78  )-----------( 45       ( 15 Apr 2021 07:50 )             25.7     04-15    136  |  101  |  10  ----------------------------<  129<H>  3.7   |  27  |  0.39<L>    Ca    8.4      15 Apr 2021 07:50  Phos  2.8     04-15  Mg     2.0     04-15            RADIOLOGY & ADDITIONAL TESTS:  Studies reviewed.

## 2021-04-15 NOTE — PROGRESS NOTE ADULT - SUBJECTIVE AND OBJECTIVE BOX
[facility] Division of Hospital Medicine  Vida Carney MD  Pager [phone]    Patient is a 70y old  Female who presents with a chief complaint of Lower Extremity Weakness, Inability to Ambulate (15 Apr 2021 14:43)      SUBJECTIVE / OVERNIGHT EVENTS: pt seen and examined at 130p- not eating well; no bm yet either; upset that she is not being moved out of bed  ADDITIONAL REVIEW OF SYSTEMS:    MEDICATIONS  (STANDING):  bisacodyl Suppository 10 milliGRAM(s) Rectal once  dextrose 40% Gel 15 Gram(s) Oral once  dextrose 5%. 1000 milliLiter(s) (50 mL/Hr) IV Continuous <Continuous>  dextrose 5%. 1000 milliLiter(s) (100 mL/Hr) IV Continuous <Continuous>  dextrose 50% Injectable 25 Gram(s) IV Push once  dextrose 50% Injectable 12.5 Gram(s) IV Push once  dextrose 50% Injectable 25 Gram(s) IV Push once  dronabinol 2.5 milliGRAM(s) Oral three times a day  enoxaparin Injectable 40 milliGRAM(s) SubCutaneous daily  fentaNYL   Patch  12 MICROgram(s)/Hr 1 Patch Transdermal every 72 hours  glucagon  Injectable 1 milliGRAM(s) IntraMuscular once  insulin lispro (ADMELOG) corrective regimen sliding scale   SubCutaneous three times a day before meals  insulin lispro (ADMELOG) corrective regimen sliding scale   SubCutaneous at bedtime  polyethylene glycol 3350 17 Gram(s) Oral <User Schedule>  senna 2 Tablet(s) Oral two times a day    MEDICATIONS  (PRN):  HYDROmorphone   Tablet 4 milliGRAM(s) Oral every 4 hours PRN Moderate Pain (4 - 6)  HYDROmorphone   Tablet 6 milliGRAM(s) Oral every 4 hours PRN Severe Pain (7 - 10)  HYDROmorphone  Injectable 0.5 milliGRAM(s) IV Push every 6 hours PRN breakthrough pain in case PO dilaudid does not work      CAPILLARY BLOOD GLUCOSE      POCT Blood Glucose.: 143 mg/dL (15 Apr 2021 12:08)  POCT Blood Glucose.: 156 mg/dL (15 Apr 2021 08:15)  POCT Blood Glucose.: 148 mg/dL (14 Apr 2021 21:59)  POCT Blood Glucose.: 231 mg/dL (14 Apr 2021 18:09)    I&O's Summary    14 Apr 2021 07:01  -  15 Apr 2021 07:00  --------------------------------------------------------  IN: 0 mL / OUT: 1875 mL / NET: -1875 mL        PHYSICAL EXAM:  Vital Signs Last 24 Hrs  T(C): 37.1 (15 Apr 2021 14:05), Max: 37.1 (15 Apr 2021 06:45)  T(F): 98.7 (15 Apr 2021 14:05), Max: 98.8 (15 Apr 2021 06:45)  HR: 89 (15 Apr 2021 14:05) (63 - 91)  BP: 129/57 (15 Apr 2021 14:05) (129/57 - 154/76)  BP(mean): --  RR: 16 (15 Apr 2021 14:05) (16 - 17)  SpO2: 100% (15 Apr 2021 14:05) (99% - 100%)  CONSTITUTIONAL: NAD, well-developed, well-groomed  EYES: conjunctiva and sclera clear  NECK: Supple  RESPIRATORY: Normal respiratory effort; lungs are clear to auscultation bilaterally  CARDIOVASCULAR: Regular rate and rhythm, normal S1 and S2, no murmur/rub/gallop; No lower extremity edema;   ABDOMEN: Nontender to palpation, normoactive bowel sounds, not distended;   MUSCULOSKELETAL:   no clubbing or cyanosis of digits; no joint swelling or tenderness to palpation  PSYCH: A+O to person, place, and time; affect appropriate      LABS:                        8.7    6.78  )-----------( 45       ( 15 Apr 2021 07:50 )             25.7     04-15    136  |  101  |  10  ----------------------------<  129<H>  3.7   |  27  |  0.39<L>    Ca    8.4      15 Apr 2021 07:50  Phos  2.8     04-15  Mg     2.0     04-15                  RADIOLOGY & ADDITIONAL TESTS:  Results Reviewed:   Imaging Personally Reviewed:  Electrocardiogram Personally Reviewed:    COORDINATION OF CARE:  Care Discussed with Consultants/Other Providers [Y/N]:  Prior or Outpatient Records Reviewed [Y/N]:   [facility] Division of Hospital Medicine  Vida Carney MD  Pager [phone]    Patient is a 70y old  Female who presents with a chief complaint of Lower Extremity Weakness, Inability to Ambulate (15 Apr 2021 14:43)      SUBJECTIVE / OVERNIGHT EVENTS: pt seen and examined at 130p- not eating well; no bm yet either; upset that she is not being moved out of bed and that MRI not done yet  ADDITIONAL REVIEW OF SYSTEMS:    MEDICATIONS  (STANDING):  bisacodyl Suppository 10 milliGRAM(s) Rectal once  dextrose 40% Gel 15 Gram(s) Oral once  dextrose 5%. 1000 milliLiter(s) (50 mL/Hr) IV Continuous <Continuous>  dextrose 5%. 1000 milliLiter(s) (100 mL/Hr) IV Continuous <Continuous>  dextrose 50% Injectable 25 Gram(s) IV Push once  dextrose 50% Injectable 12.5 Gram(s) IV Push once  dextrose 50% Injectable 25 Gram(s) IV Push once  dronabinol 2.5 milliGRAM(s) Oral three times a day  enoxaparin Injectable 40 milliGRAM(s) SubCutaneous daily  fentaNYL   Patch  12 MICROgram(s)/Hr 1 Patch Transdermal every 72 hours  glucagon  Injectable 1 milliGRAM(s) IntraMuscular once  insulin lispro (ADMELOG) corrective regimen sliding scale   SubCutaneous three times a day before meals  insulin lispro (ADMELOG) corrective regimen sliding scale   SubCutaneous at bedtime  polyethylene glycol 3350 17 Gram(s) Oral <User Schedule>  senna 2 Tablet(s) Oral two times a day    MEDICATIONS  (PRN):  HYDROmorphone   Tablet 4 milliGRAM(s) Oral every 4 hours PRN Moderate Pain (4 - 6)  HYDROmorphone   Tablet 6 milliGRAM(s) Oral every 4 hours PRN Severe Pain (7 - 10)  HYDROmorphone  Injectable 0.5 milliGRAM(s) IV Push every 6 hours PRN breakthrough pain in case PO dilaudid does not work      CAPILLARY BLOOD GLUCOSE      POCT Blood Glucose.: 143 mg/dL (15 Apr 2021 12:08)  POCT Blood Glucose.: 156 mg/dL (15 Apr 2021 08:15)  POCT Blood Glucose.: 148 mg/dL (14 Apr 2021 21:59)  POCT Blood Glucose.: 231 mg/dL (14 Apr 2021 18:09)    I&O's Summary    14 Apr 2021 07:01  -  15 Apr 2021 07:00  --------------------------------------------------------  IN: 0 mL / OUT: 1875 mL / NET: -1875 mL        PHYSICAL EXAM:  Vital Signs Last 24 Hrs  T(C): 37.1 (15 Apr 2021 14:05), Max: 37.1 (15 Apr 2021 06:45)  T(F): 98.7 (15 Apr 2021 14:05), Max: 98.8 (15 Apr 2021 06:45)  HR: 89 (15 Apr 2021 14:05) (63 - 91)  BP: 129/57 (15 Apr 2021 14:05) (129/57 - 154/76)  BP(mean): --  RR: 16 (15 Apr 2021 14:05) (16 - 17)  SpO2: 100% (15 Apr 2021 14:05) (99% - 100%)  CONSTITUTIONAL: tired weak balding  RESPIRATORY: Normal respiratory effort; lungs are clear to auscultation bilaterally  CARDIOVASCULAR: Regular rate and rhythm, normal S1 and S2, no murmur/rub/gallop; No lower extremity edema;   ABDOMEN: Nontender to palpation, normoactive bowel sounds, slightly distended;   MUSCULOSKELETAL:   no clubbing or cyanosis of digits; no joint swelling or tenderness to palpation  PSYCH: A+O to person, place, and time; affect appropriate      LABS:                        8.7    6.78  )-----------( 45       ( 15 Apr 2021 07:50 )             25.7     04-15    136  |  101  |  10  ----------------------------<  129<H>  3.7   |  27  |  0.39<L>    Ca    8.4      15 Apr 2021 07:50  Phos  2.8     04-15  Mg     2.0     04-15                  RADIOLOGY & ADDITIONAL TESTS:  Results Reviewed:   Imaging Personally Reviewed:  Electrocardiogram Personally Reviewed:    COORDINATION OF CARE:  Care Discussed with Consultants/Other Providers [Y/N]:  Prior or Outpatient Records Reviewed [Y/N]:

## 2021-04-15 NOTE — PROGRESS NOTE ADULT - SUBJECTIVE AND OBJECTIVE BOX
HPI:  Patient is a 70 year old female with past medical history of diabetes, pancreatic cancer w/ mets to lung and liver on chemo currently admitted for LE pain and weakness. Palliative Medicine was consulted to evaluate and manage complex symptomatology related to the patient's life-limiting illness    =======================================================  4/15/2021    - Chart reviewed. Patient seen and examined.  - Pain is persistent. Again declined any adjustments to pain regimen. She agreed to voice out if pain worsens and if she is requesting higher doses  - Discussed GOC again:    1) She says she is still frustrated that she cannot walk  2) I told her in the end, it is likely that the cancer is causing all of this.   3) I noted that she has been losing weight and she acknowledged this  4) She was requesting a HHA but I told her that she may need to go to rehab/ LTC instead  5) Discussed code status. She was clear that she does not want to be resuscitated or placed on any machines. She agreed to put the DNR, DNI on her chart      =======================================================  ----- SYMPTOM ASSESSMENT:   [ ]Unable to obtain due to poor mentation: information obtained from team/ contact    PAIN ASSESSMENT:   Site- BOTH LEGS FROM KNEES DOWN  Onset- ACUTE   Character- NUMBNESS  Radiation- NONE  Associated symptoms- NONE  Timing and duration- INTERMITTENT SPIKES THROUGHOUT THE DAY  Exacerbating factors  Severity- MOD    Effect on QOL- SIGNIFICANTLY INTERFERES WITH ADL'S  PAIN AD Score: 0    Dyspnea:  Yes [ ] No [X ] - [ ]Mild [ ]Moderate [ ]Severe  Anxiety:    Yes [ ] No [ X] - [ ]Mild [ ]Moderate [ ]Severe  Fatigue:    Yes [ ] No [X ] - [ ]Mild [ ]Moderate [ ]Severe  Nausea:    Yes [ ] No [ X] - [ ]Mild [ ]Moderate [ ]Severe                         Loss of appetite: Yes [ ] No [X ] - [ ]Mild [ ]Moderate [ ]Severe             Constipation:  Yes [ ] No [X ] - [ ]Mild [ ]Moderate [ ]Severe  Grief: Yes [ ] No [X ]     [X ]All other review of systems negative, including: weakness, cough, colds, blurry vision, headaches, dysuria, pruritus, palpitations, muscle cramps, easy bruising, epistaxis, rashes    =======================================================  ----- PERTINENT PMH/ SXH/ FHX  Type 2 diabetes mellitus    Pancreatic cancer      S/P tendon repair      FAMILY HISTORY:  Family history of liver cancer  Sister    Family history of cardiac arrest (Sibling)    FH: aneurysm (Sibling)        ----- SOCIAL HISTORY:   [ ] Unable to elicit  Significant other/partner:    Children:   Uatsdin/Spirituality:  Substance hx: Yes[ ]  No [ ]   Tobacco hx:  Yes [ ] No [ ]   Alcohol hx: Yes [ ] No [ ]   Home Opioid hx:  [ ] I-Stop Reference No:  Living Situation: [x ]Home  [ ]Long term care  [ ]Rehab [ ]Other  LIVES BY HERSELF  SISTER LIVES NEARBY    ----- ADVANCE DIRECTIVES:    DNR  MOLST  [ ]  Living Will  [ ]   DECISION MAKER(s):  [ ] Health Care Proxy(s)  [ ] Surrogate(s)  [ ] Guardian           Name(s): Phone Number(s):  SISTER IS HER MAIN CONTACT    ----- BASELINE (I)ADL(s) (prior to admission):  Donley: [ ]Total  [ ] Moderate [ ]Dependent  Palliative Performance Status Version 2:  40      =======================================================  -----MEDICATIONS AND ALLERGIES:  Allergies    No Known Allergies    Intolerances    MEDICATIONS  (STANDING):  dextrose 40% Gel 15 Gram(s) Oral once  dextrose 5%. 1000 milliLiter(s) (50 mL/Hr) IV Continuous <Continuous>  dextrose 5%. 1000 milliLiter(s) (100 mL/Hr) IV Continuous <Continuous>  dextrose 50% Injectable 25 Gram(s) IV Push once  dextrose 50% Injectable 12.5 Gram(s) IV Push once  dextrose 50% Injectable 25 Gram(s) IV Push once  dronabinol 2.5 milliGRAM(s) Oral three times a day  enoxaparin Injectable 40 milliGRAM(s) SubCutaneous daily  fentaNYL   Patch  12 MICROgram(s)/Hr 1 Patch Transdermal every 72 hours  glucagon  Injectable 1 milliGRAM(s) IntraMuscular once  glycerin Suppository - Adult 1 Suppository(s) Rectal daily  insulin lispro (ADMELOG) corrective regimen sliding scale   SubCutaneous three times a day before meals  insulin lispro (ADMELOG) corrective regimen sliding scale   SubCutaneous at bedtime  polyethylene glycol 3350 17 Gram(s) Oral <User Schedule>  senna 2 Tablet(s) Oral two times a day    MEDICATIONS  (PRN):  HYDROmorphone   Tablet 4 milliGRAM(s) Oral every 4 hours PRN Moderate Pain (4 - 6)  HYDROmorphone   Tablet 6 milliGRAM(s) Oral every 4 hours PRN Severe Pain (7 - 10)  HYDROmorphone  Injectable 0.5 milliGRAM(s) IV Push every 6 hours PRN breakthrough pain in case PO dilaudid does not work    =======================================================  ----- PHYSICAL EXAM:  Vital Signs Last 24 Hrs  T(C): 37.1 (15 Apr 2021 06:45), Max: 37.1 (15 Apr 2021 06:45)  T(F): 98.8 (15 Apr 2021 06:45), Max: 98.8 (15 Apr 2021 06:45)  HR: 91 (15 Apr 2021 06:45) (63 - 91)  BP: 154/76 (15 Apr 2021 06:45) (141/52 - 154/76)  BP(mean): --  RR: 16 (15 Apr 2021 06:45) (16 - 17)  SpO2: 99% (15 Apr 2021 06:45) (99% - 100%)    GEN: Awake, alert, NAD, CACHECTIC  HEAD: Normocephalic and atraumatic,   EYES: Anicteric sclerae, EOM's grossly intact  NECK: No JVD, no thyromegaly  PULM: Comfortable work of breathing, clear BS  CV: Pulses 2+ symmetric bilaterally, regular rate and rhythm  ABD: Not distended, soft, non-tender,   EXT: +TENDERNESS TO PALPATION BOTH LEGS, +1 EDEMA, MMT 2/5  PSYCH: Appropriate mood and affect, no suicidal ideations elicited  NEURO: No facial asymmetry, no tremors, no observed movement disorders  SKIN: No rashes or lesions on exposed skin, No jaundice    =======================================================  ----- LABS:                 04-15    136  |  101  |  10  ----------------------------<  129<H>  3.7   |  27  |  0.39<L>    Ca    8.4      15 Apr 2021 07:50  Phos  2.8     04-15  Mg     2.0     04-15

## 2021-04-15 NOTE — PROGRESS NOTE ADULT - PROBLEM SELECTOR PLAN 9
encouraged miralax TID  if not, then ?moviprep vs. enema encouraged miralax TID  if not, then ?moviprep vs. enema  remains constipated- add dulcolax suppository as pt has not had bm since adm- has had fecal impaction in the past.

## 2021-04-15 NOTE — PROGRESS NOTE ADULT - ASSESSMENT
70f with metastatic pancreatic with  mets to lung and liver, on chemotherapy with gemcitabine and abraxane, presenting with LE swelling, heaviness and inability to walk since 2 days PTA.   LE doppers negative for VTE, however with ?arterial thrombus. PT and DP pulses not palpable. Legs are warm. She is not able to move leges antigravity.   spine CT with contrast without signs of spinal mets or cord compression.   Recent bone scan 3/25/2021 and recent PET/CT 3/11/2021 without evidence of bone mets. Given all the above the likelihood of spinal mets and cord compression is minimal.     -Stable Hgb today, with thrombocytopenia likely 2/2 recent chemo last week  -Monitor CBC daily  -S/p Arterial duplex, BLE of mild femoropopliteal arterial disease noted  -No Vascular intervention indicated at this time  -Urology input appreciated, patient with joy , no intervention indicated for chronic hydronephrosis for  now  -Pal care input appreciated for pain recs  -s/p MR thoracic and lumbar spine shows  degenerative disc disease at L3-4 and L4-5 with loss of signal on the T2-weighted images. No cord or cauda equina compression noted.   Progressive b/l LE weakness possibly 2/2 loss signal at L3/4 and L4/5 in the setting of degenerative disk disease vs infectious etiology in the setting of warm edematous LEs  - Appreciate Neurology consult, please expedite MRI pelvis  -Rest of care as per primary team  -Patient to followup with Dr. Vieira ([facility]) upon discharge  -C/w Supportive care, pain control, Nutrition, PT, DVT ppx  -Oncology will continue to follow with you      Case d/w Dr. Eneida ETIENNE  Oncology Physician Assistant  Roldan BECERRA/[facility]  Pager [phone]    If after 5pm or weekends please page On-call Oncology Fellow

## 2021-04-16 ENCOUNTER — NON-APPOINTMENT (OUTPATIENT)
Age: 71
End: 2021-04-16

## 2021-04-16 LAB
% ALBUMIN: 39.2 % — SIGNIFICANT CHANGE UP
% ALPHA 1: 7.6 % — SIGNIFICANT CHANGE UP
% ALPHA 2: 14.3 % — SIGNIFICANT CHANGE UP
% BETA: 16.2 % — SIGNIFICANT CHANGE UP
% GAMMA, URINE: SIGNIFICANT CHANGE UP
% GAMMA: 22.6 % — SIGNIFICANT CHANGE UP
ALBUMIN 24H MFR UR ELPH: PRESENT
ALBUMIN SERPL ELPH-MCNC: 2.43 G/DL — LOW (ref 3.3–4.4)
ALBUMIN/GLOB SERPL ELPH: 0.6 RATIO — SIGNIFICANT CHANGE UP
ALPHA1 GLOB 24H MFR UR ELPH: PRESENT
ALPHA1 GLOB SERPL ELPH-MCNC: 0.47 G/DL — HIGH (ref 0.1–0.3)
ALPHA2 GLOB 24H MFR UR ELPH: SIGNIFICANT CHANGE UP
ALPHA2 GLOB SERPL ELPH-MCNC: 0.9 G/DL — SIGNIFICANT CHANGE UP (ref 0.6–1)
ANION GAP SERPL CALC-SCNC: 9 MMOL/L — SIGNIFICANT CHANGE UP (ref 7–14)
B-GLOBULIN 24H MFR UR ELPH: PRESENT
B-GLOBULIN SERPL ELPH-MCNC: 1 G/DL — SIGNIFICANT CHANGE UP (ref 0.6–1.1)
BASOPHILS # BLD AUTO: 0.02 K/UL — SIGNIFICANT CHANGE UP (ref 0–0.2)
BASOPHILS NFR BLD AUTO: 0.2 % — SIGNIFICANT CHANGE UP (ref 0–2)
BUN SERPL-MCNC: 10 MG/DL — SIGNIFICANT CHANGE UP (ref 7–23)
CALCIUM SERPL-MCNC: 8.5 MG/DL — SIGNIFICANT CHANGE UP (ref 8.4–10.5)
CHLORIDE SERPL-SCNC: 102 MMOL/L — SIGNIFICANT CHANGE UP (ref 98–107)
CO2 SERPL-SCNC: 26 MMOL/L — SIGNIFICANT CHANGE UP (ref 22–31)
CREAT SERPL-MCNC: 0.4 MG/DL — LOW (ref 0.5–1.3)
EOSINOPHIL # BLD AUTO: 0.03 K/UL — SIGNIFICANT CHANGE UP (ref 0–0.5)
EOSINOPHIL NFR BLD AUTO: 0.3 % — SIGNIFICANT CHANGE UP (ref 0–6)
GAMMA GLOBULIN: 1.4 G/DL — SIGNIFICANT CHANGE UP (ref 0.7–1.7)
GLUCOSE BLDC GLUCOMTR-MCNC: 127 MG/DL — HIGH (ref 70–99)
GLUCOSE BLDC GLUCOMTR-MCNC: 130 MG/DL — HIGH (ref 70–99)
GLUCOSE BLDC GLUCOMTR-MCNC: 173 MG/DL — HIGH (ref 70–99)
GLUCOSE BLDC GLUCOMTR-MCNC: 177 MG/DL — HIGH (ref 70–99)
GLUCOSE SERPL-MCNC: 124 MG/DL — HIGH (ref 70–99)
HCT VFR BLD CALC: 27.6 % — LOW (ref 34.5–45)
HGB BLD-MCNC: 9.2 G/DL — LOW (ref 11.5–15.5)
IANC: 8.95 K/UL — HIGH (ref 1.5–8.5)
IMM GRANULOCYTES NFR BLD AUTO: 0.6 % — SIGNIFICANT CHANGE UP (ref 0–1.5)
LYMPHOCYTES # BLD AUTO: 1.07 K/UL — SIGNIFICANT CHANGE UP (ref 1–3.3)
LYMPHOCYTES # BLD AUTO: 9.4 % — LOW (ref 13–44)
M PROTEIN 24H UR ELPH-MRATE: SIGNIFICANT CHANGE UP
MAGNESIUM SERPL-MCNC: 2.2 MG/DL — SIGNIFICANT CHANGE UP (ref 1.6–2.6)
MCHC RBC-ENTMCNC: 31.6 PG — SIGNIFICANT CHANGE UP (ref 27–34)
MCHC RBC-ENTMCNC: 33.3 GM/DL — SIGNIFICANT CHANGE UP (ref 32–36)
MCV RBC AUTO: 94.8 FL — SIGNIFICANT CHANGE UP (ref 80–100)
MONOCYTES # BLD AUTO: 1.26 K/UL — HIGH (ref 0–0.9)
MONOCYTES NFR BLD AUTO: 11.1 % — SIGNIFICANT CHANGE UP (ref 2–14)
NEUTROPHILS # BLD AUTO: 8.95 K/UL — HIGH (ref 1.8–7.4)
NEUTROPHILS NFR BLD AUTO: 78.4 % — HIGH (ref 43–77)
NRBC # BLD: 0 /100 WBCS — SIGNIFICANT CHANGE UP
NRBC # FLD: 0 K/UL — SIGNIFICANT CHANGE UP
PHOSPHATE SERPL-MCNC: 3 MG/DL — SIGNIFICANT CHANGE UP (ref 2.5–4.5)
PLATELET # BLD AUTO: 68 K/UL — LOW (ref 150–400)
POTASSIUM SERPL-MCNC: 3.8 MMOL/L — SIGNIFICANT CHANGE UP (ref 3.5–5.3)
POTASSIUM SERPL-SCNC: 3.8 MMOL/L — SIGNIFICANT CHANGE UP (ref 3.5–5.3)
PROT ?TM UR-MCNC: 120 MG/DL — SIGNIFICANT CHANGE UP
PROT PATTERN 24H UR ELPH-IMP: SIGNIFICANT CHANGE UP
PROT PATTERN SERPL ELPH-IMP: SIGNIFICANT CHANGE UP
PROT PATTERN SERPL ELPH-IMP: SIGNIFICANT CHANGE UP
PROT SERPL-MCNC: 6.2 G/DL — SIGNIFICANT CHANGE UP
RBC # BLD: 2.91 M/UL — LOW (ref 3.8–5.2)
RBC # FLD: 20.5 % — HIGH (ref 10.3–14.5)
SODIUM SERPL-SCNC: 137 MMOL/L — SIGNIFICANT CHANGE UP (ref 135–145)
WBC # BLD: 11.4 K/UL — HIGH (ref 3.8–10.5)
WBC # FLD AUTO: 11.4 K/UL — HIGH (ref 3.8–10.5)

## 2021-04-16 PROCEDURE — 99232 SBSQ HOSP IP/OBS MODERATE 35: CPT

## 2021-04-16 RX ORDER — SOD SULF/SODIUM/NAHCO3/KCL/PEG
500 SOLUTION, RECONSTITUTED, ORAL ORAL ONCE
Refills: 0 | Status: COMPLETED | OUTPATIENT
Start: 2021-04-16 | End: 2021-04-16

## 2021-04-16 RX ADMIN — Medication 1: at 18:19

## 2021-04-16 RX ADMIN — SENNA PLUS 2 TABLET(S): 8.6 TABLET ORAL at 06:10

## 2021-04-16 RX ADMIN — HYDROMORPHONE HYDROCHLORIDE 6 MILLIGRAM(S): 2 INJECTION INTRAMUSCULAR; INTRAVENOUS; SUBCUTANEOUS at 06:10

## 2021-04-16 RX ADMIN — SENNA PLUS 2 TABLET(S): 8.6 TABLET ORAL at 18:19

## 2021-04-16 RX ADMIN — Medication 1: at 08:48

## 2021-04-16 RX ADMIN — Medication 2.5 MILLIGRAM(S): at 21:12

## 2021-04-16 RX ADMIN — HYDROMORPHONE HYDROCHLORIDE 6 MILLIGRAM(S): 2 INJECTION INTRAMUSCULAR; INTRAVENOUS; SUBCUTANEOUS at 21:15

## 2021-04-16 RX ADMIN — HYDROMORPHONE HYDROCHLORIDE 6 MILLIGRAM(S): 2 INJECTION INTRAMUSCULAR; INTRAVENOUS; SUBCUTANEOUS at 21:46

## 2021-04-16 RX ADMIN — POLYETHYLENE GLYCOL 3350 17 GRAM(S): 17 POWDER, FOR SOLUTION ORAL at 19:30

## 2021-04-16 RX ADMIN — Medication 2.5 MILLIGRAM(S): at 06:10

## 2021-04-16 RX ADMIN — Medication 500 MILLILITER(S): at 15:41

## 2021-04-16 RX ADMIN — Medication 2.5 MILLIGRAM(S): at 13:12

## 2021-04-16 RX ADMIN — POLYETHYLENE GLYCOL 3350 17 GRAM(S): 17 POWDER, FOR SOLUTION ORAL at 13:12

## 2021-04-16 RX ADMIN — FENTANYL CITRATE 1 PATCH: 50 INJECTION INTRAVENOUS at 18:13

## 2021-04-16 RX ADMIN — HYDROMORPHONE HYDROCHLORIDE 0.5 MILLIGRAM(S): 2 INJECTION INTRAMUSCULAR; INTRAVENOUS; SUBCUTANEOUS at 23:39

## 2021-04-16 RX ADMIN — HYDROMORPHONE HYDROCHLORIDE 6 MILLIGRAM(S): 2 INJECTION INTRAMUSCULAR; INTRAVENOUS; SUBCUTANEOUS at 07:10

## 2021-04-16 RX ADMIN — ENOXAPARIN SODIUM 40 MILLIGRAM(S): 100 INJECTION SUBCUTANEOUS at 11:52

## 2021-04-16 RX ADMIN — FENTANYL CITRATE 1 PATCH: 50 INJECTION INTRAVENOUS at 06:09

## 2021-04-16 RX ADMIN — POLYETHYLENE GLYCOL 3350 17 GRAM(S): 17 POWDER, FOR SOLUTION ORAL at 08:49

## 2021-04-16 RX ADMIN — HYDROMORPHONE HYDROCHLORIDE 0.5 MILLIGRAM(S): 2 INJECTION INTRAMUSCULAR; INTRAVENOUS; SUBCUTANEOUS at 23:54

## 2021-04-16 NOTE — PROGRESS NOTE ADULT - SUBJECTIVE AND OBJECTIVE BOX
INTERVAL HPI/OVERNIGHT EVENTS:  Patient seen at bedside.  Patient with continued symptoms of LE weakness   and pain  Also endorses constipation x 1 week  Patient explains that she has had an enema with no relief  Passing flatus      VITAL SIGNS:  T(F): 98 (04-16-21 @ 13:27)  HR: 66 (04-16-21 @ 13:27)  BP: 122/56 (04-16-21 @ 13:27)  RR: 16 (04-16-21 @ 06:14)  SpO2: 100% (04-16-21 @ 13:27)  Wt(kg): --    PHYSICAL EXAM:  In accordance with current standard limiting patient contact, deferred physical exam  2/2 COVID pandemic  Please refer to physical exam of primary team.    MEDICATIONS  (STANDING):  bisacodyl Suppository 10 milliGRAM(s) Rectal once  dextrose 40% Gel 15 Gram(s) Oral once  dextrose 5%. 1000 milliLiter(s) (50 mL/Hr) IV Continuous <Continuous>  dextrose 5%. 1000 milliLiter(s) (100 mL/Hr) IV Continuous <Continuous>  dextrose 50% Injectable 25 Gram(s) IV Push once  dextrose 50% Injectable 12.5 Gram(s) IV Push once  dextrose 50% Injectable 25 Gram(s) IV Push once  dronabinol 2.5 milliGRAM(s) Oral three times a day  enoxaparin Injectable 40 milliGRAM(s) SubCutaneous daily  fentaNYL   Patch  12 MICROgram(s)/Hr 1 Patch Transdermal every 72 hours  glucagon  Injectable 1 milliGRAM(s) IntraMuscular once  insulin lispro (ADMELOG) corrective regimen sliding scale   SubCutaneous three times a day before meals  insulin lispro (ADMELOG) corrective regimen sliding scale   SubCutaneous at bedtime  polyethylene glycol 3350 17 Gram(s) Oral <User Schedule>  senna 2 Tablet(s) Oral two times a day    MEDICATIONS  (PRN):  HYDROmorphone   Tablet 4 milliGRAM(s) Oral every 4 hours PRN Moderate Pain (4 - 6)  HYDROmorphone   Tablet 6 milliGRAM(s) Oral every 4 hours PRN Severe Pain (7 - 10)  HYDROmorphone  Injectable 0.5 milliGRAM(s) IV Push every 6 hours PRN breakthrough pain in case PO dilaudid does not work      Allergies    No Known Allergies    Intolerances        LABS:                        9.2    11.40 )-----------( 68       ( 16 Apr 2021 12:18 )             27.6     04-16    137  |  102  |  10  ----------------------------<  124<H>  3.8   |  26  |  0.40<L>    Ca    8.5      16 Apr 2021 12:18  Phos  3.0     04-16  Mg     2.2     04-16            RADIOLOGY & ADDITIONAL TESTS:  Studies reviewed.

## 2021-04-16 NOTE — PROGRESS NOTE ADULT - ASSESSMENT
70f with metastatic pancreatic with  mets to lung and liver, on chemotherapy with gemcitabine and abraxane, presenting with LE swelling, heaviness and inability to walk since 2 days PTA.   LE doppers negative for VTE, however with ?arterial thrombus. PT and DP pulses not palpable. Legs are warm. She is not able to move leges antigravity.   spine CT with contrast without signs of spinal mets or cord compression.   Recent bone scan 3/25/2021 and recent PET/CT 3/11/2021 without evidence of bone mets. Given all the above the likelihood of spinal mets and cord compression is minimal.     -Stable Hgb today, with thrombocytopenia likely 2/2 recent chemo last week  -Monitor CBC daily  -S/p Arterial duplex, BLE of mild femoropopliteal arterial disease noted  -No Vascular intervention indicated at this time  -Urology input appreciated, patient with joy , no intervention indicated for chronic hydronephrosis for  now  -Pal care input appreciated for pain recs  -s/p MR thoracic and lumbar spine shows  degenerative disc disease at L3-4 and L4-5 with loss of signal on the T2-weighted images. No cord or cauda equina compression noted.   Progressive b/l LE weakness possibly 2/2 loss signal at L3/4 and L4/5 in the setting of degenerative disk disease vs infectious etiology in the setting of warm edematous LEs  - s/p MRI pelvis: Exam limited by susceptibility artifact from a rectal tube. No well-formed mass or collection is otherwise demonstrated along the course of the lumbosacral plexus bilaterally. Diffuse marrow signal alteration which may secondary to anemia or marrow fluid/infiltrative disorder. Anasarca.---->No acute pathology to explain symptoms   -Will followup Neuro if any further recs  -Continue with Physical therapy  -Rest of care as per primary team  -Patient to followup with Dr. Vieira ([facility]) upon discharge  -C/w Supportive care, pain control, Nutrition, PT, DVT ppx  -Oncology will continue to follow with you      Case d/w Dr. Eneida ETIENNE  Oncology Physician Assistant  Roldan BECERRA/[facility]  Pager [phone]    If after 5pm or weekends please page On-call Oncology Fellow

## 2021-04-16 NOTE — PROGRESS NOTE ADULT - PROBLEM SELECTOR PROBLEM 9
Constipation due to opioid therapy

## 2021-04-16 NOTE — PROGRESS NOTE ADULT - PROBLEM SELECTOR PLAN 4
Patient w/ large stool burden on CT likely in setting of opioid prescription.   - miralax daily   - senna 2 tabs at night Patient w/ large stool burden on mri  agreed to moviprep/enema   if no bm by am then CT abd/pelvis as there was concern for anal wall thickening

## 2021-04-16 NOTE — PROGRESS NOTE ADULT - SUBJECTIVE AND OBJECTIVE BOX
[facility] Division of Hospital Medicine  Vida Carney MD  Pager [phone]    Patient is a 70y old  Female who presents with a chief complaint of Lower Extremity Weakness, Inability to Ambulate (16 Apr 2021 14:33)      SUBJECTIVE / OVERNIGHT EVENTS: pt seen and examined at 3p- no bm thus far- willing to take moviprep- was sitting in chair when seen  ADDITIONAL REVIEW OF SYSTEMS:    MEDICATIONS  (STANDING):  dextrose 40% Gel 15 Gram(s) Oral once  dextrose 5%. 1000 milliLiter(s) (50 mL/Hr) IV Continuous <Continuous>  dextrose 5%. 1000 milliLiter(s) (100 mL/Hr) IV Continuous <Continuous>  dextrose 50% Injectable 25 Gram(s) IV Push once  dextrose 50% Injectable 12.5 Gram(s) IV Push once  dextrose 50% Injectable 25 Gram(s) IV Push once  dronabinol 2.5 milliGRAM(s) Oral three times a day  enoxaparin Injectable 40 milliGRAM(s) SubCutaneous daily  fentaNYL   Patch  12 MICROgram(s)/Hr 1 Patch Transdermal every 72 hours  glucagon  Injectable 1 milliGRAM(s) IntraMuscular once  insulin lispro (ADMELOG) corrective regimen sliding scale   SubCutaneous three times a day before meals  insulin lispro (ADMELOG) corrective regimen sliding scale   SubCutaneous at bedtime  polyethylene glycol 3350 17 Gram(s) Oral <User Schedule>  saline laxative (FLEET) Rectal Enema 1 Enema Rectal once  senna 2 Tablet(s) Oral two times a day    MEDICATIONS  (PRN):  HYDROmorphone   Tablet 4 milliGRAM(s) Oral every 4 hours PRN Moderate Pain (4 - 6)  HYDROmorphone   Tablet 6 milliGRAM(s) Oral every 4 hours PRN Severe Pain (7 - 10)  HYDROmorphone  Injectable 0.5 milliGRAM(s) IV Push every 6 hours PRN breakthrough pain in case PO dilaudid does not work      CAPILLARY BLOOD GLUCOSE      POCT Blood Glucose.: 127 mg/dL (16 Apr 2021 12:12)  POCT Blood Glucose.: 177 mg/dL (16 Apr 2021 08:17)  POCT Blood Glucose.: 271 mg/dL (15 Apr 2021 23:07)  POCT Blood Glucose.: 286 mg/dL (15 Apr 2021 18:16)    I&O's Summary    15 Apr 2021 07:01  -  16 Apr 2021 07:00  --------------------------------------------------------  IN: 130 mL / OUT: 600 mL / NET: -470 mL    16 Apr 2021 07:01  -  16 Apr 2021 17:00  --------------------------------------------------------  IN: 0 mL / OUT: 1000 mL / NET: -1000 mL        PHYSICAL EXAM:  Vital Signs Last 24 Hrs  T(C): 36.7 (16 Apr 2021 13:27), Max: 36.9 (15 Apr 2021 22:37)  T(F): 98 (16 Apr 2021 13:27), Max: 98.5 (15 Apr 2021 22:37)  HR: 66 (16 Apr 2021 13:27) (66 - 92)  BP: 122/56 (16 Apr 2021 13:27) (122/56 - 162/55)  BP(mean): --  RR: 16 (16 Apr 2021 06:14) (16 - 17)  SpO2: 100% (16 Apr 2021 13:27) (96% - 100%)  CONSTITUTIONAL: appears weak and tired  RESPIRATORY: Normal respiratory effort; lungs are clear to auscultation bilaterally  CARDIOVASCULAR: Regular rate and rhythm, normal S1 and S2, no murmur/rub/gallop; No lower extremity edema;   ABDOMEN: Nontender to palpation, normoactive bowel sounds, slightly distended;   MUSCULOSKELETAL:  Normal gait; no clubbing or cyanosis of digits; no joint swelling or tenderness to palpation  PSYCH: A+O to person, place, and time; affect appropriate      LABS:                        9.2    11.40 )-----------( 68       ( 16 Apr 2021 12:18 )             27.6     04-16    137  |  102  |  10  ----------------------------<  124<H>  3.8   |  26  |  0.40<L>    Ca    8.5      16 Apr 2021 12:18  Phos  3.0     04-16  Mg     2.2     04-16                  RADIOLOGY & ADDITIONAL TESTS:  Results Reviewed: < from: MR Pelvis Bony Only w/wo IV Cont (04.15.21 @ 21:33) >  Reportedly the patient has no rectal tube. The susceptibility artifact arising within the rectum is therefore likely related to either a metallic foreign body in the rectum or a large amount of air in the rectum. There is also a large amount of throughout the colon and within the rectum with distention of the rectum and nonspecific distal rectal/anal wall thickening. Correlate clinically.    There is also an irregularly shaped collection in the lateral subcutaneous tissues overlying the right greater trochanter spanning 3.0 x 1.4 x 3.5 cm, possibly an adventitial bursa or abscess. Correlate clinically.    < end of copied text >    Imaging Personally Reviewed:  Electrocardiogram Personally Reviewed:    COORDINATION OF CARE:  Care Discussed with Consultants/Other Providers [Y/N]:  Prior or Outpatient Records Reviewed [Y/N]:   [facility] Division of Hospital Medicine  Vida Carney MD  Pager [phone]    Patient is a 70y old  Female who presents with a chief complaint of Lower Extremity Weakness, Inability to Ambulate (16 Apr 2021 14:33)      SUBJECTIVE / OVERNIGHT EVENTS: pt seen and examined at 3p- no bm thus far- willing to take moviprep- was sitting in chair when seen  ADDITIONAL REVIEW OF SYSTEMS:    MEDICATIONS  (STANDING):  dextrose 40% Gel 15 Gram(s) Oral once  dextrose 5%. 1000 milliLiter(s) (50 mL/Hr) IV Continuous <Continuous>  dextrose 5%. 1000 milliLiter(s) (100 mL/Hr) IV Continuous <Continuous>  dextrose 50% Injectable 25 Gram(s) IV Push once  dextrose 50% Injectable 12.5 Gram(s) IV Push once  dextrose 50% Injectable 25 Gram(s) IV Push once  dronabinol 2.5 milliGRAM(s) Oral three times a day  enoxaparin Injectable 40 milliGRAM(s) SubCutaneous daily  fentaNYL   Patch  12 MICROgram(s)/Hr 1 Patch Transdermal every 72 hours  glucagon  Injectable 1 milliGRAM(s) IntraMuscular once  insulin lispro (ADMELOG) corrective regimen sliding scale   SubCutaneous three times a day before meals  insulin lispro (ADMELOG) corrective regimen sliding scale   SubCutaneous at bedtime  polyethylene glycol 3350 17 Gram(s) Oral <User Schedule>  saline laxative (FLEET) Rectal Enema 1 Enema Rectal once  senna 2 Tablet(s) Oral two times a day    MEDICATIONS  (PRN):  HYDROmorphone   Tablet 4 milliGRAM(s) Oral every 4 hours PRN Moderate Pain (4 - 6)  HYDROmorphone   Tablet 6 milliGRAM(s) Oral every 4 hours PRN Severe Pain (7 - 10)  HYDROmorphone  Injectable 0.5 milliGRAM(s) IV Push every 6 hours PRN breakthrough pain in case PO dilaudid does not work      CAPILLARY BLOOD GLUCOSE      POCT Blood Glucose.: 127 mg/dL (16 Apr 2021 12:12)  POCT Blood Glucose.: 177 mg/dL (16 Apr 2021 08:17)  POCT Blood Glucose.: 271 mg/dL (15 Apr 2021 23:07)  POCT Blood Glucose.: 286 mg/dL (15 Apr 2021 18:16)    I&O's Summary    15 Apr 2021 07:01  -  16 Apr 2021 07:00  --------------------------------------------------------  IN: 130 mL / OUT: 600 mL / NET: -470 mL    16 Apr 2021 07:01  -  16 Apr 2021 17:00  --------------------------------------------------------  IN: 0 mL / OUT: 1000 mL / NET: -1000 mL        PHYSICAL EXAM:  Vital Signs Last 24 Hrs  T(C): 36.7 (16 Apr 2021 13:27), Max: 36.9 (15 Apr 2021 22:37)  T(F): 98 (16 Apr 2021 13:27), Max: 98.5 (15 Apr 2021 22:37)  HR: 66 (16 Apr 2021 13:27) (66 - 92)  BP: 122/56 (16 Apr 2021 13:27) (122/56 - 162/55)  BP(mean): --  RR: 16 (16 Apr 2021 06:14) (16 - 17)  SpO2: 100% (16 Apr 2021 13:27) (96% - 100%)  CONSTITUTIONAL: appears weak and tired  RESPIRATORY: Normal respiratory effort; lungs are clear to auscultation bilaterally  CARDIOVASCULAR: Regular rate and rhythm, normal S1 and S2, no murmur/rub/gallop; trace pedal edema;   ABDOMEN: Nontender to palpation, normoactive bowel sounds, slightly distended;   MUSCULOSKELETAL: no clubbing or cyanosis of digits; no joint swelling or tenderness to palpation  PSYCH: A+O to person, place, and time; affect appropriate      LABS:                        9.2    11.40 )-----------( 68       ( 16 Apr 2021 12:18 )             27.6     04-16    137  |  102  |  10  ----------------------------<  124<H>  3.8   |  26  |  0.40<L>    Ca    8.5      16 Apr 2021 12:18  Phos  3.0     04-16  Mg     2.2     04-16                  RADIOLOGY & ADDITIONAL TESTS:  Results Reviewed: < from: MR Pelvis Bony Only w/wo IV Cont (04.15.21 @ 21:33) >  Reportedly the patient has no rectal tube. The susceptibility artifact arising within the rectum is therefore likely related to either a metallic foreign body in the rectum or a large amount of air in the rectum. There is also a large amount of throughout the colon and within the rectum with distention of the rectum and nonspecific distal rectal/anal wall thickening. Correlate clinically.    There is also an irregularly shaped collection in the lateral subcutaneous tissues overlying the right greater trochanter spanning 3.0 x 1.4 x 3.5 cm, possibly an adventitial bursa or abscess. Correlate clinically.    < end of copied text >    Imaging Personally Reviewed:  Electrocardiogram Personally Reviewed:    COORDINATION OF CARE:  Care Discussed with Consultants/Other Providers [Y/N]:  Prior or Outpatient Records Reviewed [Y/N]:

## 2021-04-16 NOTE — CHART NOTE - NSCHARTNOTEFT_GEN_A_CORE
Source: Patient [X ]    Family [ ]     other [X ] electronic chart, RN    Diet : Diet, Consistent Carbohydrate w/Evening Snack:   Supplement Feeding Modality:  Oral  Glucerna Shake Cans or Servings Per Day:  1       Frequency:  Three Times a day (04-10-21 @ 14:53)    Nutrition Follow-up Note, severe malnutrition. Patient reports loss of taste but also does not like food in-house. She is currently on appetite stimulant Marinol. Consumed only elkins and home fries about 50% of each item. She also reports consuming Glucerna Therapeutic Nutrition 240mls (220kcals, 10g protein) after breakfast. Prefers both Chocolate and Vanilla Glucerna shake. Explored other food/snack options, only voiced preference for orange, banana and grapes. She is selecting items from personalized menu. Patient denies any nausea/vomiting, c/o constipation on bowel regimen-no movement x 7 days as reported. Continue good po intake of nutrient and protein dense foods encouraged. Importance of having well balanced meals, nutrient and protein dense foods emphasized. RD remains available, patient made aware.     Weight: 42.2kg (4/9)       Pertinent Medications: dextrose 40% Gel  dextrose 5%.  dextrose 5%.  dextrose 50% Injectable  dextrose 50% Injectable  dextrose 50% Injectable  dronabinol  fentaNYL   Patch  12 MICROgram(s)/Hr  glucagon  Injectable  insulin lispro (ADMELOG) corrective regimen sliding scale  insulin lispro (ADMELOG) corrective regimen sliding scale  polyethylene glycol 3350  senna    Pertinent Labs:  04-15 Na136 mmol/L Glu 129 mg/dL<H> K+ 3.7 mmol/L Cr  0.39 mg/dL<L> BUN 10 mg/dL 04-15 Phos 2.8 mg/dL 04-13 Alb 2.5 g/dL<L> 04-10 Chol 103 mg/dL LDL --    HDL 65 mg/dL Trig 52 mg/dL      Skin: left hallux wound per nursing flowsheet.   +3 b/l ankle edema noted.     Estimated Needs:   [X ] no change since previous assessment  [ ] recalculated:       Previous Nutrition Diagnosis:      [ X] Malnutrition, Severe     Nutrition Diagnosis is [X ] ongoing  [ ] resolved [ ] not applicable       Additional Recommendations:     1. Continue current diet order, which remains appropriate at this time.   2. Monitor weights, labs, BM's, skin integrity, p.o. intake.   3. Please Encourage po intake, assist with meals and menu selections, provide alternatives PRN.   4. Honor food and beverage preferences within diet restriction of patient in an effort to maximize level of nutrient intake

## 2021-04-16 NOTE — PROGRESS NOTE ADULT - PROBLEM SELECTOR PLAN 1
Patient presenting w/ lower extremity weakness L>R distally in the setting of metastatic pancreatic cancer. Notable Left foot drop. W/o known spinal mets. No spinal mets seen on CT.   -MR spine thoracic/lumbar w/ and w/o neg for spinal cord compression   d/w Dr. Diane and Neuro team today- MRI pelvis r/o plexopathy  - PT eval Patient presenting w/ lower extremity weakness L>R distally in the setting of metastatic pancreatic cancer. Notable Left foot drop. W/o known spinal mets. No spinal mets seen on CT.   -MR spine thoracic/lumbar w/ and w/o neg for spinal cord compression   - MRI pelvis neg for plexopathy  - f/u w/ neuro re: next steps; paraneoplastic panel serum pending  - PT eval

## 2021-04-16 NOTE — CHART NOTE - NSCHARTNOTEFT_GEN_A_CORE
04/15/21 MRI Pelvis w/wo contrast: Susceptibility artifact arising within the rectum is likely related to either a metallic foreign body in the rectum or a large amount of air in the rectum. There is also a large amount of stool throughout the colon and within the rectum with distention of the rectum and nonspecific distal rectal/anal wall thickening. There is anasarca with diffuse myofascial and subcutaneous soft tissue edema.  There is also an irregularly shaped collection in the lateral subcutaneous tissues overlying the right greater trochanter spanning 3.0 x 1.4 x 3.5 cm, possibly an adventitial bursa or abscess. No well-formed masses or collections are otherwise demonstrated along the course of the lumbosacral plexus bilaterally.  There is small disc bulge at L4-L5. There is diffuse low T1 marrow signal which may be secondary to anemia or marrow proliferative/infiltrative disorder.    PT MR Pelvis reviewed for Lumbosacral plexopathy and results w/o overt plexopathy multiple abnormalities throughout the colon as well as subcutaneous soft tissue abnormalities overlying R. greater trochanter raising possibility of adventitial bursa or abscess.  This may be contributing to PT's symptoms, however they are predominantly on the left side.  As such, will try to pursue inpatient EMG 04/17 or 04/18 with Dr. Su, neuromuscular attending.  In AM, please call [phone] to reach neurology resident on call to coordinate and discuss if still necessary. Neurology Chart Note:       04/15/21 MRI Pelvis w/wo contrast: Susceptibility artifact arising within the rectum is likely related to either a metallic foreign body in the rectum or a large amount of air in the rectum. There is also a large amount of stool throughout the colon and within the rectum with distention of the rectum and nonspecific distal rectal/anal wall thickening. There is anasarca with diffuse myofascial and subcutaneous soft tissue edema.  There is also an irregularly shaped collection in the lateral subcutaneous tissues overlying the right greater trochanter spanning 3.0 x 1.4 x 3.5 cm, possibly an adventitial bursa or abscess. No well-formed masses or collections are otherwise demonstrated along the course of the lumbosacral plexus bilaterally.  There is small disc bulge at L4-L5. There is diffuse low T1 marrow signal which may be secondary to anemia or marrow proliferative/infiltrative disorder.    PT MR Pelvis reviewed for Lumbosacral plexopathy and results w/o overt plexopathy multiple abnormalities throughout the colon as well as subcutaneous soft tissue abnormalities overlying R. greater trochanter raising possibility of adventitial bursa or abscess.   This may be contributing to PT's symptoms, however they are predominantly on the left side.  As such, will try to pursue inpatient EMG 04/17 or 04/18 with Dr. Su, neuromuscular attending.    In AM, please call [phone] to reach neurology resident on call to coordinate and discuss if still necessary.    Thank you and we will continue to follow and above discussed with Neurology Attending.

## 2021-04-16 NOTE — PROGRESS NOTE ADULT - ASSESSMENT
70fwith past medical history of diabetes, pancreatic cancer w/ mets to lung and liver on chemo (9th session, abraxane/gemzar) coming in w/ inability to ambulate- left leg weaker than right  seen by NEurology- MRI pelvis to r/o plexopathy (paraneoplastic)  70fwith past medical history of diabetes, pancreatic cancer w/ mets to lung and liver on chemo (9th session, abraxane/gemzar) coming in w/ inability to ambulate- left leg weaker than right  seen by NEurology- MRI pelvis to r/o plexopathy (paraneoplastic)- no clear findings of plexopathy- case d/w radiologist concern for ?anal mass- rectum distended and that could have caused artifact.

## 2021-04-16 NOTE — PROGRESS NOTE ADULT - ATTENDING COMMENTS
Patient seen at bedside. Case discussed with LOWELL Samano. Plan as above.   MRI reviewed  Neurology input appreciated

## 2021-04-17 LAB
A-TOCOPHEROL VIT E SERPL-MCNC: 9.1 MG/L — SIGNIFICANT CHANGE UP (ref 9–29)
ANION GAP SERPL CALC-SCNC: 9 MMOL/L — SIGNIFICANT CHANGE UP (ref 7–14)
BASOPHILS # BLD AUTO: 0.02 K/UL — SIGNIFICANT CHANGE UP (ref 0–0.2)
BASOPHILS NFR BLD AUTO: 0.2 % — SIGNIFICANT CHANGE UP (ref 0–2)
BETA+GAMMA TOCOPHEROL SERPL-MCNC: 0.4 MG/L — LOW (ref 0.5–4.9)
BUN SERPL-MCNC: 10 MG/DL — SIGNIFICANT CHANGE UP (ref 7–23)
CALCIUM SERPL-MCNC: 8.4 MG/DL — SIGNIFICANT CHANGE UP (ref 8.4–10.5)
CHLORIDE SERPL-SCNC: 101 MMOL/L — SIGNIFICANT CHANGE UP (ref 98–107)
CO2 SERPL-SCNC: 27 MMOL/L — SIGNIFICANT CHANGE UP (ref 22–31)
CREAT SERPL-MCNC: 0.33 MG/DL — LOW (ref 0.5–1.3)
EOSINOPHIL # BLD AUTO: 0.04 K/UL — SIGNIFICANT CHANGE UP (ref 0–0.5)
EOSINOPHIL NFR BLD AUTO: 0.3 % — SIGNIFICANT CHANGE UP (ref 0–6)
GLUCOSE BLDC GLUCOMTR-MCNC: 163 MG/DL — HIGH (ref 70–99)
GLUCOSE BLDC GLUCOMTR-MCNC: 227 MG/DL — HIGH (ref 70–99)
GLUCOSE BLDC GLUCOMTR-MCNC: 257 MG/DL — HIGH (ref 70–99)
GLUCOSE BLDC GLUCOMTR-MCNC: 261 MG/DL — HIGH (ref 70–99)
GLUCOSE SERPL-MCNC: 144 MG/DL — HIGH (ref 70–99)
HCT VFR BLD CALC: 30.5 % — LOW (ref 34.5–45)
HGB BLD-MCNC: 9.9 G/DL — LOW (ref 11.5–15.5)
IANC: 10.01 K/UL — HIGH (ref 1.5–8.5)
IMM GRANULOCYTES NFR BLD AUTO: 0.8 % — SIGNIFICANT CHANGE UP (ref 0–1.5)
LYMPHOCYTES # BLD AUTO: 0.77 K/UL — LOW (ref 1–3.3)
LYMPHOCYTES # BLD AUTO: 6.4 % — LOW (ref 13–44)
MAGNESIUM SERPL-MCNC: 2 MG/DL — SIGNIFICANT CHANGE UP (ref 1.6–2.6)
MCHC RBC-ENTMCNC: 30.7 PG — SIGNIFICANT CHANGE UP (ref 27–34)
MCHC RBC-ENTMCNC: 32.5 GM/DL — SIGNIFICANT CHANGE UP (ref 32–36)
MCV RBC AUTO: 94.7 FL — SIGNIFICANT CHANGE UP (ref 80–100)
MONOCYTES # BLD AUTO: 1.04 K/UL — HIGH (ref 0–0.9)
MONOCYTES NFR BLD AUTO: 8.7 % — SIGNIFICANT CHANGE UP (ref 2–14)
NEUTROPHILS # BLD AUTO: 10.01 K/UL — HIGH (ref 1.8–7.4)
NEUTROPHILS NFR BLD AUTO: 83.6 % — HIGH (ref 43–77)
NRBC # BLD: 0 /100 WBCS — SIGNIFICANT CHANGE UP
NRBC # FLD: 0 K/UL — SIGNIFICANT CHANGE UP
PHOSPHATE SERPL-MCNC: 3.4 MG/DL — SIGNIFICANT CHANGE UP (ref 2.5–4.5)
PLATELET # BLD AUTO: 101 K/UL — LOW (ref 150–400)
POTASSIUM SERPL-MCNC: 3.1 MMOL/L — LOW (ref 3.5–5.3)
POTASSIUM SERPL-SCNC: 3.1 MMOL/L — LOW (ref 3.5–5.3)
RBC # BLD: 3.22 M/UL — LOW (ref 3.8–5.2)
RBC # FLD: 21 % — HIGH (ref 10.3–14.5)
SODIUM SERPL-SCNC: 137 MMOL/L — SIGNIFICANT CHANGE UP (ref 135–145)
WBC # BLD: 11.97 K/UL — HIGH (ref 3.8–10.5)
WBC # FLD AUTO: 11.97 K/UL — HIGH (ref 3.8–10.5)

## 2021-04-17 PROCEDURE — 99232 SBSQ HOSP IP/OBS MODERATE 35: CPT

## 2021-04-17 PROCEDURE — 99233 SBSQ HOSP IP/OBS HIGH 50: CPT

## 2021-04-17 RX ORDER — POTASSIUM CHLORIDE 20 MEQ
40 PACKET (EA) ORAL EVERY 4 HOURS
Refills: 0 | Status: COMPLETED | OUTPATIENT
Start: 2021-04-17 | End: 2021-04-17

## 2021-04-17 RX ADMIN — FENTANYL CITRATE 1 PATCH: 50 INJECTION INTRAVENOUS at 18:33

## 2021-04-17 RX ADMIN — HYDROMORPHONE HYDROCHLORIDE 6 MILLIGRAM(S): 2 INJECTION INTRAMUSCULAR; INTRAVENOUS; SUBCUTANEOUS at 05:28

## 2021-04-17 RX ADMIN — SENNA PLUS 2 TABLET(S): 8.6 TABLET ORAL at 06:00

## 2021-04-17 RX ADMIN — ENOXAPARIN SODIUM 40 MILLIGRAM(S): 100 INJECTION SUBCUTANEOUS at 12:00

## 2021-04-17 RX ADMIN — Medication 40 MILLIEQUIVALENT(S): at 21:04

## 2021-04-17 RX ADMIN — HYDROMORPHONE HYDROCHLORIDE 6 MILLIGRAM(S): 2 INJECTION INTRAMUSCULAR; INTRAVENOUS; SUBCUTANEOUS at 21:04

## 2021-04-17 RX ADMIN — Medication 40 MILLIEQUIVALENT(S): at 18:38

## 2021-04-17 RX ADMIN — HYDROMORPHONE HYDROCHLORIDE 6 MILLIGRAM(S): 2 INJECTION INTRAMUSCULAR; INTRAVENOUS; SUBCUTANEOUS at 22:00

## 2021-04-17 RX ADMIN — Medication 2.5 MILLIGRAM(S): at 21:04

## 2021-04-17 RX ADMIN — Medication 2.5 MILLIGRAM(S): at 14:14

## 2021-04-17 RX ADMIN — FENTANYL CITRATE 1 PATCH: 50 INJECTION INTRAVENOUS at 07:06

## 2021-04-17 RX ADMIN — Medication 1: at 08:48

## 2021-04-17 RX ADMIN — Medication 2: at 12:23

## 2021-04-17 RX ADMIN — Medication 1: at 22:40

## 2021-04-17 RX ADMIN — Medication 2.5 MILLIGRAM(S): at 05:59

## 2021-04-17 RX ADMIN — HYDROMORPHONE HYDROCHLORIDE 6 MILLIGRAM(S): 2 INJECTION INTRAMUSCULAR; INTRAVENOUS; SUBCUTANEOUS at 04:38

## 2021-04-17 RX ADMIN — Medication 3: at 18:33

## 2021-04-17 NOTE — PROGRESS NOTE ADULT - SUBJECTIVE AND OBJECTIVE BOX
Patient seen and examined for opinion at the request of Dr. Diane.     She c/o subacute onset tingling left thigh radiating down to dorsum of left foot about three weeks ago, followed by weakness of the left foot which has progressed to complete plegia at the ankles and proximal LLE weakness.  She states the right leg only has a vague heaviness in the calf and about a week ago she noted right drop foot which has worsened.  She denies LBP, buttock pain or weakness/sensory sx;s in the arms.      She has h/o pancreatic CA diagnosed this past Dec and is currently on chemo regimen.  Imaging to date has been abd/pelvis which have not shown a clear mass or lymphadenopathy in the region of the LS plexus; MRI T and LS spine which show open canal but ?marrow infiltration from ?CA    MEDICATIONS  (STANDING):  dextrose 40% Gel 15 Gram(s) Oral once  dextrose 5%. 1000 milliLiter(s) (50 mL/Hr) IV Continuous <Continuous>  dextrose 5%. 1000 milliLiter(s) (100 mL/Hr) IV Continuous <Continuous>  dextrose 50% Injectable 25 Gram(s) IV Push once  dextrose 50% Injectable 12.5 Gram(s) IV Push once  dextrose 50% Injectable 25 Gram(s) IV Push once  dronabinol 2.5 milliGRAM(s) Oral three times a day  enoxaparin Injectable 40 milliGRAM(s) SubCutaneous daily  fentaNYL   Patch  12 MICROgram(s)/Hr 1 Patch Transdermal every 72 hours  glucagon  Injectable 1 milliGRAM(s) IntraMuscular once  insulin lispro (ADMELOG) corrective regimen sliding scale   SubCutaneous three times a day before meals  insulin lispro (ADMELOG) corrective regimen sliding scale   SubCutaneous at bedtime  polyethylene glycol 3350 17 Gram(s) Oral <User Schedule>  saline laxative (FLEET) Rectal Enema 1 Enema Rectal once  senna 2 Tablet(s) Oral two times a day        Vital Signs Last 24 Hrs  T(C): 36.7 (17 Apr 2021 05:58), Max: 37.1 (16 Apr 2021 21:42)  T(F): 98 (17 Apr 2021 05:58), Max: 98.8 (16 Apr 2021 21:42)  HR: 80 (17 Apr 2021 05:58) (66 - 80)  BP: 153/68 (17 Apr 2021 05:58) (122/56 - 167/65)  BP(mean): --  RR: 16 (17 Apr 2021 05:58) (16 - 18)  SpO2: 100% (17 Apr 2021 05:58) (100% - 100%)        Constitutional: awake and alert, cachectic  Neck: Supple.  Respiratory: Breath sounds are clear bilaterally  Cardiovascular: S1 and S2, regular / irregular rhythm  Gastrointestinal: soft, nontender  Extremities: edema LLE at ankle, dependent  Vascular: Carotid Bruit - no  Musculoskeletal: no joint swelling/tenderness, no abnormal movements  Skin: No rashes    Neurological exam:  Mental status: A x O x 3. Appropriately interactive, normal affect. Speech fluent, No Aphasia or paraphasic errors. Naming /repetition intact   CN: PERRL, EOMI, VFF, facial sensation normal, no NLFD, tongue midline, Palate moves equally, SCM equal bilaterally  Motor: No pronator drift, LLE hip flex 4/5, knee ext 2/5 flex 4-/5, ankle DF and PF 0/5, RLE hip flex 4+/5, knee ext 4+/5 flex 4/5, ankles DF 0/5, PF 4/5, ev/inv 2-3/5, flaccid tone    Sens: absent JPS at toes, decreased vib to ASIS  Reflexes: 1+ UE, absent LE's, toes mute.  No Beevor sign    Labs:                        9.9    11.97 )-----------( 101      ( 17 Apr 2021 07:25 )             30.5     04-17    137  |  101  |  10  ----------------------------<  144<H>  3.1<L>   |  27  |  0.33<L>    Ca    8.4      17 Apr 2021 07:25  Phos  3.4     04-17  Mg     2.0     04-17        04-10 Chol 103 LDL -- HDL 65 Trig 52      A/P:  Patient presents with subacute onset left more then right distal and proximal LE weakness with LLE sensory symptoms and objective sensory deficits, with absent DTR's.     This presentation in the setting of metastatic pancreatic CA strongly suggests a pathological connection and the neuro findings may be due to cervical vert CA mets with cord compromise.  Long tract signs would be masked by her h/o DM2.  Alternatively, the pancreatic CA and lymphadenopathy conceivably could put mass effect upon portions of the upper LS plexus but this would not explain the severe distal foot weakness.      Carcinomatous infiltration of the plexus is in the DDx but this is rare with pancreatic CA and very difficult to definitively diagnose.  EMG will not likely be helpful to discriminate a plexus lesion due to her diabetes which would confound findings.     Would await MRI C spine results as I think mets in the C spine could be a unifying diagnosis.

## 2021-04-17 NOTE — PROGRESS NOTE ADULT - SUBJECTIVE AND OBJECTIVE BOX
[facility] Division of Hospital Medicine  Vida Carney MD  Pager [phone]    Patient is a 70y old  Female who presents with a chief complaint of Lower Extremity Weakness, Inability to Ambulate (17 Apr 2021 10:19)      SUBJECTIVE / OVERNIGHT EVENTS: pt seen and examined around 1450p- remains uncomfortable w her leg weakness; had BM but now having liquid stool when she passes gas.    ADDITIONAL REVIEW OF SYSTEMS:    MEDICATIONS  (STANDING):  dextrose 40% Gel 15 Gram(s) Oral once  dextrose 5%. 1000 milliLiter(s) (50 mL/Hr) IV Continuous <Continuous>  dextrose 5%. 1000 milliLiter(s) (100 mL/Hr) IV Continuous <Continuous>  dextrose 50% Injectable 25 Gram(s) IV Push once  dextrose 50% Injectable 12.5 Gram(s) IV Push once  dextrose 50% Injectable 25 Gram(s) IV Push once  dronabinol 2.5 milliGRAM(s) Oral three times a day  enoxaparin Injectable 40 milliGRAM(s) SubCutaneous daily  fentaNYL   Patch  12 MICROgram(s)/Hr 1 Patch Transdermal every 72 hours  glucagon  Injectable 1 milliGRAM(s) IntraMuscular once  insulin lispro (ADMELOG) corrective regimen sliding scale   SubCutaneous three times a day before meals  insulin lispro (ADMELOG) corrective regimen sliding scale   SubCutaneous at bedtime  polyethylene glycol 3350 17 Gram(s) Oral <User Schedule>  saline laxative (FLEET) Rectal Enema 1 Enema Rectal once  senna 2 Tablet(s) Oral two times a day    MEDICATIONS  (PRN):  HYDROmorphone   Tablet 4 milliGRAM(s) Oral every 4 hours PRN Moderate Pain (4 - 6)  HYDROmorphone   Tablet 6 milliGRAM(s) Oral every 4 hours PRN Severe Pain (7 - 10)  HYDROmorphone  Injectable 0.5 milliGRAM(s) IV Push every 6 hours PRN breakthrough pain in case PO dilaudid does not work      CAPILLARY BLOOD GLUCOSE      POCT Blood Glucose.: 257 mg/dL (17 Apr 2021 22:28)  POCT Blood Glucose.: 261 mg/dL (17 Apr 2021 18:08)  POCT Blood Glucose.: 227 mg/dL (17 Apr 2021 12:13)  POCT Blood Glucose.: 163 mg/dL (17 Apr 2021 08:34)    I&O's Summary    16 Apr 2021 07:01  -  17 Apr 2021 07:00  --------------------------------------------------------  IN: 0 mL / OUT: 2200 mL / NET: -2200 mL        PHYSICAL EXAM:  Vital Signs Last 24 Hrs  T(C): 36.8 (17 Apr 2021 21:37), Max: 36.9 (17 Apr 2021 14:56)  T(F): 98.2 (17 Apr 2021 21:37), Max: 98.5 (17 Apr 2021 14:56)  HR: 82 (17 Apr 2021 21:37) (79 - 82)  BP: 176/60 (17 Apr 2021 21:37) (136/56 - 176/60)  BP(mean): --  RR: 18 (17 Apr 2021 21:37) (16 - 18)  SpO2: 99% (17 Apr 2021 21:37) (99% - 100%)  CONSTITUTIONAL: tired, weak, uncomfortable  RESPIRATORY: Normal respiratory effort; lungs are clear to auscultation bilaterally  CARDIOVASCULAR: Regular rate and rhythm, normal S1 and S2, no murmur/rub/gallop; No lower extremity edema;   ABDOMEN: Nontender to palpation, normoactive bowel sounds, not distended;   MUSCULOSKELETAL:; no clubbing or cyanosis of digits; no joint swelling or tenderness to palpation  PSYCH: A+O to person, place, and time; affect appropriate      LABS:                        9.9    11.97 )-----------( 101      ( 17 Apr 2021 07:25 )             30.5     04-17    137  |  101  |  10  ----------------------------<  144<H>  3.1<L>   |  27  |  0.33<L>    Ca    8.4      17 Apr 2021 07:25  Phos  3.4     04-17  Mg     2.0     04-17                  RADIOLOGY & ADDITIONAL TESTS:  Results Reviewed:   Imaging Personally Reviewed:  Electrocardiogram Personally Reviewed:    COORDINATION OF CARE:  Care Discussed with Consultants/Other Providers [Y/N]:  Prior or Outpatient Records Reviewed [Y/N]:

## 2021-04-17 NOTE — PROGRESS NOTE ADULT - ASSESSMENT
70fwith past medical history of diabetes, pancreatic cancer w/ mets to lung and liver on chemo (9th session, abraxane/gemzar) coming in w/ inability to ambulate- left leg weaker than right  seen by NEurology- MRI pelvis to r/o plexopathy (paraneoplastic)- no clear findings of plexopathy- case d/w radiologist concern for ?anal mass- rectum distended and that could have caused artifact.    apprec neuro input- will obtain MRI c spine and head w and w/o contrast

## 2021-04-17 NOTE — PROGRESS NOTE ADULT - PROBLEM SELECTOR PLAN 1
Patient presenting w/ lower extremity weakness L>R distally in the setting of metastatic pancreatic cancer. Notable Left foot drop. W/o known spinal mets. No spinal mets seen on CT.   -MR spine thoracic/lumbar w/ and w/o neg for spinal cord compression   - MRI pelvis neg for plexopathy  -case d/w neuro- will order MRI C spine and head w and w/o contrast paraneoplastic panel serum pending  - PT eval

## 2021-04-18 LAB
ANION GAP SERPL CALC-SCNC: 5 MMOL/L — LOW (ref 7–14)
BASOPHILS # BLD AUTO: 0.02 K/UL — SIGNIFICANT CHANGE UP (ref 0–0.2)
BASOPHILS NFR BLD AUTO: 0.2 % — SIGNIFICANT CHANGE UP (ref 0–2)
BUN SERPL-MCNC: 9 MG/DL — SIGNIFICANT CHANGE UP (ref 7–23)
CALCIUM SERPL-MCNC: 8.5 MG/DL — SIGNIFICANT CHANGE UP (ref 8.4–10.5)
CHLORIDE SERPL-SCNC: 101 MMOL/L — SIGNIFICANT CHANGE UP (ref 98–107)
CO2 SERPL-SCNC: 28 MMOL/L — SIGNIFICANT CHANGE UP (ref 22–31)
CREAT SERPL-MCNC: 0.33 MG/DL — LOW (ref 0.5–1.3)
EOSINOPHIL # BLD AUTO: 0.07 K/UL — SIGNIFICANT CHANGE UP (ref 0–0.5)
EOSINOPHIL NFR BLD AUTO: 0.6 % — SIGNIFICANT CHANGE UP (ref 0–6)
GLUCOSE BLDC GLUCOMTR-MCNC: 156 MG/DL — HIGH (ref 70–99)
GLUCOSE BLDC GLUCOMTR-MCNC: 171 MG/DL — HIGH (ref 70–99)
GLUCOSE BLDC GLUCOMTR-MCNC: 245 MG/DL — HIGH (ref 70–99)
GLUCOSE BLDC GLUCOMTR-MCNC: 250 MG/DL — HIGH (ref 70–99)
GLUCOSE SERPL-MCNC: 159 MG/DL — HIGH (ref 70–99)
HCT VFR BLD CALC: 28.4 % — LOW (ref 34.5–45)
HGB BLD-MCNC: 9.2 G/DL — LOW (ref 11.5–15.5)
IANC: 9.13 K/UL — HIGH (ref 1.5–8.5)
IMM GRANULOCYTES NFR BLD AUTO: 0.5 % — SIGNIFICANT CHANGE UP (ref 0–1.5)
LYMPHOCYTES # BLD AUTO: 0.78 K/UL — LOW (ref 1–3.3)
LYMPHOCYTES # BLD AUTO: 6.9 % — LOW (ref 13–44)
MAGNESIUM SERPL-MCNC: 2.1 MG/DL — SIGNIFICANT CHANGE UP (ref 1.6–2.6)
MCHC RBC-ENTMCNC: 30.7 PG — SIGNIFICANT CHANGE UP (ref 27–34)
MCHC RBC-ENTMCNC: 32.4 GM/DL — SIGNIFICANT CHANGE UP (ref 32–36)
MCV RBC AUTO: 94.7 FL — SIGNIFICANT CHANGE UP (ref 80–100)
MONOCYTES # BLD AUTO: 1.18 K/UL — HIGH (ref 0–0.9)
MONOCYTES NFR BLD AUTO: 10.5 % — SIGNIFICANT CHANGE UP (ref 2–14)
NEUTROPHILS # BLD AUTO: 9.13 K/UL — HIGH (ref 1.8–7.4)
NEUTROPHILS NFR BLD AUTO: 81.3 % — HIGH (ref 43–77)
NRBC # BLD: 0 /100 WBCS — SIGNIFICANT CHANGE UP
NRBC # FLD: 0 K/UL — SIGNIFICANT CHANGE UP
PHOSPHATE SERPL-MCNC: 2.5 MG/DL — SIGNIFICANT CHANGE UP (ref 2.5–4.5)
PLATELET # BLD AUTO: 188 K/UL — SIGNIFICANT CHANGE UP (ref 150–400)
POTASSIUM SERPL-MCNC: 4.2 MMOL/L — SIGNIFICANT CHANGE UP (ref 3.5–5.3)
POTASSIUM SERPL-SCNC: 4.2 MMOL/L — SIGNIFICANT CHANGE UP (ref 3.5–5.3)
RBC # BLD: 3 M/UL — LOW (ref 3.8–5.2)
RBC # FLD: 21.2 % — HIGH (ref 10.3–14.5)
SODIUM SERPL-SCNC: 134 MMOL/L — LOW (ref 135–145)
WBC # BLD: 11.24 K/UL — HIGH (ref 3.8–10.5)
WBC # FLD AUTO: 11.24 K/UL — HIGH (ref 3.8–10.5)

## 2021-04-18 PROCEDURE — 99232 SBSQ HOSP IP/OBS MODERATE 35: CPT

## 2021-04-18 RX ADMIN — Medication 2.5 MILLIGRAM(S): at 22:13

## 2021-04-18 RX ADMIN — ENOXAPARIN SODIUM 40 MILLIGRAM(S): 100 INJECTION SUBCUTANEOUS at 14:20

## 2021-04-18 RX ADMIN — Medication 2: at 18:04

## 2021-04-18 RX ADMIN — FENTANYL CITRATE 1 PATCH: 50 INJECTION INTRAVENOUS at 22:14

## 2021-04-18 RX ADMIN — HYDROMORPHONE HYDROCHLORIDE 6 MILLIGRAM(S): 2 INJECTION INTRAMUSCULAR; INTRAVENOUS; SUBCUTANEOUS at 05:37

## 2021-04-18 RX ADMIN — HYDROMORPHONE HYDROCHLORIDE 6 MILLIGRAM(S): 2 INJECTION INTRAMUSCULAR; INTRAVENOUS; SUBCUTANEOUS at 21:00

## 2021-04-18 RX ADMIN — FENTANYL CITRATE 1 PATCH: 50 INJECTION INTRAVENOUS at 18:22

## 2021-04-18 RX ADMIN — HYDROMORPHONE HYDROCHLORIDE 6 MILLIGRAM(S): 2 INJECTION INTRAMUSCULAR; INTRAVENOUS; SUBCUTANEOUS at 06:27

## 2021-04-18 RX ADMIN — Medication 2.5 MILLIGRAM(S): at 05:37

## 2021-04-18 RX ADMIN — FENTANYL CITRATE 1 PATCH: 50 INJECTION INTRAVENOUS at 07:00

## 2021-04-18 RX ADMIN — Medication 2.5 MILLIGRAM(S): at 14:20

## 2021-04-18 RX ADMIN — HYDROMORPHONE HYDROCHLORIDE 6 MILLIGRAM(S): 2 INJECTION INTRAMUSCULAR; INTRAVENOUS; SUBCUTANEOUS at 20:16

## 2021-04-18 RX ADMIN — FENTANYL CITRATE 1 PATCH: 50 INJECTION INTRAVENOUS at 22:00

## 2021-04-18 RX ADMIN — Medication 1: at 10:40

## 2021-04-18 NOTE — PROGRESS NOTE ADULT - PROBLEM SELECTOR PLAN 4
Patient w/ large stool burden on mri  had bm w/ moviprep on 4/17- still leaking stool per pt- hold laxatives

## 2021-04-18 NOTE — PROGRESS NOTE ADULT - SUBJECTIVE AND OBJECTIVE BOX
[facility] Division of Hospital Medicine  Vida Carney MD  Pager [phone]    Patient is a 70y old  Female who presents with a chief complaint of Lower Extremity Weakness, Inability to Ambulate (17 Apr 2021 14:48)      SUBJECTIVE / OVERNIGHT EVENTS: pt seen and examined at 250p- upset that nothing is being done to get her better is getting weaker here  still leaking stool   ADDITIONAL REVIEW OF SYSTEMS:    MEDICATIONS  (STANDING):  dextrose 40% Gel 15 Gram(s) Oral once  dextrose 5%. 1000 milliLiter(s) (50 mL/Hr) IV Continuous <Continuous>  dextrose 5%. 1000 milliLiter(s) (100 mL/Hr) IV Continuous <Continuous>  dextrose 50% Injectable 25 Gram(s) IV Push once  dextrose 50% Injectable 12.5 Gram(s) IV Push once  dextrose 50% Injectable 25 Gram(s) IV Push once  dronabinol 2.5 milliGRAM(s) Oral three times a day  enoxaparin Injectable 40 milliGRAM(s) SubCutaneous daily  fentaNYL   Patch  12 MICROgram(s)/Hr 1 Patch Transdermal every 72 hours  glucagon  Injectable 1 milliGRAM(s) IntraMuscular once  insulin lispro (ADMELOG) corrective regimen sliding scale   SubCutaneous three times a day before meals  insulin lispro (ADMELOG) corrective regimen sliding scale   SubCutaneous at bedtime  polyethylene glycol 3350 17 Gram(s) Oral <User Schedule>  saline laxative (FLEET) Rectal Enema 1 Enema Rectal once  senna 2 Tablet(s) Oral two times a day    MEDICATIONS  (PRN):  HYDROmorphone   Tablet 4 milliGRAM(s) Oral every 4 hours PRN Moderate Pain (4 - 6)  HYDROmorphone   Tablet 6 milliGRAM(s) Oral every 4 hours PRN Severe Pain (7 - 10)  HYDROmorphone  Injectable 0.5 milliGRAM(s) IV Push every 6 hours PRN breakthrough pain in case PO dilaudid does not work      CAPILLARY BLOOD GLUCOSE      POCT Blood Glucose.: 245 mg/dL (18 Apr 2021 22:03)  POCT Blood Glucose.: 250 mg/dL (18 Apr 2021 17:57)  POCT Blood Glucose.: 171 mg/dL (18 Apr 2021 10:33)  POCT Blood Glucose.: 156 mg/dL (18 Apr 2021 08:58)    I&O's Summary    17 Apr 2021 07:01  -  18 Apr 2021 07:00  --------------------------------------------------------  IN: 0 mL / OUT: 1100 mL / NET: -1100 mL    18 Apr 2021 07:01  -  18 Apr 2021 23:48  --------------------------------------------------------  IN: 0 mL / OUT: 900 mL / NET: -900 mL        PHYSICAL EXAM:  Vital Signs Last 24 Hrs  T(C): 37 (18 Apr 2021 22:10), Max: 37.1 (18 Apr 2021 15:26)  T(F): 98.6 (18 Apr 2021 22:10), Max: 98.8 (18 Apr 2021 15:26)  HR: 93 (18 Apr 2021 22:10) (74 - 93)  BP: 176/85 (18 Apr 2021 22:10) (143/52 - 176/85)  BP(mean): --  RR: 19 (18 Apr 2021 22:10) (15 - 19)  SpO2: 100% (18 Apr 2021 22:10) (99% - 100%)  CONSTITUTIONAL: weak, tired, cachectic  RESPIRATORY: Normal respiratory effort; lungs are clear to auscultation bilaterally  CARDIOVASCULAR: Regular rate and rhythm, normal S1 and S2, no murmur/rub/gallop; No lower extremity edema;   ABDOMEN: Nontender to palpation, normoactive bowel sounds, not distended;   rectum no hard stool on exam  MUSCULOSKELETAL:   no clubbing or cyanosis of digits; no joint swelling or tenderness to palpation  PSYCH: A+O to person, place, and time; affect appropriate      LABS:                        9.2    11.24 )-----------( 188      ( 18 Apr 2021 07:58 )             28.4     04-18    134<L>  |  101  |  9   ----------------------------<  159<H>  4.2   |  28  |  0.33<L>    Ca    8.5      18 Apr 2021 07:57  Phos  2.5     04-18  Mg     2.1     04-18                  RADIOLOGY & ADDITIONAL TESTS:  Results Reviewed:   Imaging Personally Reviewed:  Electrocardiogram Personally Reviewed:    COORDINATION OF CARE:  Care Discussed with Consultants/Other Providers [Y/N]:  Prior or Outpatient Records Reviewed [Y/N]:

## 2021-04-19 LAB
GLUCOSE BLDC GLUCOMTR-MCNC: 181 MG/DL — HIGH (ref 70–99)
GLUCOSE BLDC GLUCOMTR-MCNC: 315 MG/DL — HIGH (ref 70–99)
GLUCOSE BLDC GLUCOMTR-MCNC: 396 MG/DL — HIGH (ref 70–99)

## 2021-04-19 PROCEDURE — 99232 SBSQ HOSP IP/OBS MODERATE 35: CPT

## 2021-04-19 RX ORDER — BENZOCAINE AND MENTHOL 5; 1 G/100ML; G/100ML
1 LIQUID ORAL ONCE
Refills: 0 | Status: COMPLETED | OUTPATIENT
Start: 2021-04-19 | End: 2021-04-20

## 2021-04-19 RX ADMIN — HYDROMORPHONE HYDROCHLORIDE 6 MILLIGRAM(S): 2 INJECTION INTRAMUSCULAR; INTRAVENOUS; SUBCUTANEOUS at 05:15

## 2021-04-19 RX ADMIN — ENOXAPARIN SODIUM 40 MILLIGRAM(S): 100 INJECTION SUBCUTANEOUS at 12:35

## 2021-04-19 RX ADMIN — HYDROMORPHONE HYDROCHLORIDE 6 MILLIGRAM(S): 2 INJECTION INTRAMUSCULAR; INTRAVENOUS; SUBCUTANEOUS at 22:01

## 2021-04-19 RX ADMIN — Medication 5: at 18:17

## 2021-04-19 RX ADMIN — HYDROMORPHONE HYDROCHLORIDE 6 MILLIGRAM(S): 2 INJECTION INTRAMUSCULAR; INTRAVENOUS; SUBCUTANEOUS at 23:01

## 2021-04-19 RX ADMIN — FENTANYL CITRATE 1 PATCH: 50 INJECTION INTRAVENOUS at 07:00

## 2021-04-19 RX ADMIN — FENTANYL CITRATE 1 PATCH: 50 INJECTION INTRAVENOUS at 18:34

## 2021-04-19 RX ADMIN — Medication 2.5 MILLIGRAM(S): at 22:01

## 2021-04-19 RX ADMIN — Medication 4: at 12:34

## 2021-04-19 RX ADMIN — HYDROMORPHONE HYDROCHLORIDE 6 MILLIGRAM(S): 2 INJECTION INTRAMUSCULAR; INTRAVENOUS; SUBCUTANEOUS at 04:22

## 2021-04-19 NOTE — PROGRESS NOTE ADULT - SUBJECTIVE AND OBJECTIVE BOX
[facility] Division of Hospital Medicine  Vida Carney MD  Pager [phone]    Patient is a 70y old  Female who presents with a chief complaint of Lower Extremity Weakness, Inability to Ambulate (18 Apr 2021 23:47)      SUBJECTIVE / OVERNIGHT EVENTS:seen at 250p- in chair- still having bm's but less frequent now    ADDITIONAL REVIEW OF SYSTEMS:    MEDICATIONS  (STANDING):  benzocaine 15 mG/menthol 3.6 mG (Sugar-Free) Lozenge 1 Lozenge Oral once  dextrose 40% Gel 15 Gram(s) Oral once  dextrose 5%. 1000 milliLiter(s) (50 mL/Hr) IV Continuous <Continuous>  dextrose 5%. 1000 milliLiter(s) (100 mL/Hr) IV Continuous <Continuous>  dextrose 50% Injectable 25 Gram(s) IV Push once  dextrose 50% Injectable 12.5 Gram(s) IV Push once  dextrose 50% Injectable 25 Gram(s) IV Push once  dronabinol 2.5 milliGRAM(s) Oral three times a day  enoxaparin Injectable 40 milliGRAM(s) SubCutaneous daily  fentaNYL   Patch  12 MICROgram(s)/Hr 1 Patch Transdermal every 72 hours  glucagon  Injectable 1 milliGRAM(s) IntraMuscular once  insulin lispro (ADMELOG) corrective regimen sliding scale   SubCutaneous three times a day before meals  insulin lispro (ADMELOG) corrective regimen sliding scale   SubCutaneous at bedtime  saline laxative (FLEET) Rectal Enema 1 Enema Rectal once    MEDICATIONS  (PRN):  HYDROmorphone   Tablet 4 milliGRAM(s) Oral every 4 hours PRN Moderate Pain (4 - 6)  HYDROmorphone   Tablet 6 milliGRAM(s) Oral every 4 hours PRN Severe Pain (7 - 10)  HYDROmorphone  Injectable 0.5 milliGRAM(s) IV Push every 6 hours PRN breakthrough pain in case PO dilaudid does not work      CAPILLARY BLOOD GLUCOSE      POCT Blood Glucose.: 181 mg/dL (19 Apr 2021 22:22)  POCT Blood Glucose.: 396 mg/dL (19 Apr 2021 18:11)  POCT Blood Glucose.: 315 mg/dL (19 Apr 2021 12:07)    I&O's Summary    18 Apr 2021 07:01  -  19 Apr 2021 07:00  --------------------------------------------------------  IN: 0 mL / OUT: 900 mL / NET: -900 mL        PHYSICAL EXAM:  Vital Signs Last 24 Hrs  T(C): 37.3 (19 Apr 2021 21:48), Max: 37.3 (19 Apr 2021 21:48)  T(F): 99.1 (19 Apr 2021 21:48), Max: 99.1 (19 Apr 2021 21:48)  HR: 95 (19 Apr 2021 21:48) (85 - 98)  BP: 150/65 (19 Apr 2021 21:48) (147/77 - 168/58)  BP(mean): --  RR: 20 (19 Apr 2021 21:48) (16 - 20)  SpO2: 95% (19 Apr 2021 21:48) (95% - 100%)  CONSTITUTIONAL: NAD, well-developed, well-groomed  EYES: conjunctiva and sclera clear  NECK: Supple  RESPIRATORY: Normal respiratory effort; lungs are clear to auscultation bilaterally  CARDIOVASCULAR: Regular rate and rhythm, normal S1 and S2, no murmur/rub/gallop; No lower extremity edema;   ABDOMEN: Nontender to palpation, normoactive bowel sounds, less distended;   MUSCULOSKELETAL:  no clubbing or cyanosis of digits; no joint swelling or tenderness to palpation  PSYCH: A+O to person, place, and time; affect appropriate      LABS:                        9.2    11.24 )-----------( 188      ( 18 Apr 2021 07:58 )             28.4     04-18    134<L>  |  101  |  9   ----------------------------<  159<H>  4.2   |  28  |  0.33<L>    Ca    8.5      18 Apr 2021 07:57  Phos  2.5     04-18  Mg     2.1     04-18                  RADIOLOGY & ADDITIONAL TESTS:  Results Reviewed:   Imaging Personally Reviewed:  Electrocardiogram Personally Reviewed:    COORDINATION OF CARE:  Care Discussed with Consultants/Other Providers [Y/N]:  Prior or Outpatient Records Reviewed [Y/N]:

## 2021-04-20 LAB
GLUCOSE BLDC GLUCOMTR-MCNC: 141 MG/DL — HIGH (ref 70–99)
GLUCOSE BLDC GLUCOMTR-MCNC: 205 MG/DL — HIGH (ref 70–99)
GLUCOSE BLDC GLUCOMTR-MCNC: 252 MG/DL — HIGH (ref 70–99)
GLUCOSE BLDC GLUCOMTR-MCNC: 258 MG/DL — HIGH (ref 70–99)
METHYLMALONATE SERPL-SCNC: 87 NMOL/L — SIGNIFICANT CHANGE UP (ref 0–378)
SARS-COV-2 RNA SPEC QL NAA+PROBE: SIGNIFICANT CHANGE UP

## 2021-04-20 PROCEDURE — 72156 MRI NECK SPINE W/O & W/DYE: CPT | Mod: 26

## 2021-04-20 PROCEDURE — 70553 MRI BRAIN STEM W/O & W/DYE: CPT | Mod: 26

## 2021-04-20 PROCEDURE — 99232 SBSQ HOSP IP/OBS MODERATE 35: CPT

## 2021-04-20 RX ADMIN — FENTANYL CITRATE 1 PATCH: 50 INJECTION INTRAVENOUS at 08:09

## 2021-04-20 RX ADMIN — Medication 2.5 MILLIGRAM(S): at 14:32

## 2021-04-20 RX ADMIN — HYDROMORPHONE HYDROCHLORIDE 6 MILLIGRAM(S): 2 INJECTION INTRAMUSCULAR; INTRAVENOUS; SUBCUTANEOUS at 23:02

## 2021-04-20 RX ADMIN — ENOXAPARIN SODIUM 40 MILLIGRAM(S): 100 INJECTION SUBCUTANEOUS at 11:46

## 2021-04-20 RX ADMIN — HYDROMORPHONE HYDROCHLORIDE 6 MILLIGRAM(S): 2 INJECTION INTRAMUSCULAR; INTRAVENOUS; SUBCUTANEOUS at 06:44

## 2021-04-20 RX ADMIN — FENTANYL CITRATE 1 PATCH: 50 INJECTION INTRAVENOUS at 07:46

## 2021-04-20 RX ADMIN — Medication 2.5 MILLIGRAM(S): at 06:44

## 2021-04-20 RX ADMIN — HYDROMORPHONE HYDROCHLORIDE 0.5 MILLIGRAM(S): 2 INJECTION INTRAMUSCULAR; INTRAVENOUS; SUBCUTANEOUS at 01:55

## 2021-04-20 RX ADMIN — HYDROMORPHONE HYDROCHLORIDE 4 MILLIGRAM(S): 2 INJECTION INTRAMUSCULAR; INTRAVENOUS; SUBCUTANEOUS at 15:02

## 2021-04-20 RX ADMIN — HYDROMORPHONE HYDROCHLORIDE 0.5 MILLIGRAM(S): 2 INJECTION INTRAMUSCULAR; INTRAVENOUS; SUBCUTANEOUS at 02:10

## 2021-04-20 RX ADMIN — Medication 3: at 08:13

## 2021-04-20 RX ADMIN — Medication 3: at 18:04

## 2021-04-20 RX ADMIN — HYDROMORPHONE HYDROCHLORIDE 6 MILLIGRAM(S): 2 INJECTION INTRAMUSCULAR; INTRAVENOUS; SUBCUTANEOUS at 07:44

## 2021-04-20 RX ADMIN — BENZOCAINE AND MENTHOL 1 LOZENGE: 5; 1 LIQUID ORAL at 01:55

## 2021-04-20 RX ADMIN — HYDROMORPHONE HYDROCHLORIDE 4 MILLIGRAM(S): 2 INJECTION INTRAMUSCULAR; INTRAVENOUS; SUBCUTANEOUS at 14:28

## 2021-04-20 NOTE — PROGRESS NOTE ADULT - PROBLEM SELECTOR PLAN 1
Patient presenting w/ lower extremity weakness L>R distally in the setting of metastatic pancreatic cancer. Notable Left foot drop. W/o known spinal mets. No spinal mets seen on CT.   -MR spine thoracic/lumbar w/ and w/o neg for spinal cord compression   - MRI pelvis neg for plexopathy  - MRI C spine and head w and w/o contrast w change in signal- no obvious cord issue  paraneoplastic panel serum pending  - PT eval

## 2021-04-20 NOTE — PROGRESS NOTE ADULT - ASSESSMENT
70fwith past medical history of diabetes, pancreatic cancer w/ mets to lung and liver on chemo (9th session, abraxane/gemzar) coming in w/ inability to ambulate- left leg weaker than right  seen by NEurology- MRI pelvis to r/o plexopathy (paraneoplastic)- no clear findings of plexopathy- case d/w radiologist concern for ?anal mass- rectum distended and that could have caused artifact.    apprec neuro input- await recs after MRI c spine and head w and w/o contrast

## 2021-04-20 NOTE — PROGRESS NOTE ADULT - SUBJECTIVE AND OBJECTIVE BOX
[facility] Division of Hospital Medicine  Vida Carney MD  Pager [phone]    Patient is a 70y old  Female who presents with a chief complaint of Lower Extremity Weakness, Inability to Ambulate (20 Apr 2021 14:47)      SUBJECTIVE / OVERNIGHT EVENTS: pt seen and examined at 145p- frustrated that she is just laying here getting weak.  wants to get better; wants to walk  told her that Neuro was contacted and we would need to wait for their input- case d/w Dr. Diane as well  ADDITIONAL REVIEW OF SYSTEMS:    MEDICATIONS  (STANDING):  dextrose 40% Gel 15 Gram(s) Oral once  dextrose 5%. 1000 milliLiter(s) (50 mL/Hr) IV Continuous <Continuous>  dextrose 5%. 1000 milliLiter(s) (100 mL/Hr) IV Continuous <Continuous>  dextrose 50% Injectable 25 Gram(s) IV Push once  dextrose 50% Injectable 12.5 Gram(s) IV Push once  dextrose 50% Injectable 25 Gram(s) IV Push once  dronabinol 2.5 milliGRAM(s) Oral three times a day  enoxaparin Injectable 40 milliGRAM(s) SubCutaneous daily  glucagon  Injectable 1 milliGRAM(s) IntraMuscular once  insulin lispro (ADMELOG) corrective regimen sliding scale   SubCutaneous three times a day before meals  insulin lispro (ADMELOG) corrective regimen sliding scale   SubCutaneous at bedtime  saline laxative (FLEET) Rectal Enema 1 Enema Rectal once    MEDICATIONS  (PRN):  HYDROmorphone   Tablet 4 milliGRAM(s) Oral every 4 hours PRN Moderate Pain (4 - 6)  HYDROmorphone   Tablet 6 milliGRAM(s) Oral every 4 hours PRN Severe Pain (7 - 10)  HYDROmorphone  Injectable 0.5 milliGRAM(s) IV Push every 6 hours PRN breakthrough pain in case PO dilaudid does not work      CAPILLARY BLOOD GLUCOSE      POCT Blood Glucose.: 205 mg/dL (20 Apr 2021 21:10)  POCT Blood Glucose.: 258 mg/dL (20 Apr 2021 17:47)  POCT Blood Glucose.: 141 mg/dL (20 Apr 2021 12:16)  POCT Blood Glucose.: 252 mg/dL (20 Apr 2021 08:09)    I&O's Summary      PHYSICAL EXAM:  Vital Signs Last 24 Hrs  T(C): 37.1 (20 Apr 2021 22:45), Max: 37.1 (20 Apr 2021 22:45)  T(F): 98.8 (20 Apr 2021 22:45), Max: 98.8 (20 Apr 2021 22:45)  HR: 90 (20 Apr 2021 22:45) (90 - 101)  BP: 161/72 (20 Apr 2021 22:45) (146/65 - 161/72)  BP(mean): --  RR: 18 (20 Apr 2021 22:45) (18 - 18)  SpO2: 98% (20 Apr 2021 22:45) (96% - 100%)  CONSTITUTIONAL: thin cachectic frail appearing  EYES: conjunctiva and sclera clear  NECK: Supple  RESPIRATORY: Normal respiratory effort; lungs are clear to auscultation bilaterally  CARDIOVASCULAR: Regular rate and rhythm, normal S1 and S2,; No lower extremity edema;   ABDOMEN: Nontender to palpation, normoactive bowel sounds, not distended;   MUSCULOSKELETAL:   no clubbing or cyanosis of digits; no joint swelling or tenderness to palpation  PSYCH: A+O to person, place, and time; affect appropriate      LABS:                      RADIOLOGY & ADDITIONAL TESTS:  Results Reviewed:   Imaging Personally Reviewed:  Electrocardiogram Personally Reviewed:    COORDINATION OF CARE:  Care Discussed with Consultants/Other Providers [Y/N]:  Prior or Outpatient Records Reviewed [Y/N]:

## 2021-04-20 NOTE — CHART NOTE - NSCHARTNOTEFT_GEN_A_CORE
70y R-HF w h/o DM2, metastatic pancreatic CA p/w subacute proximal >distal paraparesis (LLE>RLE), worsening over past month to point she is unable to walk.  On exam, she is cachectic w/ bi-temporal, B/L thenar eminence and thigh wasting.  She additionally has proprioceptive deficits in the left foot, vibratory sensory deficit to L. ASIS, hyporeflexia of both legs, weakness in L. hi flexion, adduction, knee extension, foot dorsiflexion a/w foot drop, plantar flexion and inversion weakness.     04/20/21 MRI Brain: Curvilinear region of T2 FLAIR hyperintensity adjacent to the cavernous segment of the ICA (3-80), which displays contrast enhancement (6-38), which may represent chronic thickening of the right ethmoid sinus. No findings suggestive of intra-axial metastatic disease.  Nonspecific Heterogeneous marrow signal throughout the calvarium, which may represent a marrow infiltrative process versus anemia.    04/20/21 MRI C spine: S/P posterior spinal fusion and laminectomy of C3-C6.  Non-specific focal high T2 signal within left cervical cord at C4-5 w/o cord compression which could represent likely gliosis. No abnormal enhancement to suggest underlying lesion. Edema not entirely excluded.  Nonspecific heterogeneous low T1 marrow signal noted throughout the vertebral bodies of the cervicothoracic spine; metastatic disease/marrow infiltrative disease not excluded.  C5/C6: Mild circumferential disc bulge w/o significant canal stenosis.  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  Though mostly LMN in presentation, long tract signs causing UMN presentation could be masked by underlying DM.  PT with metastatic pancreatic CA now found to have cervical vertebral heterogenous marrow changes and hyperintense L. hemicord findings at C4-C5 w resultant gliosis in setting of prior C3-C6 sinal fusion & laminectomy.  This concerning for vertebral bony metastases with resultant cervical myelopathy which could explain her symptoms.      Plan:  -Re-discuss findings with neurosurgery and heme/onc re: possible interventions if in line with GOC.   -No need for EMG at this time.   -Consider rad-onc eval if associated cervical neck pain though could possibly cause worsening gliosis.   -Continue PT/OT, fall precautions  -Given subacute process, and no juan manuel cord compression, likely no role for steroids at this time but would discuss with NSG.

## 2021-04-20 NOTE — CHART NOTE - NSCHARTNOTEFT_GEN_A_CORE
Notified by RN patient c/o sore throat and nasal congestion. Cepacol lozenges ordered. Last Covid swab done was on 4/8/21 which was negative. Covid swab sent- Negative. Will continue to monitor.

## 2021-04-20 NOTE — PROGRESS NOTE ADULT - SUBJECTIVE AND OBJECTIVE BOX
INTERVAL HPI/OVERNIGHT EVENTS:  Patient seen at bedside.  Patient with slightly improved pain  but continues to have LE weakness  Patient frustrated with hospitalization as she  feels she has not gotten any answers or  treatment  No overnight events reported    VITAL SIGNS:  T(F): 98.2 (04-20-21 @ 13:56)  HR: 101 (04-20-21 @ 13:56)  BP: 146/65 (04-20-21 @ 13:56)  RR: 18 (04-20-21 @ 06:45)  SpO2: 100% (04-20-21 @ 13:56)  Wt(kg): --    PHYSICAL EXAM:    In accordance with current standard limiting patient contact, deferred physical exam  2/2 COVID pandemic  Please refer to physical exam of primary team.      MEDICATIONS  (STANDING):  dextrose 40% Gel 15 Gram(s) Oral once  dextrose 5%. 1000 milliLiter(s) (50 mL/Hr) IV Continuous <Continuous>  dextrose 5%. 1000 milliLiter(s) (100 mL/Hr) IV Continuous <Continuous>  dextrose 50% Injectable 25 Gram(s) IV Push once  dextrose 50% Injectable 12.5 Gram(s) IV Push once  dextrose 50% Injectable 25 Gram(s) IV Push once  dronabinol 2.5 milliGRAM(s) Oral three times a day  enoxaparin Injectable 40 milliGRAM(s) SubCutaneous daily  glucagon  Injectable 1 milliGRAM(s) IntraMuscular once  insulin lispro (ADMELOG) corrective regimen sliding scale   SubCutaneous three times a day before meals  insulin lispro (ADMELOG) corrective regimen sliding scale   SubCutaneous at bedtime  saline laxative (FLEET) Rectal Enema 1 Enema Rectal once    MEDICATIONS  (PRN):  HYDROmorphone   Tablet 4 milliGRAM(s) Oral every 4 hours PRN Moderate Pain (4 - 6)  HYDROmorphone   Tablet 6 milliGRAM(s) Oral every 4 hours PRN Severe Pain (7 - 10)  HYDROmorphone  Injectable 0.5 milliGRAM(s) IV Push every 6 hours PRN breakthrough pain in case PO dilaudid does not work      Allergies    No Known Allergies    Intolerances        LABS:                RADIOLOGY & ADDITIONAL TESTS:  Studies reviewed.

## 2021-04-20 NOTE — PROGRESS NOTE ADULT - ASSESSMENT
70f with metastatic pancreatic with  mets to lung and liver, on chemotherapy with gemcitabine and abraxane, presenting with LE swelling, heaviness and inability to walk since 2 days PTA.   LE doppers negative for VTE, however with ?arterial thrombus. PT and DP pulses not palpable. Legs are warm. She is not able to move leges antigravity.   spine CT with contrast without signs of spinal mets or cord compression.   Recent bone scan 3/25/2021 and recent PET/CT 3/11/2021 without evidence of bone mets. Given all the above the likelihood of spinal mets and cord compression is minimal.     -Stable Hgb today, with thrombocytopenia likely 2/2 recent chemo   -Monitor CBC daily  -S/p Arterial duplex, BLE of mild femoropopliteal arterial disease noted  -No Vascular intervention indicated at this time  -Urology input appreciated, patient with joy , no intervention indicated for chronic hydronephrosis for  now  -Pal care input appreciated for pain recs  -s/p MR thoracic and lumbar spine shows  degenerative disc disease at L3-4 and L4-5 with loss of signal on the T2-weighted images. No cord or cauda equina compression noted. Progressive b/l LE weakness possibly 2/2 loss signal at L3/4 and L4/5 in the setting of degenerative disk disease vs infectious etiology in the setting of warm edematous LEs  - s/p MRI pelvis: Exam limited by susceptibility artifact from a rectal tube. No well-formed mass or collection is otherwise demonstrated along the course of the lumbosacral plexus bilaterally. Diffuse marrow signal alteration which may secondary to anemia or marrow fluid/infiltrative disorder. Anasarca.---->No acute pathology to explain symptoms   -As per Neuro , now rec MRI C spine and head w and w/o contrast paraneoplastic panel serum  -Continue with Physical therapy  -Rest of care as per primary team  -Patient to followup with Dr. Vieira ([facility]) upon discharge  -C/w Supportive care, pain control, Nutrition, PT, DVT ppx  -Oncology will continue to follow with you      Case d/w Dr. Eneida ETIENNE  Oncology Physician Assistant  [facility]  Pager [phone]    If after 5pm or weekends please page On-call Oncology Fellow

## 2021-04-21 LAB
GLUCOSE BLDC GLUCOMTR-MCNC: 118 MG/DL — HIGH (ref 70–99)
GLUCOSE BLDC GLUCOMTR-MCNC: 214 MG/DL — HIGH (ref 70–99)
GLUCOSE BLDC GLUCOMTR-MCNC: 217 MG/DL — HIGH (ref 70–99)
GLUCOSE BLDC GLUCOMTR-MCNC: 221 MG/DL — HIGH (ref 70–99)

## 2021-04-21 PROCEDURE — 99222 1ST HOSP IP/OBS MODERATE 55: CPT

## 2021-04-21 PROCEDURE — 99232 SBSQ HOSP IP/OBS MODERATE 35: CPT

## 2021-04-21 RX ORDER — POLYETHYLENE GLYCOL 3350 17 G/17G
17 POWDER, FOR SOLUTION ORAL
Refills: 0 | Status: DISCONTINUED | OUTPATIENT
Start: 2021-04-21 | End: 2021-05-04

## 2021-04-21 RX ORDER — FENTANYL CITRATE 50 UG/ML
1 INJECTION INTRAVENOUS
Refills: 0 | Status: DISCONTINUED | OUTPATIENT
Start: 2021-04-21 | End: 2021-04-22

## 2021-04-21 RX ORDER — GLYCERIN ADULT
1 SUPPOSITORY, RECTAL RECTAL DAILY
Refills: 0 | Status: DISCONTINUED | OUTPATIENT
Start: 2021-04-21 | End: 2021-05-04

## 2021-04-21 RX ORDER — SENNA PLUS 8.6 MG/1
2 TABLET ORAL
Refills: 0 | Status: DISCONTINUED | OUTPATIENT
Start: 2021-04-21 | End: 2021-05-04

## 2021-04-21 RX ADMIN — HYDROMORPHONE HYDROCHLORIDE 6 MILLIGRAM(S): 2 INJECTION INTRAMUSCULAR; INTRAVENOUS; SUBCUTANEOUS at 07:14

## 2021-04-21 RX ADMIN — HYDROMORPHONE HYDROCHLORIDE 6 MILLIGRAM(S): 2 INJECTION INTRAMUSCULAR; INTRAVENOUS; SUBCUTANEOUS at 00:02

## 2021-04-21 RX ADMIN — Medication 2.5 MILLIGRAM(S): at 22:36

## 2021-04-21 RX ADMIN — FENTANYL CITRATE 1 PATCH: 50 INJECTION INTRAVENOUS at 13:09

## 2021-04-21 RX ADMIN — SENNA PLUS 2 TABLET(S): 8.6 TABLET ORAL at 18:04

## 2021-04-21 RX ADMIN — FENTANYL CITRATE 1 PATCH: 50 INJECTION INTRAVENOUS at 18:02

## 2021-04-21 RX ADMIN — ENOXAPARIN SODIUM 40 MILLIGRAM(S): 100 INJECTION SUBCUTANEOUS at 13:09

## 2021-04-21 RX ADMIN — Medication 2: at 09:00

## 2021-04-21 RX ADMIN — HYDROMORPHONE HYDROCHLORIDE 6 MILLIGRAM(S): 2 INJECTION INTRAMUSCULAR; INTRAVENOUS; SUBCUTANEOUS at 22:36

## 2021-04-21 RX ADMIN — Medication 2.5 MILLIGRAM(S): at 13:09

## 2021-04-21 RX ADMIN — HYDROMORPHONE HYDROCHLORIDE 6 MILLIGRAM(S): 2 INJECTION INTRAMUSCULAR; INTRAVENOUS; SUBCUTANEOUS at 23:36

## 2021-04-21 RX ADMIN — Medication 2: at 18:03

## 2021-04-21 RX ADMIN — HYDROMORPHONE HYDROCHLORIDE 6 MILLIGRAM(S): 2 INJECTION INTRAMUSCULAR; INTRAVENOUS; SUBCUTANEOUS at 08:14

## 2021-04-21 RX ADMIN — Medication 2.5 MILLIGRAM(S): at 07:14

## 2021-04-21 RX ADMIN — POLYETHYLENE GLYCOL 3350 17 GRAM(S): 17 POWDER, FOR SOLUTION ORAL at 22:37

## 2021-04-21 NOTE — CHART NOTE - NSCHARTNOTEFT_GEN_A_CORE
HPI:  Patient is a 70 y.o. F, h/o  pancreatic cancer w/ mets to lung and liver on chemo last session Monday (Dr. Vieira at [facility]) admitted w/ inability to ambulate and independently take care of ADLs since Monday.      States her weakness is associated w/ b/l lower extremity pain. Pain is 9/10 in severity and is aching. Patient was taking her home fentanyl and Dilaudid however this did not help symptoms. Patient states she lives alone w/o HHA. Patient states she has had some difficulty completely emptying her bladder however denies any urinary incontinence. Patient denies any fecal incontinence. States she is moving her bowels however her BMs have been small. Patient denies any saddle anesthesia.     Inpatient work up has revealed a possible arterial thrombus as the cause for her LE swelling and pain.   MRI imaging has ruled out any cord compression (see below.)     There is no obvious bony/lytic/ or metastatic lesions to target with palliative radiation treatment.   Feel free to call us if there is a new site of disease for us to consider.  Thank you.     [phone] (Dr. Mart)          < from: MR Cervical Spine w/wo IV Cont (04.20.21 @ 09:56) >    IMPRESSION:    MR BRAIN:  *  No findings suggestive of intra-axial metastatic disease.  *  Nonspecific Heterogeneous marrow signal throughout the calvarium, which may represent a marrow infiltrative process versus anemia.    MR CERVICAL SPINE:  *  Nonspecific focal abnormal cord signal in the left hemicord at the level of C4-5 could represent likely gliosis. No abnormal enhancement to suggest underlying lesion. Edema not entirely excluded.  *  Nonspecific heterogeneous marrow signal noted throughout the visualized cervical and thoracic vertebral bodies. Metastatic disease/marrow infiltrative disease not excluded HPI:  Patient is a 70 y.o. F, h/o  pancreatic cancer w/ mets to lung and liver, on chemo, last session Monday (Dr. Vieira at [facility]) admitted w/ inability to ambulate and independently take care of ADLs since Monday.      States her weakness is associated w/ b/l lower extremity pain. Pain is 9/10 in severity and is aching. Patient was taking her home fentanyl and Dilaudid however this did not help symptoms. Patient states she lives alone w/o HHA. Patient states she has had some difficulty completely emptying her bladder however denies any urinary incontinence. Patient denies any fecal incontinence. States she is moving her bowels however her BMs have been small. Patient denies any saddle anesthesia.     Inpatient work up has revealed a possible arterial thrombus as the cause for her LE swelling and pain.   MRI imaging has ruled out any cord compression (see below.)     There is no obvious bony/lytic/ or metastatic lesions to target with palliative radiation treatment.   Feel free to call us if there is a new site of disease for us to consider.  Thank you.     [phone] (Dr. Mart)          < from: MR Cervical Spine w/wo IV Cont (04.20.21 @ 09:56) >    IMPRESSION:    MR BRAIN:  *  No findings suggestive of intra-axial metastatic disease.  *  Nonspecific Heterogeneous marrow signal throughout the calvarium, which may represent a marrow infiltrative process versus anemia.    MR CERVICAL SPINE:  *  Nonspecific focal abnormal cord signal in the left hemicord at the level of C4-5 could represent likely gliosis. No abnormal enhancement to suggest underlying lesion. Edema not entirely excluded.  *  Nonspecific heterogeneous marrow signal noted throughout the visualized cervical and thoracic vertebral bodies. Metastatic disease/marrow infiltrative disease not excluded

## 2021-04-21 NOTE — CONSULT NOTE ADULT - SUBJECTIVE AND OBJECTIVE BOX
Patient is a 70y old  Female who presents with a chief complaint of Lower Extremity Weakness, Inability to Ambulate (20 Apr 2021 23:13)      HPI:  Patient is a 70 year old female with past medical history of diabetes, pancreatic cancer w/ mets to lung and liver on chemo last session Monday (9th session, abraxane/gemzar) coming in w/ inability to ambulate and independently take care of ADLs since Monday. Patient reports she ambulates w/ a walker at baseline, however she does so independently. Patient now w/ issues even standing up with the walker. States her weakness is associated w/ b/l lower extremity pain. Pain is 9/10 in severity and is aching. Patient was taking her home fentanyl and Dilaudid however this did not help symptoms. Patient states she lives alone w/o HHA. Patient states she has had some difficulty completely emptying her bladder however denies any urinary incontinence. Patient denies any fecal incontinence. States she is moving her bowels however her BMs have been small. Patient denies any saddle anesthesia.   ED Course: T 98.2, R 17, HR 93, BP 91/48. Patient Labs notable for BUN 30. Patient hyperglycemic on FSG. Patient AST midly elevated 43. Patient Hemoglobin 8.8 at baseline. Patient urine concentrated 1.050 otherwise negative for UTI. Patient had a CT thoracic and lumbar spine, with chronic severe left hydronephrosis. Also w/ large stool burden otherwise unremarkable. Patient given 1L NS. Patient admitted for evaluation of lower extremity weakness and PT eval for inability to ambulate.  (09 Apr 2021 09:30)    Patient reports achy, heaviness in b/l LE. She reports 60 lb weight loss int he past 4 months, and decreased appetite.    She lives alone and was previously independent for adls. She stopped driving a few months ago, ambulates with cane at baseline.   MRI c spine revealed abnormal cord signal, also possible metastatic disease in cervical and vertebral bodies.        REVIEW OF SYSTEMS  Constitutional - No fever, No weight loss, No fatigue  HEENT - No eye pain, No visual disturbances, No difficulty hearing, No tinnitus, No vertigo, No neck pain  Respiratory - No cough, No wheezing, No shortness of breath  Cardiovascular - No chest pain, No palpitations  Gastrointestinal - No abdominal pain, No nausea, No vomiting, No diarrhea, No constipation  Genitourinary - No dysuria, No frequency, No hematuria, No incontinence  Neurological - No headaches, No memory loss, + loss of strength, + numbness, No tremors  Skin - No itching, No rashes, No lesions   Endocrine - No temperature intolerance  Musculoskeletal - No joint pain, No joint swelling, No muscle pain  Psychiatric - No depression, No anxiety    PAST MEDICAL & SURGICAL HISTORY  Type 2 diabetes mellitus  Pancreatic cancer  S/P tendon repair      SOCIAL HISTORY  Smoking - + smoker  EtOH - Denied   Drugs - Denied    FUNCTIONAL HISTORY  Lives in private house with 3 steps to enter, 15 stairs inside  Independent with cane    CURRENT FUNCTIONAL STATUS  BM: min of 1  deferred sit to stand transfers    FAMILY HISTORY   Family history of liver cancer  Family history of cardiac arrest (Sibling)  FH: aneurysm (Sibling)        RECENT LABS/IMAGING  < from: MR Head w/wo IV Cont (04.20.21 @ 10:29) >  EXAM:  MR SPINE CERVICAL WAW IC      EXAM:  MR BRAIN WAW IC        PROCEDURE DATE:  Apr 20 2021         INTERPRETATION:  CLINICAL INFORMATION: Lower extremity weakness, metastatic pancreatic cancer to the liver and lung, evaluate for metastatic disease.    TECHNIQUE: Multiplanar, multisequence imaging of the brain and cervical spine were obtained. 4 cc of IV Gadavist were injected and 3.5 cc were discarded.    COMPARISON: None.    FINDINGS:    Limited evaluation due to patient motion.    MR BRAIN:    There is a curvilinear region of T2 FLAIR hyperintensity adjacent to the cavernous segment of the internal carotid artery (3-80), which displays contrast enhancement (6-38), which may represent chronic thickening of the right ethmoid sinus.        Mild prominence of the sulci and ventricles are consistent with age-appropriate volume loss.    There are severe foci of T2/FLAIR signal hyperintensity within the hemispheric white matter, which are nonspecific but likely related to sequelae of microvascular disease.    There is no intraparenchymal hematoma, mass effect or midline shift.    No abnormal extra-axial fluid collections are present. There is no diffusion abnormality to suggest acute or subacute infarction.      Nonspecific heterogeneous bone marrow signal noted.    Partial opacification mastoid air cells. Mucosal thickening paranasal sinuses.    MR CERVICAL SPINE:    Study markedly limited due to motion especially the postcontrast images    Status post posterior spinal fusion and laminectomy of C3-C6.    There is focal high T2 signal within the cervical cord at the level of C4-5 in the left hemicord. No cord compression.    Vertebral body heights are maintained. Heterogeneous low T1 marrow signal is noted throughout the vertebral bodies of the visualized cervicothoracic spine.    There is straightening of the normal cervical lordosis.    Sagittal alignment intact.    DISC LEVEL EVALUATION:    C2/C3: Unremarkable.  C3/C4: Unremarkable.  C4/C5: Unremarkable.  C5/C6: Mild circumferential disc bulge. No significant canal stenosis.  C6/C7: Moderate circumferential disc bulge with bilateral facet arthropathy results in mild canal stenosis. Mild right neural foraminal narrowing  C7/T1: Unremarkable.    IMPRESSION:    MR BRAIN:  *  No findings suggestive of intra-axial metastatic disease.  *  Nonspecific Heterogeneous marrow signal throughout the calvarium, which may represent a marrow infiltrative process versus anemia.    MR CERVICAL SPINE:  *  Nonspecific focal abnormal cord signal in the left hemicord at the level of C4-5 could represent likely gliosis. No abnormal enhancement to suggest underlying lesion. Edema not entirely excluded.  *  Nonspecific heterogeneous marrow signal noted throughout the visualized cervical and thoracic vertebral bodies. Metastatic disease/marrow infiltrative disease not excluded  *            CELESTINO SANTOS MD; Resident Radiology  This document has been electronically signed.  ABNER VANCE MD; Attending Radiologist  This document has been electronically signed. Apr 20 2021  1:32PM    < end of copied text >              VITALS  T(C): 36.8 (04-21-21 @ 07:02), Max: 37.1 (04-20-21 @ 22:45)  HR: 87 (04-21-21 @ 07:02) (87 - 101)  BP: 156/82 (04-21-21 @ 07:02) (146/65 - 161/72)  RR: 18 (04-21-21 @ 07:02) (18 - 18)  SpO2: 100% (04-21-21 @ 07:02) (98% - 100%)  Wt(kg): --    ALLERGIES  No Known Allergies      MEDICATIONS   dextrose 40% Gel 15 Gram(s) Oral once  dextrose 5%. 1000 milliLiter(s) IV Continuous <Continuous>  dextrose 5%. 1000 milliLiter(s) IV Continuous <Continuous>  dextrose 50% Injectable 25 Gram(s) IV Push once  dextrose 50% Injectable 12.5 Gram(s) IV Push once  dextrose 50% Injectable 25 Gram(s) IV Push once  dronabinol 2.5 milliGRAM(s) Oral three times a day  enoxaparin Injectable 40 milliGRAM(s) SubCutaneous daily  fentaNYL   Patch  12 MICROgram(s)/Hr 1 Patch Transdermal every 72 hours  glucagon  Injectable 1 milliGRAM(s) IntraMuscular once  HYDROmorphone   Tablet 6 milliGRAM(s) Oral every 4 hours PRN  HYDROmorphone   Tablet 4 milliGRAM(s) Oral every 4 hours PRN  HYDROmorphone  Injectable 0.5 milliGRAM(s) IV Push every 6 hours PRN  insulin lispro (ADMELOG) corrective regimen sliding scale   SubCutaneous three times a day before meals  insulin lispro (ADMELOG) corrective regimen sliding scale   SubCutaneous at bedtime  saline laxative (FLEET) Rectal Enema 1 Enema Rectal once      ----------------------------------------------------------------------------------------  PHYSICAL EXAM  Constitutional - NAD, Comfortable, + cachectic  HEENT - NCAT, EOMI  Neck - Supple, No limited ROM  Chest - no respiratory distress  Cardiovascular - RRR, S1S2   Abdomen - Soft, NTND  + joy  Extremities - No C/C/E, + calf and thigh tenderness   Neurologic Exam -                    Cognitive - Awake, Alert, AAO to self, place, date, year, situation     Communication - Fluent, No dysarthria     Cranial Nerves - CN 2-12 intact     Motor -                     LEFT    UE - 4/5                    RIGHT UE - 4/5                    LEFT    LE - HF 2/5, KE 3/5, DF 0/5, PF 0/5                    RIGHT LE - HF 3/5, KE 3/5, DF 0/5, PF 0/5        Sensory - Intact to LT, impaired proprioception LLE     Reflexes - 1 + b/l LE     Balance - WNL Static  Psychiatric - Mood stable, Affect WNL  ----------------------------------------------------------------------------------------  ASSESSMENT/PLAN  71 yo RHD f pmh dm, metastatic pancreatic ca, p/w b/l LE weakness  MRI revealed possible vertebral bony mets (marrow changes and L hemicord findings C 4-5 ), possible cervical myelopathy, also c5/c6 mild disc bulge with significant canal stenosis, mild foraminal narrowing and canal stenosis C6/7  also with partially thrombosed femoral arteries on doppler- may be contributing to pain  -no juan manuel cord compression, however may benefit from steroid given severity of weakness and pain   Pain - per palliative, on dilaudid prn 4 or 6 mg po, with 0.5mg iv q 6 prn breakthrough pain  on marinol   diet: consistent carb with evening snack, supplement glucerna TID  DVT PPX - lovenox  continue bedside PT  OOB daily if tolerates  please add OT evaluation  Rehab -     Recommend [facility], patient DOES NOT meet acute inpatient rehabilitation criteria

## 2021-04-21 NOTE — PROGRESS NOTE ADULT - PROBLEM SELECTOR PLAN 1
Patient presenting w/ lower extremity weakness L>R distally in the setting of metastatic pancreatic cancer. Notable Left foot drop. W/o known spinal mets. No spinal mets seen on CT.   -MR spine thoracic/lumbar w/ and w/o neg for spinal cord compression   - MRI pelvis neg for plexopathy  - MRI C spine and head w and w/o contrast w change in signal- no obvious cord issue ?gliosis  paraneoplastic panel serum pending  - PT eval  rad onc no intervention planned  neuro surg consulted

## 2021-04-21 NOTE — PROGRESS NOTE ADULT - PROBLEM SELECTOR PLAN 4
Patient w/ large stool burden on mri  had bm w/ moviprep on 4/17- now restarting bowel regimen as pt gets constipated easily

## 2021-04-21 NOTE — PROGRESS NOTE ADULT - ASSESSMENT
70fwith past medical history of diabetes, pancreatic cancer w/ mets to lung and liver on chemo (9th session, abraxane/gemzar) coming in w/ inability to ambulate- left leg weaker than right  seen by NEurology- MRI pelvis to r/o plexopathy (paraneoplastic)- no clear findings of plexopathy- case d/w radiologist concern for ?anal mass- rectum distended and that could have caused artifact.    per d/w neuro- spine surg and rad onc consulted as the gliosis could have caused leg weakness

## 2021-04-21 NOTE — CONSULT NOTE ADULT - SUBJECTIVE AND OBJECTIVE BOX
HPI:  Ms. Nicolas is a 70 year old female with past medical history of diabetes, pancreatic cancer w/ mets to lung and liver on chemo last session Monday (9th session, abraxane/gemzar), sees Dr. Vieira at [facility], arrived with inability to ambulate and urinary retention.     She ambulates w/ a walker at baseline, however she does so independently. Patient now w/ issues even standing up with the walker. States her weakness is associated w/ b/l lower extremity pain. Pain is 9/10 in severity and is aching. Patient was taking her home fentanyl and Dilaudid however this did not help symptoms. Patient states she lives alone w/o HHA. Patient states she has had some difficulty completely emptying her bladder however denies any urinary incontinence. Patient denies any fecal incontinence. States she is moving her bowels however her BMs have been small. Patient denies any saddle anesthesia.     She was ruled out for DVT to b/l LE but there is ? arterial thrombus.   Spine MRI's ruled out any cord compression.     < from: MR Cervical Spine w/wo IV Cont (04.20.21 @ 09:56) >  IMPRESSION:    MR BRAIN:  *  No findings suggestive of intra-axial metastatic disease.  *  Nonspecific Heterogeneous marrow signal throughout the calvarium, which may represent a marrow infiltrative process versus anemia.    MR CERVICAL SPINE:  *  Nonspecific focal abnormal cord signal in the left hemicord at the level of C4-5 could represent likely gliosis. No abnormal enhancement to suggest underlying lesion. Edema not entirely excluded.  *  Nonspecific heterogeneous marrow signal noted throughout the visualized cervical and thoracic vertebral bodies. Metastatic disease/marrow infiltrative disease not excluded      < from: MR Lumbar Spine w/wo IV Cont (04.09.21 @ 14:59) >  IMPRESSION: Abnormal marrow signal involving the visualized thoracic and lumbosacral vertebral bodies is nonspecific but may be related to anemia, marrow infiltrative process, osteopenia. No cord or cauda equina compression. Mild annular bulges L3-4 and L4-5.      < from: VA Duplex SAL. (VA Duplex Arterial Lower Ext, Bilateral.) (04.10.21 @ 09:51) >  Summary/Impressions:  1. Right SAL is mildly reduced (0.84). Right TBI is mildly  reduced (0.52), with a toe pressure of 74 mmHg. Waveform  analysis suggests the presence of mild femoropopliteal  arterial disease in the right lower extremity.  2. Left SAL is mildly reduced (0.84). Left TBI is normal  (0.77), with a toe pressure of 108 mmHg. Waveform analysis  suggests the presence of mild femoropopliteal arterial  disease in the left lower extremity.        Allergies    No Known Allergies    Intolerances        ROS: [  ] Fever  [  ] Chills  [  ]Chest Pain [  ] SOB  [  ]Cough [  ] N/V  [  ] Diarrhea [  ]Constipation [  ]Other ROS:  [  ] ROS otherwise negative    PAST MEDICAL & SURGICAL HISTORY:  Type 2 diabetes mellitus    Pancreatic cancer  mets to lung and liver    S/P tendon repair  R foot, R elbow        FAMILY HISTORY:  Family history of liver cancer  Sister    Family history of cardiac arrest (Sibling)    FH: aneurysm (Sibling)        MEDICATIONS  (STANDING):  dextrose 40% Gel 15 Gram(s) Oral once  dextrose 5%. 1000 milliLiter(s) (50 mL/Hr) IV Continuous <Continuous>  dextrose 5%. 1000 milliLiter(s) (100 mL/Hr) IV Continuous <Continuous>  dextrose 50% Injectable 25 Gram(s) IV Push once  dextrose 50% Injectable 12.5 Gram(s) IV Push once  dextrose 50% Injectable 25 Gram(s) IV Push once  dronabinol 2.5 milliGRAM(s) Oral three times a day  enoxaparin Injectable 40 milliGRAM(s) SubCutaneous daily  fentaNYL   Patch  12 MICROgram(s)/Hr 1 Patch Transdermal every 72 hours  glucagon  Injectable 1 milliGRAM(s) IntraMuscular once  insulin lispro (ADMELOG) corrective regimen sliding scale   SubCutaneous three times a day before meals  insulin lispro (ADMELOG) corrective regimen sliding scale   SubCutaneous at bedtime  saline laxative (FLEET) Rectal Enema 1 Enema Rectal once    MEDICATIONS  (PRN):  HYDROmorphone   Tablet 6 milliGRAM(s) Oral every 4 hours PRN Severe Pain (7 - 10)  HYDROmorphone   Tablet 4 milliGRAM(s) Oral every 4 hours PRN Moderate Pain (4 - 6)  HYDROmorphone  Injectable 0.5 milliGRAM(s) IV Push every 6 hours PRN breakthrough pain in case PO dilaudid does not work      PHYSICAL EXAM  Vital Signs Last 24 Hrs  T(C): 36.8 (21 Apr 2021 07:02), Max: 37.1 (20 Apr 2021 22:45)  T(F): 98.2 (21 Apr 2021 07:02), Max: 98.8 (20 Apr 2021 22:45)  HR: 87 (21 Apr 2021 07:02) (87 - 101)  BP: 156/82 (21 Apr 2021 07:02) (146/65 - 161/72)  BP(mean): --  RR: 18 (21 Apr 2021 07:02) (18 - 18)  SpO2: 100% (21 Apr 2021 07:02) (98% - 100%)    General:  seen OOB in chair, +cachectic,  no acute distress  HEENT: NC/AT; EOMI, PERRL, sclera nonicteric; external ears normal; no rhinorrhea or epistaxis; mucous membranes moist; oropharynx clear and without erythema  CV: NR, RR; no appreciable r/m/g  Lungs: CTAB, no increased work of breathing  Abdomen: Bowel sounds present; soft, NTND  MSK: Vertebral spine non-tender to palpation  Neuro: AAOx3; cranial nerves II-XII intact; strength 5/5 in upper and 3/5 LLE, 4/5 RLE, sensation to light touch in tact bilaterally.  Psych: Full affect; mood congruent  Skin: no visible rashes on limited examination    IMAGING/LABS/PATHOLOGY: I have personally reviewed the relevant labs, pathology, and imaging as noted in the HPI.  In addition,                ASSESSMENT/PLAN    MARIA NICOLAS is a 70y woman with h/o pancreatic cancer with metastases to liver/lung, undergoing chemotherapy at [facility] with Dr. Canela admitted with difficulty ambulating secondary to possible arterial leg thrombosis causing pain and weakness.  Urinary retention symptoms were imaging with spine MRI's' and no cord compression or lytic lesions were found.  There is no definitive role for any radiation and no target sites at this time.     Thank you for this consult.   [phone]    HPI:  Ms. Nicolas is a 70 year old female with past medical history of diabetes, pancreatic cancer w/ mets to lung and liver on chemo last session Monday (9th session, abraxane/gemzar), sees Dr. Vieira at [facility], arrived with inability to ambulate and urinary retention.     She ambulates w/ a walker at baseline, however she does so independently. Patient now w/ issues even standing up with the walker. States her weakness is associated w/ b/l lower extremity pain. Pain is 9/10 in severity and is aching. Patient was taking her home fentanyl and Dilaudid however this did not help symptoms. Patient states she lives alone w/o HHA. Patient states she has had some difficulty completely emptying her bladder however denies any urinary incontinence. Patient denies any fecal incontinence. States she is moving her bowels however her BMs have been small. Patient denies any saddle anesthesia.     She was ruled out for DVT to b/l LE but there is ? arterial thrombus.   Spine MRI's ruled out any cord compression.     < from: MR Cervical Spine w/wo IV Cont (04.20.21 @ 09:56) >  IMPRESSION:    MR BRAIN:  *  No findings suggestive of intra-axial metastatic disease.  *  Nonspecific Heterogeneous marrow signal throughout the calvarium, which may represent a marrow infiltrative process versus anemia.    MR CERVICAL SPINE:  *  Nonspecific focal abnormal cord signal in the left hemicord at the level of C4-5 could represent likely gliosis. No abnormal enhancement to suggest underlying lesion. Edema not entirely excluded.  *  Nonspecific heterogeneous marrow signal noted throughout the visualized cervical and thoracic vertebral bodies. Metastatic disease/marrow infiltrative disease not excluded      < from: MR Lumbar Spine w/wo IV Cont (04.09.21 @ 14:59) >  IMPRESSION: Abnormal marrow signal involving the visualized thoracic and lumbosacral vertebral bodies is nonspecific but may be related to anemia, marrow infiltrative process, osteopenia. No cord or cauda equina compression. Mild annular bulges L3-4 and L4-5.      < from: VA Duplex SAL. (VA Duplex Arterial Lower Ext, Bilateral.) (04.10.21 @ 09:51) >  Summary/Impressions:  1. Right SAL is mildly reduced (0.84). Right TBI is mildly  reduced (0.52), with a toe pressure of 74 mmHg. Waveform  analysis suggests the presence of mild femoropopliteal  arterial disease in the right lower extremity.  2. Left SAL is mildly reduced (0.84). Left TBI is normal  (0.77), with a toe pressure of 108 mmHg. Waveform analysis  suggests the presence of mild femoropopliteal arterial  disease in the left lower extremity.        Allergies    No Known Allergies    Intolerances        ROS: [  ] Fever  [  ] Chills  [  ]Chest Pain [  ] SOB  [  ]Cough [  ] N/V  [  ] Diarrhea [  ]Constipation [  ]Other ROS:  [  ] ROS otherwise negative    PAST MEDICAL & SURGICAL HISTORY:  Type 2 diabetes mellitus    Pancreatic cancer  mets to lung and liver    S/P tendon repair  R foot, R elbow        FAMILY HISTORY:  Family history of liver cancer  Sister    Family history of cardiac arrest (Sibling)    FH: aneurysm (Sibling)        MEDICATIONS  (STANDING):  dextrose 40% Gel 15 Gram(s) Oral once  dextrose 5%. 1000 milliLiter(s) (50 mL/Hr) IV Continuous <Continuous>  dextrose 5%. 1000 milliLiter(s) (100 mL/Hr) IV Continuous <Continuous>  dextrose 50% Injectable 25 Gram(s) IV Push once  dextrose 50% Injectable 12.5 Gram(s) IV Push once  dextrose 50% Injectable 25 Gram(s) IV Push once  dronabinol 2.5 milliGRAM(s) Oral three times a day  enoxaparin Injectable 40 milliGRAM(s) SubCutaneous daily  fentaNYL   Patch  12 MICROgram(s)/Hr 1 Patch Transdermal every 72 hours  glucagon  Injectable 1 milliGRAM(s) IntraMuscular once  insulin lispro (ADMELOG) corrective regimen sliding scale   SubCutaneous three times a day before meals  insulin lispro (ADMELOG) corrective regimen sliding scale   SubCutaneous at bedtime  saline laxative (FLEET) Rectal Enema 1 Enema Rectal once    MEDICATIONS  (PRN):  HYDROmorphone   Tablet 6 milliGRAM(s) Oral every 4 hours PRN Severe Pain (7 - 10)  HYDROmorphone   Tablet 4 milliGRAM(s) Oral every 4 hours PRN Moderate Pain (4 - 6)  HYDROmorphone  Injectable 0.5 milliGRAM(s) IV Push every 6 hours PRN breakthrough pain in case PO dilaudid does not work      PHYSICAL EXAM  Vital Signs Last 24 Hrs  T(C): 36.8 (21 Apr 2021 07:02), Max: 37.1 (20 Apr 2021 22:45)  T(F): 98.2 (21 Apr 2021 07:02), Max: 98.8 (20 Apr 2021 22:45)  HR: 87 (21 Apr 2021 07:02) (87 - 101)  BP: 156/82 (21 Apr 2021 07:02) (146/65 - 161/72)  BP(mean): --  RR: 18 (21 Apr 2021 07:02) (18 - 18)  SpO2: 100% (21 Apr 2021 07:02) (98% - 100%)    General:  seen OOB in chair, +cachectic,  no acute distress  HEENT: NC/AT; EOMI, PERRL, sclera nonicteric; external ears normal; no rhinorrhea or epistaxis; mucous membranes moist; oropharynx clear and without erythema  CV: NR, RR; no appreciable r/m/g  Lungs: CTAB, no increased work of breathing  Abdomen: Bowel sounds present; soft, NTND  MSK: Vertebral spine non-tender to palpation  Neuro: AAOx3; cranial nerves II-XII intact; strength 5/5 in upper and 3/5 LLE, 4/5 RLE, sensation to light touch in tact bilaterally.  Psych: Full affect; mood congruent  Skin: no visible rashes on limited examination    IMAGING/LABS/PATHOLOGY: I have personally reviewed the relevant labs, pathology, and imaging as noted in the HPI.  In addition,                ASSESSMENT/PLAN    MARIA NICOLAS is a 70y woman with h/o pancreatic cancer with metastases to liver/lung, undergoing chemotherapy at [facility] with Dr. Canela admitted with difficulty ambulating secondary to possible arterial leg thrombosis causing pain and weakness.  Urinary retention symptoms were imaging with spine MRI's' and no cord compression or lytic lesions were found.  There is no definitive role for any radiation ,given the negative spine MRI findings. Please call to discuss as needed    Enmanuel de lat orre MD   cell    Radiation Medicine at [facility]  [phone]

## 2021-04-21 NOTE — PROGRESS NOTE ADULT - SUBJECTIVE AND OBJECTIVE BOX
[facility] Division of Hospital Medicine  Vida Carney MD  Pager [phone]    Patient is a 70y old  Female who presents with a chief complaint of Lower Extremity Weakness, Inability to Ambulate (21 Apr 2021 12:57)      SUBJECTIVE / OVERNIGHT EVENTS: [facility] informed her of the numerous discussions writer had w/ neuro, ortho spine, rad onc and then w. neurosurg  eventual rehab for deconditioning if no intervention for gliosis planned  ADDITIONAL REVIEW OF SYSTEMS:    MEDICATIONS  (STANDING):  dextrose 40% Gel 15 Gram(s) Oral once  dextrose 5%. 1000 milliLiter(s) (50 mL/Hr) IV Continuous <Continuous>  dextrose 5%. 1000 milliLiter(s) (100 mL/Hr) IV Continuous <Continuous>  dextrose 50% Injectable 25 Gram(s) IV Push once  dextrose 50% Injectable 12.5 Gram(s) IV Push once  dextrose 50% Injectable 25 Gram(s) IV Push once  dronabinol 2.5 milliGRAM(s) Oral three times a day  enoxaparin Injectable 40 milliGRAM(s) SubCutaneous daily  fentaNYL   Patch  12 MICROgram(s)/Hr 1 Patch Transdermal every 72 hours  glucagon  Injectable 1 milliGRAM(s) IntraMuscular once  glycerin Suppository - Adult 1 Suppository(s) Rectal daily  insulin lispro (ADMELOG) corrective regimen sliding scale   SubCutaneous three times a day before meals  insulin lispro (ADMELOG) corrective regimen sliding scale   SubCutaneous at bedtime  polyethylene glycol 3350 17 Gram(s) Oral <User Schedule>  saline laxative (FLEET) Rectal Enema 1 Enema Rectal once  senna 2 Tablet(s) Oral two times a day    MEDICATIONS  (PRN):  HYDROmorphone   Tablet 4 milliGRAM(s) Oral every 4 hours PRN Moderate Pain (4 - 6)  HYDROmorphone   Tablet 6 milliGRAM(s) Oral every 4 hours PRN Severe Pain (7 - 10)  HYDROmorphone  Injectable 0.5 milliGRAM(s) IV Push every 6 hours PRN breakthrough pain in case PO dilaudid does not work      CAPILLARY BLOOD GLUCOSE      POCT Blood Glucose.: 217 mg/dL (21 Apr 2021 22:13)  POCT Blood Glucose.: 221 mg/dL (21 Apr 2021 17:40)  POCT Blood Glucose.: 118 mg/dL (21 Apr 2021 12:12)  POCT Blood Glucose.: 214 mg/dL (21 Apr 2021 08:13)    I&O's Summary      PHYSICAL EXAM:  Vital Signs Last 24 Hrs  T(C): 37 (21 Apr 2021 21:30), Max: 37 (21 Apr 2021 21:30)  T(F): 98.6 (21 Apr 2021 21:30), Max: 98.6 (21 Apr 2021 21:30)  HR: 84 (21 Apr 2021 21:30) (84 - 101)  BP: 145/72 (21 Apr 2021 21:30) (113/56 - 156/82)  BP(mean): --  RR: 17 (21 Apr 2021 21:30) (17 - 18)  SpO2: 97% (21 Apr 2021 21:30) (97% - 100%)  CONSTITUTIONAL: NAD, thin, in chair  EYES: conjunctiva and sclera clear  NECK: Supple  RESPIRATORY: Normal respiratory effort; lungs are clear to auscultation bilaterally  CARDIOVASCULAR: Regular rate and rhythm, normal S1 and S2, no murmur/rub/gallop; No lower extremity edema;   ABDOMEN: Nontender to palpation, normoactive bowel sounds, not distended;   MUSCULOSKELETAL:   no clubbing or cyanosis of digits; no joint swelling or tenderness to palpation  PSYCH: A+O to person, place, and time; affect appropriate      LABS:                      RADIOLOGY & ADDITIONAL TESTS:  Results Reviewed:   Imaging Personally Reviewed:  Electrocardiogram Personally Reviewed:    COORDINATION OF CARE:  Care Discussed with Consultants/Other Providers [Y/N]:  Prior or Outpatient Records Reviewed [Y/N]:

## 2021-04-22 ENCOUNTER — APPOINTMENT (OUTPATIENT)
Dept: INFUSION THERAPY | Facility: HOSPITAL | Age: 71
End: 2021-04-22

## 2021-04-22 LAB
GLUCOSE BLDC GLUCOMTR-MCNC: 154 MG/DL — HIGH (ref 70–99)
GLUCOSE BLDC GLUCOMTR-MCNC: 223 MG/DL — HIGH (ref 70–99)
GLUCOSE BLDC GLUCOMTR-MCNC: 264 MG/DL — HIGH (ref 70–99)
GLUCOSE BLDC GLUCOMTR-MCNC: 268 MG/DL — HIGH (ref 70–99)
PYRIDOXAL PHOS SERPL-MCNC: 1.1 UG/L — LOW (ref 2–32.8)
VIT B1 SERPL-MCNC: 57.3 NMOL/L — LOW (ref 66.5–200)

## 2021-04-22 PROCEDURE — 99233 SBSQ HOSP IP/OBS HIGH 50: CPT

## 2021-04-22 PROCEDURE — 99497 ADVNCD CARE PLAN 30 MIN: CPT

## 2021-04-22 PROCEDURE — 99221 1ST HOSP IP/OBS SF/LOW 40: CPT

## 2021-04-22 PROCEDURE — 99232 SBSQ HOSP IP/OBS MODERATE 35: CPT

## 2021-04-22 PROCEDURE — 99358 PROLONG SERVICE W/O CONTACT: CPT | Mod: 25

## 2021-04-22 RX ORDER — HYDROMORPHONE HYDROCHLORIDE 2 MG/ML
4 INJECTION INTRAMUSCULAR; INTRAVENOUS; SUBCUTANEOUS EVERY 4 HOURS
Refills: 0 | Status: DISCONTINUED | OUTPATIENT
Start: 2021-04-22 | End: 2021-04-22

## 2021-04-22 RX ORDER — PYRIDOXINE HCL (VITAMIN B6) 100 MG
50 TABLET ORAL DAILY
Refills: 0 | Status: DISCONTINUED | OUTPATIENT
Start: 2021-04-22 | End: 2021-05-04

## 2021-04-22 RX ORDER — THIAMINE MONONITRATE (VIT B1) 100 MG
100 TABLET ORAL DAILY
Refills: 0 | Status: DISCONTINUED | OUTPATIENT
Start: 2021-04-22 | End: 2021-05-04

## 2021-04-22 RX ORDER — HYDROMORPHONE HYDROCHLORIDE 2 MG/ML
8 INJECTION INTRAMUSCULAR; INTRAVENOUS; SUBCUTANEOUS EVERY 4 HOURS
Refills: 0 | Status: DISCONTINUED | OUTPATIENT
Start: 2021-04-22 | End: 2021-04-29

## 2021-04-22 RX ORDER — HYDROMORPHONE HYDROCHLORIDE 2 MG/ML
6 INJECTION INTRAMUSCULAR; INTRAVENOUS; SUBCUTANEOUS EVERY 4 HOURS
Refills: 0 | Status: DISCONTINUED | OUTPATIENT
Start: 2021-04-22 | End: 2021-04-22

## 2021-04-22 RX ORDER — HYDROMORPHONE HYDROCHLORIDE 2 MG/ML
0.5 INJECTION INTRAMUSCULAR; INTRAVENOUS; SUBCUTANEOUS EVERY 6 HOURS
Refills: 0 | Status: DISCONTINUED | OUTPATIENT
Start: 2021-04-22 | End: 2021-04-29

## 2021-04-22 RX ORDER — FENTANYL CITRATE 50 UG/ML
1 INJECTION INTRAVENOUS
Refills: 0 | Status: DISCONTINUED | OUTPATIENT
Start: 2021-04-22 | End: 2021-04-27

## 2021-04-22 RX ORDER — DRONABINOL 2.5 MG
2.5 CAPSULE ORAL THREE TIMES A DAY
Refills: 0 | Status: DISCONTINUED | OUTPATIENT
Start: 2021-04-22 | End: 2021-04-29

## 2021-04-22 RX ORDER — HYDROMORPHONE HYDROCHLORIDE 2 MG/ML
6 INJECTION INTRAMUSCULAR; INTRAVENOUS; SUBCUTANEOUS EVERY 4 HOURS
Refills: 0 | Status: DISCONTINUED | OUTPATIENT
Start: 2021-04-22 | End: 2021-04-29

## 2021-04-22 RX ADMIN — SENNA PLUS 2 TABLET(S): 8.6 TABLET ORAL at 05:54

## 2021-04-22 RX ADMIN — HYDROMORPHONE HYDROCHLORIDE 6 MILLIGRAM(S): 2 INJECTION INTRAMUSCULAR; INTRAVENOUS; SUBCUTANEOUS at 04:29

## 2021-04-22 RX ADMIN — Medication 2.5 MILLIGRAM(S): at 22:04

## 2021-04-22 RX ADMIN — HYDROMORPHONE HYDROCHLORIDE 6 MILLIGRAM(S): 2 INJECTION INTRAMUSCULAR; INTRAVENOUS; SUBCUTANEOUS at 22:04

## 2021-04-22 RX ADMIN — FENTANYL CITRATE 1 PATCH: 50 INJECTION INTRAVENOUS at 08:21

## 2021-04-22 RX ADMIN — Medication 3: at 18:15

## 2021-04-22 RX ADMIN — Medication 2.5 MILLIGRAM(S): at 05:53

## 2021-04-22 RX ADMIN — Medication 2: at 12:39

## 2021-04-22 RX ADMIN — POLYETHYLENE GLYCOL 3350 17 GRAM(S): 17 POWDER, FOR SOLUTION ORAL at 12:41

## 2021-04-22 RX ADMIN — ENOXAPARIN SODIUM 40 MILLIGRAM(S): 100 INJECTION SUBCUTANEOUS at 12:40

## 2021-04-22 RX ADMIN — HYDROMORPHONE HYDROCHLORIDE 6 MILLIGRAM(S): 2 INJECTION INTRAMUSCULAR; INTRAVENOUS; SUBCUTANEOUS at 23:00

## 2021-04-22 RX ADMIN — SENNA PLUS 2 TABLET(S): 8.6 TABLET ORAL at 18:16

## 2021-04-22 RX ADMIN — POLYETHYLENE GLYCOL 3350 17 GRAM(S): 17 POWDER, FOR SOLUTION ORAL at 08:21

## 2021-04-22 RX ADMIN — Medication 1: at 08:21

## 2021-04-22 RX ADMIN — Medication 1: at 22:21

## 2021-04-22 RX ADMIN — FENTANYL CITRATE 1 PATCH: 50 INJECTION INTRAVENOUS at 18:25

## 2021-04-22 RX ADMIN — Medication 2.5 MILLIGRAM(S): at 13:35

## 2021-04-22 RX ADMIN — HYDROMORPHONE HYDROCHLORIDE 6 MILLIGRAM(S): 2 INJECTION INTRAMUSCULAR; INTRAVENOUS; SUBCUTANEOUS at 05:25

## 2021-04-22 NOTE — PROGRESS NOTE ADULT - ASSESSMENT
70fwith past medical history of diabetes, pancreatic cancer w/ mets to lung and liver on chemo (9th session, abraxane/gemzar) coming in w/ inability to ambulate- left leg weaker than right  seen by NEurology- MRI pelvis to r/o plexopathy (paraneoplastic)- no clear findings of plexopathy- case d/w radiologist concern for ?anal mass- rectum distended and that could have caused artifact.    per d/w neuro- spine surg and rad onc consulted as the gliosis could have caused leg weakness- think gliosis is reactive to prior surgery  await neuro input re: etiology of left leg weakness  now awaiting rehab placement

## 2021-04-22 NOTE — OCCUPATIONAL THERAPY INITIAL EVALUATION ADULT - LIVES WITH, PROFILE
Pt. reports she lives alone in a house with 3 steps to enter using back enterance. Once inside, pt. reports she has full flight of steps to negotiate to 2nd floor where main bedroom and bathroom are located. Per pt., she has a bathtub in her bathroom.

## 2021-04-22 NOTE — PROGRESS NOTE ADULT - PROBLEM SELECTOR PLAN 1
.  BLE pain  Very incidental pain, unknown trigger but can go to 9/10 fast    > Continue Fent patch 12mcg   > Increase Dilaudid PO to 6-8mg PO q4h PRN

## 2021-04-22 NOTE — OCCUPATIONAL THERAPY INITIAL EVALUATION ADULT - DIAGNOSIS, OT EVAL
r/o Gliosis; Hx of pancreatic cancer with mets to lung & liver; Decreased Bilateral fine-motor coordination; Decreased standing balance; Decreased functional mobility; Decreased ADL management

## 2021-04-22 NOTE — OCCUPATIONAL THERAPY INITIAL EVALUATION ADULT - MD ORDER
Occupational Therapy (OT) to evaluate and treat. Occupational Therapy (OT) to evaluate and treat. Ambulate with assistance. Per RN, pt is okay to participate in OT evaluation and perform activity as tolerated.

## 2021-04-22 NOTE — PROGRESS NOTE ADULT - ASSESSMENT
70f with metastatic pancreatic with  mets to lung and liver, on chemotherapy with gemcitabine and abraxane, presenting with LE swelling, heaviness and inability to walk since 2 days PTA.   LE doppers negative for VTE, however with ?arterial thrombus. PT and DP pulses not palpable. Legs are warm. She is not able to move leges antigravity.   spine CT with contrast without signs of spinal mets or cord compression.   Recent bone scan 3/25/2021 and recent PET/CT 3/11/2021 without evidence of bone mets. Given all the above the likelihood of spinal mets and cord compression is minimal.       -S/p Arterial duplex, BLE of mild femoropopliteal arterial disease noted  -No Vascular intervention indicated at this time  -Urology input appreciated, patient with joy , no intervention indicated for chronic hydronephrosis for  now  -Pal care input appreciated for pain recs  -s/p MR thoracic and lumbar spine shows  degenerative disc disease at L3-4 and L4-5 with loss of signal on the T2-weighted images. No cord or cauda equina compression noted. Progressive b/l LE weakness possibly 2/2 loss signal at L3/4 and L4/5 in the setting of degenerative disk disease vs infectious etiology in the setting of warm edematous LEs  - s/p MRI pelvis: Exam limited by susceptibility artifact from a rectal tube. No well-formed mass or collection is otherwise demonstrated along the course of the lumbosacral plexus bilaterally. Diffuse marrow signal alteration which may secondary to anemia or marrow fluid/infiltrative disorder. Anasarca.---->No acute pathology to explain symptoms    s/p MRI C spine and head w and w/o contrast-findings concerning for vertebral bony metastases with resultant cervical myelopathy which could explain her symptoms.   -paraneoplastic panel serum still pending  -Continue with Physical therapy, Appreciate PMR recs, pt dispo for rehab. Patient amendable to going to rehab. SW to help coordinate.  -Rest of care as per primary team  -Patient to followup with Dr. Vieira ([facility]) upon discharge  -C/w Supportive care, pain control, Nutrition, PT, DVT ppx  -Oncology will continue to follow with you      Case d/w Dr. Eneida ETIENNE  Oncology Physician Assistant  Roldan BECERRA/[facility]  Pager [phone]    If after 5pm or weekends please page On-call Oncology Fellow

## 2021-04-22 NOTE — OCCUPATIONAL THERAPY INITIAL EVALUATION ADULT - PERTINENT HX OF CURRENT PROBLEM, REHAB EVAL
Pt is a 70 year old female w/ hx of DM, pancreatic cancer w/ mets to lung & liver on chemo last session Monday, who presented to [facility] on 4/9/21 with inability to ambulate. MR Thoracic/Lumbar Spine w/wo IV Contrast on 4/9/21 displayed abnormal marrow signal involving the visualized thoracic & lumbosacral vertebral bodies is nonspecific but may be related to anemia, marrow infiltrative process, osteopenia. No cord or cauda equina compression. Mild annular bulges L3-4 & L4-5. (See continued below)

## 2021-04-22 NOTE — OCCUPATIONAL THERAPY INITIAL EVALUATION ADULT - GENERAL OBSERVATIONS, REHAB EVAL
Pt. received sitting at edge of bed with staff. No acute distress. Patient agreed to evaluation from Occupational Therapist. +Itz.

## 2021-04-22 NOTE — PROGRESS NOTE ADULT - PROBLEM SELECTOR PLAN 4
Patient w/ large stool burden on mri  had bm w/ moviprep on 4/17- now restarting bowel regimen as pt gets constipated easily- miralax bid, started dulcolax bid, dulcolax 5mg HS

## 2021-04-22 NOTE — CONSULT NOTE ADULT - CONSULT REASON
urinary retention
weakness
C4-C5 Gliosis
foot drop
Pancreatic ca
left severe hydroureteronephrosis and incomplete emptying of bladder
Pain management

## 2021-04-22 NOTE — PROGRESS NOTE ADULT - SUBJECTIVE AND OBJECTIVE BOX
INTERVAL HPI/OVERNIGHT EVENTS:  Patient seen at bedside.  Patient with no acute complaints  Describes her symptoms "as good as it gets"      VITAL SIGNS:  T(F): 98.2 (04-22-21 @ 13:10)  HR: 97 (04-22-21 @ 13:10)  BP: 102/60 (04-22-21 @ 13:10)  RR: 16 (04-22-21 @ 05:50)  SpO2: 100% (04-22-21 @ 13:10)  Wt(kg): --    PHYSICAL EXAM:  In accordance with current standard limiting patient contact, deferred physical exam  2/2 COVID pandemic  Please refer to physical exam of primary team.    MEDICATIONS  (STANDING):  bisacodyl 5 milliGRAM(s) Oral at bedtime  bisacodyl 5 milliGRAM(s) Oral every 12 hours  dextrose 40% Gel 15 Gram(s) Oral once  dextrose 5%. 1000 milliLiter(s) (50 mL/Hr) IV Continuous <Continuous>  dextrose 5%. 1000 milliLiter(s) (100 mL/Hr) IV Continuous <Continuous>  dextrose 50% Injectable 25 Gram(s) IV Push once  dextrose 50% Injectable 12.5 Gram(s) IV Push once  dextrose 50% Injectable 25 Gram(s) IV Push once  dronabinol 2.5 milliGRAM(s) Oral three times a day  enoxaparin Injectable 40 milliGRAM(s) SubCutaneous daily  fentaNYL   Patch  12 MICROgram(s)/Hr 1 Patch Transdermal every 72 hours  glucagon  Injectable 1 milliGRAM(s) IntraMuscular once  glycerin Suppository - Adult 1 Suppository(s) Rectal daily  insulin lispro (ADMELOG) corrective regimen sliding scale   SubCutaneous three times a day before meals  insulin lispro (ADMELOG) corrective regimen sliding scale   SubCutaneous at bedtime  polyethylene glycol 3350 17 Gram(s) Oral <User Schedule>  saline laxative (FLEET) Rectal Enema 1 Enema Rectal once  senna 2 Tablet(s) Oral two times a day    MEDICATIONS  (PRN):  HYDROmorphone   Tablet 6 milliGRAM(s) Oral every 4 hours PRN Moderate Pain (4 - 6)  HYDROmorphone   Tablet 8 milliGRAM(s) Oral every 4 hours PRN Severe Pain (7 - 10)  HYDROmorphone  Injectable 0.5 milliGRAM(s) IV Push every 6 hours PRN breakthrough pain in case PO dilaudid does not work      Allergies    No Known Allergies    Intolerances        LABS:                RADIOLOGY & ADDITIONAL TESTS:  Studies reviewed.

## 2021-04-22 NOTE — OCCUPATIONAL THERAPY INITIAL EVALUATION ADULT - ADDITIONAL COMMENTS
(See continued from above) MR Abdomen w/ IV Contrast on 4/11/21 displayed interval increase in size of hepatic metastases. Pancreatic mass is stable to slightly decreased in size. Trace right pleural effusion. MR Cervical Spine w/wo IV Contrast on 4/20/21 displayed nonspecific focal abnormal cord signal in the left hemicord at the level of C4-5 could represent likely gliosis. No abnormal enhancement to suggest underlying lesion. Edema not entirely excluded. Nonspecific heterogeneous marrow signal noted throughout the visualized cervical and thoracic vertebral bodies. Metastatic disease/marrow infiltrative disease not excluded.

## 2021-04-22 NOTE — PROGRESS NOTE ADULT - ATTENDING COMMENTS
Patient seen at bedside. Case discussed with LOWELL Samano. Plan as above.   Dc to rehab.   outpt oncology follow up.

## 2021-04-22 NOTE — OCCUPATIONAL THERAPY INITIAL EVALUATION ADULT - GROOMING, PREVIOUS LEVEL OF FUNCTION, OT EVAL
independent Detail Level: Simple Initiate Treatment: Ketoconazole cream apply twice daily for one month

## 2021-04-22 NOTE — PROGRESS NOTE ADULT - SUBJECTIVE AND OBJECTIVE BOX
[facility] Division of Hospital Medicine  Vida Carney MD  Pager [phone]    Patient is a 70y old  Female who presents with a chief complaint of Lower Extremity Weakness, Inability to Ambulate (22 Apr 2021 14:09)      SUBJECTIVE / OVERNIGHT EVENTS: pt seen and examined at 1240p- frustrated that we don't have an answer for why her left leg is weak   told her rehab placement is next step  no bm in 2 days  ADDITIONAL REVIEW OF SYSTEMS:    MEDICATIONS  (STANDING):  bisacodyl 5 milliGRAM(s) Oral at bedtime  bisacodyl 5 milliGRAM(s) Oral every 12 hours  dextrose 40% Gel 15 Gram(s) Oral once  dextrose 5%. 1000 milliLiter(s) (50 mL/Hr) IV Continuous <Continuous>  dextrose 5%. 1000 milliLiter(s) (100 mL/Hr) IV Continuous <Continuous>  dextrose 50% Injectable 25 Gram(s) IV Push once  dextrose 50% Injectable 12.5 Gram(s) IV Push once  dextrose 50% Injectable 25 Gram(s) IV Push once  dronabinol 2.5 milliGRAM(s) Oral three times a day  enoxaparin Injectable 40 milliGRAM(s) SubCutaneous daily  fentaNYL   Patch  12 MICROgram(s)/Hr 1 Patch Transdermal every 72 hours  glucagon  Injectable 1 milliGRAM(s) IntraMuscular once  glycerin Suppository - Adult 1 Suppository(s) Rectal daily  insulin lispro (ADMELOG) corrective regimen sliding scale   SubCutaneous three times a day before meals  insulin lispro (ADMELOG) corrective regimen sliding scale   SubCutaneous at bedtime  polyethylene glycol 3350 17 Gram(s) Oral <User Schedule>  saline laxative (FLEET) Rectal Enema 1 Enema Rectal once  senna 2 Tablet(s) Oral two times a day    MEDICATIONS  (PRN):  HYDROmorphone   Tablet 6 milliGRAM(s) Oral every 4 hours PRN Moderate Pain (4 - 6)  HYDROmorphone   Tablet 8 milliGRAM(s) Oral every 4 hours PRN Severe Pain (7 - 10)  HYDROmorphone  Injectable 0.5 milliGRAM(s) IV Push every 6 hours PRN breakthrough pain in case PO dilaudid does not work      CAPILLARY BLOOD GLUCOSE      POCT Blood Glucose.: 223 mg/dL (22 Apr 2021 12:17)  POCT Blood Glucose.: 154 mg/dL (22 Apr 2021 08:19)  POCT Blood Glucose.: 217 mg/dL (21 Apr 2021 22:13)  POCT Blood Glucose.: 221 mg/dL (21 Apr 2021 17:40)    I&O's Summary    21 Apr 2021 07:01  -  22 Apr 2021 07:00  --------------------------------------------------------  IN: 0 mL / OUT: 810 mL / NET: -810 mL        PHYSICAL EXAM:  Vital Signs Last 24 Hrs  T(C): 36.8 (22 Apr 2021 13:10), Max: 37 (21 Apr 2021 21:30)  T(F): 98.2 (22 Apr 2021 13:10), Max: 98.6 (21 Apr 2021 21:30)  HR: 97 (22 Apr 2021 13:10) (74 - 97)  BP: 102/60 (22 Apr 2021 13:10) (102/60 - 151/62)  BP(mean): --  RR: 16 (22 Apr 2021 05:50) (16 - 17)  SpO2: 100% (22 Apr 2021 13:10) (97% - 100%)  CONSTITUTIONAL: NAD, well-developed, well-groomed  EYES: conjunctiva and sclera clear  NECK: Supple  RESPIRATORY: Normal respiratory effort; lungs are clear to auscultation bilaterally  CARDIOVASCULAR: Regular rate and rhythm, normal S1 and S2, no murmur/rub/gallop; +left pedal edema;   ABDOMEN: Nontender to palpation, normoactive bowel sounds, slightly distended;   MUSCULOSKELETAL:   no clubbing or cyanosis of digits; no joint swelling or tenderness to palpation        LABS:                      RADIOLOGY & ADDITIONAL TESTS:  Results Reviewed:   Imaging Personally Reviewed:  Electrocardiogram Personally Reviewed:    COORDINATION OF CARE:  Care Discussed with Consultants/Other Providers [Y/N]:  Prior or Outpatient Records Reviewed [Y/N]:

## 2021-04-22 NOTE — CONSULT NOTE ADULT - CONSULT REQUESTED DATE/TIME
09-Apr-2021 11:35
09-Apr-2021 11:05
22-Apr-2021 11:08
10-Apr-2021 16:32
21-Apr-2021 13:00
21-Apr-2021 10:06
09-Apr-2021 11:49

## 2021-04-22 NOTE — PROGRESS NOTE ADULT - PROBLEM SELECTOR PLAN 1
Patient presenting w/ lower extremity weakness L>R distally in the setting of metastatic pancreatic cancer. Notable Left foot drop. No spinal mets seen on CT.   -MR spine thoracic/lumbar w/ and w/o neg for spinal cord compression   - MRI pelvis neg for plexopathy  - MRI C spine and head w and w/o contrast w change in signal- no obvious cord issue ?gliosis  paraneoplastic panel serum pending sent 4/13  - PT eval for rehab  rad onc no intervention planned  neuro surg consulted- think gliosis is reactive to prior surgery  will see if neuro can give an answer to why the left leg is weak- pt eager to get better and go to rehab

## 2021-04-22 NOTE — PROGRESS NOTE ADULT - SUBJECTIVE AND OBJECTIVE BOX
HPI:  Patient is a 70 year old female with past medical history of diabetes, pancreatic cancer w/ mets to lung and liver on chemo currently admitted for LE pain and weakness. Palliative Medicine was consulted to evaluate and manage complex symptomatology related to the patient's life-limiting illness    =======================================================  4/22/2021    - Chart reviewed. Patient seen and examined.  - Her pain is very incidental. At rest, she says she has no pain most of the time, then something happens and it goes to a 9/10 all of a sudden.  -  Allowed me to increase her meds "just a bit"  - Talked about her GOC:    1) Very frustrated at her current state. Said that no one has explained what is going on with her.  2) I told her the tests did not show anything aside from what we suspect is happening: that this is her cancer that is causing this  3) I told her what she would want if this was all due to the cancer and that there is nothing much that can stop/ slow it down  4) She said she would want to go to rehab. She knows she cannot go home. She wants to at least try to stand again.  5) We did talk about hospice but she wants to try to get stronger if going home is still a possibility      =======================================================  ----- SYMPTOM ASSESSMENT:   [ ]Unable to obtain due to poor mentation: information obtained from team/ contact    PAIN ASSESSMENT:   Site- BOTH LEGS FROM KNEES DOWN  Onset- ACUTE   Character- NUMBNESS  Radiation- NONE  Associated symptoms- NONE  Timing and duration- INTERMITTENT SPIKES THROUGHOUT THE DAY  Exacerbating factors  Severity- MOD    Effect on QOL- SIGNIFICANTLY INTERFERES WITH ADL'S  PAIN AD Score: 0    Dyspnea:  Yes [ ] No [X ] - [ ]Mild [ ]Moderate [ ]Severe  Anxiety:    Yes [ ] No [ X] - [ ]Mild [ ]Moderate [ ]Severe  Fatigue:    Yes [ ] No [X ] - [ ]Mild [ ]Moderate [ ]Severe  Nausea:    Yes [ ] No [ X] - [ ]Mild [ ]Moderate [ ]Severe                         Loss of appetite: Yes [ ] No [X ] - [ ]Mild [ ]Moderate [ ]Severe             Constipation:  Yes [ ] No [X ] - [ ]Mild [ ]Moderate [ ]Severe  Grief: Yes [ ] No [X ]     [X ]All other review of systems negative, including: weakness, cough, colds, blurry vision, headaches, dysuria, pruritus, palpitations, muscle cramps, easy bruising, epistaxis, rashes    =======================================================  ----- PERTINENT PMH/ SXH/ FHX  Type 2 diabetes mellitus    Pancreatic cancer      S/P tendon repair      FAMILY HISTORY:  Family history of liver cancer  Sister    Family history of cardiac arrest (Sibling)    FH: aneurysm (Sibling)        ----- SOCIAL HISTORY:   [ ] Unable to elicit  Significant other/partner:    Children:   Adventist/Spirituality:  Substance hx: Yes[ ]  No [ ]   Tobacco hx:  Yes [ ] No [ ]   Alcohol hx: Yes [ ] No [ ]   Home Opioid hx:  [ ] I-Stop Reference No:  Living Situation: [x ]Home  [ ]Long term care  [ ]Rehab [ ]Other  LIVES BY HERSELF  SISTER LIVES NEARBY    ----- ADVANCE DIRECTIVES:    DNR  MOLST  [ ]  Living Will  [ ]   DECISION MAKER(s):  [ ] Health Care Proxy(s)  [ ] Surrogate(s)  [ ] Guardian           Name(s): Phone Number(s):  SISTER IS HER MAIN CONTACT    ----- BASELINE (I)ADL(s) (prior to admission):  [place]: [ ]Total  [ ] Moderate [ ]Dependent  Palliative Performance Status Version 2:  40      =======================================================  -----MEDICATIONS AND ALLERGIES:  Allergies    No Known Allergies    Intolerances    MEDICATIONS  (STANDING):  bisacodyl 5 milliGRAM(s) Oral at bedtime  bisacodyl 5 milliGRAM(s) Oral every 12 hours  dextrose 40% Gel 15 Gram(s) Oral once  dextrose 5%. 1000 milliLiter(s) (50 mL/Hr) IV Continuous <Continuous>  dextrose 5%. 1000 milliLiter(s) (100 mL/Hr) IV Continuous <Continuous>  dextrose 50% Injectable 25 Gram(s) IV Push once  dextrose 50% Injectable 25 Gram(s) IV Push once  dextrose 50% Injectable 12.5 Gram(s) IV Push once  dronabinol 2.5 milliGRAM(s) Oral three times a day  enoxaparin Injectable 40 milliGRAM(s) SubCutaneous daily  fentaNYL   Patch  12 MICROgram(s)/Hr 1 Patch Transdermal every 72 hours  glucagon  Injectable 1 milliGRAM(s) IntraMuscular once  glycerin Suppository - Adult 1 Suppository(s) Rectal daily  insulin lispro (ADMELOG) corrective regimen sliding scale   SubCutaneous three times a day before meals  insulin lispro (ADMELOG) corrective regimen sliding scale   SubCutaneous at bedtime  polyethylene glycol 3350 17 Gram(s) Oral <User Schedule>  saline laxative (FLEET) Rectal Enema 1 Enema Rectal once  senna 2 Tablet(s) Oral two times a day    MEDICATIONS  (PRN):  HYDROmorphone   Tablet 4 milliGRAM(s) Oral every 4 hours PRN Moderate Pain (4 - 6)  HYDROmorphone   Tablet 6 milliGRAM(s) Oral every 4 hours PRN Severe Pain (7 - 10)  HYDROmorphone  Injectable 0.5 milliGRAM(s) IV Push every 6 hours PRN breakthrough pain in case PO dilaudid does not work      =======================================================  ----- PHYSICAL EXAM:  Vital Signs Last 24 Hrs  T(C): 36.8 (22 Apr 2021 13:10), Max: 37 (21 Apr 2021 21:30)  T(F): 98.2 (22 Apr 2021 13:10), Max: 98.6 (21 Apr 2021 21:30)  HR: 97 (22 Apr 2021 13:10) (74 - 97)  BP: 102/60 (22 Apr 2021 13:10) (102/60 - 151/62)  BP(mean): --  RR: 16 (22 Apr 2021 05:50) (16 - 17)  SpO2: 100% (22 Apr 2021 13:10) (97% - 100%)    GEN: Awake, alert, NAD, CACHECTIC  HEAD: Normocephalic and atraumatic,   EYES: Anicteric sclerae, EOM's grossly intact  NECK: No JVD, no thyromegaly  PULM: Comfortable work of breathing, clear BS  CV: Pulses 2+ symmetric bilaterally, regular rate and rhythm  ABD: Not distended, soft, non-tender,   EXT: +TENDERNESS TO PALPATION BOTH LEGS, +1 EDEMA, MMT 2/5  PSYCH: Appropriate mood and affect, no suicidal ideations elicited  NEURO: No facial asymmetry, no tremors, no observed movement disorders  SKIN: No rashes or lesions on exposed skin, No jaundice    =======================================================  ----- LABS:                 ************************************************************************  PALLIATIVE MEDICINE COORDINATION OF CARE DOCUMENTATION  [x] Inpatient Consult  [ ] Other:  ************************************************************************  MEDICATION REVIEW:  --- Pls refer to current medicatons in the body of this note  ISTOP REFERENCE:  [id number]  --- PRN usage:   ------------------------------------------------------------------------  COORDINATION OF CARE:  --- Palliative Care consulted for: PAIN MANAGEMENT  --- Patient assessed: 4/22  --- Patient previously seen by Palliative Care service: NO  ADVANCE CARE PLANNING  --- Code status: DNR, DNI  --- MOLST reviewed in chart: YES  --- HCP/ Surrogate: SISTER IS MAIN CONTACT  --- GOC document found in Alpha: NONE  --- HCP/ Living will/ Other advanced directives in Alpha: NONE  ------------------------------------------------------------------------  CARE PROVIDER DOCUMENTATION:  --- Discussed in IDT  --- All work up, including MRI's reviewed  --- RAD-ONC: No role  --- REHAB: [facility], not a candidate for inpt rehab  --- NEURO: Cervical myelopathy 2/2 vet [facility]  PLAN OF CARE  --- Known admissions in past year: 2  --- Current admit date: 4/9  --- LOS: 13  --- LACE score: 15  --- Current dispo plan: TO BE DETERMINED  ------------------------------------------------------------------------  --- Chart reviewed: 30 Minutes [including time used to gather, review and transfer data to this note]  --- Start: 12:35  --- End: 1:05  Prolonged services rendered, as part of this patient's care provided by Palliative Medicine, include: i.chart review for provider and ancillary service documentation, ii.pertinent diagnostics including laboratory and imaging studies,iii. medication review including PRN use, iv. admission history including previous palliative care encounters and GOC notes, v.advance care planning documents including HCP and MOLST forms in Alpha. Part of Palliative Medicine extended evaluation and management also involves coordination of care with our IDT, the primary and consulting jing, and unit CM/SW and Hospice if eligible. Recommendations based on the information gathered and discussed are outline in the AP of our notes.    ************************************************************************         HPI:  Patient is a 70 year old female with past medical history of diabetes, pancreatic cancer w/ mets to lung and liver on chemo currently admitted for LE pain and weakness. Palliative Medicine was consulted to evaluate and manage complex symptomatology related to the patient's life-limiting illness    =======================================================  4/22/2021    - Chart reviewed. Patient seen and examined.  - Her pain is very incidental. At rest, she says she has no pain most of the time, then something happens and it goes to a 9/10 all of a sudden.  -  Allowed me to increase her meds "just a bit"  - Talked about her GOC:    1) Very frustrated at her current state. Said that no one has explained what is going on with her.  2) I told her the tests did not show anything aside from what we suspect is happening: that this is her cancer that is causing this  3) I told her what she would want if this was all due to the cancer and that there is nothing much that can stop/ slow it down  4) She said she would want to go to rehab. She knows she cannot go home. She wants to at least try to stand again.  5) We did talk about hospice but she wants to try to get stronger if going home is still a possibility      =======================================================  ----- SYMPTOM ASSESSMENT:   [ ]Unable to obtain due to poor mentation: information obtained from team/ contact    PAIN ASSESSMENT:   Site- BOTH LEGS FROM KNEES DOWN  Onset- ACUTE   Character- NUMBNESS  Radiation- NONE  Associated symptoms- NONE  Timing and duration- INTERMITTENT SPIKES THROUGHOUT THE DAY  Exacerbating factors  Severity- MOD    Effect on QOL- SIGNIFICANTLY INTERFERES WITH ADL'S  PAIN AD Score: 0    Dyspnea:  Yes [ ] No [X ] - [ ]Mild [ ]Moderate [ ]Severe  Anxiety:    Yes [ ] No [ X] - [ ]Mild [ ]Moderate [ ]Severe  Fatigue:    Yes [ ] No [X ] - [ ]Mild [ ]Moderate [ ]Severe  Nausea:    Yes [ ] No [ X] - [ ]Mild [ ]Moderate [ ]Severe                         Loss of appetite: Yes [ ] No [X ] - [ ]Mild [ ]Moderate [ ]Severe             Constipation:  Yes [ ] No [X ] - [ ]Mild [ ]Moderate [ ]Severe  Grief: Yes [ ] No [X ]     [X ]All other review of systems negative, including: weakness, cough, colds, blurry vision, headaches, dysuria, pruritus, palpitations, muscle cramps, easy bruising, epistaxis, rashes    =======================================================  ----- PERTINENT PMH/ SXH/ FHX  Type 2 diabetes mellitus    Pancreatic cancer      S/P tendon repair      FAMILY HISTORY:  Family history of liver cancer  Sister    Family history of cardiac arrest (Sibling)    FH: aneurysm (Sibling)        ----- SOCIAL HISTORY:   [ ] Unable to elicit  Significant other/partner:    Children:   Sikh/Spirituality:  Substance hx: Yes[ ]  No [ ]   Tobacco hx:  Yes [ ] No [ ]   Alcohol hx: Yes [ ] No [ ]   Home Opioid hx:  [ ] I-Stop Reference No:  Living Situation: [x ]Home  [ ]Long term care  [ ]Rehab [ ]Other  LIVES BY HERSELF  SISTER LIVES NEARBY    ----- ADVANCE DIRECTIVES:    DNR  MOLST  [ ]  Living Will  [ ]   DECISION MAKER(s):  [ ] Health Care Proxy(s)  [ ] Surrogate(s)  [ ] Guardian           Name(s): Phone Number(s):  SISTER IS HER MAIN CONTACT    ----- BASELINE (I)ADL(s) (prior to admission):  [place]: [ ]Total  [ ] Moderate [ ]Dependent  Palliative Performance Status Version 2:  40      =======================================================  -----MEDICATIONS AND ALLERGIES:  Allergies    No Known Allergies    Intolerances    MEDICATIONS  (STANDING):  bisacodyl 5 milliGRAM(s) Oral at bedtime  bisacodyl 5 milliGRAM(s) Oral every 12 hours  dextrose 40% Gel 15 Gram(s) Oral once  dextrose 5%. 1000 milliLiter(s) (50 mL/Hr) IV Continuous <Continuous>  dextrose 5%. 1000 milliLiter(s) (100 mL/Hr) IV Continuous <Continuous>  dextrose 50% Injectable 25 Gram(s) IV Push once  dextrose 50% Injectable 25 Gram(s) IV Push once  dextrose 50% Injectable 12.5 Gram(s) IV Push once  dronabinol 2.5 milliGRAM(s) Oral three times a day  enoxaparin Injectable 40 milliGRAM(s) SubCutaneous daily  fentaNYL   Patch  12 MICROgram(s)/Hr 1 Patch Transdermal every 72 hours  glucagon  Injectable 1 milliGRAM(s) IntraMuscular once  glycerin Suppository - Adult 1 Suppository(s) Rectal daily  insulin lispro (ADMELOG) corrective regimen sliding scale   SubCutaneous three times a day before meals  insulin lispro (ADMELOG) corrective regimen sliding scale   SubCutaneous at bedtime  polyethylene glycol 3350 17 Gram(s) Oral <User Schedule>  saline laxative (FLEET) Rectal Enema 1 Enema Rectal once  senna 2 Tablet(s) Oral two times a day    MEDICATIONS  (PRN):  HYDROmorphone   Tablet 4 milliGRAM(s) Oral every 4 hours PRN Moderate Pain (4 - 6)  HYDROmorphone   Tablet 6 milliGRAM(s) Oral every 4 hours PRN Severe Pain (7 - 10)  HYDROmorphone  Injectable 0.5 milliGRAM(s) IV Push every 6 hours PRN breakthrough pain in case PO dilaudid does not work      =======================================================  ----- PHYSICAL EXAM:  Vital Signs Last 24 Hrs  T(C): 36.8 (22 Apr 2021 13:10), Max: 37 (21 Apr 2021 21:30)  T(F): 98.2 (22 Apr 2021 13:10), Max: 98.6 (21 Apr 2021 21:30)  HR: 97 (22 Apr 2021 13:10) (74 - 97)  BP: 102/60 (22 Apr 2021 13:10) (102/60 - 151/62)  BP(mean): --  RR: 16 (22 Apr 2021 05:50) (16 - 17)  SpO2: 100% (22 Apr 2021 13:10) (97% - 100%)    GEN: Awake, alert, NAD, CACHECTIC  HEAD: Normocephalic and atraumatic,   EYES: Anicteric sclerae, EOM's grossly intact  NECK: No JVD, no thyromegaly  PULM: Comfortable work of breathing, clear BS  CV: Pulses 2+ symmetric bilaterally, regular rate and rhythm  ABD: Not distended, soft, non-tender,   EXT: +TENDERNESS TO PALPATION BOTH LEGS, +1 EDEMA, MMT 2/5  PSYCH: Appropriate mood and affect, no suicidal ideations elicited  NEURO: No facial asymmetry, no tremors, no observed movement disorders  SKIN: No rashes or lesions on exposed skin, No jaundice    =======================================================  ----- LABS:                 ************************************************************************  PALLIATIVE MEDICINE COORDINATION OF CARE DOCUMENTATION  [x] Inpatient Consult  [ ] Other:  ************************************************************************  MEDICATION REVIEW:  --- Pls refer to current medicatons in the body of this note  ISTOP REFERENCE:  [id number]  --- PRN usage:   ------------------------------------------------------------------------  COORDINATION OF CARE:  --- Palliative Care consulted for: PAIN MANAGEMENT  --- Patient assessed: 4/22  --- Patient previously seen by Palliative Care service: NO  ADVANCE CARE PLANNING  --- Code status: DNR, DNI  --- MOLST reviewed in chart: YES  --- HCP/ Surrogate: SISTER IS MAIN CONTACT  --- GOC document found in Alpha: NONE  --- HCP/ Living will/ Other advanced directives in Alpha: NONE  ------------------------------------------------------------------------  CARE PROVIDER DOCUMENTATION:  --- Discussed in IDT  --- All work up, including MRI's reviewed  --- RAD-ONC: No role  --- REHAB: [facility], not a candidate for inpt rehab  --- NEURO: Cervical myelopathy 2/2 vet [facility]  PLAN OF CARE  --- Known admissions in past year: 2  --- Current admit date: 4/9  --- LOS: 13  --- LACE score: 15  --- Current dispo plan: TO BE DETERMINED  ------------------------------------------------------------------------  --- Chart reviewed: 30 Minutes [including time used to gather, review and transfer data to this note]  --- Start: 1:15  --- End: 1:45  Prolonged services rendered, as part of this patient's care provided by Palliative Medicine, include: i.chart review for provider and ancillary service documentation, ii.pertinent diagnostics including laboratory and imaging studies,iii. medication review including PRN use, iv. admission history including previous palliative care encounters and GOC notes, v.advance care planning documents including HCP and MOLST forms in Alpha. Part of Palliative Medicine extended evaluation and management also involves coordination of care with our IDT, the primary and consulting jing, and unit CM/SW and Hospice if eligible. Recommendations based on the information gathered and discussed are outline in the AP of our notes.    ************************************************************************

## 2021-04-22 NOTE — CONSULT NOTE ADULT - ASSESSMENT
70y with pancreatic cancer and mets to liver and lung on chemotherapy with b/l LE weakness with MRI C4-C5 gliosis       PLAN   - Discussed case with attending  70y with pancreatic cancer and mets to liver and lung on chemotherapy with b/l LE weakness with MRI C4-C5 gliosis       PLAN   - No acute neurosurgical intervention   - MRI findings unlikely related to b/l LE weakness and is a chronic finding   - Discussed case with attending  70y with pancreatic cancer and mets to liver and lung on chemotherapy with b/l LE weakness with MRI C4-C5 gliosis       PLAN   - No acute neurosurgical intervention   - Defer to neurology as she is being work up for plexopathy   - MRI findings unlikely related to b/l LE weakness and is a chronic finding from old posterior cervical laminectomy  - Discussed case with attending

## 2021-04-22 NOTE — CONSULT NOTE ADULT - SUBJECTIVE AND OBJECTIVE BOX
NEUROSURGERY CONSULT  MARIA NICOLAS   04-22-21 @ 11:09    HPI: 70y Female  Patient is a 70 year old female with past medical history of diabetes, pancreatic cancer w/ mets to lung and liver on chemo last session Monday (9th session, abraxane/gemzar) coming in w/ inability to ambulate and independently take care of ADLs since Monday. Patient reports she ambulates w/ a walker at baseline, however she does so independently. Patient now w/ issues even standing up with the walker. States her weakness is associated w/ b/l lower extremity pain. Pain is 9/10 in severity and is aching. Patient was taking her home fentanyl and Dilaudid however this did not help symptoms. Patient states she lives alone w/o HHA. Patient states she has had some difficulty completely emptying her bladder however denies any urinary incontinence. Patient denies any fecal incontinence. States she is moving her bowels however her BMs have been small. Patient denies any saddle anesthesia.   ED Course: T 98.2, R 17, HR 93, BP 91/48. Patient Labs notable for BUN 30. Patient hyperglycemic on FSG. Patient AST midly elevated 43. Patient Hemoglobin 8.8 at baseline. Patient urine concentrated 1.050 otherwise negative for UTI. Patient had a CT thoracic and lumbar spine, with chronic severe left hydronephrosis. Also w/ large stool burden otherwise unremarkable. Patient given 1L NS. Patient admitted for evaluation of lower extremity weakness and PT eval for inability to ambulate.     RADIOLOGY:   < from: MR Cervical Spine w/wo IV Cont (04.20.21 @ 09:56) >  MR BRAIN:    There is a curvilinear region of T2 FLAIR hyperintensity adjacent to the cavernous segment of the internal carotid artery (3-80), which displays contrast enhancement (6-38), which may represent chronic thickening of the right ethmoid sinus.        Mild prominence of the sulci and ventricles are consistent with age-appropriate volume loss.    There are severe foci of T2/FLAIR signal hyperintensity within the hemispheric white matter, which are nonspecific but likely related to sequelae of microvascular disease.    There is no intraparenchymal hematoma, mass effect or midline shift.    No abnormal extra-axial fluid collections are present. There is no diffusion abnormality to suggest acute or subacute infarction.      Nonspecific heterogeneous bone marrow signal noted.    Partial opacification mastoid air cells. Mucosal thickening paranasal sinuses.    MR CERVICAL SPINE:    Study markedly limited due to motion especially the postcontrast images    Status post posterior spinal fusion and laminectomy of C3-C6.    There is focal high T2 signal within the cervical cord at the level of C4-5 in the left hemicord. No cord compression.    Vertebral body heights are maintained. Heterogeneous low T1 marrow signal is noted throughout the vertebral bodies of the visualized cervicothoracic spine.    There is straightening of the normal cervical lordosis.    Sagittal alignment intact.    DISC LEVEL EVALUATION:    C2/C3: Unremarkable.  C3/C4: Unremarkable.  C4/C5: Unremarkable.  C5/C6: Mild circumferential disc bulge. No significant canal stenosis.  C6/C7: Moderate circumferential disc bulge with bilateral facet arthropathy results in mild canal stenosis. Mild right neural foraminal narrowing  C7/T1: Unremarkable.    IMPRESSION:    MR BRAIN:  *  No findings suggestive of intra-axial metastatic disease.  *  Nonspecific Heterogeneous marrow signal throughout the calvarium, which may represent a marrow infiltrative process versus anemia.    MR CERVICAL SPINE:  *  Nonspecific focal abnormal cord signal in the left hemicord at the level of C4-5 could represent likely gliosis. No abnormal enhancement to suggest underlying lesion. Edema not entirely excluded.  *  Nonspecific heterogeneous marrow signal noted throughout the visualized cervical and thoracic vertebral bodies. Metastatic disease/marrow infiltrative disease not excluded    < end of copied text >      MEDS:  dextrose 40% Gel 15 Gram(s) Oral once  dextrose 5%. 1000 milliLiter(s) IV Continuous <Continuous>  dextrose 5%. 1000 milliLiter(s) IV Continuous <Continuous>  dextrose 50% Injectable 25 Gram(s) IV Push once  dextrose 50% Injectable 12.5 Gram(s) IV Push once  dextrose 50% Injectable 25 Gram(s) IV Push once  dronabinol 2.5 milliGRAM(s) Oral three times a day  enoxaparin Injectable 40 milliGRAM(s) SubCutaneous daily  fentaNYL   Patch  12 MICROgram(s)/Hr 1 Patch Transdermal every 72 hours  glucagon  Injectable 1 milliGRAM(s) IntraMuscular once  glycerin Suppository - Adult 1 Suppository(s) Rectal daily  HYDROmorphone   Tablet 4 milliGRAM(s) Oral every 4 hours PRN  HYDROmorphone   Tablet 6 milliGRAM(s) Oral every 4 hours PRN  HYDROmorphone  Injectable 0.5 milliGRAM(s) IV Push every 6 hours PRN  insulin lispro (ADMELOG) corrective regimen sliding scale   SubCutaneous three times a day before meals  insulin lispro (ADMELOG) corrective regimen sliding scale   SubCutaneous at bedtime  polyethylene glycol 3350 17 Gram(s) Oral <User Schedule>  saline laxative (FLEET) Rectal Enema 1 Enema Rectal once  senna 2 Tablet(s) Oral two times a day      PHYSICAL EXAM:  Awake, alert, following commands   PERRL EOMI   MAEx4   L foot drop   SILT    Vital Signs Last 24 Hrs  T(C): 36.7 (22 Apr 2021 05:50), Max: 37 (21 Apr 2021 21:30)  T(F): 98 (22 Apr 2021 05:50), Max: 98.6 (21 Apr 2021 21:30)  HR: 74 (22 Apr 2021 05:50) (74 - 101)  BP: 151/62 (22 Apr 2021 05:50) (113/56 - 151/62)  BP(mean): --  RR: 16 (22 Apr 2021 05:50) (16 - 17)  SpO2: 98% (22 Apr 2021 05:50) (97% - 99%)      LABS:                     NEUROSURGERY CONSULT  MARIA NICOLAS   04-22-21 @ 11:09    HPI: 70y Female  Patient is a 70 year old female with past medical history of diabetes, pancreatic cancer w/ mets to lung and liver on chemo last session Monday (9th session, abraxane/gemzar) coming in w/ inability to ambulate and independently take care of ADLs since Monday. Patient reports she ambulates w/ a walker at baseline, however she does so independently. Patient now w/ issues even standing up with the walker. States her weakness is associated w/ b/l lower extremity pain. Pain is 9/10 in severity and is aching. Patient was taking her home fentanyl and Dilaudid however this did not help symptoms. Patient states she lives alone w/o HHA. Patient states she has had some difficulty completely emptying her bladder however denies any urinary incontinence. Patient denies any fecal incontinence. States she is moving her bowels however her BMs have been small. Patient denies any saddle anesthesia.   ED Course: T 98.2, R 17, HR 93, BP 91/48. Patient Labs notable for BUN 30. Patient hyperglycemic on FSG. Patient AST midly elevated 43. Patient Hemoglobin 8.8 at baseline. Patient urine concentrated 1.050 otherwise negative for UTI. Patient had a CT thoracic and lumbar spine, with chronic severe left hydronephrosis. Also w/ large stool burden otherwise unremarkable. Patient given 1L NS. Patient admitted for evaluation of lower extremity weakness and PT eval for inability to ambulate.     RADIOLOGY:   < from: MR Cervical Spine w/wo IV Cont (04.20.21 @ 09:56) >  MR BRAIN:    There is a curvilinear region of T2 FLAIR hyperintensity adjacent to the cavernous segment of the internal carotid artery (3-80), which displays contrast enhancement (6-38), which may represent chronic thickening of the right ethmoid sinus.        Mild prominence of the sulci and ventricles are consistent with age-appropriate volume loss.    There are severe foci of T2/FLAIR signal hyperintensity within the hemispheric white matter, which are nonspecific but likely related to sequelae of microvascular disease.    There is no intraparenchymal hematoma, mass effect or midline shift.    No abnormal extra-axial fluid collections are present. There is no diffusion abnormality to suggest acute or subacute infarction.      Nonspecific heterogeneous bone marrow signal noted.    Partial opacification mastoid air cells. Mucosal thickening paranasal sinuses.    MR CERVICAL SPINE:    Study markedly limited due to motion especially the postcontrast images    Status post posterior spinal fusion and laminectomy of C3-C6.    There is focal high T2 signal within the cervical cord at the level of C4-5 in the left hemicord. No cord compression.    Vertebral body heights are maintained. Heterogeneous low T1 marrow signal is noted throughout the vertebral bodies of the visualized cervicothoracic spine.    There is straightening of the normal cervical lordosis.    Sagittal alignment intact.    DISC LEVEL EVALUATION:    C2/C3: Unremarkable.  C3/C4: Unremarkable.  C4/C5: Unremarkable.  C5/C6: Mild circumferential disc bulge. No significant canal stenosis.  C6/C7: Moderate circumferential disc bulge with bilateral facet arthropathy results in mild canal stenosis. Mild right neural foraminal narrowing  C7/T1: Unremarkable.    IMPRESSION:    MR BRAIN:  *  No findings suggestive of intra-axial metastatic disease.  *  Nonspecific Heterogeneous marrow signal throughout the calvarium, which may represent a marrow infiltrative process versus anemia.    MR CERVICAL SPINE:  *  Nonspecific focal abnormal cord signal in the left hemicord at the level of C4-5 could represent likely gliosis. No abnormal enhancement to suggest underlying lesion. Edema not entirely excluded.  *  Nonspecific heterogeneous marrow signal noted throughout the visualized cervical and thoracic vertebral bodies. Metastatic disease/marrow infiltrative disease not excluded    < end of copied text >      MEDS:  dextrose 40% Gel 15 Gram(s) Oral once  dextrose 5%. 1000 milliLiter(s) IV Continuous <Continuous>  dextrose 5%. 1000 milliLiter(s) IV Continuous <Continuous>  dextrose 50% Injectable 25 Gram(s) IV Push once  dextrose 50% Injectable 12.5 Gram(s) IV Push once  dextrose 50% Injectable 25 Gram(s) IV Push once  dronabinol 2.5 milliGRAM(s) Oral three times a day  enoxaparin Injectable 40 milliGRAM(s) SubCutaneous daily  fentaNYL   Patch  12 MICROgram(s)/Hr 1 Patch Transdermal every 72 hours  glucagon  Injectable 1 milliGRAM(s) IntraMuscular once  glycerin Suppository - Adult 1 Suppository(s) Rectal daily  HYDROmorphone   Tablet 4 milliGRAM(s) Oral every 4 hours PRN  HYDROmorphone   Tablet 6 milliGRAM(s) Oral every 4 hours PRN  HYDROmorphone  Injectable 0.5 milliGRAM(s) IV Push every 6 hours PRN  insulin lispro (ADMELOG) corrective regimen sliding scale   SubCutaneous three times a day before meals  insulin lispro (ADMELOG) corrective regimen sliding scale   SubCutaneous at bedtime  polyethylene glycol 3350 17 Gram(s) Oral <User Schedule>  saline laxative (FLEET) Rectal Enema 1 Enema Rectal once  senna 2 Tablet(s) Oral two times a day      PHYSICAL EXAM:  Awake, alert, following commands   PERRL EOMI   MAEx4, 4+/5 throughout  L foot drop   SILT    Vital Signs Last 24 Hrs  T(C): 36.7 (22 Apr 2021 05:50), Max: 37 (21 Apr 2021 21:30)  T(F): 98 (22 Apr 2021 05:50), Max: 98.6 (21 Apr 2021 21:30)  HR: 74 (22 Apr 2021 05:50) (74 - 101)  BP: 151/62 (22 Apr 2021 05:50) (113/56 - 151/62)  BP(mean): --  RR: 16 (22 Apr 2021 05:50) (16 - 17)  SpO2: 98% (22 Apr 2021 05:50) (97% - 99%)      LABS:

## 2021-04-22 NOTE — CONSULT NOTE ADULT - REASON FOR ADMISSION
Lower Extremity Weakness, Inability to Ambulate

## 2021-04-23 LAB
APPEARANCE UR: ABNORMAL
BACTERIA # UR AUTO: ABNORMAL
BILIRUB UR-MCNC: NEGATIVE — SIGNIFICANT CHANGE UP
COLOR SPEC: ABNORMAL
DIFF PNL FLD: ABNORMAL
GLUCOSE BLDC GLUCOMTR-MCNC: 190 MG/DL — HIGH (ref 70–99)
GLUCOSE UR QL: ABNORMAL
KETONES UR-MCNC: NEGATIVE — SIGNIFICANT CHANGE UP
LEUKOCYTE ESTERASE UR-ACNC: ABNORMAL
NITRITE UR-MCNC: NEGATIVE — SIGNIFICANT CHANGE UP
PH UR: 6.5 — SIGNIFICANT CHANGE UP (ref 5–8)
PROT UR-MCNC: ABNORMAL
RBC CASTS # UR COMP ASSIST: 10 /HPF — HIGH (ref 0–4)
SP GR SPEC: 1.02 — SIGNIFICANT CHANGE UP (ref 1.01–1.02)
UROBILINOGEN FLD QL: SIGNIFICANT CHANGE UP
WBC UR QL: SIGNIFICANT CHANGE UP /HPF (ref 0–5)

## 2021-04-23 PROCEDURE — 99233 SBSQ HOSP IP/OBS HIGH 50: CPT

## 2021-04-23 PROCEDURE — 99232 SBSQ HOSP IP/OBS MODERATE 35: CPT

## 2021-04-23 RX ADMIN — Medication 100 MILLIGRAM(S): at 12:50

## 2021-04-23 RX ADMIN — HYDROMORPHONE HYDROCHLORIDE 8 MILLIGRAM(S): 2 INJECTION INTRAMUSCULAR; INTRAVENOUS; SUBCUTANEOUS at 22:09

## 2021-04-23 RX ADMIN — HYDROMORPHONE HYDROCHLORIDE 6 MILLIGRAM(S): 2 INJECTION INTRAMUSCULAR; INTRAVENOUS; SUBCUTANEOUS at 03:00

## 2021-04-23 RX ADMIN — Medication 2.5 MILLIGRAM(S): at 14:02

## 2021-04-23 RX ADMIN — FENTANYL CITRATE 1 PATCH: 50 INJECTION INTRAVENOUS at 18:05

## 2021-04-23 RX ADMIN — HYDROMORPHONE HYDROCHLORIDE 6 MILLIGRAM(S): 2 INJECTION INTRAMUSCULAR; INTRAVENOUS; SUBCUTANEOUS at 07:20

## 2021-04-23 RX ADMIN — FENTANYL CITRATE 1 PATCH: 50 INJECTION INTRAVENOUS at 06:14

## 2021-04-23 RX ADMIN — HYDROMORPHONE HYDROCHLORIDE 8 MILLIGRAM(S): 2 INJECTION INTRAMUSCULAR; INTRAVENOUS; SUBCUTANEOUS at 21:09

## 2021-04-23 RX ADMIN — HYDROMORPHONE HYDROCHLORIDE 0.5 MILLIGRAM(S): 2 INJECTION INTRAMUSCULAR; INTRAVENOUS; SUBCUTANEOUS at 23:08

## 2021-04-23 RX ADMIN — ENOXAPARIN SODIUM 40 MILLIGRAM(S): 100 INJECTION SUBCUTANEOUS at 12:51

## 2021-04-23 RX ADMIN — Medication 50 MILLIGRAM(S): at 14:02

## 2021-04-23 RX ADMIN — Medication 1: at 09:12

## 2021-04-23 RX ADMIN — Medication 1: at 22:57

## 2021-04-23 RX ADMIN — Medication 1: at 12:51

## 2021-04-23 RX ADMIN — HYDROMORPHONE HYDROCHLORIDE 0.5 MILLIGRAM(S): 2 INJECTION INTRAMUSCULAR; INTRAVENOUS; SUBCUTANEOUS at 22:53

## 2021-04-23 RX ADMIN — HYDROMORPHONE HYDROCHLORIDE 6 MILLIGRAM(S): 2 INJECTION INTRAMUSCULAR; INTRAVENOUS; SUBCUTANEOUS at 06:23

## 2021-04-23 RX ADMIN — Medication 2.5 MILLIGRAM(S): at 06:23

## 2021-04-23 RX ADMIN — Medication 2: at 17:21

## 2021-04-23 RX ADMIN — HYDROMORPHONE HYDROCHLORIDE 6 MILLIGRAM(S): 2 INJECTION INTRAMUSCULAR; INTRAVENOUS; SUBCUTANEOUS at 02:05

## 2021-04-23 NOTE — PROGRESS NOTE ADULT - ASSESSMENT
70F w/ PMH of Pancreatic cancer w/ mets to the lungs and liver presents w/ paraparesis of unclear etiology.     MR C-spine demonstrates L. C4-C5 T2 hyperintensity consistent w/ gliosis. s/p posterior spinal fusion and laminectomy of C3-C6.     MR l-spine w/w/o contrast 4.9.2021: Negative.  MR Pelvis 4.15.21 negative               70F w/ PMH of Pancreatic cancer w/ mets to the lungs and liver presents w/ paraparesis of unclear etiology.     MR C-spine demonstrates L. C4-C5 T2 hyperintensity consistent w/ gliosis. s/p posterior spinal fusion and laminectomy of C3-C6.   MR l-spine w/w/o contrast 4.9.2021: Negative.  MR Pelvis 4.15.21 negative     Impression Acute to subacute onset paraparesis of unclear localization, possibilities include polyradiculoneuropathy or myelopathy. Etiologies include nutritional polyneuropathy (B6 defiiciency), AIDP, paraneoplastic immune mediated polyneuropathy, or cervical spondylytic myelopathy (though this is probably chronic). Although patient has severe diffuse atrophy throughout, this is symmetric and likely related to her malignancy and underlying cachexia as opposed to a neurodegenerative process such as motor neuron disease.     Plan:   [] SPEP, UPEP, Ganglioside antibody, ACE, Sjogren, SLE antibodies  [] Paraneoplastic serum panel   [] LP: Please send CSF cell count; Glucose; Protein: Gram stain; CSF PCR panel; CSF AFB; CSF fungal cx; CSF VDRL titer; Oligoclonal bands; Myelin Basic Protein; Cryptococcal antigen  [] Replete B1 and B6

## 2021-04-23 NOTE — PROGRESS NOTE ADULT - SUBJECTIVE AND OBJECTIVE BOX
MARIA NICOLAS  Female  MRN-[id number]    Interval:    Patient examined at bedside. Exam unchanged.       VITAL SIGNS:  T(F): 98.6  HR: 70  BP: 147/70  RR: 18  SpO2: 99%    Exam:   MS: Oriented x3. Fluent. Follows crossed commands.   CN: VFF. EOMI. V1-V3 intact. Face symmetric. T/u midline.   Motor: Full strength throughout.   Sensory: Intact to LT throughout   Reflexes: 2+ throughout. Babinski absent bilaterally.   Coordination: No dysmetria on FNF or ataxia on HTS bilaterally   Gait: Deferred    LABS:                MEDICATIONS:   bisacodyl 5 milliGRAM(s) Oral at bedtime  bisacodyl 5 milliGRAM(s) Oral every 12 hours  dextrose 40% Gel 15 Gram(s) Oral once  dextrose 5%. 1000 milliLiter(s) IV Continuous <Continuous>  dextrose 5%. 1000 milliLiter(s) IV Continuous <Continuous>  dextrose 50% Injectable 25 Gram(s) IV Push once  dextrose 50% Injectable 12.5 Gram(s) IV Push once  dextrose 50% Injectable 25 Gram(s) IV Push once  dronabinol 2.5 milliGRAM(s) Oral three times a day  enoxaparin Injectable 40 milliGRAM(s) SubCutaneous daily  fentaNYL   Patch  12 MICROgram(s)/Hr 1 Patch Transdermal every 72 hours  glucagon  Injectable 1 milliGRAM(s) IntraMuscular once  glycerin Suppository - Adult 1 Suppository(s) Rectal daily  HYDROmorphone   Tablet 6 milliGRAM(s) Oral every 4 hours PRN  HYDROmorphone   Tablet 8 milliGRAM(s) Oral every 4 hours PRN  HYDROmorphone  Injectable 0.5 milliGRAM(s) IV Push every 6 hours PRN  insulin lispro (ADMELOG) corrective regimen sliding scale   SubCutaneous three times a day before meals  insulin lispro (ADMELOG) corrective regimen sliding scale   SubCutaneous at bedtime  polyethylene glycol 3350 17 Gram(s) Oral <User Schedule>  pyridoxine 50 milliGRAM(s) Oral daily  senna 2 Tablet(s) Oral two times a day  thiamine 100 milliGRAM(s) Oral daily          RADIOLOGY & ADDITIONAL STUDIES:             MARIA NICOLAS  Female  MRN-[id number]    Interval:    Patient examined at bedside. Exam unchanged.       VITAL SIGNS:  T(F): 98.6  HR: 70  BP: 147/70  RR: 18  SpO2: 99%    Exam:   MS: Oriented x3. Fluent. Follows crossed commands.   CN: VFF. EOMI. V1-V3 intact. Face symmetric. T/u midline.   Motor: Diffuse atrophy throughout. Possible fasculations on the left thigh. UE 5/5. LE some movement within plane of bed, DF 0/5.   Sensory: Intact to LT throughout   Reflexes: 2+ UEs. areflexic in the LEs.   Coordination: No dysmetria on FNF or ataxia on HTS bilaterally   Gait: Deferred    LABS:                MEDICATIONS:   bisacodyl 5 milliGRAM(s) Oral at bedtime  bisacodyl 5 milliGRAM(s) Oral every 12 hours  dextrose 40% Gel 15 Gram(s) Oral once  dextrose 5%. 1000 milliLiter(s) IV Continuous <Continuous>  dextrose 5%. 1000 milliLiter(s) IV Continuous <Continuous>  dextrose 50% Injectable 25 Gram(s) IV Push once  dextrose 50% Injectable 12.5 Gram(s) IV Push once  dextrose 50% Injectable 25 Gram(s) IV Push once  dronabinol 2.5 milliGRAM(s) Oral three times a day  enoxaparin Injectable 40 milliGRAM(s) SubCutaneous daily  fentaNYL   Patch  12 MICROgram(s)/Hr 1 Patch Transdermal every 72 hours  glucagon  Injectable 1 milliGRAM(s) IntraMuscular once  glycerin Suppository - Adult 1 Suppository(s) Rectal daily  HYDROmorphone   Tablet 6 milliGRAM(s) Oral every 4 hours PRN  HYDROmorphone   Tablet 8 milliGRAM(s) Oral every 4 hours PRN  HYDROmorphone  Injectable 0.5 milliGRAM(s) IV Push every 6 hours PRN  insulin lispro (ADMELOG) corrective regimen sliding scale   SubCutaneous three times a day before meals  insulin lispro (ADMELOG) corrective regimen sliding scale   SubCutaneous at bedtime  polyethylene glycol 3350 17 Gram(s) Oral <User Schedule>  pyridoxine 50 milliGRAM(s) Oral daily  senna 2 Tablet(s) Oral two times a day  thiamine 100 milliGRAM(s) Oral daily          RADIOLOGY & ADDITIONAL STUDIES:

## 2021-04-23 NOTE — PROGRESS NOTE ADULT - PROBLEM SELECTOR PLAN 1
Patient presenting w/ lower extremity weakness L>R distally in the setting of metastatic pancreatic cancer. Notable Left foot drop. No spinal mets seen on CT.   -MR spine thoracic/lumbar w/ and w/o neg for spinal cord compression   - MRI pelvis neg for plexopathy  - MRI C spine and head w and w/o contrast w change in signal- no obvious cord issue ?gliosis  Imaging concerning for vertebral bony metastases with resultant cervical myelopathy which could explain symptoms per neuro  rad onc no intervention planned  neuro surg:- think gliosis is reactive to prior surgery  B1 and B6 levels noted to be low: started on supplements  Pending rehab

## 2021-04-23 NOTE — PROGRESS NOTE ADULT - SUBJECTIVE AND OBJECTIVE BOX
[facility] Division of Hospital Medicine  Leila Jimenez DO  Pager (JACQUE-JONATHAN, 4A-5P): [phone]      Patient is a 70y old  Female who presents with a chief complaint of Lower Extremity Weakness, Inability to Ambulate (22 Apr 2021 16:27)      SUBJECTIVE / OVERNIGHT EVENTS: No events overnight. Reports some pain in RLE and wants to straighten. Denies N/V, abd pain, states she urinated after her joy was removed. Per RN note, patient still retaining and requiring straight cath after joy DCed last night.    MEDICATIONS  (STANDING):  bisacodyl 5 milliGRAM(s) Oral at bedtime  bisacodyl 5 milliGRAM(s) Oral every 12 hours  dextrose 40% Gel 15 Gram(s) Oral once  dextrose 5%. 1000 milliLiter(s) (50 mL/Hr) IV Continuous <Continuous>  dextrose 5%. 1000 milliLiter(s) (100 mL/Hr) IV Continuous <Continuous>  dextrose 50% Injectable 25 Gram(s) IV Push once  dextrose 50% Injectable 12.5 Gram(s) IV Push once  dextrose 50% Injectable 25 Gram(s) IV Push once  dronabinol 2.5 milliGRAM(s) Oral three times a day  enoxaparin Injectable 40 milliGRAM(s) SubCutaneous daily  fentaNYL   Patch  12 MICROgram(s)/Hr 1 Patch Transdermal every 72 hours  glucagon  Injectable 1 milliGRAM(s) IntraMuscular once  glycerin Suppository - Adult 1 Suppository(s) Rectal daily  insulin lispro (ADMELOG) corrective regimen sliding scale   SubCutaneous three times a day before meals  insulin lispro (ADMELOG) corrective regimen sliding scale   SubCutaneous at bedtime  polyethylene glycol 3350 17 Gram(s) Oral <User Schedule>  pyridoxine 50 milliGRAM(s) Oral daily  senna 2 Tablet(s) Oral two times a day  thiamine 100 milliGRAM(s) Oral daily    MEDICATIONS  (PRN):  HYDROmorphone   Tablet 6 milliGRAM(s) Oral every 4 hours PRN Moderate Pain (4 - 6)  HYDROmorphone   Tablet 8 milliGRAM(s) Oral every 4 hours PRN Severe Pain (7 - 10)  HYDROmorphone  Injectable 0.5 milliGRAM(s) IV Push every 6 hours PRN breakthrough pain in case PO dilaudid does not work      CAPILLARY BLOOD GLUCOSE      POCT Blood Glucose.: 175 mg/dL (23 Apr 2021 12:42)  POCT Blood Glucose.: 190 mg/dL (23 Apr 2021 08:21)  POCT Blood Glucose.: 264 mg/dL (22 Apr 2021 22:11)  POCT Blood Glucose.: 268 mg/dL (22 Apr 2021 18:08)    I&O's Summary    22 Apr 2021 07:01  -  23 Apr 2021 07:00  --------------------------------------------------------  IN: 0 mL / OUT: 600 mL / NET: -600 mL        PHYSICAL EXAM:  Vital Signs Last 24 Hrs  T(C): 37 (23 Apr 2021 13:32), Max: 37.1 (22 Apr 2021 21:39)  T(F): 98.6 (23 Apr 2021 13:32), Max: 98.7 (22 Apr 2021 21:39)  HR: 70 (23 Apr 2021 13:32) (70 - 86)  BP: 147/70 (23 Apr 2021 13:32) (133/64 - 152/75)  BP(mean): --  RR: 18 (23 Apr 2021 13:32) (18 - 20)  SpO2: 99% (23 Apr 2021 13:32) (99% - 100%)    CONSTITUTIONAL: looks uncomfortable lying in bed, severely cachectic  EYES: sclera clear  RESPIRATORY: Normal respiratory effort; lungs are clear to auscultation bilaterally  CARDIOVASCULAR:S1 and S2, no murmurs appreciated  ABDOMEN: Nontender to palpation, decreased bowel sounds, slightly distended;   MUSCULOSKELETAL: severe muscle atrophy in all extremities, left foot drop, decreased ROM of LLE due to pain  NEURO: awake, answers Qs, left foot drop    LABS:                      RADIOLOGY & ADDITIONAL TESTS:  Results Reviewed:   Imaging Personally Reviewed:  Electrocardiogram Personally Reviewed:    COORDINATION OF CARE:  Care Discussed with Consultants/Other Providers [Y/N]:  Prior or Outpatient Records Reviewed [Y/N]:

## 2021-04-23 NOTE — PROGRESS NOTE ADULT - PROBLEM SELECTOR PLAN 8
pt rec rehab, does not meet criteria for acute rehab. Will likely go on Monday 4/26 per . COVID swab to be done 4/25

## 2021-04-23 NOTE — PROGRESS NOTE ADULT - PROBLEM SELECTOR PLAN 4
Patient w/ large stool burden on mri  had bm w/ moviprep on 4/17  c/w miralax tid, dulcolax bid, dulcolax 5mg HS, senna

## 2021-04-23 NOTE — PROGRESS NOTE ADULT - ASSESSMENT
70yr old woman PMH diabetes, pancreatic cancer w/ mets to lung and liver on chemo (9th session, abraxane/gemzar) p/w inability to ambulate and LLE>RLE weakness. Possibly due to gliosisfrom prior surgery per neuro.

## 2021-04-23 NOTE — PROGRESS NOTE ADULT - ATTENDING COMMENTS
Date of service: 4/23/2021    71 yo female with hx of metastatic pancreatic cancer with mets to lung and liver (on chemotherapy) presented with 6 week hx of progressive b/l LE weakness (L>R).    On exam patient has significant loss of muscle mass all over, is cachetic appearing. No cranial nerve deficits. Speech and language intact. UE strength 5/5 and reflexes 2+. LE paresis with areflexia with bilateral foot drop (0/5 dorsiflexion and 2/5 plantar flexion on the right) and proximally right hip flexion 4-/5 and left hip flexion 3/5. No clonus and plantars mute. Diminished vibration sensation at toes and ankles. Fasciculations noted over thigh muscles.    MRI imaging of entire neuro axis was completed and showed evidence of posterior cervical spine fusion and laminectomy from C3-C6 with cord signal abnormality in left posterior cord at C4-C5 consistent with gliosis. MRI L spine showed some degenerative changes but no significant stenosis of canal or neural foramina and no nerve root enhancement. MRI pelvis revealed intact lumbosacral plexus. MRI brain did not show any metastatic disease.     IMP: acute to subacute onset lower extremity paresthesias- unclear etiology at this time. Differentials include polyradiculoneuropathy or polyneuropathy (nutritional deficiencies, immune mediated vs paraneoplastic). Patient labs show evidence for B1 and B6 deficiency which could contribute to peripheral neuropathy, however I would not expect such a rapid decline. NO myelopathic signs on exam and cervical spine findings are likely chronic.     Recommend obtaining serum paraneoplastic panel, ganglioside antibody, ACE, Rheumatological serologies. Please also obtain spinal tap to rule out immune mediated polyneuropathy. Date of service: 4/23/2021    69 yo female with hx of metastatic pancreatic cancer with mets to lung and liver (on chemotherapy) presented with 6 week hx of progressive b/l LE weakness (L>R).    On exam patient has significant loss of muscle mass all over, is cachetic appearing. No cranial nerve deficits. Speech and language intact. UE strength 5/5 and reflexes 2+. LE paresis with areflexia with bilateral foot drop (0/5 dorsiflexion and 2/5 plantar flexion on the right) and proximally right hip flexion 4-/5 and left hip flexion 3/5. No clonus and plantars mute. Diminished vibration sensation at toes and ankles. Fasciculations noted over thigh muscles.     MRI imaging of entire neuro axis was completed and showed evidence of posterior cervical spine fusion and laminectomy from C3-C6 with cord signal abnormality in left posterior cord at C4-C5 consistent with gliosis. MRI L spine showed some degenerative changes but no significant stenosis of canal or neural foramina and no nerve root enhancement. MRI pelvis revealed intact lumbosacral plexus. MRI brain did not show any metastatic disease.     IMP: acute to subacute onset lower extremity paresis- unclear etiology at this time. Differentials include polyradiculoneuropathy or polyneuropathy (nutritional deficiencies, immune mediated vs paraneoplastic). Patient labs show evidence for B1 and B6 deficiency which could contribute to peripheral neuropathy, however I would not expect such a rapid decline. NO myelopathic signs on exam and cervical spine findings are likely chronic.     Recommend obtaining serum paraneoplastic panel, ganglioside antibody, ACE, Rheumatological serologies. Please also obtain spinal tap to rule out immune mediated polyneuropathy.

## 2021-04-24 LAB
ANION GAP SERPL CALC-SCNC: 9 MMOL/L — SIGNIFICANT CHANGE UP (ref 7–14)
BUN SERPL-MCNC: 13 MG/DL — SIGNIFICANT CHANGE UP (ref 7–23)
CALCIUM SERPL-MCNC: 8.8 MG/DL — SIGNIFICANT CHANGE UP (ref 8.4–10.5)
CHLORIDE SERPL-SCNC: 98 MMOL/L — SIGNIFICANT CHANGE UP (ref 98–107)
CO2 SERPL-SCNC: 28 MMOL/L — SIGNIFICANT CHANGE UP (ref 22–31)
CREAT SERPL-MCNC: 0.35 MG/DL — LOW (ref 0.5–1.3)
GLUCOSE SERPL-MCNC: 202 MG/DL — HIGH (ref 70–99)
HCT VFR BLD CALC: 28.9 % — LOW (ref 34.5–45)
HGB BLD-MCNC: 9.5 G/DL — LOW (ref 11.5–15.5)
MAGNESIUM SERPL-MCNC: 2.2 MG/DL — SIGNIFICANT CHANGE UP (ref 1.6–2.6)
MCHC RBC-ENTMCNC: 31 PG — SIGNIFICANT CHANGE UP (ref 27–34)
MCHC RBC-ENTMCNC: 32.9 GM/DL — SIGNIFICANT CHANGE UP (ref 32–36)
MCV RBC AUTO: 94.4 FL — SIGNIFICANT CHANGE UP (ref 80–100)
NRBC # BLD: 0 /100 WBCS — SIGNIFICANT CHANGE UP
NRBC # FLD: 0 K/UL — SIGNIFICANT CHANGE UP
PHOSPHATE SERPL-MCNC: 3.4 MG/DL — SIGNIFICANT CHANGE UP (ref 2.5–4.5)
PLATELET # BLD AUTO: 549 K/UL — HIGH (ref 150–400)
POTASSIUM SERPL-MCNC: 4 MMOL/L — SIGNIFICANT CHANGE UP (ref 3.5–5.3)
POTASSIUM SERPL-SCNC: 4 MMOL/L — SIGNIFICANT CHANGE UP (ref 3.5–5.3)
RBC # BLD: 3.06 M/UL — LOW (ref 3.8–5.2)
RBC # FLD: 18.9 % — HIGH (ref 10.3–14.5)
SODIUM SERPL-SCNC: 135 MMOL/L — SIGNIFICANT CHANGE UP (ref 135–145)
WBC # BLD: 7.49 K/UL — SIGNIFICANT CHANGE UP (ref 3.8–10.5)
WBC # FLD AUTO: 7.49 K/UL — SIGNIFICANT CHANGE UP (ref 3.8–10.5)

## 2021-04-24 PROCEDURE — 99232 SBSQ HOSP IP/OBS MODERATE 35: CPT

## 2021-04-24 RX ORDER — CEFTRIAXONE 500 MG/1
1000 INJECTION, POWDER, FOR SOLUTION INTRAMUSCULAR; INTRAVENOUS EVERY 24 HOURS
Refills: 0 | Status: COMPLETED | OUTPATIENT
Start: 2021-04-24 | End: 2021-04-26

## 2021-04-24 RX ADMIN — POLYETHYLENE GLYCOL 3350 17 GRAM(S): 17 POWDER, FOR SOLUTION ORAL at 08:30

## 2021-04-24 RX ADMIN — Medication 5: at 17:43

## 2021-04-24 RX ADMIN — Medication 5 MILLIGRAM(S): at 17:43

## 2021-04-24 RX ADMIN — HYDROMORPHONE HYDROCHLORIDE 6 MILLIGRAM(S): 2 INJECTION INTRAMUSCULAR; INTRAVENOUS; SUBCUTANEOUS at 07:27

## 2021-04-24 RX ADMIN — FENTANYL CITRATE 1 PATCH: 50 INJECTION INTRAVENOUS at 12:30

## 2021-04-24 RX ADMIN — Medication 2.5 MILLIGRAM(S): at 22:32

## 2021-04-24 RX ADMIN — Medication 5 MILLIGRAM(S): at 07:27

## 2021-04-24 RX ADMIN — SENNA PLUS 2 TABLET(S): 8.6 TABLET ORAL at 17:42

## 2021-04-24 RX ADMIN — CEFTRIAXONE 100 MILLIGRAM(S): 500 INJECTION, POWDER, FOR SOLUTION INTRAMUSCULAR; INTRAVENOUS at 06:37

## 2021-04-24 RX ADMIN — Medication 2: at 12:30

## 2021-04-24 RX ADMIN — SENNA PLUS 2 TABLET(S): 8.6 TABLET ORAL at 07:27

## 2021-04-24 RX ADMIN — ENOXAPARIN SODIUM 40 MILLIGRAM(S): 100 INJECTION SUBCUTANEOUS at 17:42

## 2021-04-24 RX ADMIN — Medication 2.5 MILLIGRAM(S): at 14:08

## 2021-04-24 RX ADMIN — Medication 2.5 MILLIGRAM(S): at 00:14

## 2021-04-24 RX ADMIN — HYDROMORPHONE HYDROCHLORIDE 8 MILLIGRAM(S): 2 INJECTION INTRAMUSCULAR; INTRAVENOUS; SUBCUTANEOUS at 18:45

## 2021-04-24 RX ADMIN — FENTANYL CITRATE 1 PATCH: 50 INJECTION INTRAVENOUS at 07:53

## 2021-04-24 RX ADMIN — Medication 2: at 22:33

## 2021-04-24 RX ADMIN — FENTANYL CITRATE 1 PATCH: 50 INJECTION INTRAVENOUS at 18:59

## 2021-04-24 RX ADMIN — Medication 5 MILLIGRAM(S): at 00:14

## 2021-04-24 RX ADMIN — HYDROMORPHONE HYDROCHLORIDE 6 MILLIGRAM(S): 2 INJECTION INTRAMUSCULAR; INTRAVENOUS; SUBCUTANEOUS at 08:40

## 2021-04-24 RX ADMIN — Medication 50 MILLIGRAM(S): at 17:43

## 2021-04-24 RX ADMIN — POLYETHYLENE GLYCOL 3350 17 GRAM(S): 17 POWDER, FOR SOLUTION ORAL at 00:14

## 2021-04-24 RX ADMIN — Medication 2: at 08:30

## 2021-04-24 RX ADMIN — Medication 2.5 MILLIGRAM(S): at 07:27

## 2021-04-24 RX ADMIN — FENTANYL CITRATE 1 PATCH: 50 INJECTION INTRAVENOUS at 12:29

## 2021-04-24 RX ADMIN — Medication 100 MILLIGRAM(S): at 14:09

## 2021-04-24 RX ADMIN — HYDROMORPHONE HYDROCHLORIDE 8 MILLIGRAM(S): 2 INJECTION INTRAMUSCULAR; INTRAVENOUS; SUBCUTANEOUS at 17:43

## 2021-04-24 NOTE — PROGRESS NOTE ADULT - PROBLEM SELECTOR PLAN 1
Patient presenting w/ lower extremity weakness L>R distally in the setting of metastatic pancreatic cancer. Notable Left foot drop. No spinal mets seen on CT.   -MR spine thoracic/lumbar w/ and w/o neg for spinal cord compression   - MRI pelvis neg for plexopathy  - MRI C spine and head w and w/o contrast w change in signal- no obvious cord issue ?gliosis  Imaging concerning for vertebral bony metastases with resultant cervical myelopathy which could explain symptoms per neuro  rad onc no intervention planned  neuro surg:- think gliosis is reactive to prior surgery  B1 and B6 levels noted to be low: started on supplements  send serum paraneoplastic panel, ganglioside antibody, ACE, Rheumatological serologies.   Obtain spinal tap to rule out immune mediated polyneuropathy per neuro recs: coordinate with procedure team Mon 4/26  Pending rehab Patient presenting w/ lower extremity weakness L>R distally in the setting of metastatic pancreatic cancer. Notable Left foot drop. No spinal mets seen on CT.   -MR spine thoracic/lumbar w/ and w/o neg for spinal cord compression   - MRI pelvis neg for plexopathy  - MRI C spine and head w and w/o contrast w change in signal- no obvious cord issue ?gliosis  Imaging concerning for vertebral bony metastases with resultant cervical myelopathy which could explain symptoms per neuro  rad onc no intervention planned  neuro surg:- think gliosis is reactive to prior surgery  B1 and B6 levels noted to be low: started on supplements  send serum paraneoplastic panel, ganglioside antibody, ACE, Rheumatological serologies.   Obtain spinal tap to rule out immune mediated polyneuropathy per neuro recs: coordinate with procedure team Mon 4/26 if pt agreeable  Pending rehab

## 2021-04-24 NOTE — PROGRESS NOTE ADULT - SUBJECTIVE AND OBJECTIVE BOX
INCOMPLETE [facility] Division of Hospital Medicine  Leila Jimenez   Pager (JACQUE-F, 8A-5P): [phone]      Patient is a 70y old  Female who presents with a chief complaint of Lower Extremity Weakness, Inability to Ambulate (2021 09:27)      SUBJECTIVE / OVERNIGHT EVENTS: Yesterday patient failed TOV and joy reinserted. Reported suprapubic pain overnight and was started on CTX after UA was shown to be positive. Reports the suprapubic pain is resolved but reports pain in back 7/10. Reports decreased appetite, having a few smears of stool on trinity.    MEDICATIONS  (STANDING):  bisacodyl 5 milliGRAM(s) Oral at bedtime  bisacodyl 5 milliGRAM(s) Oral every 12 hours  cefTRIAXone   IVPB 1000 milliGRAM(s) IV Intermittent every 24 hours  dextrose 40% Gel 15 Gram(s) Oral once  dextrose 5%. 1000 milliLiter(s) (50 mL/Hr) IV Continuous <Continuous>  dextrose 5%. 1000 milliLiter(s) (100 mL/Hr) IV Continuous <Continuous>  dextrose 50% Injectable 25 Gram(s) IV Push once  dextrose 50% Injectable 12.5 Gram(s) IV Push once  dextrose 50% Injectable 25 Gram(s) IV Push once  dronabinol 2.5 milliGRAM(s) Oral three times a day  enoxaparin Injectable 40 milliGRAM(s) SubCutaneous daily  fentaNYL   Patch  12 MICROgram(s)/Hr 1 Patch Transdermal every 72 hours  glucagon  Injectable 1 milliGRAM(s) IntraMuscular once  glycerin Suppository - Adult 1 Suppository(s) Rectal daily  insulin lispro (ADMELOG) corrective regimen sliding scale   SubCutaneous at bedtime  insulin lispro (ADMELOG) corrective regimen sliding scale   SubCutaneous three times a day before meals  polyethylene glycol 3350 17 Gram(s) Oral <User Schedule>  pyridoxine 50 milliGRAM(s) Oral daily  senna 2 Tablet(s) Oral two times a day  thiamine 100 milliGRAM(s) Oral daily    MEDICATIONS  (PRN):  HYDROmorphone   Tablet 6 milliGRAM(s) Oral every 4 hours PRN Moderate Pain (4 - 6)  HYDROmorphone   Tablet 8 milliGRAM(s) Oral every 4 hours PRN Severe Pain (7 - 10)  HYDROmorphone  Injectable 0.5 milliGRAM(s) IV Push every 6 hours PRN breakthrough pain in case PO dilaudid does not work      CAPILLARY BLOOD GLUCOSE      POCT Blood Glucose.: 213 mg/dL (2021 08:13)  POCT Blood Glucose.: 288 mg/dL (2021 22:33)  POCT Blood Glucose.: 220 mg/dL (2021 17:15)  POCT Blood Glucose.: 175 mg/dL (2021 12:42)    I&O's Summary    2021 07:01  -  2021 07:00  --------------------------------------------------------  IN: 0 mL / OUT: 450 mL / NET: -450 mL    2021 07:01  -  2021 10:47  --------------------------------------------------------  IN: 0 mL / OUT: 750 mL / NET: -750 mL        PHYSICAL EXAM:  Vital Signs Last 24 Hrs  T(C): 36.9 (2021 05:35), Max: 37 (2021 13:32)  T(F): 98.5 (2021 05:35), Max: 98.6 (2021 13:32)  HR: 101 (2021 05:35) (70 - 101)  BP: 166/82 (2021 05:35) (147/70 - 180/70)  BP(mean): --  RR: 18 (2021 05:35) (18 - 18)  SpO2: 100% (2021 05:35) (99% - 100%)    CONSTITUTIONAL: looks uncomfortable seated up in bed holding onto walker, severely cachectic  EYES: sclera clear  RESPIRATORY: Normal respiratory effort; lungs are clear to auscultation bilaterally  CARDIOVASCULAR:S1 and S2, no murmurs appreciated  ABDOMEN: Nontender to palpation, decreased bowel sounds, slightly distended;   MUSCULOSKELETAL: severe muscle atrophy in all extremities, left foot drop, decreased ROM of LLE due to pain  NEURO: awake, answers Qs, left foot drop, attempting to work with PT with walker    LABS:                        9.5    7.49  )-----------( 549      ( 2021 09:19 )             28.9     04-24    135  |  98  |  13  ----------------------------<  202<H>  4.0   |  28  |  0.35<L>    Ca    8.8      2021 09:19  Phos  3.4     -  Mg     2.2     -24            Urinalysis Basic - ( 2021 23:34 )    Color: Dark Brown / Appearance: Turbid / S.016 / pH: x  Gluc: x / Ketone: Negative  / Bili: Negative / Urobili: <2 mg/dL   Blood: x / Protein: 300 mg/dL / Nitrite: Negative   Leuk Esterase: Large / RBC: 10 /HPF / WBC TNTC /HPF   Sq Epi: x / Non Sq Epi: x / Bacteria: Many          RADIOLOGY & ADDITIONAL TESTS:  Results Reviewed:   Imaging Personally Reviewed:  Electrocardiogram Personally Reviewed:    COORDINATION OF CARE:  Care Discussed with Consultants/Other Providers [Y/N]:  Prior or Outpatient Records Reviewed [Y/N]:

## 2021-04-24 NOTE — PROGRESS NOTE ADULT - ASSESSMENT
70yr old woman PMH diabetes, pancreatic cancer w/ mets to lung and liver on chemo (9th session, abraxane/gemzar) p/w inability to ambulate and LLE>RLE weakness. Found to have evidence of posterior cervical spine fusion and laminectomy from C3-C6 with cord signal abnormality in left posterior cord at C4-C5 consistent with gliosis but unclear etiology of acute to subacute onset lower extremity paresis. Differentials include polyradiculoneuropathy or polyneuropathy (nutritional deficiencies, immune mediated vs paraneoplastic)

## 2021-04-24 NOTE — PROGRESS NOTE ADULT - PROBLEM SELECTOR PLAN 2
Patient w/ severe left hydronephrosis seen on CT, patient reports difficulty emptying bladder.   Failed TOV on 4/23 and joy reinserted. UA positive and had suprapubic pain 4/24 so on CTX day 1. Send Ucx Patient w/ severe left hydronephrosis seen on CT, patient reports difficulty emptying bladder.   Failed TOV on 4/23 and joy reinserted. UA positive and had suprapubic pain 4/24 so started on CTX day 1. However afebrile, no leukocytosis. Possibly from urinary retention and joy placement. Possible cystitis. Send Ucx

## 2021-04-24 NOTE — CHART NOTE - NSCHARTNOTEFT_GEN_A_CORE
Late note entry     Called to bedside by RN to evaluate pt's joy and cloudy urine. Pt afebrile however endorses suprapubic pain and has cva tenderness on exam. UA sent, +. Will start ceftriaxone x 3 days.     Vital Signs Last 24 Hrs  T(C): 37 (23 Apr 2021 22:03), Max: 37 (23 Apr 2021 13:32)  T(F): 98.6 (23 Apr 2021 22:03), Max: 98.6 (23 Apr 2021 13:32)  HR: 87 (23 Apr 2021 22:03) (70 - 87)  BP: 160/70 (24 Apr 2021 00:22) (133/64 - 180/70)  BP(mean): --  RR: 18 (23 Apr 2021 22:03) (18 - 20)  SpO2: 100% (23 Apr 2021 22:03) (99% - 100%)    Will continue to monitor   HORACE Parra PA-C  [id number]

## 2021-04-25 LAB
ANION GAP SERPL CALC-SCNC: 9 MMOL/L — SIGNIFICANT CHANGE UP (ref 7–14)
BUN SERPL-MCNC: 14 MG/DL — SIGNIFICANT CHANGE UP (ref 7–23)
CALCIUM SERPL-MCNC: 8.8 MG/DL — SIGNIFICANT CHANGE UP (ref 8.4–10.5)
CHLORIDE SERPL-SCNC: 99 MMOL/L — SIGNIFICANT CHANGE UP (ref 98–107)
CO2 SERPL-SCNC: 27 MMOL/L — SIGNIFICANT CHANGE UP (ref 22–31)
CREAT SERPL-MCNC: 0.38 MG/DL — LOW (ref 0.5–1.3)
DSDNA AB FLD-ACNC: <0.2 AI — SIGNIFICANT CHANGE UP
ENA SS-A AB FLD IA-ACNC: <0.2 AI — SIGNIFICANT CHANGE UP
GLUCOSE SERPL-MCNC: 193 MG/DL — HIGH (ref 70–99)
HCT VFR BLD CALC: 28.7 % — LOW (ref 34.5–45)
HGB BLD-MCNC: 9.1 G/DL — LOW (ref 11.5–15.5)
MAGNESIUM SERPL-MCNC: 2.1 MG/DL — SIGNIFICANT CHANGE UP (ref 1.6–2.6)
MCHC RBC-ENTMCNC: 30.3 PG — SIGNIFICANT CHANGE UP (ref 27–34)
MCHC RBC-ENTMCNC: 31.7 GM/DL — LOW (ref 32–36)
MCV RBC AUTO: 95.7 FL — SIGNIFICANT CHANGE UP (ref 80–100)
NRBC # BLD: 0 /100 WBCS — SIGNIFICANT CHANGE UP
NRBC # FLD: 0 K/UL — SIGNIFICANT CHANGE UP
PHOSPHATE SERPL-MCNC: 3.2 MG/DL — SIGNIFICANT CHANGE UP (ref 2.5–4.5)
PLATELET # BLD AUTO: 562 K/UL — HIGH (ref 150–400)
POTASSIUM SERPL-MCNC: 3.9 MMOL/L — SIGNIFICANT CHANGE UP (ref 3.5–5.3)
POTASSIUM SERPL-SCNC: 3.9 MMOL/L — SIGNIFICANT CHANGE UP (ref 3.5–5.3)
RBC # BLD: 3 M/UL — LOW (ref 3.8–5.2)
RBC # FLD: 18.3 % — HIGH (ref 10.3–14.5)
SODIUM SERPL-SCNC: 135 MMOL/L — SIGNIFICANT CHANGE UP (ref 135–145)
WBC # BLD: 7.74 K/UL — SIGNIFICANT CHANGE UP (ref 3.8–10.5)
WBC # FLD AUTO: 7.74 K/UL — SIGNIFICANT CHANGE UP (ref 3.8–10.5)

## 2021-04-25 PROCEDURE — 99232 SBSQ HOSP IP/OBS MODERATE 35: CPT

## 2021-04-25 RX ADMIN — Medication 4: at 17:41

## 2021-04-25 RX ADMIN — HYDROMORPHONE HYDROCHLORIDE 8 MILLIGRAM(S): 2 INJECTION INTRAMUSCULAR; INTRAVENOUS; SUBCUTANEOUS at 06:42

## 2021-04-25 RX ADMIN — HYDROMORPHONE HYDROCHLORIDE 8 MILLIGRAM(S): 2 INJECTION INTRAMUSCULAR; INTRAVENOUS; SUBCUTANEOUS at 02:24

## 2021-04-25 RX ADMIN — Medication 50 MILLIGRAM(S): at 13:44

## 2021-04-25 RX ADMIN — FENTANYL CITRATE 1 PATCH: 50 INJECTION INTRAVENOUS at 07:31

## 2021-04-25 RX ADMIN — Medication 2.5 MILLIGRAM(S): at 05:42

## 2021-04-25 RX ADMIN — CEFTRIAXONE 100 MILLIGRAM(S): 500 INJECTION, POWDER, FOR SOLUTION INTRAMUSCULAR; INTRAVENOUS at 05:42

## 2021-04-25 RX ADMIN — HYDROMORPHONE HYDROCHLORIDE 8 MILLIGRAM(S): 2 INJECTION INTRAMUSCULAR; INTRAVENOUS; SUBCUTANEOUS at 21:33

## 2021-04-25 RX ADMIN — Medication 2.5 MILLIGRAM(S): at 21:33

## 2021-04-25 RX ADMIN — Medication 100 MILLIGRAM(S): at 13:45

## 2021-04-25 RX ADMIN — HYDROMORPHONE HYDROCHLORIDE 8 MILLIGRAM(S): 2 INJECTION INTRAMUSCULAR; INTRAVENOUS; SUBCUTANEOUS at 05:42

## 2021-04-25 RX ADMIN — Medication 2: at 08:45

## 2021-04-25 RX ADMIN — HYDROMORPHONE HYDROCHLORIDE 8 MILLIGRAM(S): 2 INJECTION INTRAMUSCULAR; INTRAVENOUS; SUBCUTANEOUS at 01:24

## 2021-04-25 RX ADMIN — HYDROMORPHONE HYDROCHLORIDE 8 MILLIGRAM(S): 2 INJECTION INTRAMUSCULAR; INTRAVENOUS; SUBCUTANEOUS at 22:33

## 2021-04-25 RX ADMIN — Medication 2.5 MILLIGRAM(S): at 13:44

## 2021-04-25 RX ADMIN — Medication 1: at 12:14

## 2021-04-25 RX ADMIN — FENTANYL CITRATE 1 PATCH: 50 INJECTION INTRAVENOUS at 18:23

## 2021-04-25 NOTE — PROGRESS NOTE ADULT - SUBJECTIVE AND OBJECTIVE BOX
INCOMPLETE [facility] Division of Hospital Medicine  Leila Jimenez DO  Pager (JACQUE-JONATHAN, 2K-5P): [phone]      Patient is a 70y old  Female who presents with a chief complaint of Lower Extremity Weakness, Inability to Ambulate (2021 09:26)      SUBJECTIVE / OVERNIGHT EVENTS: No events overnight. States she had a large BM yesterday. No chest pain, SOB, abd pain    MEDICATIONS  (STANDING):  bisacodyl 5 milliGRAM(s) Oral at bedtime  bisacodyl 5 milliGRAM(s) Oral every 12 hours  cefTRIAXone   IVPB 1000 milliGRAM(s) IV Intermittent every 24 hours  dextrose 40% Gel 15 Gram(s) Oral once  dextrose 5%. 1000 milliLiter(s) (50 mL/Hr) IV Continuous <Continuous>  dextrose 5%. 1000 milliLiter(s) (100 mL/Hr) IV Continuous <Continuous>  dextrose 50% Injectable 25 Gram(s) IV Push once  dextrose 50% Injectable 12.5 Gram(s) IV Push once  dextrose 50% Injectable 25 Gram(s) IV Push once  dronabinol 2.5 milliGRAM(s) Oral three times a day  enoxaparin Injectable 40 milliGRAM(s) SubCutaneous daily  fentaNYL   Patch  12 MICROgram(s)/Hr 1 Patch Transdermal every 72 hours  glucagon  Injectable 1 milliGRAM(s) IntraMuscular once  glycerin Suppository - Adult 1 Suppository(s) Rectal daily  insulin lispro (ADMELOG) corrective regimen sliding scale   SubCutaneous three times a day before meals  insulin lispro (ADMELOG) corrective regimen sliding scale   SubCutaneous at bedtime  polyethylene glycol 3350 17 Gram(s) Oral <User Schedule>  pyridoxine 50 milliGRAM(s) Oral daily  senna 2 Tablet(s) Oral two times a day  thiamine 100 milliGRAM(s) Oral daily    MEDICATIONS  (PRN):  HYDROmorphone   Tablet 6 milliGRAM(s) Oral every 4 hours PRN Moderate Pain (4 - 6)  HYDROmorphone   Tablet 8 milliGRAM(s) Oral every 4 hours PRN Severe Pain (7 - 10)  HYDROmorphone  Injectable 0.5 milliGRAM(s) IV Push every 6 hours PRN breakthrough pain in case PO dilaudid does not work      CAPILLARY BLOOD GLUCOSE      POCT Blood Glucose.: 166 mg/dL (2021 12:04)  POCT Blood Glucose.: 216 mg/dL (2021 08:40)  POCT Blood Glucose.: 350 mg/dL (2021 22:24)  POCT Blood Glucose.: 368 mg/dL (2021 17:25)    I&O's Summary    2021 07:01  -  2021 07:00  --------------------------------------------------------  IN: 0 mL / OUT: 750 mL / NET: -750 mL        PHYSICAL EXAM:  Vital Signs Last 24 Hrs  T(C): 36.8 (2021 12:30), Max: 37 (2021 13:07)  T(F): 98.3 (2021 12:30), Max: 98.6 (2021 13:07)  HR: 90 (2021 12:30) (90 - 99)  BP: 114/65 (2021 12:30) (114/65 - 165/90)  BP(mean): --  RR: 18 (2021 12:30) (18 - 18)  SpO2: 100% (2021 12:30) (99% - 100%)    CONSTITUTIONAL: lying in bed, resting comfortably  EYES: sclera clear  RESPIRATORY: Normal respiratory effort; lungs are clear to auscultation bilaterally  CARDIOVASCULAR:S1 and S2, no murmurs appreciated  ABDOMEN: Nontender to palpation, decreased bowel sounds, slightly distended   MUSCULOSKELETAL: severe muscle atrophy in all extremities, left foot drop  NEURO: awake, answers Qs, left foot drop  LABS:                        9.1    7.74  )-----------( 562      ( 2021 06:27 )             28.7     04-25    135  |  99  |  14  ----------------------------<  193<H>  3.9   |  27  |  0.38<L>    Ca    8.8      2021 06:27  Phos  3.2     04-25  Mg     2.1     04-25            Urinalysis Basic - ( 2021 23:34 )    Color: Dark Brown / Appearance: Turbid / S.016 / pH: x  Gluc: x / Ketone: Negative  / Bili: Negative / Urobili: <2 mg/dL   Blood: x / Protein: 300 mg/dL / Nitrite: Negative   Leuk Esterase: Large / RBC: 10 /HPF / WBC TNTC /HPF   Sq Epi: x / Non Sq Epi: x / Bacteria: Many          RADIOLOGY & ADDITIONAL TESTS:  Results Reviewed:   Imaging Personally Reviewed:  Electrocardiogram Personally Reviewed:    COORDINATION OF CARE:  Care Discussed with Consultants/Other Providers [Y/N]:  Prior or Outpatient Records Reviewed [Y/N]:

## 2021-04-25 NOTE — PROGRESS NOTE ADULT - PROBLEM SELECTOR PLAN 1
Patient presenting w/ lower extremity weakness L>R distally in the setting of metastatic pancreatic cancer. Notable Left foot drop. No spinal mets seen on CT.   -MR spine thoracic/lumbar w/ and w/o neg for spinal cord compression   - MRI pelvis neg for plexopathy  - MRI C spine and head w and w/o contrast w change in signal- no obvious cord issue ?gliosis  Imaging concerning for vertebral bony metastases with resultant cervical myelopathy which could explain symptoms per neuro  rad onc no intervention planned  neuro surg:- think gliosis is reactive to prior surgery  B1 and B6 levels noted to be low: started on supplements  send serum paraneoplastic panel, ganglioside antibody, ACE, Rheumatological serologies.   Obtain spinal tap to rule out immune mediated polyneuropathy per neuro recs: coordinate with procedure team Mon 4/26 if pt agreeable  Needs rehab Patient presenting w/ lower extremity weakness L>R distally in the setting of metastatic pancreatic cancer. Notable Left foot drop. No spinal mets seen on CT.   -MR spine thoracic/lumbar w/ and w/o neg for spinal cord compression   - MRI pelvis neg for plexopathy  - MRI C spine and head w and w/o contrast w change in signal- no obvious cord issue ?gliosis  Imaging concerning for vertebral bony metastases with resultant cervical myelopathy which could explain symptoms per neuro  rad onc no intervention planned  neuro surg:- think gliosis is reactive to prior surgery  B1 and B6 levels noted to be low: started on supplements  send serum paraneoplastic panel, ganglioside antibody, ACE, Rheumatological serologies.   Obtain spinal tap to rule out immune mediated polyneuropathy per neuro recs: coordinate with procedure team Mon 4/26 if pt agreeable. Patient currently wants to think about it.  Needs rehab

## 2021-04-25 NOTE — PROGRESS NOTE ADULT - PROBLEM SELECTOR PLAN 3
a1c 6.1, FS elevated possibly in setting of UTI?   on consistent carb diet  - c/w ISS, hesitant to start standing regimen given her variable and limited PO intake a1c 6.1, FS elevated/labile possibly in setting of UTI?   on consistent carb diet  - c/w ISS, hesitant to start standing regimen given her variable and limited PO intake

## 2021-04-25 NOTE — PROGRESS NOTE ADULT - PROBLEM SELECTOR PLAN 8
pt rec rehab, does not meet criteria for acute rehab. Will likely go on Monday 4/26 per . COVID swab to be done 4/25 pt rec rehab, does not meet criteria for acute rehab. Will likely go on Monday 4/26 per SW. COVID swab to be done 4/25  Possible LP on 4/26 if patient agreeable

## 2021-04-25 NOTE — PROGRESS NOTE ADULT - PROBLEM SELECTOR PLAN 2
Patient w/ severe left hydronephrosis seen on CT, patient reports difficulty emptying bladder.   Failed TOV on 4/23 and joy reinserted. UA positive and had suprapubic pain 4/24 so started on CTX day 2/3. However afebrile, no leukocytosis. Possibly from urinary retention and joy placement. Possible cystitis. F/u Ucx

## 2021-04-26 LAB
ACE SERPL-CCNC: 44 U/L — SIGNIFICANT CHANGE UP (ref 14–82)
ANION GAP SERPL CALC-SCNC: 8 MMOL/L — SIGNIFICANT CHANGE UP (ref 7–14)
APPEARANCE CSF: CLEAR — SIGNIFICANT CHANGE UP
APPEARANCE SPUN FLD: COLORLESS — SIGNIFICANT CHANGE UP
BUN SERPL-MCNC: 13 MG/DL — SIGNIFICANT CHANGE UP (ref 7–23)
CALCIUM SERPL-MCNC: 9.3 MG/DL — SIGNIFICANT CHANGE UP (ref 8.4–10.5)
CHLORIDE SERPL-SCNC: 98 MMOL/L — SIGNIFICANT CHANGE UP (ref 98–107)
CO2 SERPL-SCNC: 28 MMOL/L — SIGNIFICANT CHANGE UP (ref 22–31)
COLOR CSF: COLORLESS — SIGNIFICANT CHANGE UP
CREAT SERPL-MCNC: 0.37 MG/DL — LOW (ref 0.5–1.3)
CSF PCR RESULT: SIGNIFICANT CHANGE UP
CULTURE RESULTS: SIGNIFICANT CHANGE UP
DSDNA AB SER-ACNC: <12 IU/ML — SIGNIFICANT CHANGE UP
GLUCOSE BLDC GLUCOMTR-MCNC: 295 MG/DL — HIGH (ref 70–99)
GLUCOSE BLDC GLUCOMTR-MCNC: 335 MG/DL — HIGH (ref 70–99)
GLUCOSE CSF-MCNC: 103 MG/DL — HIGH (ref 40–70)
GLUCOSE SERPL-MCNC: 179 MG/DL — HIGH (ref 70–99)
GRAM STN FLD: SIGNIFICANT CHANGE UP
HCT VFR BLD CALC: 31.8 % — LOW (ref 34.5–45)
HGB BLD-MCNC: 10.2 G/DL — LOW (ref 11.5–15.5)
INTERPRETATION 24H UR IFE-IMP: SIGNIFICANT CHANGE UP
INTERPRETATION 24H UR IFE-IMP: SIGNIFICANT CHANGE UP
LYMPHOCYTES # CSF: 6 % — SIGNIFICANT CHANGE UP
MAGNESIUM SERPL-MCNC: 2.1 MG/DL — SIGNIFICANT CHANGE UP (ref 1.6–2.6)
MCHC RBC-ENTMCNC: 30.7 PG — SIGNIFICANT CHANGE UP (ref 27–34)
MCHC RBC-ENTMCNC: 32.1 GM/DL — SIGNIFICANT CHANGE UP (ref 32–36)
MCV RBC AUTO: 95.8 FL — SIGNIFICANT CHANGE UP (ref 80–100)
MONOS+MACROS NFR CSF: 27 % — SIGNIFICANT CHANGE UP
NEUTROPHILS # CSF: 0 % — SIGNIFICANT CHANGE UP
NIGHT BLUE STAIN TISS: SIGNIFICANT CHANGE UP
NRBC # BLD: 0 /100 WBCS — SIGNIFICANT CHANGE UP
NRBC # FLD: 0 K/UL — SIGNIFICANT CHANGE UP
NRBC NFR CSF: <1 CELLS/UL — SIGNIFICANT CHANGE UP (ref 0–5)
PHOSPHATE SERPL-MCNC: 3.7 MG/DL — SIGNIFICANT CHANGE UP (ref 2.5–4.5)
PLATELET # BLD AUTO: 568 K/UL — HIGH (ref 150–400)
POTASSIUM SERPL-MCNC: 4.1 MMOL/L — SIGNIFICANT CHANGE UP (ref 3.5–5.3)
POTASSIUM SERPL-SCNC: 4.1 MMOL/L — SIGNIFICANT CHANGE UP (ref 3.5–5.3)
PROT CSF-MCNC: 66 MG/DL — HIGH (ref 15–45)
RBC # BLD: 3.32 M/UL — LOW (ref 3.8–5.2)
RBC # CSF: 0 CELLS/UL — SIGNIFICANT CHANGE UP (ref 0–0)
RBC # FLD: 18.1 % — HIGH (ref 10.3–14.5)
SODIUM SERPL-SCNC: 134 MMOL/L — LOW (ref 135–145)
SPECIMEN SOURCE: SIGNIFICANT CHANGE UP
TOTAL CELLS COUNTED, SPINAL FLUID: 33 CELLS — SIGNIFICANT CHANGE UP
TUBE TYPE: SIGNIFICANT CHANGE UP
WBC # BLD: 8.73 K/UL — SIGNIFICANT CHANGE UP (ref 3.8–10.5)
WBC # FLD AUTO: 8.73 K/UL — SIGNIFICANT CHANGE UP (ref 3.8–10.5)

## 2021-04-26 PROCEDURE — 62270 DX LMBR SPI PNXR: CPT | Mod: GC

## 2021-04-26 PROCEDURE — 99232 SBSQ HOSP IP/OBS MODERATE 35: CPT

## 2021-04-26 RX ADMIN — CEFTRIAXONE 100 MILLIGRAM(S): 500 INJECTION, POWDER, FOR SOLUTION INTRAMUSCULAR; INTRAVENOUS at 06:04

## 2021-04-26 RX ADMIN — Medication 2.5 MILLIGRAM(S): at 14:20

## 2021-04-26 RX ADMIN — FENTANYL CITRATE 1 PATCH: 50 INJECTION INTRAVENOUS at 18:05

## 2021-04-26 RX ADMIN — HYDROMORPHONE HYDROCHLORIDE 8 MILLIGRAM(S): 2 INJECTION INTRAMUSCULAR; INTRAVENOUS; SUBCUTANEOUS at 17:30

## 2021-04-26 RX ADMIN — HYDROMORPHONE HYDROCHLORIDE 8 MILLIGRAM(S): 2 INJECTION INTRAMUSCULAR; INTRAVENOUS; SUBCUTANEOUS at 04:07

## 2021-04-26 RX ADMIN — HYDROMORPHONE HYDROCHLORIDE 8 MILLIGRAM(S): 2 INJECTION INTRAMUSCULAR; INTRAVENOUS; SUBCUTANEOUS at 16:27

## 2021-04-26 RX ADMIN — Medication 4: at 18:12

## 2021-04-26 RX ADMIN — HYDROMORPHONE HYDROCHLORIDE 8 MILLIGRAM(S): 2 INJECTION INTRAMUSCULAR; INTRAVENOUS; SUBCUTANEOUS at 05:00

## 2021-04-26 RX ADMIN — Medication 50 MILLIGRAM(S): at 14:20

## 2021-04-26 RX ADMIN — Medication 1: at 22:54

## 2021-04-26 RX ADMIN — Medication 3: at 12:45

## 2021-04-26 RX ADMIN — Medication 2.5 MILLIGRAM(S): at 05:57

## 2021-04-26 RX ADMIN — Medication 100 MILLIGRAM(S): at 14:20

## 2021-04-26 RX ADMIN — Medication 1: at 08:41

## 2021-04-26 RX ADMIN — FENTANYL CITRATE 1 PATCH: 50 INJECTION INTRAVENOUS at 07:06

## 2021-04-26 NOTE — PROGRESS NOTE ADULT - SUBJECTIVE AND OBJECTIVE BOX
Patient is a 70y old  Female who presents with a chief complaint of Lower Extremity Weakness, Inability to Ambulate (26 Apr 2021 14:21)      SUBJECTIVE / OVERNIGHT EVENTS: No events overnight. had a BM two days ago. She is frustrated that she is still here and wants to go to rehab. has no other medical complaints.     MEDICATIONS  (STANDING):  bisacodyl 5 milliGRAM(s) Oral at bedtime  bisacodyl 5 milliGRAM(s) Oral every 12 hours  dextrose 40% Gel 15 Gram(s) Oral once  dextrose 5%. 1000 milliLiter(s) (50 mL/Hr) IV Continuous <Continuous>  dextrose 5%. 1000 milliLiter(s) (100 mL/Hr) IV Continuous <Continuous>  dextrose 50% Injectable 25 Gram(s) IV Push once  dextrose 50% Injectable 12.5 Gram(s) IV Push once  dextrose 50% Injectable 25 Gram(s) IV Push once  dronabinol 2.5 milliGRAM(s) Oral three times a day  fentaNYL   Patch  12 MICROgram(s)/Hr 1 Patch Transdermal every 72 hours  glucagon  Injectable 1 milliGRAM(s) IntraMuscular once  glycerin Suppository - Adult 1 Suppository(s) Rectal daily  insulin lispro (ADMELOG) corrective regimen sliding scale   SubCutaneous three times a day before meals  insulin lispro (ADMELOG) corrective regimen sliding scale   SubCutaneous at bedtime  polyethylene glycol 3350 17 Gram(s) Oral <User Schedule>  pyridoxine 50 milliGRAM(s) Oral daily  senna 2 Tablet(s) Oral two times a day  thiamine 100 milliGRAM(s) Oral daily    MEDICATIONS  (PRN):  HYDROmorphone   Tablet 6 milliGRAM(s) Oral every 4 hours PRN Moderate Pain (4 - 6)  HYDROmorphone   Tablet 8 milliGRAM(s) Oral every 4 hours PRN Severe Pain (7 - 10)  HYDROmorphone  Injectable 0.5 milliGRAM(s) IV Push every 6 hours PRN breakthrough pain in case PO dilaudid does not work        CAPILLARY BLOOD GLUCOSE      POCT Blood Glucose.: 254 mg/dL (26 Apr 2021 12:37)  POCT Blood Glucose.: 171 mg/dL (26 Apr 2021 08:35)  POCT Blood Glucose.: 155 mg/dL (25 Apr 2021 22:11)  POCT Blood Glucose.: 340 mg/dL (25 Apr 2021 17:33)    Vital Signs Last 24 Hrs  T(C): 37.1 (26 Apr 2021 13:21), Max: 37.1 (26 Apr 2021 13:21)  T(F): 98.7 (26 Apr 2021 13:21), Max: 98.7 (26 Apr 2021 13:21)  HR: 86 (26 Apr 2021 13:21) (86 - 89)  BP: 115/67 (26 Apr 2021 13:21) (115/67 - 147/73)  BP(mean): --  RR: 17 (26 Apr 2021 13:21) (17 - 18)  SpO2: 99% (26 Apr 2021 13:21) (98% - 99%)      PHYSICAL EXAM:  CONSTITUTIONAL: lying in bed, resting comfortably  EYES: sclera clear  RESPIRATORY: Normal respiratory effort; lungs are clear to auscultation bilaterally  CARDIOVASCULAR:S1 and S2, no murmurs appreciated  ABDOMEN: Nontender to palpation, decreased bowel sounds, slightly distended   MUSCULOSKELETAL: severe muscle atrophy in all extremities, left foot drop  NEURO: awake, answers Qs, left foot drop    LABS:                        10.2   8.73  )-----------( 568      ( 26 Apr 2021 07:49 )             31.8     04-26    134<L>  |  98  |  13  ----------------------------<  179<H>  4.1   |  28  |  0.37<L>    Ca    9.3      26 Apr 2021 07:49  Phos  3.7     04-26  Mg     2.1     04-26

## 2021-04-26 NOTE — CHART NOTE - NSCHARTNOTEFT_GEN_A_CORE
Neurology team reviewed CSF findings.    Cerebrospinal Fluid Cell Count-1 (04.26.21 @ 12:04)   Total Nucleated Cell Count, CSF: <1 cells/uL   RBC Count - Spinal Fluid: 0: Red Cell count correlates with the number and proportion of cells on   cytospin preparation. cells/uL   CSF Color: Colorless   Tube Type: Tube 3   CSF Appearance: Clear   CSF Lymphocytes: 6 %   CSF Monocytes/Macrophages: 27 %   CSF Segmented Neutrophils: 0 %   Appearance Spun: Colorless   Total Cells Counted, Spinal Fluid: 33 Cells   Glucose, CSF: 103 mg/dL (04.26.21 @ 12:04)  Protein, CSF: 66 mg/dL (04.26.21 @ 12:04)     Neurology team will follow up with patient tomorrow.

## 2021-04-26 NOTE — PROGRESS NOTE ADULT - ASSESSMENT
70f with metastatic pancreatic with  mets to lung and liver, on chemotherapy with gemcitabine and abraxane, presenting with LE swelling, heaviness and inability to walk since 2 days PTA.   LE doppers negative for VTE, however with ?arterial thrombus. PT and DP pulses not palpable. Legs are warm. She is not able to move leges antigravity.   spine CT with contrast without signs of spinal mets or cord compression.   Recent bone scan 3/25/2021 and recent PET/CT 3/11/2021 without evidence of bone mets. Given all the above the likelihood of spinal mets and cord compression is minimal.       -S/p Arterial duplex, BLE of mild femoropopliteal arterial disease noted  -No Vascular intervention indicated at this time  -Urology input appreciated, patient with joy , no intervention indicated for chronic hydronephrosis for  now  -Pal care input appreciated for pain recs  -s/p MR thoracic and lumbar spine shows  degenerative disc disease at L3-4 and L4-5 with loss of signal on the T2-weighted images. No cord or cauda equina compression noted. Progressive b/l LE weakness possibly 2/2 loss signal at L3/4 and L4/5 in the setting of degenerative disk disease vs infectious etiology in the setting of warm edematous LEs  - s/p MRI pelvis: Exam limited by susceptibility artifact from a rectal tube. No well-formed mass or collection is otherwise demonstrated along the course of the lumbosacral plexus bilaterally. Diffuse marrow signal alteration which may secondary to anemia or marrow fluid/infiltrative disorder. Anasarca.---->No acute pathology to explain symptoms    s/p MRI C spine and head w and w/o contrast-findings concerning for vertebral bony metastases with resultant cervical myelopathy which could explain her symptoms.   -s/p lumbar puncture to rule out immune mediated polyneuropathy. plan to followup paraneoplastic panel, ganglioside antibody, ACE, Rheumatological serologies.  -Continue with Physical therapy, Appreciate PMR recs, pt dispo for rehab. Patient amendable to going to rehab. SW to help coordinate.  -There will be no plans for chemotherapy while patient is admitted to rehab  -Rest of care as per primary team  -Patient to followup with Dr. Vieira ([facility]) upon discharge form rehab  -C/w Supportive care, pain control, Nutrition, PT, DVT ppx  -Oncology will continue to follow with you      Case d/w Dr. Eneida ETIENNE  Oncology Physician Assistant  [facility]  Pager [phone]    If after 5pm or weekends please page On-call Oncology Fellow

## 2021-04-26 NOTE — PROCEDURE NOTE - ADDITIONAL PROCEDURE DETAILS
Invasive procedure team was consulted for diagnostic/therapeutic LP.  Patient Platelets were >500 and PT/INR was normal and CT head did not show signs of increased ICP. Patient not on DVT PPx. Patient with no overlying skin infection. Patient was explained risks (including bleeding, infection, and risk of paralysis) and benefits and consented (in Chart). Procedure was done using sterile technique and equipment. Patient was in lateral recombinant position. Skin was cleaned with chlorhexidine and iodine. 22 mL of clear CSF obtained. Stylet re-inserted and needle removed and dressed. Pt tolerated procedure well and no complications. Lab Analysis sent. Communicated to primary team.

## 2021-04-26 NOTE — PROGRESS NOTE ADULT - PROBLEM SELECTOR PLAN 3
a1c 6.1  on consistent carb diet  - c/w ISS, hesitant to start standing regimen given her variable and limited PO intake

## 2021-04-26 NOTE — PROGRESS NOTE ADULT - PROBLEM SELECTOR PLAN 1
Patient presenting w/ lower extremity weakness L>R distally in the setting of metastatic pancreatic cancer. Notable Left foot drop. No spinal mets seen on CT.   -MR spine thoracic/lumbar w/ and w/o neg for spinal cord compression   - MRI pelvis neg for plexopathy  - MRI C spine and head w and w/o contrast w change in signal- no obvious cord issue ?gliosis  Imaging concerning for vertebral bony metastases with resultant cervical myelopathy which could explain symptoms per neuro  rad onc no intervention planned  neuro surg:- think gliosis is reactive to prior surgery  B1 and B6 levels noted to be low: started on supplements  pending results for serum paraneoplastic panel, ganglioside antibody, ACE, Rheumatological serologies.   s/p LP 4/26 to rule out immune mediated polyneuropathy per neuro recs:  CSF cell count; Glucose; Protein: Gram stain; CSF PCR panel; CSF AFB; CSF fungal cx; CSF VDRL titer; Oligoclonal bands; Myelin Basic Protein; Cryptococcal antigen  Needs rehab when medically cleared

## 2021-04-26 NOTE — PROGRESS NOTE ADULT - ATTENDING COMMENTS
Patient seen at bedside. Case discussed with LOWELL Samano. Plan as above.   S/p LP. Neuro follow up.

## 2021-04-26 NOTE — CHART NOTE - NSCHARTNOTEFT_GEN_A_CORE
Source: Patient [X]    Family [ ]     other [X ] electronic chart, RN     Diet : Diet, Consistent Carbohydrate w/Evening Snack:   Supplement Feeding Modality:  Oral  Glucerna Shake Cans or Servings Per Day:  1       Frequency:  Three Times a day (04-10-21 @ 14:53)    Nutrition Follow-up Note, severe malnutrition. Patient reports eating better, and ate most of breakfast today. She consumes atleast 2 containers of Glucerna Shake daily. Patient denies any nausea/vomiting/diarrhea/constipation or difficulty chewing and swallowing. Assisted patient to complete her personalized menu at bedside. Continue good po intake of nutrient and protein dense foods encouraged. PO supplements between meals suggested. No questions or concerns voiced at this time. RD remains available, patient made aware.      Weight: 4/9 42.2kg No new weight to address at this time.       Pertinent Medications: bisacodyl  bisacodyl  dextrose 40% Gel  dextrose 5%.  dextrose 5%.  dextrose 50% Injectable  dextrose 50% Injectable  dextrose 50% Injectable  dronabinol  fentaNYL   Patch  12 MICROgram(s)/Hr  glucagon  Injectable  glycerin Suppository - Adult  insulin lispro (ADMELOG) corrective regimen sliding scale  insulin lispro (ADMELOG) corrective regimen sliding scale  polyethylene glycol 3350  pyridoxine  senna  thiamine    Pertinent Labs:  04-26 Na134 mmol/L<L> Glu 179 mg/dL<H> K+ 4.1 mmol/L Cr  0.37 mg/dL<L> BUN 13 mg/dL 04-26 Phos 3.7 mg/dL 04-10 Chol 103 mg/dL LDL --    HDL 65 mg/dL Trig 52 mg/dL      Skin: Left hallux wound healing.   +1 b/l ankle edema noted.     Estimated Needs:   [ ] no change since previous assessment  [ ] recalculated:       Previous Nutrition Diagnosis:      [X ] Malnutrition, Severe     Nutrition Diagnosis is [X ] ongoing  [ ] resolved [ ] not applicable     Additional Recommendations:     1. Continue current diet order, which remains appropriate at this time.   2. Obtain new weight and weekly weight.  3. Monitor weights, labs, BM's, skin integrity, p.o. intake.   4. Please Encourage po intake, assist with meals and menu selections, provide alternatives PRN.   5. Honor food and beverage preferences within diet restriction of patient in an effort to maximize level of nutrient intake.

## 2021-04-26 NOTE — PROGRESS NOTE ADULT - PROBLEM SELECTOR PLAN 2
Patient w/ severe left hydronephrosis seen on CT, patient reports difficulty emptying bladder.   Failed TOV on 4/23 and joy reinserted. UA positive and had suprapubic pain 4/24 so started on CTX day 2/3. However afebrile, no leukocytosis. Possibly from urinary retention and joy placement. Possible cystitis. Ucx shows contamination

## 2021-04-26 NOTE — PROGRESS NOTE ADULT - SUBJECTIVE AND OBJECTIVE BOX
INTERVAL HPI/OVERNIGHT EVENTS:  Patient seen at bedside.  Patient with continued symptoms of LE weakness  S/p lumbar puncture denies back pain  Patient inquiring about discharge    VITAL SIGNS:  T(F): 98.7 (04-26-21 @ 13:21)  HR: 86 (04-26-21 @ 13:21)  BP: 115/67 (04-26-21 @ 13:21)  RR: 17 (04-26-21 @ 13:21)  SpO2: 99% (04-26-21 @ 13:21)  Wt(kg): --    PHYSICAL EXAM:  In accordance with current standard limiting patient contact, deferred physical exam  2/2 COVID pandemic  Please refer to physical exam of primary team.      MEDICATIONS  (STANDING):  bisacodyl 5 milliGRAM(s) Oral at bedtime  bisacodyl 5 milliGRAM(s) Oral every 12 hours  dextrose 40% Gel 15 Gram(s) Oral once  dextrose 5%. 1000 milliLiter(s) (50 mL/Hr) IV Continuous <Continuous>  dextrose 5%. 1000 milliLiter(s) (100 mL/Hr) IV Continuous <Continuous>  dextrose 50% Injectable 25 Gram(s) IV Push once  dextrose 50% Injectable 12.5 Gram(s) IV Push once  dextrose 50% Injectable 25 Gram(s) IV Push once  dronabinol 2.5 milliGRAM(s) Oral three times a day  fentaNYL   Patch  12 MICROgram(s)/Hr 1 Patch Transdermal every 72 hours  glucagon  Injectable 1 milliGRAM(s) IntraMuscular once  glycerin Suppository - Adult 1 Suppository(s) Rectal daily  insulin lispro (ADMELOG) corrective regimen sliding scale   SubCutaneous three times a day before meals  insulin lispro (ADMELOG) corrective regimen sliding scale   SubCutaneous at bedtime  polyethylene glycol 3350 17 Gram(s) Oral <User Schedule>  pyridoxine 50 milliGRAM(s) Oral daily  senna 2 Tablet(s) Oral two times a day  thiamine 100 milliGRAM(s) Oral daily    MEDICATIONS  (PRN):  HYDROmorphone   Tablet 6 milliGRAM(s) Oral every 4 hours PRN Moderate Pain (4 - 6)  HYDROmorphone   Tablet 8 milliGRAM(s) Oral every 4 hours PRN Severe Pain (7 - 10)  HYDROmorphone  Injectable 0.5 milliGRAM(s) IV Push every 6 hours PRN breakthrough pain in case PO dilaudid does not work      Allergies    No Known Allergies    Intolerances        LABS:                        10.2   8.73  )-----------( 568      ( 26 Apr 2021 07:49 )             31.8     04-26    134<L>  |  98  |  13  ----------------------------<  179<H>  4.1   |  28  |  0.37<L>    Ca    9.3      26 Apr 2021 07:49  Phos  3.7     04-26  Mg     2.1     04-26            RADIOLOGY & ADDITIONAL TESTS:  Studies reviewed.

## 2021-04-27 LAB
ANION GAP SERPL CALC-SCNC: 9 MMOL/L — SIGNIFICANT CHANGE UP (ref 7–14)
BUN SERPL-MCNC: 12 MG/DL — SIGNIFICANT CHANGE UP (ref 7–23)
CALCIUM SERPL-MCNC: 8.9 MG/DL — SIGNIFICANT CHANGE UP (ref 8.4–10.5)
CHLORIDE SERPL-SCNC: 98 MMOL/L — SIGNIFICANT CHANGE UP (ref 98–107)
CO2 SERPL-SCNC: 26 MMOL/L — SIGNIFICANT CHANGE UP (ref 22–31)
CREAT SERPL-MCNC: 0.33 MG/DL — LOW (ref 0.5–1.3)
CRYPTOC AG CSF-ACNC: NEGATIVE — SIGNIFICANT CHANGE UP
GLUCOSE BLDC GLUCOMTR-MCNC: 219 MG/DL — HIGH (ref 70–99)
GLUCOSE BLDC GLUCOMTR-MCNC: 263 MG/DL — HIGH (ref 70–99)
GLUCOSE BLDC GLUCOMTR-MCNC: 328 MG/DL — HIGH (ref 70–99)
GLUCOSE BLDC GLUCOMTR-MCNC: 343 MG/DL — HIGH (ref 70–99)
GLUCOSE SERPL-MCNC: 207 MG/DL — HIGH (ref 70–99)
HCT VFR BLD CALC: 27.9 % — LOW (ref 34.5–45)
HGB BLD-MCNC: 9.2 G/DL — LOW (ref 11.5–15.5)
MCHC RBC-ENTMCNC: 31.4 PG — SIGNIFICANT CHANGE UP (ref 27–34)
MCHC RBC-ENTMCNC: 33 GM/DL — SIGNIFICANT CHANGE UP (ref 32–36)
MCV RBC AUTO: 95.2 FL — SIGNIFICANT CHANGE UP (ref 80–100)
NRBC # BLD: 0 /100 WBCS — SIGNIFICANT CHANGE UP
NRBC # FLD: 0 K/UL — SIGNIFICANT CHANGE UP
PARANEOPLASTIC AB PNL SER: SIGNIFICANT CHANGE UP
PLATELET # BLD AUTO: 516 K/UL — HIGH (ref 150–400)
POTASSIUM SERPL-MCNC: 4 MMOL/L — SIGNIFICANT CHANGE UP (ref 3.5–5.3)
POTASSIUM SERPL-SCNC: 4 MMOL/L — SIGNIFICANT CHANGE UP (ref 3.5–5.3)
RBC # BLD: 2.93 M/UL — LOW (ref 3.8–5.2)
RBC # FLD: 17.6 % — HIGH (ref 10.3–14.5)
SODIUM SERPL-SCNC: 133 MMOL/L — LOW (ref 135–145)
WBC # BLD: 9.09 K/UL — SIGNIFICANT CHANGE UP (ref 3.8–10.5)
WBC # FLD AUTO: 9.09 K/UL — SIGNIFICANT CHANGE UP (ref 3.8–10.5)

## 2021-04-27 PROCEDURE — 99232 SBSQ HOSP IP/OBS MODERATE 35: CPT

## 2021-04-27 PROCEDURE — 99233 SBSQ HOSP IP/OBS HIGH 50: CPT | Mod: GC

## 2021-04-27 RX ORDER — ACETAMINOPHEN 500 MG
650 TABLET ORAL DAILY
Refills: 0 | Status: DISCONTINUED | OUTPATIENT
Start: 2021-04-27 | End: 2021-05-02

## 2021-04-27 RX ORDER — ENOXAPARIN SODIUM 100 MG/ML
40 INJECTION SUBCUTANEOUS DAILY
Refills: 0 | Status: DISCONTINUED | OUTPATIENT
Start: 2021-04-27 | End: 2021-05-04

## 2021-04-27 RX ORDER — IMMUNE GLOBULIN (HUMAN) 10 G/100ML
85 INJECTION INTRAVENOUS; SUBCUTANEOUS DAILY
Refills: 0 | Status: DISCONTINUED | OUTPATIENT
Start: 2021-04-27 | End: 2021-04-27

## 2021-04-27 RX ORDER — DIPHENHYDRAMINE HCL 50 MG
25 CAPSULE ORAL DAILY
Refills: 0 | Status: DISCONTINUED | OUTPATIENT
Start: 2021-04-27 | End: 2021-05-02

## 2021-04-27 RX ORDER — IMMUNE GLOBULIN (HUMAN) 10 G/100ML
20 INJECTION INTRAVENOUS; SUBCUTANEOUS DAILY
Refills: 0 | Status: DISCONTINUED | OUTPATIENT
Start: 2021-04-27 | End: 2021-05-02

## 2021-04-27 RX ADMIN — FENTANYL CITRATE 1 PATCH: 50 INJECTION INTRAVENOUS at 13:59

## 2021-04-27 RX ADMIN — Medication 2: at 21:50

## 2021-04-27 RX ADMIN — FENTANYL CITRATE 1 PATCH: 50 INJECTION INTRAVENOUS at 14:22

## 2021-04-27 RX ADMIN — HYDROMORPHONE HYDROCHLORIDE 8 MILLIGRAM(S): 2 INJECTION INTRAMUSCULAR; INTRAVENOUS; SUBCUTANEOUS at 01:00

## 2021-04-27 RX ADMIN — Medication 4: at 18:26

## 2021-04-27 RX ADMIN — Medication 3: at 12:30

## 2021-04-27 RX ADMIN — Medication 25 MILLIGRAM(S): at 19:14

## 2021-04-27 RX ADMIN — IMMUNE GLOBULIN (HUMAN) 33.33 GRAM(S): 10 INJECTION INTRAVENOUS; SUBCUTANEOUS at 19:54

## 2021-04-27 RX ADMIN — Medication 50 MILLIGRAM(S): at 12:31

## 2021-04-27 RX ADMIN — FENTANYL CITRATE 1 PATCH: 50 INJECTION INTRAVENOUS at 05:24

## 2021-04-27 RX ADMIN — HYDROMORPHONE HYDROCHLORIDE 6 MILLIGRAM(S): 2 INJECTION INTRAMUSCULAR; INTRAVENOUS; SUBCUTANEOUS at 21:50

## 2021-04-27 RX ADMIN — Medication 2: at 08:37

## 2021-04-27 RX ADMIN — Medication 100 MILLIGRAM(S): at 12:31

## 2021-04-27 RX ADMIN — HYDROMORPHONE HYDROCHLORIDE 8 MILLIGRAM(S): 2 INJECTION INTRAMUSCULAR; INTRAVENOUS; SUBCUTANEOUS at 06:28

## 2021-04-27 RX ADMIN — Medication 2.5 MILLIGRAM(S): at 14:22

## 2021-04-27 RX ADMIN — Medication 1 SUPPOSITORY(S): at 12:31

## 2021-04-27 RX ADMIN — FENTANYL CITRATE 1 PATCH: 50 INJECTION INTRAVENOUS at 18:05

## 2021-04-27 RX ADMIN — ENOXAPARIN SODIUM 40 MILLIGRAM(S): 100 INJECTION SUBCUTANEOUS at 19:14

## 2021-04-27 RX ADMIN — HYDROMORPHONE HYDROCHLORIDE 6 MILLIGRAM(S): 2 INJECTION INTRAMUSCULAR; INTRAVENOUS; SUBCUTANEOUS at 22:45

## 2021-04-27 RX ADMIN — HYDROMORPHONE HYDROCHLORIDE 8 MILLIGRAM(S): 2 INJECTION INTRAMUSCULAR; INTRAVENOUS; SUBCUTANEOUS at 05:28

## 2021-04-27 RX ADMIN — Medication 650 MILLIGRAM(S): at 19:14

## 2021-04-27 RX ADMIN — Medication 650 MILLIGRAM(S): at 20:30

## 2021-04-27 RX ADMIN — HYDROMORPHONE HYDROCHLORIDE 8 MILLIGRAM(S): 2 INJECTION INTRAMUSCULAR; INTRAVENOUS; SUBCUTANEOUS at 01:38

## 2021-04-27 NOTE — PROGRESS NOTE ADULT - SUBJECTIVE AND OBJECTIVE BOX
MARIA NICOLAS  Female  MRN-[id number]    Interval:    -Patient examined at bedside.   CSF consistent w/ elvated protein and no WBC, suspicious for GBS       VITAL SIGNS:  T(F): 98.5  HR: 85  BP: 118/68  RR: 16  SpO2: 100%    Exam:   MS: Oriented x3. Fluent. Follows crossed commands.   CN: VFF. EOMI. V1-V3 intact. Face symmetric. T/u midline.   Motor: Diffuse atrophy throughout. Possible fasculations on the left thigh. UE 5/5. LE some movement within plane of bed, DF 0/5.   Sensory: Intact to LT throughout   Reflexes: 2+ UEs. areflexic in the LEs.   Coordination: No dysmetria on FNF or ataxia on HTS bilaterally   Gait: Deferred    LABS:                          9.2    9.09  )-----------( 516      ( 27 Apr 2021 08:37 )             27.9     04-27    133<L>  |  98  |  12  ----------------------------<  207<H>  4.0   |  26  |  0.33<L>    Ca    8.9      27 Apr 2021 08:37  Phos  3.7     04-26  Mg     2.1     04-26    TPro  x   /  Alb  2336<L>  /  TBili  x   /  DBili  x   /  AST  x   /  ALT  x   /  AlkPhos  x   04-27        MEDICATIONS:   bisacodyl 5 milliGRAM(s) Oral at bedtime  bisacodyl 5 milliGRAM(s) Oral every 12 hours  dextrose 40% Gel 15 Gram(s) Oral once  dextrose 5%. 1000 milliLiter(s) IV Continuous <Continuous>  dextrose 5%. 1000 milliLiter(s) IV Continuous <Continuous>  dextrose 50% Injectable 25 Gram(s) IV Push once  dextrose 50% Injectable 12.5 Gram(s) IV Push once  dextrose 50% Injectable 25 Gram(s) IV Push once  dronabinol 2.5 milliGRAM(s) Oral three times a day  enoxaparin Injectable 40 milliGRAM(s) SubCutaneous daily  glucagon  Injectable 1 milliGRAM(s) IntraMuscular once  glycerin Suppository - Adult 1 Suppository(s) Rectal daily  HYDROmorphone   Tablet 6 milliGRAM(s) Oral every 4 hours PRN  HYDROmorphone   Tablet 8 milliGRAM(s) Oral every 4 hours PRN  HYDROmorphone  Injectable 0.5 milliGRAM(s) IV Push every 6 hours PRN  insulin lispro (ADMELOG) corrective regimen sliding scale   SubCutaneous three times a day before meals  insulin lispro (ADMELOG) corrective regimen sliding scale   SubCutaneous at bedtime  polyethylene glycol 3350 17 Gram(s) Oral <User Schedule>  pyridoxine 50 milliGRAM(s) Oral daily  senna 2 Tablet(s) Oral two times a day  thiamine 100 milliGRAM(s) Oral daily          RADIOLOGY & ADDITIONAL STUDIES:             MARIA NICOLAS  Female  MRN-[id number]    Interval:    -Patient examined at bedside.   CSF consistent w/ elvated protein and no WBC, suspicious for GBS     VITAL SIGNS:  T(F): 98.5  HR: 85  BP: 118/68  RR: 16  SpO2: 100%    Exam:   MS: Oriented x3. Fluent. Follows crossed commands.   CN: VFF. EOMI. V1-V3 intact. Face symmetric. T/u midline.   Motor: Diffuse atrophy throughout. Possible fasculations on the left thigh. UE 5/5. LE some movement within plane of bed, DF 0/5.   Sensory: Intact to LT throughout   Reflexes: 2+ UEs. areflexic in the LEs.   Coordination: No dysmetria on FNF or ataxia on HTS bilaterally   Gait: Deferred    LABS:                          9.2    9.09  )-----------( 516      ( 27 Apr 2021 08:37 )             27.9     04-27    133<L>  |  98  |  12  ----------------------------<  207<H>  4.0   |  26  |  0.33<L>    Ca    8.9      27 Apr 2021 08:37  Phos  3.7     04-26  Mg     2.1     04-26    TPro  x   /  Alb  2336<L>  /  TBili  x   /  DBili  x   /  AST  x   /  ALT  x   /  AlkPhos  x   04-27        MEDICATIONS:   bisacodyl 5 milliGRAM(s) Oral at bedtime  bisacodyl 5 milliGRAM(s) Oral every 12 hours  dextrose 40% Gel 15 Gram(s) Oral once  dextrose 5%. 1000 milliLiter(s) IV Continuous <Continuous>  dextrose 5%. 1000 milliLiter(s) IV Continuous <Continuous>  dextrose 50% Injectable 25 Gram(s) IV Push once  dextrose 50% Injectable 12.5 Gram(s) IV Push once  dextrose 50% Injectable 25 Gram(s) IV Push once  dronabinol 2.5 milliGRAM(s) Oral three times a day  enoxaparin Injectable 40 milliGRAM(s) SubCutaneous daily  glucagon  Injectable 1 milliGRAM(s) IntraMuscular once  glycerin Suppository - Adult 1 Suppository(s) Rectal daily  HYDROmorphone   Tablet 6 milliGRAM(s) Oral every 4 hours PRN  HYDROmorphone   Tablet 8 milliGRAM(s) Oral every 4 hours PRN  HYDROmorphone  Injectable 0.5 milliGRAM(s) IV Push every 6 hours PRN  insulin lispro (ADMELOG) corrective regimen sliding scale   SubCutaneous three times a day before meals  insulin lispro (ADMELOG) corrective regimen sliding scale   SubCutaneous at bedtime  polyethylene glycol 3350 17 Gram(s) Oral <User Schedule>  pyridoxine 50 milliGRAM(s) Oral daily  senna 2 Tablet(s) Oral two times a day  thiamine 100 milliGRAM(s) Oral daily          RADIOLOGY & ADDITIONAL STUDIES:             MARIA NICOLAS  Female  MRN-[id number]    Interval:    -Patient examined at bedside. Patient continues to report weakness in bilateral legs and inability to ambulate    CSF consistent w/ elevated protein and no WBC, suspicious for GBS     VITAL SIGNS:  T(F): 98.5  HR: 85  BP: 118/68  RR: 16  SpO2: 100%    Exam:   MS: Oriented x3. Fluent. Follows crossed commands.   CN: VFF. EOMI. V1-V3 intact. Face symmetric. T/u midline.   Motor: Diffuse atrophy throughout. Possible fasiculations on the left thigh. UE 5/5. LE some movement within plane of bed, DF 0/5.   Sensory: Intact to LT throughout   Reflexes: 2+ UEs. areflexic in the LEs.   Coordination: No dysmetria on FNF or ataxia on HTS bilaterally   Gait: Deferred    LABS:                          9.2    9.09  )-----------( 516      ( 27 Apr 2021 08:37 )             27.9     04-27    133<L>  |  98  |  12  ----------------------------<  207<H>  4.0   |  26  |  0.33<L>    Ca    8.9      27 Apr 2021 08:37  Phos  3.7     04-26  Mg     2.1     04-26    TPro  x   /  Alb  2336<L>  /  TBili  x   /  DBili  x   /  AST  x   /  ALT  x   /  AlkPhos  x   04-27        MEDICATIONS:   bisacodyl 5 milliGRAM(s) Oral at bedtime  bisacodyl 5 milliGRAM(s) Oral every 12 hours  dextrose 40% Gel 15 Gram(s) Oral once  dextrose 5%. 1000 milliLiter(s) IV Continuous <Continuous>  dextrose 5%. 1000 milliLiter(s) IV Continuous <Continuous>  dextrose 50% Injectable 25 Gram(s) IV Push once  dextrose 50% Injectable 12.5 Gram(s) IV Push once  dextrose 50% Injectable 25 Gram(s) IV Push once  dronabinol 2.5 milliGRAM(s) Oral three times a day  enoxaparin Injectable 40 milliGRAM(s) SubCutaneous daily  glucagon  Injectable 1 milliGRAM(s) IntraMuscular once  glycerin Suppository - Adult 1 Suppository(s) Rectal daily  HYDROmorphone   Tablet 6 milliGRAM(s) Oral every 4 hours PRN  HYDROmorphone   Tablet 8 milliGRAM(s) Oral every 4 hours PRN  HYDROmorphone  Injectable 0.5 milliGRAM(s) IV Push every 6 hours PRN  insulin lispro (ADMELOG) corrective regimen sliding scale   SubCutaneous three times a day before meals  insulin lispro (ADMELOG) corrective regimen sliding scale   SubCutaneous at bedtime  polyethylene glycol 3350 17 Gram(s) Oral <User Schedule>  pyridoxine 50 milliGRAM(s) Oral daily  senna 2 Tablet(s) Oral two times a day  thiamine 100 milliGRAM(s) Oral daily          RADIOLOGY & ADDITIONAL STUDIES:

## 2021-04-27 NOTE — PROGRESS NOTE ADULT - PROBLEM SELECTOR PLAN 1
Patient presenting w/ lower extremity weakness L>R distally in the setting of metastatic pancreatic cancer. Notable Left foot drop. No spinal mets seen on CT.   -MR spine thoracic/lumbar w/ and w/o neg for spinal cord compression   - MRI pelvis neg for plexopathy  - MRI C spine and head w and w/o contrast w change in signal- no obvious cord issue ?gliosis  Imaging concerning for vertebral bony metastases with resultant cervical myelopathy which could explain symptoms per neuro  rad onc no intervention planned  neuro surg:- think gliosis is reactive to prior surgery  B1 and B6 levels noted to be low: started on supplements  pending results for serum paraneoplastic panel, ganglioside antibody, ACE, Rheumatological serologies.   s/p LP 4/26 to rule out immune mediated polyneuropathy per neuro recs:  CSF IgG elevated, neuro team suspects GBS and considering trial of IVIG, will discuss w/ onc if any CI to IVIG.  Needs rehab when medically cleared

## 2021-04-27 NOTE — PROGRESS NOTE ADULT - ASSESSMENT
70F w/ PMH of Pancreatic cancer w/ mets to the lungs and liver presents w/ paraparesis of unclear etiology.     MR C-spine demonstrates L. C4-C5 T2 hyperintensity consistent w/ gliosis. s/p posterior spinal fusion and laminectomy of C3-C6.   MR l-spine w/w/o contrast 4.9.2021: Negative.  MR Pelvis 4.15.21 negative   CSF WBC 1, Pro 66     Impression Acute to subacute onset paraparesis of unclear localization, possibilities include polyradiculoneuropathy or myelopathy. Etiologies include nutritional polyneuropathy (B6 defiiciency), AIDP, paraneoplastic immune mediated polyneuropathy.     Plan:      70F w/ PMH of Pancreatic cancer w/ mets to the lungs and liver presents w/ paraparesis of unclear etiology.     MR C-spine demonstrates L. C4-C5 T2 hyperintensity consistent w/ gliosis. s/p posterior spinal fusion and laminectomy of C3-C6.   MR l-spine w/w/o contrast 4.9.2021: Negative.  MR Pelvis 4.15.21 negative   CSF WBC 1, Pro 66     Impression Acute to subacute onset paraparesis of unclear localization, possibilities include polyradiculoneuropathy or myelopathy. Etiologies include nutritional polyneuropathy (B6 defiiciency), AIDP, paraneoplastic immune mediated polyneuropathy. 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     Plan:   [] Start IVIG 2g/kg divided over 5 days (if cleared by oncology given malignancy)   [] Replete B1 and B6   [] SPEP, UPEP, Ganglioside antibody, ACE, Sjogren, SLE antibodies   70F w/ PMH of Pancreatic cancer w/ mets to the lungs and liver presents w/ paraparesis of unclear etiology.     MR C-spine demonstrates L. C4-C5 T2 hyperintensity consistent w/ gliosis. s/p posterior spinal fusion and laminectomy of C3-C6.   MR l-spine w/w/o contrast 4.9.2021: Negative.  MR Pelvis 4.15.21 negative   CSF WBC 1, Pro 66     Impression Acute to subacute onset paraparesis of unclear localization, possibilities include polyradiculoneuropathy or myelopathy. Etiologies include nutritional polyneuropathy (B6 deficiency), AIDP, paraneoplastic immune mediated polyneuropathy. 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

## 2021-04-27 NOTE — PROGRESS NOTE ADULT - SUBJECTIVE AND OBJECTIVE BOX
Patient is a 70y old  Female who presents with a chief complaint of Lower Extremity Weakness, Inability to Ambulate (26 Apr 2021 15:31)      SUBJECTIVE / OVERNIGHT EVENTS: No events overnight. Pt is frustrated again and tearful during interview. Reports she wants to leave hospital and go to rehab. L> R foot drop remains unchanged.    MEDICATIONS  (STANDING):  bisacodyl 5 milliGRAM(s) Oral at bedtime  bisacodyl 5 milliGRAM(s) Oral every 12 hours  dextrose 40% Gel 15 Gram(s) Oral once  dextrose 5%. 1000 milliLiter(s) (50 mL/Hr) IV Continuous <Continuous>  dextrose 5%. 1000 milliLiter(s) (100 mL/Hr) IV Continuous <Continuous>  dextrose 50% Injectable 25 Gram(s) IV Push once  dextrose 50% Injectable 12.5 Gram(s) IV Push once  dextrose 50% Injectable 25 Gram(s) IV Push once  dronabinol 2.5 milliGRAM(s) Oral three times a day  glucagon  Injectable 1 milliGRAM(s) IntraMuscular once  glycerin Suppository - Adult 1 Suppository(s) Rectal daily  insulin lispro (ADMELOG) corrective regimen sliding scale   SubCutaneous three times a day before meals  insulin lispro (ADMELOG) corrective regimen sliding scale   SubCutaneous at bedtime  polyethylene glycol 3350 17 Gram(s) Oral <User Schedule>  pyridoxine 50 milliGRAM(s) Oral daily  senna 2 Tablet(s) Oral two times a day  thiamine 100 milliGRAM(s) Oral daily    MEDICATIONS  (PRN):  HYDROmorphone   Tablet 6 milliGRAM(s) Oral every 4 hours PRN Moderate Pain (4 - 6)  HYDROmorphone   Tablet 8 milliGRAM(s) Oral every 4 hours PRN Severe Pain (7 - 10)  HYDROmorphone  Injectable 0.5 milliGRAM(s) IV Push every 6 hours PRN breakthrough pain in case PO dilaudid does not work        CAPILLARY BLOOD GLUCOSE      POCT Blood Glucose.: 263 mg/dL (27 Apr 2021 12:07)  POCT Blood Glucose.: 219 mg/dL (27 Apr 2021 08:29)  POCT Blood Glucose.: 295 mg/dL (26 Apr 2021 22:48)  POCT Blood Glucose.: 335 mg/dL (26 Apr 2021 18:07)    I&O's Summary    Vital Signs Last 24 Hrs  T(C): 36.9 (27 Apr 2021 13:44), Max: 36.9 (27 Apr 2021 13:44)  T(F): 98.5 (27 Apr 2021 13:44), Max: 98.5 (27 Apr 2021 13:44)  HR: 85 (27 Apr 2021 13:44) (81 - 86)  BP: 118/68 (27 Apr 2021 13:44) (118/68 - 129/65)  BP(mean): --  RR: 16 (27 Apr 2021 13:44) (16 - 17)  SpO2: 100% (27 Apr 2021 13:44) (99% - 100%)      PHYSICAL EXAM:  PHYSICAL EXAM:  CONSTITUTIONAL: lying in bed, resting comfortably  EYES: sclera clear  RESPIRATORY: Normal respiratory effort; lungs are clear to auscultation bilaterally  CARDIOVASCULAR:S1 and S2, no murmurs appreciated  ABDOMEN: Nontender to palpation, decreased bowel sounds, slightly distended   MUSCULOSKELETAL: severe muscle atrophy in all extremities, left foot drop  NEURO: awake, answers Qs, L>RL foot drop    LABS:                        9.2    9.09  )-----------( 516      ( 27 Apr 2021 08:37 )             27.9     04-27    133<L>  |  98  |  12  ----------------------------<  207<H>  4.0   |  26  |  0.33<L>    Ca    8.9      27 Apr 2021 08:37  Phos  3.7     04-26  Mg     2.1     04-26    TPro  x   /  Alb  2336<L>  /  TBili  x   /  DBili  x   /  AST  x   /  ALT  x   /  AlkPhos  x   04-27              RADIOLOGY & ADDITIONAL TESTS:    Imaging Personally Reviewed:    Consultant(s) Notes Reviewed:      Care Discussed with Consultants/Other Providers: Discussed w/ neurology resident and Dr. Monique (Onc)

## 2021-04-27 NOTE — CHART NOTE - NSCHARTNOTEFT_GEN_A_CORE
Neurology recommend to start IVIG for possible GBS, discussed with Oncologist Dr. Monique, there is no contraindication to giving IVIG given malignancy.

## 2021-04-27 NOTE — PROGRESS NOTE ADULT - ATTENDING COMMENTS
Patient seen and examined with neurology resident and above note reviewed in detail and I agree with assessment and plan as outlined. Patient hx as noted and she continues to have bilateral weakness in legs along with poor mobility and found to have now absent reflexes in legs.  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     Plan: Would treat with IVIG 0.4 grams per kilo per day if cleared by oncology as patient has a malignancy and is hypercoagulable.  Pretreat with tylenol and benadryl 25 mg daily prior to dose   daily comprehensive metabolic panel to monitor sodium, potassium and renal function    2. Follow NIF and VC daily    And agree with sending ganglioside panel, SPEP and UPEP and ACE etc..    3. PT and OT and continue medical mgt and supportive care and oncologic care    All questions answered with patient at bedside.

## 2021-04-28 LAB
ANION GAP SERPL CALC-SCNC: 8 MMOL/L — SIGNIFICANT CHANGE UP (ref 7–14)
BUN SERPL-MCNC: 13 MG/DL — SIGNIFICANT CHANGE UP (ref 7–23)
CALCIUM SERPL-MCNC: 9 MG/DL — SIGNIFICANT CHANGE UP (ref 8.4–10.5)
CHLORIDE SERPL-SCNC: 98 MMOL/L — SIGNIFICANT CHANGE UP (ref 98–107)
CO2 SERPL-SCNC: 27 MMOL/L — SIGNIFICANT CHANGE UP (ref 22–31)
CREAT SERPL-MCNC: 0.35 MG/DL — LOW (ref 0.5–1.3)
GLUCOSE BLDC GLUCOMTR-MCNC: 128 MG/DL — HIGH (ref 70–99)
GLUCOSE BLDC GLUCOMTR-MCNC: 179 MG/DL — HIGH (ref 70–99)
GLUCOSE BLDC GLUCOMTR-MCNC: 243 MG/DL — HIGH (ref 70–99)
GLUCOSE BLDC GLUCOMTR-MCNC: 245 MG/DL — HIGH (ref 70–99)
GLUCOSE SERPL-MCNC: 199 MG/DL — HIGH (ref 70–99)
HCT VFR BLD CALC: 27.9 % — LOW (ref 34.5–45)
HGB BLD-MCNC: 9.3 G/DL — LOW (ref 11.5–15.5)
MBP CSF-MCNC: 3.6 NG/ML — SIGNIFICANT CHANGE UP (ref 0–4.7)
MCHC RBC-ENTMCNC: 30.9 PG — SIGNIFICANT CHANGE UP (ref 27–34)
MCHC RBC-ENTMCNC: 33.3 GM/DL — SIGNIFICANT CHANGE UP (ref 32–36)
MCV RBC AUTO: 92.7 FL — SIGNIFICANT CHANGE UP (ref 80–100)
NRBC # BLD: 0 /100 WBCS — SIGNIFICANT CHANGE UP
NRBC # FLD: 0 K/UL — SIGNIFICANT CHANGE UP
PLATELET # BLD AUTO: 496 K/UL — HIGH (ref 150–400)
POTASSIUM SERPL-MCNC: 4 MMOL/L — SIGNIFICANT CHANGE UP (ref 3.5–5.3)
POTASSIUM SERPL-SCNC: 4 MMOL/L — SIGNIFICANT CHANGE UP (ref 3.5–5.3)
RBC # BLD: 3.01 M/UL — LOW (ref 3.8–5.2)
RBC # FLD: 17.5 % — HIGH (ref 10.3–14.5)
SODIUM SERPL-SCNC: 133 MMOL/L — LOW (ref 135–145)
VDRL CSF-TITR: SIGNIFICANT CHANGE UP
WBC # BLD: 10.03 K/UL — SIGNIFICANT CHANGE UP (ref 3.8–10.5)
WBC # FLD AUTO: 10.03 K/UL — SIGNIFICANT CHANGE UP (ref 3.8–10.5)

## 2021-04-28 PROCEDURE — 99233 SBSQ HOSP IP/OBS HIGH 50: CPT

## 2021-04-28 RX ORDER — SODIUM CHLORIDE 9 MG/ML
1000 INJECTION, SOLUTION INTRAVENOUS
Refills: 0 | Status: DISCONTINUED | OUTPATIENT
Start: 2021-04-28 | End: 2021-05-04

## 2021-04-28 RX ORDER — INSULIN LISPRO 100/ML
VIAL (ML) SUBCUTANEOUS
Refills: 0 | Status: DISCONTINUED | OUTPATIENT
Start: 2021-04-28 | End: 2021-05-04

## 2021-04-28 RX ORDER — DEXTROSE 50 % IN WATER 50 %
25 SYRINGE (ML) INTRAVENOUS ONCE
Refills: 0 | Status: DISCONTINUED | OUTPATIENT
Start: 2021-04-28 | End: 2021-05-04

## 2021-04-28 RX ORDER — GLUCAGON INJECTION, SOLUTION 0.5 MG/.1ML
1 INJECTION, SOLUTION SUBCUTANEOUS ONCE
Refills: 0 | Status: DISCONTINUED | OUTPATIENT
Start: 2021-04-28 | End: 2021-05-04

## 2021-04-28 RX ORDER — DEXTROSE 50 % IN WATER 50 %
15 SYRINGE (ML) INTRAVENOUS ONCE
Refills: 0 | Status: DISCONTINUED | OUTPATIENT
Start: 2021-04-28 | End: 2021-05-04

## 2021-04-28 RX ORDER — INSULIN LISPRO 100/ML
VIAL (ML) SUBCUTANEOUS AT BEDTIME
Refills: 0 | Status: DISCONTINUED | OUTPATIENT
Start: 2021-04-28 | End: 2021-05-04

## 2021-04-28 RX ORDER — INSULIN LISPRO 100/ML
2 VIAL (ML) SUBCUTANEOUS
Refills: 0 | Status: DISCONTINUED | OUTPATIENT
Start: 2021-04-28 | End: 2021-04-29

## 2021-04-28 RX ORDER — INSULIN GLARGINE 100 [IU]/ML
5 INJECTION, SOLUTION SUBCUTANEOUS AT BEDTIME
Refills: 0 | Status: DISCONTINUED | OUTPATIENT
Start: 2021-04-28 | End: 2021-05-02

## 2021-04-28 RX ORDER — DEXTROSE 50 % IN WATER 50 %
12.5 SYRINGE (ML) INTRAVENOUS ONCE
Refills: 0 | Status: DISCONTINUED | OUTPATIENT
Start: 2021-04-28 | End: 2021-05-04

## 2021-04-28 RX ADMIN — Medication 100 MILLIGRAM(S): at 12:28

## 2021-04-28 RX ADMIN — INSULIN GLARGINE 5 UNIT(S): 100 INJECTION, SOLUTION SUBCUTANEOUS at 22:36

## 2021-04-28 RX ADMIN — Medication 2 UNIT(S): at 12:27

## 2021-04-28 RX ADMIN — Medication 2.5 MILLIGRAM(S): at 21:45

## 2021-04-28 RX ADMIN — ENOXAPARIN SODIUM 40 MILLIGRAM(S): 100 INJECTION SUBCUTANEOUS at 12:27

## 2021-04-28 RX ADMIN — HYDROMORPHONE HYDROCHLORIDE 6 MILLIGRAM(S): 2 INJECTION INTRAMUSCULAR; INTRAVENOUS; SUBCUTANEOUS at 16:44

## 2021-04-28 RX ADMIN — FENTANYL CITRATE 1 PATCH: 50 INJECTION INTRAVENOUS at 18:32

## 2021-04-28 RX ADMIN — Medication 650 MILLIGRAM(S): at 11:47

## 2021-04-28 RX ADMIN — HYDROMORPHONE HYDROCHLORIDE 8 MILLIGRAM(S): 2 INJECTION INTRAMUSCULAR; INTRAVENOUS; SUBCUTANEOUS at 21:08

## 2021-04-28 RX ADMIN — HYDROMORPHONE HYDROCHLORIDE 8 MILLIGRAM(S): 2 INJECTION INTRAMUSCULAR; INTRAVENOUS; SUBCUTANEOUS at 06:20

## 2021-04-28 RX ADMIN — Medication 2: at 12:27

## 2021-04-28 RX ADMIN — Medication 650 MILLIGRAM(S): at 12:45

## 2021-04-28 RX ADMIN — FENTANYL CITRATE 1 PATCH: 50 INJECTION INTRAVENOUS at 06:44

## 2021-04-28 RX ADMIN — HYDROMORPHONE HYDROCHLORIDE 6 MILLIGRAM(S): 2 INJECTION INTRAMUSCULAR; INTRAVENOUS; SUBCUTANEOUS at 15:24

## 2021-04-28 RX ADMIN — Medication 2: at 08:52

## 2021-04-28 RX ADMIN — Medication 25 MILLIGRAM(S): at 11:48

## 2021-04-28 RX ADMIN — Medication 1: at 18:10

## 2021-04-28 RX ADMIN — Medication 2 UNIT(S): at 18:10

## 2021-04-28 RX ADMIN — HYDROMORPHONE HYDROCHLORIDE 8 MILLIGRAM(S): 2 INJECTION INTRAMUSCULAR; INTRAVENOUS; SUBCUTANEOUS at 20:38

## 2021-04-28 RX ADMIN — SENNA PLUS 2 TABLET(S): 8.6 TABLET ORAL at 18:12

## 2021-04-28 RX ADMIN — HYDROMORPHONE HYDROCHLORIDE 8 MILLIGRAM(S): 2 INJECTION INTRAMUSCULAR; INTRAVENOUS; SUBCUTANEOUS at 05:27

## 2021-04-28 RX ADMIN — Medication 2.5 MILLIGRAM(S): at 05:27

## 2021-04-28 RX ADMIN — IMMUNE GLOBULIN (HUMAN) 33.33 GRAM(S): 10 INJECTION INTRAVENOUS; SUBCUTANEOUS at 12:28

## 2021-04-28 RX ADMIN — Medication 50 MILLIGRAM(S): at 12:28

## 2021-04-28 NOTE — PROGRESS NOTE ADULT - PROBLEM SELECTOR PLAN 1
Patient presenting w/ lower extremity weakness L>R distally in the setting of metastatic pancreatic cancer. Notable Left foot drop. No spinal mets seen on CT.   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

## 2021-04-28 NOTE — PROGRESS NOTE ADULT - ASSESSMENT
70yr old woman PMH diabetes, pancreatic cancer w/ mets to lung and liver on chemo (9th session, abraxane/gemzar) p/w inability to ambulate and LLE>RLE weakness. 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

## 2021-04-28 NOTE — PROGRESS NOTE ADULT - PROBLEM SELECTOR PLAN 2
Patient w/ severe left hydronephrosis seen on CT, patient reports difficulty emptying bladder.   Failed TOV on 4/23 and joy reinserted. UA positive and had suprapubic pain 4/24 completed 3 days of CTX.

## 2021-04-28 NOTE — PROGRESS NOTE ADULT - PROBLEM SELECTOR PLAN 3
a1c 6.1  on consistent carb diet  - c/w ISS, hesitant to start standing regimen given her variable and limited PO intake a1c 6.1  FS uncontrolled, restart lantus 5 units and admelog 2 units TID premeal, ISS  on consistent carb diet

## 2021-04-28 NOTE — PROGRESS NOTE ADULT - SUBJECTIVE AND OBJECTIVE BOX
Patient is a 70y old  Female who presents with a chief complaint of Lower Extremity Weakness, Inability to Ambulate (27 Apr 2021 17:19)      SUBJECTIVE / OVERNIGHT EVENTS:    MEDICATIONS  (STANDING):  acetaminophen   Tablet .. 650 milliGRAM(s) Oral daily  bisacodyl 5 milliGRAM(s) Oral at bedtime  bisacodyl 5 milliGRAM(s) Oral every 12 hours  dextrose 40% Gel 15 Gram(s) Oral once  dextrose 5%. 1000 milliLiter(s) (50 mL/Hr) IV Continuous <Continuous>  dextrose 5%. 1000 milliLiter(s) (100 mL/Hr) IV Continuous <Continuous>  dextrose 50% Injectable 25 Gram(s) IV Push once  dextrose 50% Injectable 12.5 Gram(s) IV Push once  dextrose 50% Injectable 25 Gram(s) IV Push once  diphenhydrAMINE 25 milliGRAM(s) Oral daily  dronabinol 2.5 milliGRAM(s) Oral three times a day  enoxaparin Injectable 40 milliGRAM(s) SubCutaneous daily  glucagon  Injectable 1 milliGRAM(s) IntraMuscular once  glycerin Suppository - Adult 1 Suppository(s) Rectal daily  immune   globulin 10% (GAMMAGARD) IVPB 20 Gram(s) IV Intermittent daily  insulin glargine Injectable (LANTUS) 5 Unit(s) SubCutaneous at bedtime  insulin lispro (ADMELOG) corrective regimen sliding scale   SubCutaneous three times a day before meals  insulin lispro (ADMELOG) corrective regimen sliding scale   SubCutaneous at bedtime  insulin lispro Injectable (ADMELOG) 2 Unit(s) SubCutaneous three times a day before meals  polyethylene glycol 3350 17 Gram(s) Oral <User Schedule>  pyridoxine 50 milliGRAM(s) Oral daily  senna 2 Tablet(s) Oral two times a day  thiamine 100 milliGRAM(s) Oral daily    MEDICATIONS  (PRN):  HYDROmorphone   Tablet 6 milliGRAM(s) Oral every 4 hours PRN Moderate Pain (4 - 6)  HYDROmorphone   Tablet 8 milliGRAM(s) Oral every 4 hours PRN Severe Pain (7 - 10)  HYDROmorphone  Injectable 0.5 milliGRAM(s) IV Push every 6 hours PRN breakthrough pain in case PO dilaudid does not work        CAPILLARY BLOOD GLUCOSE      POCT Blood Glucose.: 243 mg/dL (28 Apr 2021 12:02)  POCT Blood Glucose.: 245 mg/dL (28 Apr 2021 08:28)  POCT Blood Glucose.: 343 mg/dL (27 Apr 2021 21:39)  POCT Blood Glucose.: 328 mg/dL (27 Apr 2021 17:55)    I&O's Summary    27 Apr 2021 07:01  -  28 Apr 2021 07:00  --------------------------------------------------------  IN: 0 mL / OUT: 1000 mL / NET: -1000 mL        PHYSICAL EXAM:  GENERAL: NAD, well-developed  HEAD:  Atraumatic, Normocephalic  EYES: EOMI, PERRLA, conjunctiva and sclera clear  NECK: Supple, No JVD  CHEST/LUNG: Clear to auscultation bilaterally; No wheeze  HEART: Regular rate and rhythm; No murmurs, rubs, or gallops  ABDOMEN: Soft, Nontender, Nondistended; Bowel sounds present  EXTREMITIES:  2+ Peripheral Pulses, No clubbing, cyanosis, or edema  PSYCH: AAOx3  NEUROLOGY: non-focal  SKIN: No rashes or lesions    LABS:                        9.3    10.03 )-----------( 496      ( 28 Apr 2021 06:37 )             27.9     04-28    133<L>  |  98  |  13  ----------------------------<  199<H>  4.0   |  27  |  0.35<L>    Ca    9.0      28 Apr 2021 06:37    TPro  x   /  Alb  2336<L>  /  TBili  x   /  DBili  x   /  AST  x   /  ALT  x   /  AlkPhos  x   04-27              RADIOLOGY & ADDITIONAL TESTS:    Imaging Personally Reviewed:    Consultant(s) Notes Reviewed:      Care Discussed with Consultants/Other Providers:

## 2021-04-29 ENCOUNTER — APPOINTMENT (OUTPATIENT)
Dept: INFUSION THERAPY | Facility: HOSPITAL | Age: 71
End: 2021-04-29

## 2021-04-29 LAB
ANION GAP SERPL CALC-SCNC: 9 MMOL/L — SIGNIFICANT CHANGE UP (ref 7–14)
BUN SERPL-MCNC: 14 MG/DL — SIGNIFICANT CHANGE UP (ref 7–23)
CALCIUM SERPL-MCNC: 9.5 MG/DL — SIGNIFICANT CHANGE UP (ref 8.4–10.5)
CHLORIDE SERPL-SCNC: 99 MMOL/L — SIGNIFICANT CHANGE UP (ref 98–107)
CO2 SERPL-SCNC: 28 MMOL/L — SIGNIFICANT CHANGE UP (ref 22–31)
CREAT SERPL-MCNC: 0.38 MG/DL — LOW (ref 0.5–1.3)
CULTURE RESULTS: NO GROWTH — SIGNIFICANT CHANGE UP
GD1A GANGL IGG+IGM SER IA-ACNC: 49 IV — SIGNIFICANT CHANGE UP (ref 0–50)
GD1B GANGL IGG+IGM SER IA-ACNC: 13 IV — SIGNIFICANT CHANGE UP (ref 0–50)
GLUCOSE BLDC GLUCOMTR-MCNC: 178 MG/DL — HIGH (ref 70–99)
GLUCOSE BLDC GLUCOMTR-MCNC: 194 MG/DL — HIGH (ref 70–99)
GLUCOSE BLDC GLUCOMTR-MCNC: 223 MG/DL — HIGH (ref 70–99)
GLUCOSE BLDC GLUCOMTR-MCNC: 90 MG/DL — SIGNIFICANT CHANGE UP (ref 70–99)
GLUCOSE SERPL-MCNC: 70 MG/DL — SIGNIFICANT CHANGE UP (ref 70–99)
GM1 ASIALO IGG+IGM SER IA-ACNC: 10 IV — SIGNIFICANT CHANGE UP (ref 0–50)
GM1 GANGL IGG+IGM SER-ACNC: 11 IV — SIGNIFICANT CHANGE UP (ref 0–50)
GM2 GANGL IGG+IGM SER IA-ACNC: 22 IV — SIGNIFICANT CHANGE UP (ref 0–50)
GQ1B GANGL IGG+IGM SER IA-ACNC: 20 IV — SIGNIFICANT CHANGE UP (ref 0–50)
HCT VFR BLD CALC: 33.9 % — LOW (ref 34.5–45)
HGB BLD-MCNC: 10.8 G/DL — LOW (ref 11.5–15.5)
MAGNESIUM SERPL-MCNC: 2.1 MG/DL — SIGNIFICANT CHANGE UP (ref 1.6–2.6)
MCHC RBC-ENTMCNC: 30.5 PG — SIGNIFICANT CHANGE UP (ref 27–34)
MCHC RBC-ENTMCNC: 31.9 GM/DL — LOW (ref 32–36)
MCV RBC AUTO: 95.8 FL — SIGNIFICANT CHANGE UP (ref 80–100)
NRBC # BLD: 0 /100 WBCS — SIGNIFICANT CHANGE UP
NRBC # FLD: 0 K/UL — SIGNIFICANT CHANGE UP
PHOSPHATE SERPL-MCNC: 4 MG/DL — SIGNIFICANT CHANGE UP (ref 2.5–4.5)
PLATELET # BLD AUTO: 432 K/UL — HIGH (ref 150–400)
POTASSIUM SERPL-MCNC: 4 MMOL/L — SIGNIFICANT CHANGE UP (ref 3.5–5.3)
POTASSIUM SERPL-SCNC: 4 MMOL/L — SIGNIFICANT CHANGE UP (ref 3.5–5.3)
RBC # BLD: 3.54 M/UL — LOW (ref 3.8–5.2)
RBC # FLD: 17.8 % — HIGH (ref 10.3–14.5)
SODIUM SERPL-SCNC: 136 MMOL/L — SIGNIFICANT CHANGE UP (ref 135–145)
SPECIMEN SOURCE: SIGNIFICANT CHANGE UP
WBC # BLD: 10.41 K/UL — SIGNIFICANT CHANGE UP (ref 3.8–10.5)
WBC # FLD AUTO: 10.41 K/UL — SIGNIFICANT CHANGE UP (ref 3.8–10.5)

## 2021-04-29 PROCEDURE — 99232 SBSQ HOSP IP/OBS MODERATE 35: CPT

## 2021-04-29 RX ORDER — HYDROMORPHONE HYDROCHLORIDE 2 MG/ML
6 INJECTION INTRAMUSCULAR; INTRAVENOUS; SUBCUTANEOUS EVERY 4 HOURS
Refills: 0 | Status: DISCONTINUED | OUTPATIENT
Start: 2021-04-29 | End: 2021-05-04

## 2021-04-29 RX ORDER — HYDROMORPHONE HYDROCHLORIDE 2 MG/ML
0.5 INJECTION INTRAMUSCULAR; INTRAVENOUS; SUBCUTANEOUS EVERY 6 HOURS
Refills: 0 | Status: DISCONTINUED | OUTPATIENT
Start: 2021-04-29 | End: 2021-05-04

## 2021-04-29 RX ORDER — DRONABINOL 2.5 MG
2.5 CAPSULE ORAL THREE TIMES A DAY
Refills: 0 | Status: DISCONTINUED | OUTPATIENT
Start: 2021-04-29 | End: 2021-05-04

## 2021-04-29 RX ORDER — INSULIN LISPRO 100/ML
4 VIAL (ML) SUBCUTANEOUS
Refills: 0 | Status: DISCONTINUED | OUTPATIENT
Start: 2021-04-29 | End: 2021-05-01

## 2021-04-29 RX ORDER — HYDROMORPHONE HYDROCHLORIDE 2 MG/ML
8 INJECTION INTRAMUSCULAR; INTRAVENOUS; SUBCUTANEOUS EVERY 4 HOURS
Refills: 0 | Status: DISCONTINUED | OUTPATIENT
Start: 2021-04-29 | End: 2021-05-04

## 2021-04-29 RX ADMIN — HYDROMORPHONE HYDROCHLORIDE 8 MILLIGRAM(S): 2 INJECTION INTRAMUSCULAR; INTRAVENOUS; SUBCUTANEOUS at 22:17

## 2021-04-29 RX ADMIN — INSULIN GLARGINE 5 UNIT(S): 100 INJECTION, SOLUTION SUBCUTANEOUS at 22:10

## 2021-04-29 RX ADMIN — ENOXAPARIN SODIUM 40 MILLIGRAM(S): 100 INJECTION SUBCUTANEOUS at 12:16

## 2021-04-29 RX ADMIN — POLYETHYLENE GLYCOL 3350 17 GRAM(S): 17 POWDER, FOR SOLUTION ORAL at 21:14

## 2021-04-29 RX ADMIN — HYDROMORPHONE HYDROCHLORIDE 8 MILLIGRAM(S): 2 INJECTION INTRAMUSCULAR; INTRAVENOUS; SUBCUTANEOUS at 23:17

## 2021-04-29 RX ADMIN — Medication 25 MILLIGRAM(S): at 12:17

## 2021-04-29 RX ADMIN — FENTANYL CITRATE 1 PATCH: 50 INJECTION INTRAVENOUS at 18:49

## 2021-04-29 RX ADMIN — IMMUNE GLOBULIN (HUMAN) 33.33 GRAM(S): 10 INJECTION INTRAVENOUS; SUBCUTANEOUS at 12:45

## 2021-04-29 RX ADMIN — Medication 5 MILLIGRAM(S): at 21:14

## 2021-04-29 RX ADMIN — HYDROMORPHONE HYDROCHLORIDE 8 MILLIGRAM(S): 2 INJECTION INTRAMUSCULAR; INTRAVENOUS; SUBCUTANEOUS at 04:45

## 2021-04-29 RX ADMIN — Medication 2: at 12:16

## 2021-04-29 RX ADMIN — Medication 2.5 MILLIGRAM(S): at 21:14

## 2021-04-29 RX ADMIN — Medication 650 MILLIGRAM(S): at 12:15

## 2021-04-29 RX ADMIN — Medication 650 MILLIGRAM(S): at 13:00

## 2021-04-29 RX ADMIN — Medication 1: at 17:52

## 2021-04-29 RX ADMIN — Medication 2 UNIT(S): at 12:16

## 2021-04-29 RX ADMIN — Medication 2 UNIT(S): at 08:34

## 2021-04-29 RX ADMIN — Medication 50 MILLIGRAM(S): at 12:17

## 2021-04-29 RX ADMIN — Medication 100 MILLIGRAM(S): at 12:15

## 2021-04-29 RX ADMIN — Medication 4 UNIT(S): at 17:51

## 2021-04-29 RX ADMIN — FENTANYL CITRATE 1 PATCH: 50 INJECTION INTRAVENOUS at 07:33

## 2021-04-29 RX ADMIN — HYDROMORPHONE HYDROCHLORIDE 8 MILLIGRAM(S): 2 INJECTION INTRAMUSCULAR; INTRAVENOUS; SUBCUTANEOUS at 05:00

## 2021-04-29 NOTE — PROGRESS NOTE ADULT - SUBJECTIVE AND OBJECTIVE BOX
Patient is a 70y old  Female who presents with a chief complaint of Lower Extremity Weakness, Inability to Ambulate (29 Apr 2021 14:10)      SUBJECTIVE / OVERNIGHT EVENTS: No events overnight. Pt still w/ b/l foot drop without significant improvement. She has no other acute complaints.    MEDICATIONS  (STANDING):  acetaminophen   Tablet .. 650 milliGRAM(s) Oral daily  bisacodyl 5 milliGRAM(s) Oral at bedtime  bisacodyl 5 milliGRAM(s) Oral every 12 hours  dextrose 40% Gel 15 Gram(s) Oral once  dextrose 5%. 1000 milliLiter(s) (50 mL/Hr) IV Continuous <Continuous>  dextrose 5%. 1000 milliLiter(s) (100 mL/Hr) IV Continuous <Continuous>  dextrose 50% Injectable 25 Gram(s) IV Push once  dextrose 50% Injectable 12.5 Gram(s) IV Push once  dextrose 50% Injectable 25 Gram(s) IV Push once  diphenhydrAMINE 25 milliGRAM(s) Oral daily  dronabinol 2.5 milliGRAM(s) Oral three times a day  enoxaparin Injectable 40 milliGRAM(s) SubCutaneous daily  glucagon  Injectable 1 milliGRAM(s) IntraMuscular once  glycerin Suppository - Adult 1 Suppository(s) Rectal daily  immune   globulin 10% (GAMMAGARD) IVPB 20 Gram(s) IV Intermittent daily  insulin glargine Injectable (LANTUS) 5 Unit(s) SubCutaneous at bedtime  insulin lispro (ADMELOG) corrective regimen sliding scale   SubCutaneous three times a day before meals  insulin lispro (ADMELOG) corrective regimen sliding scale   SubCutaneous at bedtime  insulin lispro Injectable (ADMELOG) 2 Unit(s) SubCutaneous three times a day before meals  polyethylene glycol 3350 17 Gram(s) Oral <User Schedule>  pyridoxine 50 milliGRAM(s) Oral daily  senna 2 Tablet(s) Oral two times a day  thiamine 100 milliGRAM(s) Oral daily    MEDICATIONS  (PRN):  HYDROmorphone   Tablet 8 milliGRAM(s) Oral every 4 hours PRN Severe Pain (7 - 10)  HYDROmorphone   Tablet 6 milliGRAM(s) Oral every 4 hours PRN Moderate Pain (4 - 6)  HYDROmorphone  Injectable 0.5 milliGRAM(s) IV Push every 6 hours PRN breakthrough pain in case PO dilaudid does not work        CAPILLARY BLOOD GLUCOSE      POCT Blood Glucose.: 223 mg/dL (29 Apr 2021 12:09)  POCT Blood Glucose.: 90 mg/dL (29 Apr 2021 08:30)  POCT Blood Glucose.: 128 mg/dL (28 Apr 2021 22:08)  POCT Blood Glucose.: 179 mg/dL (28 Apr 2021 17:39)    I&O's Summary    28 Apr 2021 07:01  -  29 Apr 2021 07:00  --------------------------------------------------------  IN: 0 mL / OUT: 1000 mL / NET: -1000 mL      Vital Signs Last 24 Hrs  T(C): 36.6 (29 Apr 2021 04:48), Max: 37.1 (28 Apr 2021 20:56)  T(F): 97.9 (29 Apr 2021 04:48), Max: 98.8 (28 Apr 2021 20:56)  HR: 93 (29 Apr 2021 04:48) (88 - 93)  BP: 160/88 (29 Apr 2021 04:48) (154/88 - 160/88)  BP(mean): --  RR: 20 (29 Apr 2021 04:48) (18 - 20)  SpO2: 100% (29 Apr 2021 04:48) (100% - 100%)    PHYSICAL EXAM:    PHYSICAL EXAM:  CONSTITUTIONAL: lying in bed, resting comfortably  EYES: sclera clear  RESPIRATORY: Normal respiratory effort; lungs are clear to auscultation bilaterally  CARDIOVASCULAR:S1 and S2, no murmurs appreciated  ABDOMEN: Nontender to palpation, decreased bowel sounds, slightly distended   MUSCULOSKELETAL: severe muscle atrophy in all extremities, left foot drop  NEURO: awake, answers Qs, L>R foot drop  LABS:                        10.8   10.41 )-----------( 432      ( 29 Apr 2021 07:04 )             33.9     04-29    136  |  99  |  14  ----------------------------<  70  4.0   |  28  |  0.38<L>    Ca    9.5      29 Apr 2021 07:04  Phos  4.0     04-29  Mg     2.1     04-29                RADIOLOGY & ADDITIONAL TESTS:    Imaging Personally Reviewed:    Consultant(s) Notes Reviewed:      Care Discussed with Consultants/Other Providers: Discussed w/ Dr. Monique (onc)

## 2021-04-29 NOTE — PROGRESS NOTE ADULT - PROBLEM SELECTOR PLAN 1
Patient presenting w/ lower extremity weakness L>R distally in the setting of metastatic pancreatic cancer. Notable Left foot drop. No spinal mets seen on CT.   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

## 2021-04-29 NOTE — PROGRESS NOTE ADULT - ASSESSMENT
70yr old woman PMH diabetes, pancreatic cancer w/ mets to lung and liver on chemo (9th session, abraxane/gemzar) p/w inability to ambulate and LLE>RLE weakness. 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

## 2021-04-29 NOTE — PROGRESS NOTE ADULT - ATTENDING COMMENTS
Patient seen at bedside. Case discussed with OLWELL Samano. Plan as above.   On IVIG per neuro. No oncologic contraindications to IVIG.  Overall prognosis poor 2/2 metastatic pancreatic cancer.

## 2021-04-29 NOTE — PROGRESS NOTE ADULT - PROBLEM SELECTOR PLAN 3
a1c 6.1  FS uncontrolled, restart lantus 5 units, mornign FS 90. incr admelog 3 units TID premeal, ISS  on consistent carb diet

## 2021-04-29 NOTE — PROGRESS NOTE ADULT - SUBJECTIVE AND OBJECTIVE BOX
INTERVAL HPI/OVERNIGHT EVENTS:  Patient seen at bedside.  Patient similar symptoms of weakness  No acute complaints    VITAL SIGNS:  T(F): 97.9 (04-29-21 @ 04:48)  HR: 93 (04-29-21 @ 04:48)  BP: 160/88 (04-29-21 @ 04:48)  RR: 20 (04-29-21 @ 04:48)  SpO2: 100% (04-29-21 @ 04:48)  Wt(kg): --    PHYSICAL EXAM:    In accordance with current standard limiting patient contact, deferred physical exam  2/2 COVID pandemic  Please refer to physical exam of primary team.    MEDICATIONS  (STANDING):  acetaminophen   Tablet .. 650 milliGRAM(s) Oral daily  bisacodyl 5 milliGRAM(s) Oral at bedtime  bisacodyl 5 milliGRAM(s) Oral every 12 hours  dextrose 40% Gel 15 Gram(s) Oral once  dextrose 5%. 1000 milliLiter(s) (50 mL/Hr) IV Continuous <Continuous>  dextrose 5%. 1000 milliLiter(s) (100 mL/Hr) IV Continuous <Continuous>  dextrose 50% Injectable 25 Gram(s) IV Push once  dextrose 50% Injectable 12.5 Gram(s) IV Push once  dextrose 50% Injectable 25 Gram(s) IV Push once  diphenhydrAMINE 25 milliGRAM(s) Oral daily  dronabinol 2.5 milliGRAM(s) Oral three times a day  enoxaparin Injectable 40 milliGRAM(s) SubCutaneous daily  glucagon  Injectable 1 milliGRAM(s) IntraMuscular once  glycerin Suppository - Adult 1 Suppository(s) Rectal daily  immune   globulin 10% (GAMMAGARD) IVPB 20 Gram(s) IV Intermittent daily  insulin glargine Injectable (LANTUS) 5 Unit(s) SubCutaneous at bedtime  insulin lispro (ADMELOG) corrective regimen sliding scale   SubCutaneous three times a day before meals  insulin lispro (ADMELOG) corrective regimen sliding scale   SubCutaneous at bedtime  insulin lispro Injectable (ADMELOG) 2 Unit(s) SubCutaneous three times a day before meals  polyethylene glycol 3350 17 Gram(s) Oral <User Schedule>  pyridoxine 50 milliGRAM(s) Oral daily  senna 2 Tablet(s) Oral two times a day  thiamine 100 milliGRAM(s) Oral daily    MEDICATIONS  (PRN):  HYDROmorphone   Tablet 8 milliGRAM(s) Oral every 4 hours PRN Severe Pain (7 - 10)  HYDROmorphone   Tablet 6 milliGRAM(s) Oral every 4 hours PRN Moderate Pain (4 - 6)  HYDROmorphone  Injectable 0.5 milliGRAM(s) IV Push every 6 hours PRN breakthrough pain in case PO dilaudid does not work      Allergies    No Known Allergies    Intolerances        LABS:                        10.8   10.41 )-----------( 432      ( 29 Apr 2021 07:04 )             33.9     04-29    136  |  99  |  14  ----------------------------<  70  4.0   |  28  |  0.38<L>    Ca    9.5      29 Apr 2021 07:04  Phos  4.0     04-29  Mg     2.1     04-29            RADIOLOGY & ADDITIONAL TESTS:  Studies reviewed.

## 2021-04-29 NOTE — PROGRESS NOTE ADULT - ASSESSMENT
70f with metastatic pancreatic with  mets to lung and liver, on chemotherapy with gemcitabine and abraxane, presenting with LE swelling, heaviness and inability to walk since 2 days PTA.   LE doppers negative for VTE, however with ?arterial thrombus. PT and DP pulses not palpable. Legs are warm. She is not able to move leges antigravity.   spine CT with contrast without signs of spinal mets or cord compression.   Recent bone scan 3/25/2021 and recent PET/CT 3/11/2021 without evidence of bone mets. Given all the above the likelihood of spinal mets and cord compression is minimal.       -S/p Arterial duplex, BLE of mild femoropopliteal arterial disease noted  -No Vascular intervention indicated at this time  -Urology input appreciated, patient with joy , no intervention indicated for chronic hydronephrosis for  now  -Pal care input appreciated for pain recs  -s/p MR thoracic and lumbar spine shows  degenerative disc disease at L3-4 and L4-5 with loss of signal on the T2-weighted images. No cord or cauda equina compression noted. Progressive b/l LE weakness possibly 2/2 loss signal at L3/4 and L4/5 in the setting of degenerative disk disease vs infectious etiology in the setting of warm edematous LEs  - s/p MRI pelvis: Exam limited by susceptibility artifact from a rectal tube. No well-formed mass or collection is otherwise demonstrated along the course of the lumbosacral plexus bilaterally. Diffuse marrow signal alteration which may secondary to anemia or marrow fluid/infiltrative disorder. Anasarca.---->No acute pathology to explain symptoms    s/p MRI C spine and head w and w/o contrast-findings concerning for vertebral bony metastases with resultant cervical myelopathy which could explain her symptoms.   -s/p lumbar puncture to rule out immune mediated polyneuropathy. CSF WBC 1, Pro 66   Impression Acute to subacute onset paraparesis of unclear localization, possibilities include polyradiculoneuropathy or myelopathy. Etiologies include nutritional polyneuropathy (B6 deficiency), AIDP, paraneoplastic immune mediated polyneuropathy. Would treat for possible AIDP (GBS) based on history and clinical picture. Started IVIG 2g/kg divided over 5 days, today day 3 of 5.  -Continue with Physical therapy, Appreciate PMR recs, pt dispo for rehab. Patient amendable to going to rehab. SW to help coordinate.  -There will be no plans for chemotherapy while patient is admitted to rehab  -Rest of care as per primary team  -Patient to followup with Dr. Vieira ([facility]) upon discharge form rehab  -C/w Supportive care, pain control, Nutrition, PT, DVT ppx  -Oncology will continue to follow with you      Case d/w Dr. Eneida ETIENNE  Oncology Physician Assistant  Roldan BECERRA/[facility]  Pager [phone]    If after 5pm or weekends please page On-call Oncology Fellow

## 2021-04-30 LAB
ALBUMIN SERPL ELPH-MCNC: 2.5 G/DL — LOW (ref 3.3–5)
ALP SERPL-CCNC: 108 U/L — SIGNIFICANT CHANGE UP (ref 40–120)
ALT FLD-CCNC: 16 U/L — SIGNIFICANT CHANGE UP (ref 4–33)
ANION GAP SERPL CALC-SCNC: 4 MMOL/L — LOW (ref 7–14)
AST SERPL-CCNC: 18 U/L — SIGNIFICANT CHANGE UP (ref 4–32)
BILIRUB SERPL-MCNC: 0.2 MG/DL — SIGNIFICANT CHANGE UP (ref 0.2–1.2)
BUN SERPL-MCNC: 14 MG/DL — SIGNIFICANT CHANGE UP (ref 7–23)
CALCIUM SERPL-MCNC: 8.8 MG/DL — SIGNIFICANT CHANGE UP (ref 8.4–10.5)
CHLORIDE SERPL-SCNC: 99 MMOL/L — SIGNIFICANT CHANGE UP (ref 98–107)
CO2 SERPL-SCNC: 28 MMOL/L — SIGNIFICANT CHANGE UP (ref 22–31)
CREAT SERPL-MCNC: 0.37 MG/DL — LOW (ref 0.5–1.3)
GLUCOSE BLDC GLUCOMTR-MCNC: 123 MG/DL — HIGH (ref 70–99)
GLUCOSE BLDC GLUCOMTR-MCNC: 127 MG/DL — HIGH (ref 70–99)
GLUCOSE BLDC GLUCOMTR-MCNC: 180 MG/DL — HIGH (ref 70–99)
GLUCOSE BLDC GLUCOMTR-MCNC: 312 MG/DL — HIGH (ref 70–99)
GLUCOSE SERPL-MCNC: 76 MG/DL — SIGNIFICANT CHANGE UP (ref 70–99)
HCT VFR BLD CALC: 27.2 % — LOW (ref 34.5–45)
HGB BLD-MCNC: 8.8 G/DL — LOW (ref 11.5–15.5)
MAGNESIUM SERPL-MCNC: 2 MG/DL — SIGNIFICANT CHANGE UP (ref 1.6–2.6)
MCHC RBC-ENTMCNC: 30.8 PG — SIGNIFICANT CHANGE UP (ref 27–34)
MCHC RBC-ENTMCNC: 32.4 GM/DL — SIGNIFICANT CHANGE UP (ref 32–36)
MCV RBC AUTO: 95.1 FL — SIGNIFICANT CHANGE UP (ref 80–100)
NRBC # BLD: 0 /100 WBCS — SIGNIFICANT CHANGE UP
NRBC # FLD: 0 K/UL — SIGNIFICANT CHANGE UP
PHOSPHATE SERPL-MCNC: 3.4 MG/DL — SIGNIFICANT CHANGE UP (ref 2.5–4.5)
PLATELET # BLD AUTO: 362 K/UL — SIGNIFICANT CHANGE UP (ref 150–400)
POTASSIUM SERPL-MCNC: 3.7 MMOL/L — SIGNIFICANT CHANGE UP (ref 3.5–5.3)
POTASSIUM SERPL-SCNC: 3.7 MMOL/L — SIGNIFICANT CHANGE UP (ref 3.5–5.3)
PROT SERPL-MCNC: 7.3 G/DL — SIGNIFICANT CHANGE UP (ref 6–8.3)
RBC # BLD: 2.86 M/UL — LOW (ref 3.8–5.2)
RBC # FLD: 17.4 % — HIGH (ref 10.3–14.5)
SODIUM SERPL-SCNC: 131 MMOL/L — LOW (ref 135–145)
WBC # BLD: 10.56 K/UL — HIGH (ref 3.8–10.5)
WBC # FLD AUTO: 10.56 K/UL — HIGH (ref 3.8–10.5)

## 2021-04-30 PROCEDURE — 99232 SBSQ HOSP IP/OBS MODERATE 35: CPT

## 2021-04-30 PROCEDURE — 99233 SBSQ HOSP IP/OBS HIGH 50: CPT | Mod: GC

## 2021-04-30 RX ADMIN — Medication 50 MILLIGRAM(S): at 12:27

## 2021-04-30 RX ADMIN — ENOXAPARIN SODIUM 40 MILLIGRAM(S): 100 INJECTION SUBCUTANEOUS at 12:26

## 2021-04-30 RX ADMIN — Medication 2.5 MILLIGRAM(S): at 06:10

## 2021-04-30 RX ADMIN — Medication 4 UNIT(S): at 12:26

## 2021-04-30 RX ADMIN — POLYETHYLENE GLYCOL 3350 17 GRAM(S): 17 POWDER, FOR SOLUTION ORAL at 12:26

## 2021-04-30 RX ADMIN — Medication 5 MILLIGRAM(S): at 06:10

## 2021-04-30 RX ADMIN — HYDROMORPHONE HYDROCHLORIDE 8 MILLIGRAM(S): 2 INJECTION INTRAMUSCULAR; INTRAVENOUS; SUBCUTANEOUS at 06:15

## 2021-04-30 RX ADMIN — Medication 4: at 17:45

## 2021-04-30 RX ADMIN — Medication 25 MILLIGRAM(S): at 12:26

## 2021-04-30 RX ADMIN — Medication 4 UNIT(S): at 17:45

## 2021-04-30 RX ADMIN — Medication 5 MILLIGRAM(S): at 17:45

## 2021-04-30 RX ADMIN — HYDROMORPHONE HYDROCHLORIDE 0.5 MILLIGRAM(S): 2 INJECTION INTRAMUSCULAR; INTRAVENOUS; SUBCUTANEOUS at 19:58

## 2021-04-30 RX ADMIN — POLYETHYLENE GLYCOL 3350 17 GRAM(S): 17 POWDER, FOR SOLUTION ORAL at 22:01

## 2021-04-30 RX ADMIN — HYDROMORPHONE HYDROCHLORIDE 8 MILLIGRAM(S): 2 INJECTION INTRAMUSCULAR; INTRAVENOUS; SUBCUTANEOUS at 18:37

## 2021-04-30 RX ADMIN — SENNA PLUS 2 TABLET(S): 8.6 TABLET ORAL at 17:46

## 2021-04-30 RX ADMIN — Medication 100 MILLIGRAM(S): at 12:27

## 2021-04-30 RX ADMIN — Medication 2.5 MILLIGRAM(S): at 22:01

## 2021-04-30 RX ADMIN — INSULIN GLARGINE 5 UNIT(S): 100 INJECTION, SOLUTION SUBCUTANEOUS at 23:00

## 2021-04-30 RX ADMIN — FENTANYL CITRATE 1 PATCH: 50 INJECTION INTRAVENOUS at 16:15

## 2021-04-30 RX ADMIN — Medication 4 UNIT(S): at 08:37

## 2021-04-30 RX ADMIN — Medication 650 MILLIGRAM(S): at 12:26

## 2021-04-30 RX ADMIN — Medication 650 MILLIGRAM(S): at 13:20

## 2021-04-30 RX ADMIN — Medication 5 MILLIGRAM(S): at 22:02

## 2021-04-30 RX ADMIN — FENTANYL CITRATE 1 PATCH: 50 INJECTION INTRAVENOUS at 06:16

## 2021-04-30 RX ADMIN — IMMUNE GLOBULIN (HUMAN) 33.33 GRAM(S): 10 INJECTION INTRAVENOUS; SUBCUTANEOUS at 13:16

## 2021-04-30 RX ADMIN — HYDROMORPHONE HYDROCHLORIDE 8 MILLIGRAM(S): 2 INJECTION INTRAMUSCULAR; INTRAVENOUS; SUBCUTANEOUS at 07:15

## 2021-04-30 RX ADMIN — HYDROMORPHONE HYDROCHLORIDE 0.5 MILLIGRAM(S): 2 INJECTION INTRAMUSCULAR; INTRAVENOUS; SUBCUTANEOUS at 20:15

## 2021-04-30 RX ADMIN — SENNA PLUS 2 TABLET(S): 8.6 TABLET ORAL at 06:10

## 2021-04-30 RX ADMIN — Medication 1: at 08:37

## 2021-04-30 RX ADMIN — HYDROMORPHONE HYDROCHLORIDE 8 MILLIGRAM(S): 2 INJECTION INTRAMUSCULAR; INTRAVENOUS; SUBCUTANEOUS at 17:44

## 2021-04-30 NOTE — PROGRESS NOTE ADULT - TIME BILLING
Plan discussed with patient and neurology team and medical attending at bedside.
Plan discussed with patient, neurology and medical teams in great detail.
Plan discussed with patient, neurology and medical teams.

## 2021-04-30 NOTE — PROGRESS NOTE ADULT - PROBLEM SELECTOR PLAN 1
Patient presenting w/ lower extremity weakness L>R distally in the setting of metastatic pancreatic cancer. Notable Left foot drop. No spinal mets seen on CT.   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

## 2021-04-30 NOTE — PROGRESS NOTE ADULT - SUBJECTIVE AND OBJECTIVE BOX
Patient is a 70y old  Female who presents with a chief complaint of Lower Extremity Weakness, Inability to Ambulate (29 Apr 2021 14:55)      SUBJECTIVE / OVERNIGHT EVENTS: No events overnight. pt reports no significant improvement in b/l foot drop. Pain is manageable. She is tolerating PO diet. No other acute complaints.    MEDICATIONS  (STANDING):  acetaminophen   Tablet .. 650 milliGRAM(s) Oral daily  bisacodyl 5 milliGRAM(s) Oral at bedtime  bisacodyl 5 milliGRAM(s) Oral every 12 hours  dextrose 40% Gel 15 Gram(s) Oral once  dextrose 5%. 1000 milliLiter(s) (50 mL/Hr) IV Continuous <Continuous>  dextrose 5%. 1000 milliLiter(s) (100 mL/Hr) IV Continuous <Continuous>  dextrose 50% Injectable 25 Gram(s) IV Push once  dextrose 50% Injectable 25 Gram(s) IV Push once  dextrose 50% Injectable 12.5 Gram(s) IV Push once  diphenhydrAMINE 25 milliGRAM(s) Oral daily  dronabinol 2.5 milliGRAM(s) Oral three times a day  enoxaparin Injectable 40 milliGRAM(s) SubCutaneous daily  glucagon  Injectable 1 milliGRAM(s) IntraMuscular once  glycerin Suppository - Adult 1 Suppository(s) Rectal daily  immune   globulin 10% (GAMMAGARD) IVPB 20 Gram(s) IV Intermittent daily  insulin glargine Injectable (LANTUS) 5 Unit(s) SubCutaneous at bedtime  insulin lispro (ADMELOG) corrective regimen sliding scale   SubCutaneous three times a day before meals  insulin lispro (ADMELOG) corrective regimen sliding scale   SubCutaneous at bedtime  insulin lispro Injectable (ADMELOG) 4 Unit(s) SubCutaneous three times a day before meals  polyethylene glycol 3350 17 Gram(s) Oral <User Schedule>  pyridoxine 50 milliGRAM(s) Oral daily  senna 2 Tablet(s) Oral two times a day  thiamine 100 milliGRAM(s) Oral daily    MEDICATIONS  (PRN):  HYDROmorphone   Tablet 8 milliGRAM(s) Oral every 4 hours PRN Severe Pain (7 - 10)  HYDROmorphone   Tablet 6 milliGRAM(s) Oral every 4 hours PRN Moderate Pain (4 - 6)  HYDROmorphone  Injectable 0.5 milliGRAM(s) IV Push every 6 hours PRN breakthrough pain in case PO dilaudid does not work        CAPILLARY BLOOD GLUCOSE      POCT Blood Glucose.: 123 mg/dL (30 Apr 2021 12:16)  POCT Blood Glucose.: 180 mg/dL (30 Apr 2021 08:21)  POCT Blood Glucose.: 178 mg/dL (29 Apr 2021 21:54)  POCT Blood Glucose.: 194 mg/dL (29 Apr 2021 17:47)    I&O's Summary    29 Apr 2021 07:01  -  30 Apr 2021 07:00  --------------------------------------------------------  IN: 0 mL / OUT: 800 mL / NET: -800 mL    Vital Signs Last 24 Hrs  T(C): 36.7 (30 Apr 2021 13:13), Max: 37 (29 Apr 2021 14:50)  T(F): 98 (30 Apr 2021 13:13), Max: 98.6 (29 Apr 2021 14:50)  HR: 91 (30 Apr 2021 13:13) (83 - 100)  BP: 132/75 (30 Apr 2021 13:13) (111/66 - 150/62)  BP(mean): --  RR: 18 (30 Apr 2021 13:13) (16 - 18)  SpO2: 100% (30 Apr 2021 13:13) (95% - 100%)    PHYSICAL EXAM:  CONSTITUTIONAL: lying in bed, resting comfortably  EYES: sclera clear  RESPIRATORY: Normal respiratory effort; lungs are clear to auscultation bilaterally  CARDIOVASCULAR:S1 and S2, no murmurs appreciated  ABDOMEN: Nontender to palpation, decreased bowel sounds, slightly distended   MUSCULOSKELETAL: severe muscle atrophy in all extremities, left foot drop  NEURO: awake, answers Qs, L>R foot drop    LABS:                        8.8    10.56 )-----------( 362      ( 30 Apr 2021 06:26 )             27.2     04-30    131<L>  |  99  |  14  ----------------------------<  76  3.7   |  28  |  0.37<L>    Ca    8.8      30 Apr 2021 06:26  Phos  3.4     04-30  Mg     2.0     04-30    TPro  7.3  /  Alb  2.5<L>  /  TBili  0.2  /  DBili  x   /  AST  18  /  ALT  16  /  AlkPhos  108  04-30              RADIOLOGY & ADDITIONAL TESTS:    Imaging Personally Reviewed:    Consultant(s) Notes Reviewed:      Care Discussed with Consultants/Other Providers:

## 2021-04-30 NOTE — PROGRESS NOTE ADULT - SUBJECTIVE AND OBJECTIVE BOX
MARIA NICOLAS  Female  MRN-[id number]    Interval:        VITAL SIGNS:  T(F): 98  HR: 91  BP: 132/75  RR: 18  SpO2: 100%    Exam:   MS: Oriented x3. Fluent. Follows crossed commands.   CN: VFF. EOMI. V1-V3 intact. Face symmetric. T/u midline.   Motor: Diffuse atrophy throughout. Possible fasiculations on the left thigh. UE 5/5. HF 4+ bilaterally, KE 4+ bilaterally, RDF 3/5, LDF 0/5, RPF 4/5.   Sensory: Intact to LT throughout   Reflexes: 2+ UEs. areflexic in the LEs.   Coordination: No dysmetria on FNF or ataxia on HTS bilaterally   Gait: Deferred    LABS:                          8.8    10.56 )-----------( 362      ( 30 Apr 2021 06:26 )             27.2     04-30    131<L>  |  99  |  14  ----------------------------<  76  3.7   |  28  |  0.37<L>    Ca    8.8      30 Apr 2021 06:26  Phos  3.4     04-30  Mg     2.0     04-30    TPro  7.3  /  Alb  2.5<L>  /  TBili  0.2  /  DBili  x   /  AST  18  /  ALT  16  /  AlkPhos  108  04-30        MEDICATIONS:   acetaminophen   Tablet .. 650 milliGRAM(s) Oral daily  bisacodyl 5 milliGRAM(s) Oral at bedtime  bisacodyl 5 milliGRAM(s) Oral every 12 hours  dextrose 40% Gel 15 Gram(s) Oral once  dextrose 5%. 1000 milliLiter(s) IV Continuous <Continuous>  dextrose 5%. 1000 milliLiter(s) IV Continuous <Continuous>  dextrose 50% Injectable 25 Gram(s) IV Push once  dextrose 50% Injectable 25 Gram(s) IV Push once  dextrose 50% Injectable 12.5 Gram(s) IV Push once  diphenhydrAMINE 25 milliGRAM(s) Oral daily  dronabinol 2.5 milliGRAM(s) Oral three times a day  enoxaparin Injectable 40 milliGRAM(s) SubCutaneous daily  glucagon  Injectable 1 milliGRAM(s) IntraMuscular once  glycerin Suppository - Adult 1 Suppository(s) Rectal daily  HYDROmorphone   Tablet 8 milliGRAM(s) Oral every 4 hours PRN  HYDROmorphone   Tablet 6 milliGRAM(s) Oral every 4 hours PRN  HYDROmorphone  Injectable 0.5 milliGRAM(s) IV Push every 6 hours PRN  immune   globulin 10% (GAMMAGARD) IVPB 20 Gram(s) IV Intermittent daily  insulin glargine Injectable (LANTUS) 5 Unit(s) SubCutaneous at bedtime  insulin lispro (ADMELOG) corrective regimen sliding scale   SubCutaneous three times a day before meals  insulin lispro (ADMELOG) corrective regimen sliding scale   SubCutaneous at bedtime  insulin lispro Injectable (ADMELOG) 4 Unit(s) SubCutaneous three times a day before meals  polyethylene glycol 3350 17 Gram(s) Oral <User Schedule>  pyridoxine 50 milliGRAM(s) Oral daily  senna 2 Tablet(s) Oral two times a day  thiamine 100 milliGRAM(s) Oral daily          RADIOLOGY & ADDITIONAL STUDIES:             MARIA INCOLAS  Female  [id number]    Interval:    -No acute events; IVIG day 3 today. Improving.     VITAL SIGNS:  T(F): 98  HR: 91  BP: 132/75  RR: 18  SpO2: 100%    Exam:   MS: Oriented x3. Fluent. Follows crossed commands.   CN: VFF. EOMI. V1-V3 intact. Face symmetric. T/u midline.   Motor: Diffuse atrophy throughout. Possible fasiculations on the left thigh. UE 5/5. HF 4+ bilaterally, KE 4+ bilaterally, RDF 3/5, LDF 0/5, RPF 4/5.   Sensory: Intact to LT throughout   Reflexes: 2+ UEs. areflexic in the LEs.   Coordination: No dysmetria on FNF or ataxia on HTS bilaterally   Gait: Deferred    LABS:                          8.8    10.56 )-----------( 362      ( 30 Apr 2021 06:26 )             27.2     04-30    131<L>  |  99  |  14  ----------------------------<  76  3.7   |  28  |  0.37<L>    Ca    8.8      30 Apr 2021 06:26  Phos  3.4     04-30  Mg     2.0     04-30    TPro  7.3  /  Alb  2.5<L>  /  TBili  0.2  /  DBili  x   /  AST  18  /  ALT  16  /  AlkPhos  108  04-30        MEDICATIONS:   acetaminophen   Tablet .. 650 milliGRAM(s) Oral daily  bisacodyl 5 milliGRAM(s) Oral at bedtime  bisacodyl 5 milliGRAM(s) Oral every 12 hours  dextrose 40% Gel 15 Gram(s) Oral once  dextrose 5%. 1000 milliLiter(s) IV Continuous <Continuous>  dextrose 5%. 1000 milliLiter(s) IV Continuous <Continuous>  dextrose 50% Injectable 25 Gram(s) IV Push once  dextrose 50% Injectable 25 Gram(s) IV Push once  dextrose 50% Injectable 12.5 Gram(s) IV Push once  diphenhydrAMINE 25 milliGRAM(s) Oral daily  dronabinol 2.5 milliGRAM(s) Oral three times a day  enoxaparin Injectable 40 milliGRAM(s) SubCutaneous daily  glucagon  Injectable 1 milliGRAM(s) IntraMuscular once  glycerin Suppository - Adult 1 Suppository(s) Rectal daily  HYDROmorphone   Tablet 8 milliGRAM(s) Oral every 4 hours PRN  HYDROmorphone   Tablet 6 milliGRAM(s) Oral every 4 hours PRN  HYDROmorphone  Injectable 0.5 milliGRAM(s) IV Push every 6 hours PRN  immune   globulin 10% (GAMMAGARD) IVPB 20 Gram(s) IV Intermittent daily  insulin glargine Injectable (LANTUS) 5 Unit(s) SubCutaneous at bedtime  insulin lispro (ADMELOG) corrective regimen sliding scale   SubCutaneous three times a day before meals  insulin lispro (ADMELOG) corrective regimen sliding scale   SubCutaneous at bedtime  insulin lispro Injectable (ADMELOG) 4 Unit(s) SubCutaneous three times a day before meals  polyethylene glycol 3350 17 Gram(s) Oral <User Schedule>  pyridoxine 50 milliGRAM(s) Oral daily  senna 2 Tablet(s) Oral two times a day  thiamine 100 milliGRAM(s) Oral daily          RADIOLOGY & ADDITIONAL STUDIES:             MARIA NICOLAS  Female  [id number]    Interval:    -No acute events; IVIG day 3 today. Improving with her leg strength and mobility and she is tolerating the IVIG well so far.   No new neurologic complaints.    VITAL SIGNS:  T(F): 98  HR: 91  BP: 132/75  RR: 18  SpO2: 100%    Exam:   MS: Oriented x3. Fluent. Follows crossed commands.   CN: VFF. EOMI. V1-V3 intact. Face symmetric. T/u midline.   Motor: Diffuse atrophy throughout. Possible fasiculations on the left thigh. UE 5/5. HF 4+ bilaterally, KE 4+ bilaterally, RDF 3/5, LDF 0/5, RPF 4/5.   Sensory: decreased to light touch from knees down to toes   Reflexes: 2+ UEs. areflexic in the LEs.   Coordination: No dysmetria on FNF or ataxia on HTS bilaterally   Gait: Deferred    LABS:                          8.8    10.56 )-----------( 362      ( 30 Apr 2021 06:26 )             27.2     04-30    131<L>  |  99  |  14  ----------------------------<  76  3.7   |  28  |  0.37<L>    Ca    8.8      30 Apr 2021 06:26  Phos  3.4     04-30  Mg     2.0     04-30    TPro  7.3  /  Alb  2.5<L>  /  TBili  0.2  /  DBili  x   /  AST  18  /  ALT  16  /  AlkPhos  108  04-30        MEDICATIONS:   acetaminophen   Tablet .. 650 milliGRAM(s) Oral daily  bisacodyl 5 milliGRAM(s) Oral at bedtime  bisacodyl 5 milliGRAM(s) Oral every 12 hours  dextrose 40% Gel 15 Gram(s) Oral once  dextrose 5%. 1000 milliLiter(s) IV Continuous <Continuous>  dextrose 5%. 1000 milliLiter(s) IV Continuous <Continuous>  dextrose 50% Injectable 25 Gram(s) IV Push once  dextrose 50% Injectable 25 Gram(s) IV Push once  dextrose 50% Injectable 12.5 Gram(s) IV Push once  diphenhydrAMINE 25 milliGRAM(s) Oral daily  dronabinol 2.5 milliGRAM(s) Oral three times a day  enoxaparin Injectable 40 milliGRAM(s) SubCutaneous daily  glucagon  Injectable 1 milliGRAM(s) IntraMuscular once  glycerin Suppository - Adult 1 Suppository(s) Rectal daily  HYDROmorphone   Tablet 8 milliGRAM(s) Oral every 4 hours PRN  HYDROmorphone   Tablet 6 milliGRAM(s) Oral every 4 hours PRN  HYDROmorphone  Injectable 0.5 milliGRAM(s) IV Push every 6 hours PRN  immune   globulin 10% (GAMMAGARD) IVPB 20 Gram(s) IV Intermittent daily  insulin glargine Injectable (LANTUS) 5 Unit(s) SubCutaneous at bedtime  insulin lispro (ADMELOG) corrective regimen sliding scale   SubCutaneous three times a day before meals  insulin lispro (ADMELOG) corrective regimen sliding scale   SubCutaneous at bedtime  insulin lispro Injectable (ADMELOG) 4 Unit(s) SubCutaneous three times a day before meals  polyethylene glycol 3350 17 Gram(s) Oral <User Schedule>  pyridoxine 50 milliGRAM(s) Oral daily  senna 2 Tablet(s) Oral two times a day  thiamine 100 milliGRAM(s) Oral daily          RADIOLOGY & ADDITIONAL STUDIES:

## 2021-04-30 NOTE — PROGRESS NOTE ADULT - ATTENDING COMMENTS
Patient seen and examined with neurology team and above note reviewed in detail and I agree with assessment and plan as outlined. Patient was started on IVIG 2 days ago for presumed AIDP variant given hx, clinical findings and LP results.    She is tolerating well and her leg strength has clearly improved over the past 2 days based on exam findings above.  Reflexes remain absent in legs and she has a complete foot drop in left foot.    Plan: AIDP variant and continue IVIG thru Sunday    2. Await remainder of CSF and serum studies, autoimmune and inflammatory     3. Continue PT and OT and prepare for rehab placement early next week    4. Continue medical mgt and supportive care.

## 2021-04-30 NOTE — PROGRESS NOTE ADULT - ASSESSMENT
70F w/ PMH of Pancreatic cancer w/ mets to the lungs and liver presents w/ paraparesis of unclear etiology.     MR C-spine demonstrates L. C4-C5 T2 hyperintensity consistent w/ gliosis. s/p posterior spinal fusion and laminectomy of C3-C6.   MR l-spine w/w/o contrast 4.9.2021: Negative.  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 Improving on 4/30.     Plan:     [] IVIG (4/28 - ); Plan for 5 days   [] Replete B1 and B6   [] SPEP, UPEP, Ganglioside antibody panel, ACE, Sjogren, SLE antibodies  [] Continue medical mgt and supportive care    70F w/ PMH of Pancreatic cancer w/ mets to the lungs and liver presents w/ paraparesis of unclear etiology.     MR C-spine demonstrates L. C4-C5 T2 hyperintensity consistent w/ gliosis. s/p posterior spinal fusion and laminectomy of C3-C6.   MR l-spine w/w/o contrast 4.9.2021: Negative.  MR Pelvis 4.15.21 negative   CSF WBC 1, Pro 66     Impression Acute to subacute onset paraparesis likely secondary to AIDP variant  +/- B6 related polyneuropathy. Improving on 4/30.     Plan:     [] IVIG (4/28 - ); Plan for 5 days to be completed on Sunday   [] Replete B1 and B6   [] SPEP, UPEP, Ganglioside antibody panel, ACE, Sjogren, SLE antibodies  [] Continue medical mgt and supportive care and all questions answered.

## 2021-04-30 NOTE — PROGRESS NOTE ADULT - PROBLEM SELECTOR PLAN 3
a1c 6.1  FS uncontrolled, restart lantus 5 units, morning FS 90. incr admelog 3 units TID premeal, ISS  on consistent carb diet

## 2021-05-01 LAB
ALBUMIN SERPL ELPH-MCNC: 2.5 G/DL — LOW (ref 3.3–5)
ALP SERPL-CCNC: 109 U/L — SIGNIFICANT CHANGE UP (ref 40–120)
ALT FLD-CCNC: 19 U/L — SIGNIFICANT CHANGE UP (ref 4–33)
ANION GAP SERPL CALC-SCNC: 9 MMOL/L — SIGNIFICANT CHANGE UP (ref 7–14)
AST SERPL-CCNC: 26 U/L — SIGNIFICANT CHANGE UP (ref 4–32)
BILIRUB SERPL-MCNC: 0.4 MG/DL — SIGNIFICANT CHANGE UP (ref 0.2–1.2)
BUN SERPL-MCNC: 16 MG/DL — SIGNIFICANT CHANGE UP (ref 7–23)
CALCIUM SERPL-MCNC: 8.6 MG/DL — SIGNIFICANT CHANGE UP (ref 8.4–10.5)
CHLORIDE SERPL-SCNC: 96 MMOL/L — LOW (ref 98–107)
CO2 SERPL-SCNC: 25 MMOL/L — SIGNIFICANT CHANGE UP (ref 22–31)
CREAT SERPL-MCNC: 0.34 MG/DL — LOW (ref 0.5–1.3)
GLUCOSE BLDC GLUCOMTR-MCNC: 128 MG/DL — HIGH (ref 70–99)
GLUCOSE BLDC GLUCOMTR-MCNC: 136 MG/DL — HIGH (ref 70–99)
GLUCOSE BLDC GLUCOMTR-MCNC: 310 MG/DL — HIGH (ref 70–99)
GLUCOSE BLDC GLUCOMTR-MCNC: 347 MG/DL — HIGH (ref 70–99)
GLUCOSE BLDC GLUCOMTR-MCNC: 41 MG/DL — CRITICAL LOW (ref 70–99)
GLUCOSE BLDC GLUCOMTR-MCNC: 49 MG/DL — CRITICAL LOW (ref 70–99)
GLUCOSE BLDC GLUCOMTR-MCNC: 65 MG/DL — LOW (ref 70–99)
GLUCOSE BLDC GLUCOMTR-MCNC: 78 MG/DL — SIGNIFICANT CHANGE UP (ref 70–99)
GLUCOSE SERPL-MCNC: 136 MG/DL — HIGH (ref 70–99)
HCT VFR BLD CALC: 30.9 % — LOW (ref 34.5–45)
HGB BLD-MCNC: 10 G/DL — LOW (ref 11.5–15.5)
MAGNESIUM SERPL-MCNC: 2 MG/DL — SIGNIFICANT CHANGE UP (ref 1.6–2.6)
MCHC RBC-ENTMCNC: 30.9 PG — SIGNIFICANT CHANGE UP (ref 27–34)
MCHC RBC-ENTMCNC: 32.4 GM/DL — SIGNIFICANT CHANGE UP (ref 32–36)
MCV RBC AUTO: 95.4 FL — SIGNIFICANT CHANGE UP (ref 80–100)
NRBC # BLD: 0 /100 WBCS — SIGNIFICANT CHANGE UP
NRBC # FLD: 0 K/UL — SIGNIFICANT CHANGE UP
PHOSPHATE SERPL-MCNC: 3.7 MG/DL — SIGNIFICANT CHANGE UP (ref 2.5–4.5)
PLATELET # BLD AUTO: 291 K/UL — SIGNIFICANT CHANGE UP (ref 150–400)
POTASSIUM SERPL-MCNC: 4.5 MMOL/L — SIGNIFICANT CHANGE UP (ref 3.5–5.3)
POTASSIUM SERPL-SCNC: 4.5 MMOL/L — SIGNIFICANT CHANGE UP (ref 3.5–5.3)
PROT SERPL-MCNC: 7.8 G/DL — SIGNIFICANT CHANGE UP (ref 6–8.3)
RBC # BLD: 3.24 M/UL — LOW (ref 3.8–5.2)
RBC # FLD: 17.4 % — HIGH (ref 10.3–14.5)
SODIUM SERPL-SCNC: 130 MMOL/L — LOW (ref 135–145)
WBC # BLD: 11.29 K/UL — HIGH (ref 3.8–10.5)
WBC # FLD AUTO: 11.29 K/UL — HIGH (ref 3.8–10.5)

## 2021-05-01 PROCEDURE — 99232 SBSQ HOSP IP/OBS MODERATE 35: CPT

## 2021-05-01 RX ORDER — DEXTROSE 50 % IN WATER 50 %
15 SYRINGE (ML) INTRAVENOUS ONCE
Refills: 0 | Status: COMPLETED | OUTPATIENT
Start: 2021-05-01 | End: 2021-05-01

## 2021-05-01 RX ORDER — FENTANYL CITRATE 50 UG/ML
1 INJECTION INTRAVENOUS
Refills: 0 | Status: DISCONTINUED | OUTPATIENT
Start: 2021-05-01 | End: 2021-05-03

## 2021-05-01 RX ADMIN — Medication 4: at 17:33

## 2021-05-01 RX ADMIN — Medication 2.5 MILLIGRAM(S): at 21:44

## 2021-05-01 RX ADMIN — Medication 5 MILLIGRAM(S): at 06:48

## 2021-05-01 RX ADMIN — FENTANYL CITRATE 1 PATCH: 50 INJECTION INTRAVENOUS at 19:46

## 2021-05-01 RX ADMIN — Medication 4 UNIT(S): at 09:07

## 2021-05-01 RX ADMIN — Medication 15 GRAM(S): at 12:08

## 2021-05-01 RX ADMIN — Medication 650 MILLIGRAM(S): at 14:19

## 2021-05-01 RX ADMIN — HYDROMORPHONE HYDROCHLORIDE 8 MILLIGRAM(S): 2 INJECTION INTRAMUSCULAR; INTRAVENOUS; SUBCUTANEOUS at 02:45

## 2021-05-01 RX ADMIN — Medication 2.5 MILLIGRAM(S): at 06:49

## 2021-05-01 RX ADMIN — ENOXAPARIN SODIUM 40 MILLIGRAM(S): 100 INJECTION SUBCUTANEOUS at 14:19

## 2021-05-01 RX ADMIN — Medication 25 MILLIGRAM(S): at 14:19

## 2021-05-01 RX ADMIN — Medication 2: at 22:39

## 2021-05-01 RX ADMIN — Medication 15 GRAM(S): at 11:40

## 2021-05-01 RX ADMIN — FENTANYL CITRATE 1 PATCH: 50 INJECTION INTRAVENOUS at 02:55

## 2021-05-01 RX ADMIN — HYDROMORPHONE HYDROCHLORIDE 8 MILLIGRAM(S): 2 INJECTION INTRAMUSCULAR; INTRAVENOUS; SUBCUTANEOUS at 22:44

## 2021-05-01 RX ADMIN — Medication 100 MILLIGRAM(S): at 14:19

## 2021-05-01 RX ADMIN — HYDROMORPHONE HYDROCHLORIDE 8 MILLIGRAM(S): 2 INJECTION INTRAMUSCULAR; INTRAVENOUS; SUBCUTANEOUS at 21:44

## 2021-05-01 RX ADMIN — IMMUNE GLOBULIN (HUMAN) 33.33 GRAM(S): 10 INJECTION INTRAVENOUS; SUBCUTANEOUS at 15:02

## 2021-05-01 RX ADMIN — INSULIN GLARGINE 5 UNIT(S): 100 INJECTION, SOLUTION SUBCUTANEOUS at 22:39

## 2021-05-01 RX ADMIN — Medication 2.5 MILLIGRAM(S): at 14:19

## 2021-05-01 RX ADMIN — Medication 50 MILLIGRAM(S): at 14:19

## 2021-05-01 RX ADMIN — HYDROMORPHONE HYDROCHLORIDE 8 MILLIGRAM(S): 2 INJECTION INTRAMUSCULAR; INTRAVENOUS; SUBCUTANEOUS at 03:45

## 2021-05-01 RX ADMIN — FENTANYL CITRATE 1 PATCH: 50 INJECTION INTRAVENOUS at 06:48

## 2021-05-01 NOTE — PROVIDER CONTACT NOTE (HYPOGLYCEMIA EVENT) - NS PROVIDER CONTACT REASON - HYPO
Upon assessing patient, patient c/o of feelings of diaphoresis. Pt alert and no other c/o. States "she just doesn't feel right"

## 2021-05-01 NOTE — PROGRESS NOTE ADULT - SUBJECTIVE AND OBJECTIVE BOX
Neurology Progress    MARIA [id number]    HPI:  Patient is a 70 year old female with past medical history of diabetes, pancreatic cancer w/ mets to lung and liver on chemo last session Monday (9th session, abraxane/gemzar) coming in w/ inability to ambulate and independently take care of ADLs since Monday. Patient reports she ambulates w/ a walker at baseline, however she does so independently. Patient now w/ issues even standing up with the walker. States her weakness is associated w/ b/l lower extremity pain. Pain is 9/10 in severity and is aching. Patient was taking her home fentanyl and Dilaudid however this did not help symptoms. Patient states she lives alone w/o HHA. Patient states she has had some difficulty completely emptying her bladder however denies any urinary incontinence. Patient denies any fecal incontinence. States she is moving her bowels however her BMs have been small. Patient denies any saddle anesthesia.   ED Course: T 98.2, R 17, HR 93, BP 91/48. Patient Labs notable for BUN 30. Patient hyperglycemic on FSG. Patient AST midly elevated 43. Patient Hemoglobin 8.8 at baseline. Patient urine concentrated 1.050 otherwise negative for UTI. Patient had a CT thoracic and lumbar spine, with chronic severe left hydronephrosis. Also w/ large stool burden otherwise unremarkable. Patient given 1L NS. Patient admitted for evaluation of lower extremity weakness and PT eval for inability to ambulate.  (09 Apr 2021 09:30)      Past Medical History  Type 2 diabetes mellitus    Pancreatic cancer        Past Surgical History  S/P tendon repair        MEDICATIONS    acetaminophen   Tablet .. 650 milliGRAM(s) Oral daily  bisacodyl 5 milliGRAM(s) Oral at bedtime  bisacodyl 5 milliGRAM(s) Oral every 12 hours  dextrose 40% Gel 15 Gram(s) Oral once  dextrose 5%. 1000 milliLiter(s) IV Continuous <Continuous>  dextrose 5%. 1000 milliLiter(s) IV Continuous <Continuous>  dextrose 50% Injectable 25 Gram(s) IV Push once  dextrose 50% Injectable 12.5 Gram(s) IV Push once  dextrose 50% Injectable 25 Gram(s) IV Push once  diphenhydrAMINE 25 milliGRAM(s) Oral daily  dronabinol 2.5 milliGRAM(s) Oral three times a day  enoxaparin Injectable 40 milliGRAM(s) SubCutaneous daily  fentaNYL   Patch  12 MICROgram(s)/Hr 1 Patch Transdermal every 72 hours  glucagon  Injectable 1 milliGRAM(s) IntraMuscular once  glycerin Suppository - Adult 1 Suppository(s) Rectal daily  HYDROmorphone   Tablet 8 milliGRAM(s) Oral every 4 hours PRN  HYDROmorphone   Tablet 6 milliGRAM(s) Oral every 4 hours PRN  HYDROmorphone  Injectable 0.5 milliGRAM(s) IV Push every 6 hours PRN  immune   globulin 10% (GAMMAGARD) IVPB 20 Gram(s) IV Intermittent daily  insulin glargine Injectable (LANTUS) 5 Unit(s) SubCutaneous at bedtime  insulin lispro (ADMELOG) corrective regimen sliding scale   SubCutaneous three times a day before meals  insulin lispro (ADMELOG) corrective regimen sliding scale   SubCutaneous at bedtime  insulin lispro Injectable (ADMELOG) 4 Unit(s) SubCutaneous three times a day before meals  polyethylene glycol 3350 17 Gram(s) Oral <User Schedule>  pyridoxine 50 milliGRAM(s) Oral daily  senna 2 Tablet(s) Oral two times a day  thiamine 100 milliGRAM(s) Oral daily         Family history: No history of dementia, strokes, or seizures   FAMILY HISTORY:  Family history of liver cancer  Sister    Family history of cardiac arrest (Sibling)    FH: aneurysm (Sibling)      SOCIAL HISTORY -- No history of tobacco or alcohol use     Allergies    No Known Allergies    Intolerances            Vital Signs Last 24 Hrs  T(C): 36.7 (01 May 2021 06:47), Max: 37.3 (30 Apr 2021 13:35)  T(F): 98 (01 May 2021 06:47), Max: 99.2 (30 Apr 2021 13:35)  HR: 96 (01 May 2021 06:47) (96 - 101)  BP: 124/65 (01 May 2021 06:47) (110/68 - 172/96)  BP(mean): --  RR: 18 (01 May 2021 06:47) (16 - 18)  SpO2: 100% (01 May 2021 06:47) (100% - 100%)        On Neurological Examination:    Mental Status - Patient is alert, awake, oriented X3. .   Follows commands well and able to answer questions appropriately. Mood and affect  normal  Follow simple commands able to repeat  able to name.  Speech - Fluent no Dysarthria  no  Aphasia                              Cranial Nerves - Extraocular muscle intact  PITO Facial symmetry Tongue midline, CnV1to V3 intact gross hearing intact      Motor Exam -   Right upper 45/5 throughout  Left upper 4/5 throughtout  Right lower- 45/5 throughout  Left lower 4/5 throughout  Coordination -finger to nose intact  Muscle tone - is normal all over. No asymmetry is seen.      Sensory    Bilateral intact to light touch    GENERAL Exam:     Nontoxic , No Acute Distress   	  HEENT:  normocephalic, atraumatic  		  LUNGS:	Clear bilaterally  No Wheeze      VASCULAR: no carotid brui  	  HEART:	 Normal S1S2   No murmur RRR        	  MUSCULOSKELETAL: Normal Range of Motion  	   SKIN:      Normal   No Ecchymosis               LABS:  CBC Full  -  ( 01 May 2021 06:23 )  WBC Count : 11.29 K/uL  RBC Count : 3.24 M/uL  Hemoglobin : 10.0 g/dL  Hematocrit : 30.9 %  Platelet Count - Automated : 291 K/uL  Mean Cell Volume : 95.4 fL  Mean Cell Hemoglobin : 30.9 pg  Mean Cell Hemoglobin Concentration : 32.4 gm/dL  Auto Neutrophil # : x  Auto Lymphocyte # : x  Auto Monocyte # : x  Auto Eosinophil # : x  Auto Basophil # : x  Auto Neutrophil % : x  Auto Lymphocyte % : x  Auto Monocyte % : x  Auto Eosinophil % : x  Auto Basophil % : x      05-01    130<L>  |  96<L>  |  16  ----------------------------<  136<H>  4.5   |  25  |  0.34<L>    Ca    8.6      01 May 2021 06:23  Phos  3.7     05-01  Mg     2.0     05-01    TPro  7.8  /  Alb  2.5<L>  /  TBili  0.4  /  DBili  x   /  AST  26  /  ALT  19  /  AlkPhos  109  05-01    Hemoglobin A1C:     LIVER FUNCTIONS - ( 01 May 2021 06:23 )  Alb: 2.5 g/dL / Pro: 7.8 g/dL / ALK PHOS: 109 U/L / ALT: 19 U/L / AST: 26 U/L / GGT: x           Vitamin B12         RADIOLOGY    EKG      IMPRESSION  This is a  year old           schoenberg

## 2021-05-01 NOTE — CHART NOTE - NSCHARTNOTEFT_GEN_A_CORE
Late chart note entry:  Notified by RN pt reports she was sweating and felt warm, consistent with symptoms of hypoglycemia. Pt reported she ate minimal breakfast despite receiving pre-meal insulin. FS was 41. Repeat 49.   Hypoglycemia protocol started, initially with juice and cookies then progressed to dextrose gel until FS over 100.  Glucose trend evaluated, pre-meal insulin discontinued. Discussed with Dr. Olivia.

## 2021-05-01 NOTE — PROVIDER CONTACT NOTE (HYPOGLYCEMIA EVENT) - NS PROVIDER CONTACT BACKGROUND-HYPO
Age: 70y    Gender: Female    POCT Blood Glucose:  136 mg/dL (05-01-21 @ 12:29)  78 mg/dL (05-01-21 @ 12:00)  65 mg/dL (05-01-21 @ 11:38)  49 mg/dL (05-01-21 @ 11:29)  41 mg/dL (05-01-21 @ 11:20)  128 mg/dL (05-01-21 @ 08:50)  127 mg/dL (04-30-21 @ 22:25)  312 mg/dL (04-30-21 @ 17:39)      eMAR:  dextrose 40% Gel   15 Gram(s) Oral (05-01-21 @ 11:40)    dextrose 40% Gel   15 Gram(s) Oral (05-01-21 @ 12:08)    insulin glargine Injectable (LANTUS)   5 Unit(s) SubCutaneous (04-30-21 @ 23:00)    insulin lispro (ADMELOG) corrective regimen sliding scale   4 Unit(s) SubCutaneous (04-30-21 @ 17:45)    insulin lispro Injectable (ADMELOG)   4 Unit(s) SubCutaneous (05-01-21 @ 09:07)   4 Unit(s) SubCutaneous (04-30-21 @ 17:45)

## 2021-05-01 NOTE — PROGRESS NOTE ADULT - PROBLEM SELECTOR PLAN 3
a1c 6.1  FS uncontrolled, restart lantus 5 units, morning FS 44, will discontinue standing  admelog 3 units TID premeal, and keep  ISS  on consistent carb diet

## 2021-05-01 NOTE — PROVIDER CONTACT NOTE (HYPOGLYCEMIA EVENT) - NS PROVIDER CONTACT RECOMMEND-HYPO
ACP notified. Pt given apple juice x3. repeated FS. then activated hypoglycemic protocol. Given Glucose gel x1. Pt FS repeated at 78. Repeated Glucose gel as ordered. Rechecked at 136.

## 2021-05-01 NOTE — PROGRESS NOTE ADULT - SUBJECTIVE AND OBJECTIVE BOX
[facility]  Division of Hospital Medicine  Key Olivia MD  Pager: [phone]      Patient is a 70y old  Female who presents with a chief complaint of Lower Extremity Weakness, Inability to Ambulate (01 May 2021 11:47)      SUBJECTIVE / OVERNIGHT EVENTS: Patient hypo-glyemic on standing pre-meal insulin. Patient fustrated with prolonged hospitalization. Asking when she can come home. No medical complaints.   ADDITIONAL REVIEW OF SYSTEMS:    MEDICATIONS  (STANDING):  acetaminophen   Tablet .. 650 milliGRAM(s) Oral daily  bisacodyl 5 milliGRAM(s) Oral at bedtime  bisacodyl 5 milliGRAM(s) Oral every 12 hours  dextrose 40% Gel 15 Gram(s) Oral once  dextrose 5%. 1000 milliLiter(s) (50 mL/Hr) IV Continuous <Continuous>  dextrose 5%. 1000 milliLiter(s) (100 mL/Hr) IV Continuous <Continuous>  dextrose 50% Injectable 25 Gram(s) IV Push once  dextrose 50% Injectable 12.5 Gram(s) IV Push once  dextrose 50% Injectable 25 Gram(s) IV Push once  diphenhydrAMINE 25 milliGRAM(s) Oral daily  dronabinol 2.5 milliGRAM(s) Oral three times a day  enoxaparin Injectable 40 milliGRAM(s) SubCutaneous daily  fentaNYL   Patch  12 MICROgram(s)/Hr 1 Patch Transdermal every 72 hours  glucagon  Injectable 1 milliGRAM(s) IntraMuscular once  glycerin Suppository - Adult 1 Suppository(s) Rectal daily  immune   globulin 10% (GAMMAGARD) IVPB 20 Gram(s) IV Intermittent daily  insulin glargine Injectable (LANTUS) 5 Unit(s) SubCutaneous at bedtime  insulin lispro (ADMELOG) corrective regimen sliding scale   SubCutaneous three times a day before meals  insulin lispro (ADMELOG) corrective regimen sliding scale   SubCutaneous at bedtime  polyethylene glycol 3350 17 Gram(s) Oral <User Schedule>  pyridoxine 50 milliGRAM(s) Oral daily  senna 2 Tablet(s) Oral two times a day  thiamine 100 milliGRAM(s) Oral daily    MEDICATIONS  (PRN):  HYDROmorphone   Tablet 8 milliGRAM(s) Oral every 4 hours PRN Severe Pain (7 - 10)  HYDROmorphone   Tablet 6 milliGRAM(s) Oral every 4 hours PRN Moderate Pain (4 - 6)  HYDROmorphone  Injectable 0.5 milliGRAM(s) IV Push every 6 hours PRN breakthrough pain in case PO dilaudid does not work      CAPILLARY BLOOD GLUCOSE      POCT Blood Glucose.: 136 mg/dL (01 May 2021 12:29)  POCT Blood Glucose.: 78 mg/dL (01 May 2021 12:00)  POCT Blood Glucose.: 65 mg/dL (01 May 2021 11:38)  POCT Blood Glucose.: 49 mg/dL (01 May 2021 11:29)  POCT Blood Glucose.: 41 mg/dL (01 May 2021 11:20)  POCT Blood Glucose.: 128 mg/dL (01 May 2021 08:50)  POCT Blood Glucose.: 127 mg/dL (30 Apr 2021 22:25)  POCT Blood Glucose.: 312 mg/dL (30 Apr 2021 17:39)    I&O's Summary    30 Apr 2021 07:01  -  01 May 2021 07:00  --------------------------------------------------------  IN: 0 mL / OUT: 900 mL / NET: -900 mL        PHYSICAL EXAM:  Vital Signs Last 24 Hrs  T(C): 36.4 (01 May 2021 13:33), Max: 37.2 (30 Apr 2021 15:20)  T(F): 97.6 (01 May 2021 13:33), Max: 99 (30 Apr 2021 15:20)  HR: 85 (01 May 2021 13:33) (85 - 100)  BP: 137/65 (01 May 2021 13:33) (110/68 - 172/96)  BP(mean): --  RR: 17 (01 May 2021 13:33) (17 - 18)  SpO2: 100% (01 May 2021 13:33) (100% - 100%)  CONSTITUTIONAL: lying in bed, resting comfortably  EYES: sclera clear  RESPIRATORY: Normal respiratory effort; lungs are clear to auscultation bilaterally  CARDIOVASCULAR:S1 and S2, no murmurs appreciated  ABDOMEN: Nontender to palpation, decreased bowel sounds, slightly distended   MUSCULOSKELETAL: severe muscle atrophy in all extremities, left foot drop  NEURO: awake, answers Qs, L>R foot drop  LABS:                        10.0   11.29 )-----------( 291      ( 01 May 2021 06:23 )             30.9     05-01    130<L>  |  96<L>  |  16  ----------------------------<  136<H>  4.5   |  25  |  0.34<L>    Ca    8.6      01 May 2021 06:23  Phos  3.7     05-01  Mg     2.0     05-01    TPro  7.8  /  Alb  2.5<L>  /  TBili  0.4  /  DBili  x   /  AST  26  /  ALT  19  /  AlkPhos  109  05-01                RADIOLOGY & ADDITIONAL TESTS:  Results Reviewed:   Imaging Personally Reviewed:  Electrocardiogram Personally Reviewed:    COORDINATION OF CARE:  Care Discussed with Consultants/Other Providers [Y/N]:  Prior or Outpatient Records Reviewed [Y/N]:

## 2021-05-01 NOTE — PROVIDER CONTACT NOTE (HYPOGLYCEMIA EVENT) - NS PROVIDER CONTACT SITUATION-HYPO
Pt stated she does not "feel right". On assessment noted patient to be very diaphoretic. Pt requesting FS to be checked. 68 F with Type 2 DM. Admitted on 4/9 with weakness. ACP notified.

## 2021-05-01 NOTE — PROGRESS NOTE ADULT - ASSESSMENT
Patient is a 70 year old female with past medical history of diabetes, pancreatic cancer w/ mets to lung and liver on chemo last session Monday (9th session, abraxane/gemzar) coming in w/ inability to ambulate and independently take care of ADLs since Monday most likely AIDP variant     continue IVIG     follow up blood work

## 2021-05-02 ENCOUNTER — TRANSCRIPTION ENCOUNTER (OUTPATIENT)
Age: 71
End: 2021-05-02

## 2021-05-02 LAB
ALBUMIN SERPL ELPH-MCNC: 2.4 G/DL — LOW (ref 3.3–5)
ALP SERPL-CCNC: 115 U/L — SIGNIFICANT CHANGE UP (ref 40–120)
ALT FLD-CCNC: 14 U/L — SIGNIFICANT CHANGE UP (ref 4–33)
ANION GAP SERPL CALC-SCNC: 8 MMOL/L — SIGNIFICANT CHANGE UP (ref 7–14)
AST SERPL-CCNC: 21 U/L — SIGNIFICANT CHANGE UP (ref 4–32)
BILIRUB SERPL-MCNC: 0.4 MG/DL — SIGNIFICANT CHANGE UP (ref 0.2–1.2)
BLD GP AB SCN SERPL QL: NEGATIVE — SIGNIFICANT CHANGE UP
BUN SERPL-MCNC: 15 MG/DL — SIGNIFICANT CHANGE UP (ref 7–23)
CALCIUM SERPL-MCNC: 8.7 MG/DL — SIGNIFICANT CHANGE UP (ref 8.4–10.5)
CHLORIDE SERPL-SCNC: 98 MMOL/L — SIGNIFICANT CHANGE UP (ref 98–107)
CO2 SERPL-SCNC: 26 MMOL/L — SIGNIFICANT CHANGE UP (ref 22–31)
CREAT SERPL-MCNC: 0.38 MG/DL — LOW (ref 0.5–1.3)
GLUCOSE BLDC GLUCOMTR-MCNC: 126 MG/DL — HIGH (ref 70–99)
GLUCOSE BLDC GLUCOMTR-MCNC: 255 MG/DL — HIGH (ref 70–99)
GLUCOSE BLDC GLUCOMTR-MCNC: 282 MG/DL — HIGH (ref 70–99)
GLUCOSE BLDC GLUCOMTR-MCNC: 77 MG/DL — SIGNIFICANT CHANGE UP (ref 70–99)
GLUCOSE SERPL-MCNC: 63 MG/DL — LOW (ref 70–99)
HCT VFR BLD CALC: 25.7 % — LOW (ref 34.5–45)
HCT VFR BLD CALC: 25.8 % — LOW (ref 34.5–45)
HGB BLD-MCNC: 8.6 G/DL — LOW (ref 11.5–15.5)
HGB BLD-MCNC: 8.7 G/DL — LOW (ref 11.5–15.5)
MAGNESIUM SERPL-MCNC: 2 MG/DL — SIGNIFICANT CHANGE UP (ref 1.6–2.6)
MCHC RBC-ENTMCNC: 31.5 PG — SIGNIFICANT CHANGE UP (ref 27–34)
MCHC RBC-ENTMCNC: 31.5 PG — SIGNIFICANT CHANGE UP (ref 27–34)
MCHC RBC-ENTMCNC: 33.5 GM/DL — SIGNIFICANT CHANGE UP (ref 32–36)
MCHC RBC-ENTMCNC: 33.7 GM/DL — SIGNIFICANT CHANGE UP (ref 32–36)
MCV RBC AUTO: 93.5 FL — SIGNIFICANT CHANGE UP (ref 80–100)
MCV RBC AUTO: 94.1 FL — SIGNIFICANT CHANGE UP (ref 80–100)
NRBC # BLD: 0 /100 WBCS — SIGNIFICANT CHANGE UP
NRBC # BLD: 0 /100 WBCS — SIGNIFICANT CHANGE UP
NRBC # FLD: 0 K/UL — SIGNIFICANT CHANGE UP
NRBC # FLD: 0 K/UL — SIGNIFICANT CHANGE UP
PHOSPHATE SERPL-MCNC: 3.3 MG/DL — SIGNIFICANT CHANGE UP (ref 2.5–4.5)
PLATELET # BLD AUTO: 271 K/UL — SIGNIFICANT CHANGE UP (ref 150–400)
PLATELET # BLD AUTO: 293 K/UL — SIGNIFICANT CHANGE UP (ref 150–400)
POTASSIUM SERPL-MCNC: 3.8 MMOL/L — SIGNIFICANT CHANGE UP (ref 3.5–5.3)
POTASSIUM SERPL-SCNC: 3.8 MMOL/L — SIGNIFICANT CHANGE UP (ref 3.5–5.3)
PROT SERPL-MCNC: 7.7 G/DL — SIGNIFICANT CHANGE UP (ref 6–8.3)
RBC # BLD: 2.73 M/UL — LOW (ref 3.8–5.2)
RBC # BLD: 2.76 M/UL — LOW (ref 3.8–5.2)
RBC # FLD: 17.1 % — HIGH (ref 10.3–14.5)
RBC # FLD: 17.1 % — HIGH (ref 10.3–14.5)
RH IG SCN BLD-IMP: POSITIVE — SIGNIFICANT CHANGE UP
SARS-COV-2 RNA SPEC QL NAA+PROBE: SIGNIFICANT CHANGE UP
SODIUM SERPL-SCNC: 132 MMOL/L — LOW (ref 135–145)
WBC # BLD: 10.45 K/UL — SIGNIFICANT CHANGE UP (ref 3.8–10.5)
WBC # BLD: 11.16 K/UL — HIGH (ref 3.8–10.5)
WBC # FLD AUTO: 10.45 K/UL — SIGNIFICANT CHANGE UP (ref 3.8–10.5)
WBC # FLD AUTO: 11.16 K/UL — HIGH (ref 3.8–10.5)

## 2021-05-02 PROCEDURE — 99232 SBSQ HOSP IP/OBS MODERATE 35: CPT

## 2021-05-02 RX ADMIN — HYDROMORPHONE HYDROCHLORIDE 8 MILLIGRAM(S): 2 INJECTION INTRAMUSCULAR; INTRAVENOUS; SUBCUTANEOUS at 18:30

## 2021-05-02 RX ADMIN — HYDROMORPHONE HYDROCHLORIDE 8 MILLIGRAM(S): 2 INJECTION INTRAMUSCULAR; INTRAVENOUS; SUBCUTANEOUS at 17:28

## 2021-05-02 RX ADMIN — Medication 3: at 17:53

## 2021-05-02 RX ADMIN — Medication 2.5 MILLIGRAM(S): at 06:02

## 2021-05-02 RX ADMIN — FENTANYL CITRATE 1 PATCH: 50 INJECTION INTRAVENOUS at 18:49

## 2021-05-02 RX ADMIN — FENTANYL CITRATE 1 PATCH: 50 INJECTION INTRAVENOUS at 06:02

## 2021-05-02 RX ADMIN — HYDROMORPHONE HYDROCHLORIDE 8 MILLIGRAM(S): 2 INJECTION INTRAMUSCULAR; INTRAVENOUS; SUBCUTANEOUS at 06:02

## 2021-05-02 RX ADMIN — Medication 1: at 22:42

## 2021-05-02 RX ADMIN — HYDROMORPHONE HYDROCHLORIDE 8 MILLIGRAM(S): 2 INJECTION INTRAMUSCULAR; INTRAVENOUS; SUBCUTANEOUS at 07:02

## 2021-05-02 RX ADMIN — Medication 50 MILLIGRAM(S): at 13:58

## 2021-05-02 RX ADMIN — Medication 100 MILLIGRAM(S): at 13:58

## 2021-05-02 RX ADMIN — ENOXAPARIN SODIUM 40 MILLIGRAM(S): 100 INJECTION SUBCUTANEOUS at 13:58

## 2021-05-02 RX ADMIN — Medication 2.5 MILLIGRAM(S): at 13:58

## 2021-05-02 NOTE — DISCHARGE NOTE PROVIDER - HOSPITAL COURSE
70yr old woman PMH diabetes, pancreatic cancer w/ mets to lung and liver on chemo (9th session, abraxane/gemzar) p/w inability to ambulate and LLE>RLE weakness. 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    Hospital course:     Lower extremity weakness.    -Patient presenting w/ lower extremity weakness L>R distally in the setting of metastatic pancreatic cancer. Notable Left foot drop. No spinal mets seen on CT.   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 Monitored NIF/VS   -IVIG x 5 days (4/ 27 - 5/1 ), however no significant improvement in foot drop  -B1 and B6 low --> started on supplements  -Evaluated by PT and PM&R: recommended rehab    Hydronephrosis.    -Patient w/ severe left hydronephrosis seen on CT, patient reports difficulty emptying bladder.   -Joy placed: attempted TOV however pt failed it   -UA positive and had suprapubic pain, completed 3 days of CTX  -Continue with joy on discharge     Type 2 diabetes mellitus.    -a1c 6.1  -Premeal and lantus discontinued, continued ISS   -consistent carb diet.     Pancreatic cancer.    -Oncology consulted: plan for out patient follow up at [facility]    Constipation.   -Patient w/ large stool burden on mri  -Large BM after moviprep   -Then continued with miralax tid, dulcolax bid, dulcolax 5mg HS, senna    Lower extremity edema.   -Patient w/ new ankle and foot edema unclear etiology improved  -Doppler negative for DVT.   -Abraxane, gemzar can cause lower extremity edema   -Incidentally discovered partial fem art occlusion, VA duplex w/ mild fempop arterial dz.    Pt medically stable for discharge on ____ as per discussion with _____.   Dispo: rehab 70yr old woman PMH diabetes, pancreatic cancer w/ mets to lung and liver on chemo (9th session, abraxane/gemzar) p/w inability to ambulate and LLE>RLE weakness. 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    Hospital course:     Lower extremity weakness.    -Patient presenting w/ lower extremity weakness L>R distally in the setting of metastatic pancreatic cancer. Notable Left foot drop. No spinal mets seen on CT.   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 Monitored NIF/VS   -IVIG x 5 days (4/ 27 - 5/1 ), however no significant improvement in foot drop  -B1 and B6 low --> started on supplements  -Evaluated by PT and PM&R: recommended rehab    Hydronephrosis.    -Patient w/ severe left hydronephrosis seen on CT, patient reports difficulty emptying bladder.   -Joy placed: attempted TOV however pt failed it   -UA positive and had suprapubic pain, completed 3 days of CTX  -Continue with joy on discharge     Type 2 diabetes mellitus.    -a1c 6.1  -Premeal and lantus discontinued, continued ISS   -consistent carb diet.     Pancreatic cancer.    -Oncology consulted: plan for out patient follow up at [facility]    Constipation.   -Patient w/ large stool burden on mri  -Large BM after moviprep   -Then continued with miralax tid, dulcolax bid, dulcolax 5mg HS, senna    Lower extremity edema.   -Patient w/ new ankle and foot edema unclear etiology improved  -Doppler negative for DVT.   -Abraxane, gemzar can cause lower extremity edema   -Incidentally discovered partial fem art occlusion, VA duplex w/ mild fempop arterial dz.    Pt medically stable for discharge on 5/4/21 as per discussion with Dr. Sauceda.   Dispo: rehab

## 2021-05-02 NOTE — DISCHARGE NOTE PROVIDER - NSDCCPCAREPLAN_GEN_ALL_CORE_FT
PRINCIPAL DISCHARGE DIAGNOSIS  Diagnosis: Weakness  Assessment and Plan of Treatment: You were evaluated by Neurology and had a lumbar puncture which was consistent with acute inflammatory demyelinating polyneuropathy. You were recieved 5 doses of IVIG. Discharged to rehab to increase your strength. Out patient follow up with Neurology upon discharge from rehab.      SECONDARY DISCHARGE DIAGNOSES  Diagnosis: Pancreatic cancer  Assessment and Plan of Treatment: Out patient follow up with [facility].    Diagnosis: Hydronephrosis  Assessment and Plan of Treatment: Left hydronephrosis seen on CT along with difficulty emptying bladder. Joy catheter was placed, continue with joy on discharge. You were also treated with antibiotics for a urinary tract infection.    Diagnosis: Type 2 diabetes mellitus  Assessment and Plan of Treatment: Continue consistent carbohydrate diet.  Monitor blood glucose levels throughout the day before meals and at bedtime.  Record blood sugars and bring to outpatient providers appointment in order to be reviewed by your doctor for management modifications.  Be aware of diabetes management symptoms including feeling cool and clammy may be related to low glucose levels.  Feeling hot and dry may indicate high glucose levels.  If  you feel these symptoms, check your blood sugar.  Make regular podiatry appointments in order to have feet checked for wounds and toe nails cut by a doctor to prevent infections.     PRINCIPAL DISCHARGE DIAGNOSIS  Diagnosis: Weakness  Assessment and Plan of Treatment: You were evaluated by Neurology and had a lumbar puncture which was consistent with acute inflammatory demyelinating polyneuropathy. You were recieved 5 doses of IVIG. Discharged to rehab to increase your strength. Out patient follow up with Neurology upon discharge from rehab.      SECONDARY DISCHARGE DIAGNOSES  Diagnosis: Pancreatic cancer  Assessment and Plan of Treatment: Out patient follow up with [facility].    Diagnosis: Type 2 diabetes mellitus  Assessment and Plan of Treatment: Continue consistent carbohydrate diet.  Monitor blood glucose levels throughout the day before meals and at bedtime.  Record blood sugars and bring to outpatient providers appointment in order to be reviewed by your doctor for management modifications.  Be aware of diabetes management symptoms including feeling cool and clammy may be related to low glucose levels.  Feeling hot and dry may indicate high glucose levels.  If  you feel these symptoms, check your blood sugar.  Make regular podiatry appointments in order to have feet checked for wounds and toe nails cut by a doctor to prevent infections.    Diagnosis: Hydronephrosis  Assessment and Plan of Treatment: Left hydronephrosis seen on CT along with difficulty emptying bladder. Joy catheter was placed, continue with joy on discharge. You were also treated with antibiotics for a urinary tract infection.

## 2021-05-02 NOTE — PROGRESS NOTE ADULT - SUBJECTIVE AND OBJECTIVE BOX
Patient is a 70 year old female with past medical history of diabetes, pancreatic cancer w/ mets to lung and liver on chemo last session Monday (9th session, abraxane/gemzar) coming in w/ inability to ambulate and independently take care of ADLs since Monday. Patient reports she ambulates w/ a walker at baseline, however she does so independently. Patient now w/ issues even standing up with the walker. States her weakness is associated w/ b/l lower extremity pain. Pain is 9/10 in severity and is aching. Patient was taking her home fentanyl and Dilaudid however this did not help symptoms. Patient states she lives alone w/o HHA. Patient states she has had some difficulty completely emptying her bladder however denies any urinary incontinence. Patient denies any fecal incontinence. States she is moving her bowels however her BMs have been small. Patient denies any saddle anesthesia.   ED Course: T 98.2, R 17, HR 93, BP 91/48. Patient Labs notable for BUN 30. Patient hyperglycemic on FSG. Patient AST midly elevated 43. Patient Hemoglobin 8.8 at baseline. Patient urine concentrated 1.050 otherwise negative for UTI. Patient had a CT thoracic and lumbar spine, with chronic severe left hydronephrosis. Also w/ large stool burden otherwise unremarkable. Patient given 1L NS. Patient admitted for evaluation of lower extremity weakness and PT eval for inability to ambulate.  (09 Apr 2021 09:30)      Past Medical History  Type 2 diabetes mellitus    Pancreatic cancer        Past Surgical History  S/P tendon repair        MEDICATIONS    acetaminophen   Tablet .. 650 milliGRAM(s) Oral daily  bisacodyl 5 milliGRAM(s) Oral at bedtime  bisacodyl 5 milliGRAM(s) Oral every 12 hours  dextrose 40% Gel 15 Gram(s) Oral once  dextrose 5%. 1000 milliLiter(s) IV Continuous <Continuous>  dextrose 5%. 1000 milliLiter(s) IV Continuous <Continuous>  dextrose 50% Injectable 25 Gram(s) IV Push once  dextrose 50% Injectable 12.5 Gram(s) IV Push once  dextrose 50% Injectable 25 Gram(s) IV Push once  diphenhydrAMINE 25 milliGRAM(s) Oral daily  dronabinol 2.5 milliGRAM(s) Oral three times a day  enoxaparin Injectable 40 milliGRAM(s) SubCutaneous daily  fentaNYL   Patch  12 MICROgram(s)/Hr 1 Patch Transdermal every 72 hours  glucagon  Injectable 1 milliGRAM(s) IntraMuscular once  glycerin Suppository - Adult 1 Suppository(s) Rectal daily  HYDROmorphone   Tablet 8 milliGRAM(s) Oral every 4 hours PRN  HYDROmorphone   Tablet 6 milliGRAM(s) Oral every 4 hours PRN  HYDROmorphone  Injectable 0.5 milliGRAM(s) IV Push every 6 hours PRN  immune   globulin 10% (GAMMAGARD) IVPB 20 Gram(s) IV Intermittent daily  insulin glargine Injectable (LANTUS) 5 Unit(s) SubCutaneous at bedtime  insulin lispro (ADMELOG) corrective regimen sliding scale   SubCutaneous three times a day before meals  insulin lispro (ADMELOG) corrective regimen sliding scale   SubCutaneous at bedtime  insulin lispro Injectable (ADMELOG) 4 Unit(s) SubCutaneous three times a day before meals  polyethylene glycol 3350 17 Gram(s) Oral <User Schedule>  pyridoxine 50 milliGRAM(s) Oral daily  senna 2 Tablet(s) Oral two times a day  thiamine 100 milliGRAM(s) Oral daily         Family history: No history of dementia, strokes, or seizures   FAMILY HISTORY:  Family history of liver cancer  Sister    Family history of cardiac arrest (Sibling)    FH: aneurysm (Sibling)      SOCIAL HISTORY -- No history of tobacco or alcohol use     Allergies    No Known Allergies    Intolerances            Vital Signs Last 24 Hrs  T(C): 36.8  HR: 88  BP: 128/66  BP(mean): --  RR: 18    On Neurological Examination:    Mental Status - Patient is alert, awake, oriented X3. .   Follows commands well and able to answer questions appropriately. Mood and affect  normal  Follow simple commands able to repeat  able to name.  Speech - Fluent no Dysarthria  no  Aphasia                              Cranial Nerves - Extraocular muscle intact  PITO Facial symmetry Tongue midline, CnV1to V3 intact gross hearing intact      Motor Exam -   Right upper 45/5 throughout  Left upper 4/5 throughtout  Right lower- 45/5 throughout  Left lower 4/5 throughout  Coordination -finger to nose intact  Muscle tone - is normal all over. No asymmetry is seen.      Sensory    Bilateral intact to light touch    GENERAL Exam:     Nontoxic , No Acute Distress   	  HEENT:  normocephalic, atraumatic  		  LUNGS:	Clear bilaterally  No Wheeze      VASCULAR: no carotid brui  	  HEART:	 Normal S1S2   No murmur RRR        	  MUSCULOSKELETAL: Normal Range of Motion  	   SKIN:      Normal   No Ecchymosis               LABS:  CBC Full  -  ( 01 May 2021 06:23 )  WBC Count : 11.29 K/uL  RBC Count : 3.24 M/uL  Hemoglobin : 10.0 g/dL  Hematocrit : 30.9 %  Platelet Count - Automated : 291 K/uL  Mean Cell Volume : 95.4 fL  Mean Cell Hemoglobin : 30.9 pg  Mean Cell Hemoglobin Concentration : 32.4 gm/dL  Auto Neutrophil # : x  Auto Lymphocyte # : x  Auto Monocyte # : x  Auto Eosinophil # : x  Auto Basophil # : x  Auto Neutrophil % : x  Auto Lymphocyte % : x  Auto Monocyte % : x  Auto Eosinophil % : x  Auto Basophil % : x      05-01    130<L>  |  96<L>  |  16  ----------------------------<  136<H>  4.5   |  25  |  0.34<L>    Ca    8.6      01 May 2021 06:23  Phos  3.7     05-01  Mg     2.0     05-01    TPro  7.8  /  Alb  2.5<L>  /  TBili  0.4  /  DBili  x   /  AST  26  /  ALT  19  /  AlkPhos  109  05-01    Hemoglobin A1C:     LIVER FUNCTIONS - ( 01 May 2021 06:23 )  Alb: 2.5 g/dL / Pro: 7.8 g/dL / ALK PHOS: 109 U/L / ALT: 19 U/L / AST: 26 U/L / GGT: x           Vitamin B12         RADIOLOGY    EKG      IMPRESSION  This is a  year old           schoenberg     Assessment and Plan:   · Assessment	  Patient is a 70 year old female with past medical history of diabetes, pancreatic cancer w/ mets to lung and liver on chemo last session Monday (9th session, abraxane/gemzar) coming in w/ inability to ambulate and independently take care of ADLs since Monday 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

## 2021-05-02 NOTE — DISCHARGE NOTE PROVIDER - NSFOLLOWUPCLINICS_GEN_ALL_ED_FT
[facility]  Hematology/Oncology  [address] [place] [address]  Phone: [phone]  Fax:     [facility]  Neurology  [address] [place] [id number]  Phone: [phone]  Fax:

## 2021-05-02 NOTE — CHART NOTE - NSCHARTNOTEFT_GEN_A_CORE
Pt's sister Natasha updated via phone on plan of care. All medical questions answered. Sister has additional questions and concerns for SW. Primary team to ask SW to call sister tomorrow regarding discharge planning. Natasha's preferred number is her house number ([phone]), permission was given to leave a message on this voicemail if warranted. Statement Selected

## 2021-05-02 NOTE — PROGRESS NOTE ADULT - PROBLEM SELECTOR PLAN 3
a1c 6.1  FS borderline this am 70s, will discontinue lantus , lantus 5 units,  and keep  ISS  on consistent carb diet

## 2021-05-02 NOTE — DISCHARGE NOTE PROVIDER - NSDCMRMEDTOKEN_GEN_ALL_CORE_FT
alcohol swabs : Apply topically to affected area 4 times a day  Meds to Beds  [facility]  12/2 10am  page [phone]   Basaglar KwikPen 100 units/mL subcutaneous solution: 9 unit(s) subcutaneous once a day at 12pm  test script  Meds to Beds  [facility]  12/2 10am  page [id number]   dronabinol 2.5 mg oral capsule: 1 cap(s) orally 3 times a day  fentaNYL 12 mcg/hr transdermal film, extended release: 1 film(s) transdermal every 72 hours  glucometer (per patient&#x27;s insurance): 1 application subcutaneous 3 times a day   Meds to Beds  [facility]  12/2 10am  page [phone]   HYDROmorphone 2 mg oral tablet: 1 tab(s) orally every 4 hours, As Needed  Insulin Pen Needles, 4mm: 1 application subcutaneously 3 times a day . ** Use with insulin pen **   Meds to Beds  [facility]  12/2 10am  page [phone]   ICD: E11.9  lancets: 1 application subcutaneously 3 times a day   Meds to Beds  [facility]  12/2 10am  page [phone]   ICD:E11.9  NovoLOG FlexPen 100 units/mL injectable solution: 8 unit(s) injectable 3 times a day (before meals)  test scripts  Meds to Beds  [facility]  12/2 10am  page [phone]    test strips (per patient&#x27;s insurance): 1 application subcutaneously 3 times a day . ** Compatible with patient&#x27;s glucometer **   MedtoBed  [facility]  12/2 10am   [id number]   ICD: E11.9   bisacodyl 5 mg oral delayed release tablet: 1 tab(s) orally once a day (at bedtime)  bisacodyl 5 mg oral delayed release tablet: 1 tab(s) orally every 12 hours  dronabinol 2.5 mg oral capsule: 1 cap(s) orally 3 times a day  fentaNYL 12 mcg/hr transdermal film, extended release: 1 patch transdermal every 72 hours  HYDROmorphone 2 mg oral tablet: 3 tab(s) orally every 4 hours, As needed, Moderate Pain (4 - 6)  HYDROmorphone 8 mg oral tablet: 1 tab(s) orally every 4 hours, As needed, Severe Pain (7 - 10)  polyethylene glycol 3350 oral powder for reconstitution: 17 gram(s) orally   pyridoxine 50 mg oral tablet: 1 tab(s) orally once a day  thiamine 100 mg oral tablet: 1 tab(s) orally once a day

## 2021-05-02 NOTE — PROGRESS NOTE ADULT - SUBJECTIVE AND OBJECTIVE BOX
[facility]  Division of Hospital Medicine  Key Olivia MD  Pager: [phone]      Patient is a 70y old  Female who presents with a chief complaint of Lower Extremity Weakness, Inability to Ambulate (02 May 2021 11:40)      SUBJECTIVE / OVERNIGHT EVENTS: Patient finished IVIG yesterday. Patient blood sugar borderline ~70s on standing lantus. Will discontinue given poor appetite. She did not eat much of her breakfast this am.   Patient frustrated with prolonged hospitalization. Asking when she can come home. No medical complaints.   ADDITIONAL REVIEW OF SYSTEMS:    MEDICATIONS  (STANDING):  bisacodyl 5 milliGRAM(s) Oral at bedtime  bisacodyl 5 milliGRAM(s) Oral every 12 hours  dextrose 40% Gel 15 Gram(s) Oral once  dextrose 5%. 1000 milliLiter(s) (100 mL/Hr) IV Continuous <Continuous>  dextrose 5%. 1000 milliLiter(s) (50 mL/Hr) IV Continuous <Continuous>  dextrose 50% Injectable 25 Gram(s) IV Push once  dextrose 50% Injectable 12.5 Gram(s) IV Push once  dextrose 50% Injectable 25 Gram(s) IV Push once  dronabinol 2.5 milliGRAM(s) Oral three times a day  enoxaparin Injectable 40 milliGRAM(s) SubCutaneous daily  fentaNYL   Patch  12 MICROgram(s)/Hr 1 Patch Transdermal every 72 hours  glucagon  Injectable 1 milliGRAM(s) IntraMuscular once  glycerin Suppository - Adult 1 Suppository(s) Rectal daily  insulin lispro (ADMELOG) corrective regimen sliding scale   SubCutaneous three times a day before meals  insulin lispro (ADMELOG) corrective regimen sliding scale   SubCutaneous at bedtime  polyethylene glycol 3350 17 Gram(s) Oral <User Schedule>  pyridoxine 50 milliGRAM(s) Oral daily  senna 2 Tablet(s) Oral two times a day  thiamine 100 milliGRAM(s) Oral daily    MEDICATIONS  (PRN):  HYDROmorphone   Tablet 8 milliGRAM(s) Oral every 4 hours PRN Severe Pain (7 - 10)  HYDROmorphone   Tablet 6 milliGRAM(s) Oral every 4 hours PRN Moderate Pain (4 - 6)  HYDROmorphone  Injectable 0.5 milliGRAM(s) IV Push every 6 hours PRN breakthrough pain in case PO dilaudid does not work      CAPILLARY BLOOD GLUCOSE      POCT Blood Glucose.: 126 mg/dL (02 May 2021 12:13)  POCT Blood Glucose.: 77 mg/dL (02 May 2021 08:35)  POCT Blood Glucose.: 310 mg/dL (01 May 2021 22:26)  POCT Blood Glucose.: 347 mg/dL (01 May 2021 17:27)    I&O's Summary    01 May 2021 07:01  -  02 May 2021 07:00  --------------------------------------------------------  IN: 0 mL / OUT: 800 mL / NET: -800 mL        PHYSICAL EXAM:  Vital Signs Last 24 Hrs  T(C): 36.9 (02 May 2021 05:45), Max: 36.9 (02 May 2021 05:45)  T(F): 98.4 (02 May 2021 05:45), Max: 98.4 (02 May 2021 05:45)  HR: 97 (02 May 2021 05:45) (81 - 97)  BP: 147/66 (02 May 2021 05:45) (134/67 - 150/67)  BP(mean): --  RR: 18 (02 May 2021 05:45) (17 - 18)  SpO2: 100% (02 May 2021 05:45) (100% - 100%)  ]CONSTITUTIONAL: sitting in chair,  resting comfortably  EYES: sclera clear  RESPIRATORY: Normal respiratory effort; lungs are clear to auscultation bilaterally  CARDIOVASCULAR:S1 and S2, no murmurs appreciated  ABDOMEN: Nontender to palpation, decreased bowel sounds, slightly distended   MUSCULOSKELETAL: severe muscle atrophy in all extremities, left foot drop  NEURO: awake, answers Qs, L>R foot drop    LABS:                        8.7    10.45 )-----------( 271      ( 02 May 2021 09:20 )             25.8     05-02    132<L>  |  98  |  15  ----------------------------<  63<L>  3.8   |  26  |  0.38<L>    Ca    8.7      02 May 2021 06:35  Phos  3.3     05-02  Mg     2.0     05-02    TPro  7.7  /  Alb  2.4<L>  /  TBili  0.4  /  DBili  x   /  AST  21  /  ALT  14  /  AlkPhos  115  05-02                RADIOLOGY & ADDITIONAL TESTS:  Results Reviewed:   Imaging Personally Reviewed:  Electrocardiogram Personally Reviewed:    COORDINATION OF CARE:  Care Discussed with Consultants/Other Providers [Y/N]:  Prior or Outpatient Records Reviewed [Y/N]:

## 2021-05-03 LAB
ALBUMIN SERPL ELPH-MCNC: 2.8 G/DL — LOW (ref 3.3–5)
ALP SERPL-CCNC: 111 U/L — SIGNIFICANT CHANGE UP (ref 40–120)
ALT FLD-CCNC: 17 U/L — SIGNIFICANT CHANGE UP (ref 4–33)
ANION GAP SERPL CALC-SCNC: 7 MMOL/L — SIGNIFICANT CHANGE UP (ref 7–14)
AST SERPL-CCNC: 25 U/L — SIGNIFICANT CHANGE UP (ref 4–32)
BILIRUB SERPL-MCNC: 0.4 MG/DL — SIGNIFICANT CHANGE UP (ref 0.2–1.2)
BUN SERPL-MCNC: 16 MG/DL — SIGNIFICANT CHANGE UP (ref 7–23)
CALCIUM SERPL-MCNC: 8.7 MG/DL — SIGNIFICANT CHANGE UP (ref 8.4–10.5)
CHLORIDE SERPL-SCNC: 99 MMOL/L — SIGNIFICANT CHANGE UP (ref 98–107)
CO2 SERPL-SCNC: 28 MMOL/L — SIGNIFICANT CHANGE UP (ref 22–31)
CREAT SERPL-MCNC: 0.42 MG/DL — LOW (ref 0.5–1.3)
GLUCOSE BLDC GLUCOMTR-MCNC: 196 MG/DL — HIGH (ref 70–99)
GLUCOSE SERPL-MCNC: 184 MG/DL — HIGH (ref 70–99)
HCT VFR BLD CALC: 26 % — LOW (ref 34.5–45)
HGB BLD-MCNC: 8.7 G/DL — LOW (ref 11.5–15.5)
MAGNESIUM SERPL-MCNC: 2 MG/DL — SIGNIFICANT CHANGE UP (ref 1.6–2.6)
MCHC RBC-ENTMCNC: 31.5 PG — SIGNIFICANT CHANGE UP (ref 27–34)
MCHC RBC-ENTMCNC: 33.5 GM/DL — SIGNIFICANT CHANGE UP (ref 32–36)
MCV RBC AUTO: 94.2 FL — SIGNIFICANT CHANGE UP (ref 80–100)
NRBC # BLD: 0 /100 WBCS — SIGNIFICANT CHANGE UP
NRBC # FLD: 0 K/UL — SIGNIFICANT CHANGE UP
OLIGOCLONAL BANDS CSF ELPH-IMP: SIGNIFICANT CHANGE UP
PHOSPHATE SERPL-MCNC: 3 MG/DL — SIGNIFICANT CHANGE UP (ref 2.5–4.5)
PLATELET # BLD AUTO: 262 K/UL — SIGNIFICANT CHANGE UP (ref 150–400)
POTASSIUM SERPL-MCNC: 4.2 MMOL/L — SIGNIFICANT CHANGE UP (ref 3.5–5.3)
POTASSIUM SERPL-SCNC: 4.2 MMOL/L — SIGNIFICANT CHANGE UP (ref 3.5–5.3)
PROT SERPL-MCNC: 7.8 G/DL — SIGNIFICANT CHANGE UP (ref 6–8.3)
RBC # BLD: 2.76 M/UL — LOW (ref 3.8–5.2)
RBC # FLD: 17.2 % — HIGH (ref 10.3–14.5)
SODIUM SERPL-SCNC: 134 MMOL/L — LOW (ref 135–145)
WBC # BLD: 9.65 K/UL — SIGNIFICANT CHANGE UP (ref 3.8–10.5)
WBC # FLD AUTO: 9.65 K/UL — SIGNIFICANT CHANGE UP (ref 3.8–10.5)

## 2021-05-03 PROCEDURE — 99233 SBSQ HOSP IP/OBS HIGH 50: CPT | Mod: GC

## 2021-05-03 PROCEDURE — 99232 SBSQ HOSP IP/OBS MODERATE 35: CPT

## 2021-05-03 RX ORDER — FENTANYL CITRATE 50 UG/ML
1 INJECTION INTRAVENOUS
Refills: 0 | Status: DISCONTINUED | OUTPATIENT
Start: 2021-05-03 | End: 2021-05-04

## 2021-05-03 RX ADMIN — HYDROMORPHONE HYDROCHLORIDE 8 MILLIGRAM(S): 2 INJECTION INTRAMUSCULAR; INTRAVENOUS; SUBCUTANEOUS at 22:33

## 2021-05-03 RX ADMIN — HYDROMORPHONE HYDROCHLORIDE 0.5 MILLIGRAM(S): 2 INJECTION INTRAMUSCULAR; INTRAVENOUS; SUBCUTANEOUS at 04:17

## 2021-05-03 RX ADMIN — Medication 1: at 08:24

## 2021-05-03 RX ADMIN — HYDROMORPHONE HYDROCHLORIDE 8 MILLIGRAM(S): 2 INJECTION INTRAMUSCULAR; INTRAVENOUS; SUBCUTANEOUS at 10:59

## 2021-05-03 RX ADMIN — Medication 5 MILLIGRAM(S): at 22:34

## 2021-05-03 RX ADMIN — FENTANYL CITRATE 1 PATCH: 50 INJECTION INTRAVENOUS at 17:38

## 2021-05-03 RX ADMIN — ENOXAPARIN SODIUM 40 MILLIGRAM(S): 100 INJECTION SUBCUTANEOUS at 15:21

## 2021-05-03 RX ADMIN — HYDROMORPHONE HYDROCHLORIDE 0.5 MILLIGRAM(S): 2 INJECTION INTRAMUSCULAR; INTRAVENOUS; SUBCUTANEOUS at 04:33

## 2021-05-03 RX ADMIN — HYDROMORPHONE HYDROCHLORIDE 8 MILLIGRAM(S): 2 INJECTION INTRAMUSCULAR; INTRAVENOUS; SUBCUTANEOUS at 12:00

## 2021-05-03 RX ADMIN — Medication 2: at 12:37

## 2021-05-03 RX ADMIN — Medication 2: at 22:34

## 2021-05-03 RX ADMIN — HYDROMORPHONE HYDROCHLORIDE 8 MILLIGRAM(S): 2 INJECTION INTRAMUSCULAR; INTRAVENOUS; SUBCUTANEOUS at 03:35

## 2021-05-03 RX ADMIN — HYDROMORPHONE HYDROCHLORIDE 8 MILLIGRAM(S): 2 INJECTION INTRAMUSCULAR; INTRAVENOUS; SUBCUTANEOUS at 02:35

## 2021-05-03 RX ADMIN — Medication 2.5 MILLIGRAM(S): at 22:34

## 2021-05-03 RX ADMIN — Medication 50 MILLIGRAM(S): at 15:22

## 2021-05-03 RX ADMIN — FENTANYL CITRATE 1 PATCH: 50 INJECTION INTRAVENOUS at 18:42

## 2021-05-03 RX ADMIN — Medication 100 MILLIGRAM(S): at 15:20

## 2021-05-03 RX ADMIN — HYDROMORPHONE HYDROCHLORIDE 8 MILLIGRAM(S): 2 INJECTION INTRAMUSCULAR; INTRAVENOUS; SUBCUTANEOUS at 23:03

## 2021-05-03 RX ADMIN — Medication 2.5 MILLIGRAM(S): at 15:20

## 2021-05-03 NOTE — PROVIDER CONTACT NOTE (OTHER) - DATE AND TIME:
16-Apr-2021 11:00
19-Apr-2021 08:35
23-Apr-2021 05:45
23-Apr-2021 12:45
23-Apr-2021 14:19
10-Apr-2021 07:15
03-May-2021 06:20
22-Apr-2021 22:20
09-Apr-2021 17:26

## 2021-05-03 NOTE — PROGRESS NOTE ADULT - SUBJECTIVE AND OBJECTIVE BOX
Enrique Sauceda MD  Academic Hospitalist  Pager [phone]  Email: [email or web address]          PROGRESS NOTE:     Patient is a 70y old  Female who presents with a chief complaint of Lower Extremity Weakness, Inability to Ambulate (02 May 2021 17:08)      SUBJECTIVE / OVERNIGHT EVENTS:  Patient seen and examined this morning. She expresses her frustrations, stating that she feels weaker since being hospitalized and her conditions has not improved despite being in the hospital. No reports f/c/n/v.  ADDITIONAL REVIEW OF SYSTEMS:    MEDICATIONS  (STANDING):  bisacodyl 5 milliGRAM(s) Oral at bedtime  bisacodyl 5 milliGRAM(s) Oral every 12 hours  dextrose 40% Gel 15 Gram(s) Oral once  dextrose 5%. 1000 milliLiter(s) (100 mL/Hr) IV Continuous <Continuous>  dextrose 5%. 1000 milliLiter(s) (50 mL/Hr) IV Continuous <Continuous>  dextrose 50% Injectable 25 Gram(s) IV Push once  dextrose 50% Injectable 12.5 Gram(s) IV Push once  dextrose 50% Injectable 25 Gram(s) IV Push once  dronabinol 2.5 milliGRAM(s) Oral three times a day  enoxaparin Injectable 40 milliGRAM(s) SubCutaneous daily  fentaNYL   Patch  12 MICROgram(s)/Hr 1 Patch Transdermal every 72 hours  glucagon  Injectable 1 milliGRAM(s) IntraMuscular once  glycerin Suppository - Adult 1 Suppository(s) Rectal daily  insulin lispro (ADMELOG) corrective regimen sliding scale   SubCutaneous three times a day before meals  insulin lispro (ADMELOG) corrective regimen sliding scale   SubCutaneous at bedtime  polyethylene glycol 3350 17 Gram(s) Oral <User Schedule>  pyridoxine 50 milliGRAM(s) Oral daily  senna 2 Tablet(s) Oral two times a day  thiamine 100 milliGRAM(s) Oral daily    MEDICATIONS  (PRN):  HYDROmorphone   Tablet 8 milliGRAM(s) Oral every 4 hours PRN Severe Pain (7 - 10)  HYDROmorphone   Tablet 6 milliGRAM(s) Oral every 4 hours PRN Moderate Pain (4 - 6)  HYDROmorphone  Injectable 0.5 milliGRAM(s) IV Push every 6 hours PRN breakthrough pain in case PO dilaudid does not work      CAPILLARY BLOOD GLUCOSE      POCT Blood Glucose.: 239 mg/dL (03 May 2021 12:23)  POCT Blood Glucose.: 196 mg/dL (03 May 2021 08:22)  POCT Blood Glucose.: 282 mg/dL (02 May 2021 22:34)  POCT Blood Glucose.: 255 mg/dL (02 May 2021 17:50)    I&O's Summary    02 May 2021 07:01  -  03 May 2021 07:00  --------------------------------------------------------  IN: 0 mL / OUT: 775 mL / NET: -775 mL        PHYSICAL EXAM:  Vital Signs Last 24 Hrs  T(C): 36.9 (03 May 2021 13:32), Max: 37.1 (03 May 2021 04:15)  T(F): 98.4 (03 May 2021 13:32), Max: 98.7 (03 May 2021 04:15)  HR: 99 (03 May 2021 13:32) (93 - 99)  BP: 137/64 (03 May 2021 13:32) (137/64 - 155/87)  BP(mean): --  RR: 16 (03 May 2021 04:15) (16 - 18)  SpO2: 100% (03 May 2021 13:32) (100% - 100%)    CONSTITUTIONAL: cachectic, temporal wasting  EYES: sclera clear  RESPIRATORY: Normal respiratory effort; lungs are clear to auscultation bilaterally  CARDIOVASCULAR:S1 and S2, no murmurs appreciated  ABDOMEN: Nontender to palpation, decreased bowel sounds, slightly distended   MUSCULOSKELETAL: severe muscle atrophy in all extremities, left foot drop  NEURO: awake, answers Qs, L>R foot drop      LABS:                        8.7    9.65  )-----------( 262      ( 03 May 2021 07:43 )             26.0     05-03    134<L>  |  99  |  16  ----------------------------<  184<H>  4.2   |  28  |  0.42<L>    Ca    8.7      03 May 2021 07:43  Phos  3.0     05-03  Mg     2.0     05-03    TPro  7.8  /  Alb  2.8<L>  /  TBili  0.4  /  DBili  x   /  AST  25  /  ALT  17  /  AlkPhos  111  05-03                RADIOLOGY & ADDITIONAL TESTS:  Results Reviewed:   Imaging Personally Reviewed:  Electrocardiogram Personally Reviewed:    COORDINATION OF CARE:  Care Discussed with Consultants/Other Providers [Y/N]:  Prior or Outpatient Records Reviewed [Y/N]:

## 2021-05-03 NOTE — PROVIDER CONTACT NOTE (OTHER) - REASON
Fentanyl patch not at site
BS 53 and repeat BS 51
No bm recently
Patient bladder scan > 300cc
Patient refused all bowel regimen
FS 59 20 min post oral glucose
Patient bladder scan > 500cc
Pt refusing FS and care. Pt wanting to sign out AMA
bladder scan 318

## 2021-05-03 NOTE — PROVIDER CONTACT NOTE (OTHER) - RECOMMENDATIONS
Continue pain regimen with PO and IV Dilaudid.
Straight cath #2
NP aware awaiting orders, and bedside visit
juice given, will reassess in 15 mins.
Straight cath #3

## 2021-05-03 NOTE — CHART NOTE - NSCHARTNOTEFT_GEN_A_CORE
Notified by rn pt's fentanyl patch fell off during previous shift- patch currently not present on L deltoid nor located on any other part of body or environment. New order placed for Fentanyl patch at current dose for pain management per palliative recs.

## 2021-05-03 NOTE — PROGRESS NOTE ADULT - PROBLEM SELECTOR PLAN 1
Patient presenting w/ lower extremity weakness L>R distally in the setting of metastatic pancreatic cancer. Notable Left foot drop. No spinal mets seen on CT.   -MR spine thoracic/lumbar w/ and w/o neg for spinal cord compression   - MRI pelvis neg for plexopathy  - MRI C spine and head w and w/o contrast w change in signal- no obvious cord issue ?gliosis  rad onc no intervention planned  neuro surg:- think gliosis is reactive to prior surgery  B1 and B6 levels noted to be low: started on supplements  s/p LP 4/26 c/w GBS, per neuro start IVIG x 5 days (4/ 27 - 5/1 ) , no significant improvement in foot drop  Monitor NIF/VS  awaiting BRENDA

## 2021-05-03 NOTE — PROVIDER CONTACT NOTE (OTHER) - ACTION/TREATMENT ORDERED:
Per provider, straight cath #2. Continue with TOV. Next bladder scan @ 0630
NP aware awaiting orders, and bedside visit
Per provider, straight cath #3. Continue with TOV. Next bladder scan @ 14:30
provider aware
NP Ame Sorto notified. Dextrose 12.5mg IVP ordered and given. FS post dextrose  at 0747. No other orders at this time.
patient encouraged  and education provided on the importance of taking stool softener to prevent  constipation
Borden ordered
ACP aware. continue pain management with Dilaudid PO and IV breakthrough.

## 2021-05-03 NOTE — PROVIDER CONTACT NOTE (OTHER) - SITUATION
Recheck FS 59 20 min post oral glucose. FS originally 52, then increased to 58 after apple juice and marta crackers.
Bladder scan 318
Bladder scan = > 500cc
BS 51
Pt refusing FS and care. Pt wanting to sign out AMA
s/p bowel regimen yesterday of 2 suppository along with Miralax 3x day and senna 2x day with no results
Patient refused all bowel regimen
Site verification of patch for AM. Patch not present at site. Environment and clothing searched.
Bladder scan = > 300cc

## 2021-05-03 NOTE — CHART NOTE - NSCHARTNOTESELECT_GEN_ALL_CORE
ACP NP/Event Note
ACP- Hypoglycemia/Event Note
ACP/Event Note
ACP/Event Note
Event Note
Neurology, MRI C spine findings/Event Note
Nutrition Follow-up Note-Registered Dietitian/Nutrition Services
rad onc/Event Note
ACP NP/Event Note
ACP NP/Event Note
Event Note
NEUROLOGY ATTENDING/Event Note
Neurology
Neurology Update/Event Note
Neurology/Event Note
Neurosurgery/Event Note
Nutrition Follow-up Note-Registered Dietitian/Nutrition Services

## 2021-05-03 NOTE — PROVIDER CONTACT NOTE (OTHER) - BACKGROUND
pt admitted for weakness
Previously with urinary retention. Currently on TOV. Borden catheter was removed 4/22 at 0123.
Pt admitted with weakness.
Previously with urinary retention. Currently on TOV. Borden catheter was removed 4/22 at 0123.
weakness, constipation, pancreatic cancer, type 2 diabetes

## 2021-05-03 NOTE — PROVIDER CONTACT NOTE (OTHER) - ASSESSMENT
Pt asymptomatic.
pt asymptomatic
Patient bladder appears to be distended. States there is no urge to urinate. Denies pain/discomfort.
Pt alert and oriented. No current c/o pain. Fentanyl patch not at site. Environment searched, clothing searched.
LBM 4/22
Patient bladder appears to be distended. States there is no urge to urinate. Denies pain/discomfort.
no distress noted

## 2021-05-03 NOTE — CHART NOTE - NSCHARTNOTEFT_GEN_A_CORE
NEUROLOGY ATTENDING: LACY [phone]    CC: METASTATIC PANCREATIC CANCER, CANCER CACHEXIA, LEG WEAKNESS.    PATIENT SEEN AND EXAMINED.  CASE DISCUSSED WITH NEUROLOGY HOUSESTAFF  IMAGING REVIEWED  SUNRISE NOTES REVIEWED  LABS REVIEWED    Briefly, 71 yo RH woman with pancreatic cancer, now with bilateral leg weakness and numbness, s/p IVIG therapy for AIDP.    She tells me she has had insidiously worsening leg weakness for the past several weeks to months. She has also noticed numbness in the legs. Her ability to move her feet towards the ground is much better than her ability to raise them up. Indeed, her legs feel "heavy" and "swollen" most of the time. She continues to have these abnormal sensations of weight and believes her legs are swollen, when they are not. At her baseline, she walks with a walker, but she cannot do that anymore.     Dr Vieira cares for her pancreatic cancer and she is presently on Abraxane and Gemzar. There were no bony metastases on a recent PET-CT scan performed in March.     She was admitted to [facility] on 4/8/21 and MR imaging of the brain and total spine with and without contrast demonstrate no neoplasm in the CNS. She underwent a lumbar puncture on 4/26/21, demonstrating CSF protein=66, glucose=103 and acellular. She does not have a low vitamin B12 level and her serum copper is mildly elevated.    On examination, she is severely cachectic, with marked temporalis wasting, thin arms and legs. Her arms are strong and her feet are weak. The quads and hamstrings are strong bilaterally, but her anterior tibialis is week bilaterally. She has no ankle jerks, downgoing toes with dysesthesia on testing, and intact DTRs in the arms.    IMPRESSION  WEAKNESS -- She is at risk for compression neuropathies, commonly occurring at the knee where the superficial peroneal nerve comes around the fibula and supplies the anterior tibialis. The duration of her weakness suggests AIDP is not present, but if her symptoms are from AIDP, then she has completed IVIG and would improve over time, but not necessarily to normal strength. I do not find the CSF results or her examination convincing for AIDP.    DISPO -- There is no urgent requirement for her to remain in house at this time. Would pursue discharge planning, which will be challenging, given her functional paraplegia.    total time spent was 36 minutes, including care coordination and counselling on the risks of profound weight loss.

## 2021-05-03 NOTE — PROVIDER CONTACT NOTE (OTHER) - NAME OF MD/NP/PA/DO NOTIFIED:
LOWELL Cai
LOWELL Cai
[facility]
Karishma Benavides, ACP
VIRGIL Sorto
Ame Tapia NP
LOWELL Owen (ACP)
LOWELL Owen (ACP)
Radha Chambers

## 2021-05-04 ENCOUNTER — TRANSCRIPTION ENCOUNTER (OUTPATIENT)
Age: 71
End: 2021-05-04

## 2021-05-04 VITALS
SYSTOLIC BLOOD PRESSURE: 115 MMHG | RESPIRATION RATE: 17 BRPM | HEART RATE: 88 BPM | OXYGEN SATURATION: 100 % | DIASTOLIC BLOOD PRESSURE: 62 MMHG | TEMPERATURE: 99 F

## 2021-05-04 PROCEDURE — 99232 SBSQ HOSP IP/OBS MODERATE 35: CPT

## 2021-05-04 PROCEDURE — 99239 HOSP IP/OBS DSCHRG MGMT >30: CPT

## 2021-05-04 RX ORDER — HYDROMORPHONE HYDROCHLORIDE 2 MG/ML
3 INJECTION INTRAMUSCULAR; INTRAVENOUS; SUBCUTANEOUS
Qty: 0 | Refills: 0 | DISCHARGE
Start: 2021-05-04

## 2021-05-04 RX ORDER — HYDROMORPHONE HYDROCHLORIDE 2 MG/ML
1 INJECTION INTRAMUSCULAR; INTRAVENOUS; SUBCUTANEOUS
Qty: 0 | Refills: 0 | DISCHARGE

## 2021-05-04 RX ORDER — FENTANYL CITRATE 50 UG/ML
1 INJECTION INTRAVENOUS
Qty: 0 | Refills: 0 | DISCHARGE
Start: 2021-05-04

## 2021-05-04 RX ORDER — DRONABINOL 2.5 MG
1 CAPSULE ORAL
Qty: 0 | Refills: 0 | DISCHARGE

## 2021-05-04 RX ORDER — THIAMINE MONONITRATE (VIT B1) 100 MG
1 TABLET ORAL
Qty: 0 | Refills: 0 | DISCHARGE
Start: 2021-05-04

## 2021-05-04 RX ORDER — HYDROMORPHONE HYDROCHLORIDE 2 MG/ML
1 INJECTION INTRAMUSCULAR; INTRAVENOUS; SUBCUTANEOUS
Qty: 0 | Refills: 0 | DISCHARGE
Start: 2021-05-04

## 2021-05-04 RX ORDER — DRONABINOL 2.5 MG
1 CAPSULE ORAL
Qty: 0 | Refills: 0 | DISCHARGE
Start: 2021-05-04

## 2021-05-04 RX ORDER — PYRIDOXINE HCL (VITAMIN B6) 100 MG
1 TABLET ORAL
Qty: 0 | Refills: 0 | DISCHARGE
Start: 2021-05-04

## 2021-05-04 RX ORDER — FENTANYL CITRATE 50 UG/ML
1 INJECTION INTRAVENOUS
Qty: 0 | Refills: 0 | DISCHARGE

## 2021-05-04 RX ORDER — POLYETHYLENE GLYCOL 3350 17 G/17G
17 POWDER, FOR SOLUTION ORAL
Qty: 0 | Refills: 0 | DISCHARGE
Start: 2021-05-04

## 2021-05-04 RX ADMIN — Medication 2.5 MILLIGRAM(S): at 05:34

## 2021-05-04 RX ADMIN — HYDROMORPHONE HYDROCHLORIDE 8 MILLIGRAM(S): 2 INJECTION INTRAMUSCULAR; INTRAVENOUS; SUBCUTANEOUS at 12:47

## 2021-05-04 RX ADMIN — ENOXAPARIN SODIUM 40 MILLIGRAM(S): 100 INJECTION SUBCUTANEOUS at 12:14

## 2021-05-04 RX ADMIN — SENNA PLUS 2 TABLET(S): 8.6 TABLET ORAL at 05:34

## 2021-05-04 RX ADMIN — HYDROMORPHONE HYDROCHLORIDE 8 MILLIGRAM(S): 2 INJECTION INTRAMUSCULAR; INTRAVENOUS; SUBCUTANEOUS at 06:03

## 2021-05-04 RX ADMIN — HYDROMORPHONE HYDROCHLORIDE 8 MILLIGRAM(S): 2 INJECTION INTRAMUSCULAR; INTRAVENOUS; SUBCUTANEOUS at 05:33

## 2021-05-04 RX ADMIN — FENTANYL CITRATE 1 PATCH: 50 INJECTION INTRAVENOUS at 18:06

## 2021-05-04 RX ADMIN — Medication 5 MILLIGRAM(S): at 05:34

## 2021-05-04 RX ADMIN — Medication 100 MILLIGRAM(S): at 12:14

## 2021-05-04 RX ADMIN — Medication 2: at 12:13

## 2021-05-04 RX ADMIN — Medication 2: at 18:12

## 2021-05-04 RX ADMIN — HYDROMORPHONE HYDROCHLORIDE 8 MILLIGRAM(S): 2 INJECTION INTRAMUSCULAR; INTRAVENOUS; SUBCUTANEOUS at 12:12

## 2021-05-04 RX ADMIN — Medication 2: at 08:44

## 2021-05-04 RX ADMIN — POLYETHYLENE GLYCOL 3350 17 GRAM(S): 17 POWDER, FOR SOLUTION ORAL at 12:14

## 2021-05-04 RX ADMIN — FENTANYL CITRATE 1 PATCH: 50 INJECTION INTRAVENOUS at 07:31

## 2021-05-04 RX ADMIN — Medication 50 MILLIGRAM(S): at 12:14

## 2021-05-04 RX ADMIN — POLYETHYLENE GLYCOL 3350 17 GRAM(S): 17 POWDER, FOR SOLUTION ORAL at 06:35

## 2021-05-04 NOTE — DISCHARGE NOTE NURSING/CASE MANAGEMENT/SOCIAL WORK - PATIENT PORTAL LINK FT
You can access the FollowMyHealth Patient Portal offered by [facility] by registering at the following website: [email or web address]. By joining "[employer]"’s FollowMyHealth portal, you will also be able to view your health information using other applications (apps) compatible with our system.

## 2021-05-04 NOTE — PROGRESS NOTE ADULT - SUBJECTIVE AND OBJECTIVE BOX
Enrique Sauceda MD  Academic Hospitalist  Pager [phone]  Email: [email or web address]          PROGRESS NOTE:     Patient is a 70y old  Female who presents with a chief complaint of Lower Extremity Weakness, Inability to Ambulate (04 May 2021 12:25)      SUBJECTIVE / OVERNIGHT EVENTS:  Patient seen and examined this morning. No overnight events. Denies any chest pain, shortness of breath, fevers, chills, nausea or vomiting. Looks forward to being discharged.   ADDITIONAL REVIEW OF SYSTEMS:    MEDICATIONS  (STANDING):  bisacodyl 5 milliGRAM(s) Oral every 12 hours  bisacodyl 5 milliGRAM(s) Oral at bedtime  dextrose 40% Gel 15 Gram(s) Oral once  dextrose 5%. 1000 milliLiter(s) (50 mL/Hr) IV Continuous <Continuous>  dextrose 5%. 1000 milliLiter(s) (100 mL/Hr) IV Continuous <Continuous>  dextrose 50% Injectable 25 Gram(s) IV Push once  dextrose 50% Injectable 12.5 Gram(s) IV Push once  dextrose 50% Injectable 25 Gram(s) IV Push once  dronabinol 2.5 milliGRAM(s) Oral three times a day  enoxaparin Injectable 40 milliGRAM(s) SubCutaneous daily  fentaNYL   Patch  12 MICROgram(s)/Hr 1 Patch Transdermal every 72 hours  glucagon  Injectable 1 milliGRAM(s) IntraMuscular once  glycerin Suppository - Adult 1 Suppository(s) Rectal daily  insulin lispro (ADMELOG) corrective regimen sliding scale   SubCutaneous three times a day before meals  insulin lispro (ADMELOG) corrective regimen sliding scale   SubCutaneous at bedtime  polyethylene glycol 3350 17 Gram(s) Oral <User Schedule>  pyridoxine 50 milliGRAM(s) Oral daily  senna 2 Tablet(s) Oral two times a day  thiamine 100 milliGRAM(s) Oral daily    MEDICATIONS  (PRN):  HYDROmorphone   Tablet 8 milliGRAM(s) Oral every 4 hours PRN Severe Pain (7 - 10)  HYDROmorphone   Tablet 6 milliGRAM(s) Oral every 4 hours PRN Moderate Pain (4 - 6)  HYDROmorphone  Injectable 0.5 milliGRAM(s) IV Push every 6 hours PRN breakthrough pain in case PO dilaudid does not work      CAPILLARY BLOOD GLUCOSE      POCT Blood Glucose.: 201 mg/dL (04 May 2021 12:07)  POCT Blood Glucose.: 201 mg/dL (04 May 2021 08:38)  POCT Blood Glucose.: 334 mg/dL (03 May 2021 22:19)  POCT Blood Glucose.: 330 mg/dL (03 May 2021 22:18)  POCT Blood Glucose.: 126 mg/dL (03 May 2021 17:52)    I&O's Summary    03 May 2021 07:01  -  04 May 2021 07:00  --------------------------------------------------------  IN: 0 mL / OUT: 700 mL / NET: -700 mL        PHYSICAL EXAM:  Vital Signs Last 24 Hrs  T(C): 37.1 (04 May 2021 14:09), Max: 37.3 (03 May 2021 23:02)  T(F): 98.7 (04 May 2021 14:09), Max: 99.2 (03 May 2021 23:02)  HR: 88 (04 May 2021 14:09) (88 - 96)  BP: 115/62 (04 May 2021 14:09) (115/62 - 169/75)  BP(mean): --  RR: 17 (04 May 2021 14:09) (17 - 18)  SpO2: 100% (04 May 2021 14:09) (95% - 100%)    CONSTITUTIONAL: cachectic, temporal wasting  EYES: sclera clear  RESPIRATORY: Normal respiratory effort; lungs are clear to auscultation bilaterally  CARDIOVASCULAR:S1 and S2, no murmurs appreciated  ABDOMEN: Nontender to palpation, decreased bowel sounds, slightly distended   MUSCULOSKELETAL: severe muscle atrophy in all extremities, left foot drop  NEURO: awake, answers Qs, L>R foot drop    LABS:                        8.7    9.65  )-----------( 262      ( 03 May 2021 07:43 )             26.0     05-03    134<L>  |  99  |  16  ----------------------------<  184<H>  4.2   |  28  |  0.42<L>    Ca    8.7      03 May 2021 07:43  Phos  3.0     05-03  Mg     2.0     05-03    TPro  7.8  /  Alb  2.8<L>  /  TBili  0.4  /  DBili  x   /  AST  25  /  ALT  17  /  AlkPhos  111  05-03                RADIOLOGY & ADDITIONAL TESTS:  Results Reviewed:   Imaging Personally Reviewed:  Electrocardiogram Personally Reviewed:    COORDINATION OF CARE:  Care Discussed with Consultants/Other Providers [Y/N]:  Prior or Outpatient Records Reviewed [Y/N]:

## 2021-05-04 NOTE — PROGRESS NOTE ADULT - PROBLEM SELECTOR PLAN 3
a1c 6.1  FS borderline this am 70s, will discontinue lantus , lantus 5 units,  and keep  ISS  on consistent carb diet a1c 6.1  Over the weekend on 70s, though now FS trending higher, will restart lantus 3 units  on consistent carb diet

## 2021-05-04 NOTE — PROGRESS NOTE ADULT - PROBLEM SELECTOR PROBLEM 1
Acute pain
Lower extremity weakness
Acute pain
Lower extremity weakness
Acute pain
Lower extremity weakness
Acute pain
Lower extremity weakness

## 2021-05-04 NOTE — PROGRESS NOTE ADULT - PROBLEM SELECTOR PROBLEM 3
Pancreatic cancer
High risk medication use
Pancreatic cancer
Type 2 diabetes mellitus
Pancreatic cancer
High risk medication use
Pancreatic cancer
Type 2 diabetes mellitus
Type 2 diabetes mellitus
Pancreatic cancer
Type 2 diabetes mellitus
Pancreatic cancer
Type 2 diabetes mellitus
High risk medication use
High risk medication use
Pancreatic cancer
Type 2 diabetes mellitus
Pancreatic cancer
Pancreatic cancer
Type 2 diabetes mellitus
Type 2 diabetes mellitus

## 2021-05-04 NOTE — PROGRESS NOTE ADULT - PROBLEM SELECTOR PROBLEM 8
Discharge planning issues

## 2021-05-04 NOTE — PROGRESS NOTE ADULT - ASSESSMENT
70f with metastatic pancreatic with  mets to lung and liver, on chemotherapy with gemcitabine and abraxane, presenting with LE swelling, heaviness and inability to walk since 2 days PTA.   LE doppers negative for VTE, however with ?arterial thrombus. PT and DP pulses not palpable. Legs are warm. She is not able to move leges antigravity.   spine CT with contrast without signs of spinal mets or cord compression.   Recent bone scan 3/25/2021 and recent PET/CT 3/11/2021 without evidence of bone mets. Given all the above the likelihood of spinal mets and cord compression is minimal.       -S/p Arterial duplex, BLE of mild femoropopliteal arterial disease noted  -No Vascular intervention indicated at that time  -Urology input appreciated, patient s/p joy , no intervention indicated for chronic hydronephrosis for  now  -Pal care input appreciated for pain recs  -s/p MR thoracic and lumbar spine shows  degenerative disc disease at L3-4 and L4-5 with loss of signal on the T2-weighted images. No cord or cauda equina compression noted. Progressive b/l LE weakness possibly 2/2 loss signal at L3/4 and L4/5 in the setting of degenerative disk disease vs infectious etiology in the setting of warm edematous LEs  - s/p MRI pelvis: Exam limited by susceptibility artifact from a rectal tube. No well-formed mass or collection is otherwise demonstrated along the course of the lumbosacral plexus bilaterally. Diffuse marrow signal alteration which may secondary to anemia or marrow fluid/infiltrative disorder. Anasarca.---->No acute pathology to explain symptoms    s/p MRI C spine and head w and w/o contrast-findings concerning for vertebral bony metastases with resultant cervical myelopathy which could explain her symptoms.   -s/p lumbar puncture to rule out immune mediated polyneuropathy. CSF WBC 1, Pro 66   Impression Acute to subacute onset paraparesis of unclear localization, possibilities include polyradiculoneuropathy or myelopathy. Etiologies include nutritional polyneuropathy (B6 deficiency), AIDP, paraneoplastic immune mediated polyneuropathy. s/p treatment for  possible AIDP (GBS) based on history and clinical picture. Started IVIG 2g/kg divided over 5 days, completed.  -Continue with Physical therapy, Appreciate PMR recs, pt dispo for rehab. Patient amendable to going to rehab. [facility]  -There will be no plans for chemotherapy while patient is admitted to rehab  -Rest of care as per primary team  -Patient to followup with Dr. Vieira ([facility]) upon discharge form rehab  -C/w Supportive care, pain control, Nutrition, PT, DVT ppx  -Oncology will continue to follow with you      Case d/w Dr. Eneida ETIENNE  Oncology Physician Assistant  [facility]  Pager [phone]    If after 5pm or weekends please page On-call Oncology Fellow

## 2021-05-04 NOTE — PROGRESS NOTE ADULT - PROBLEM SELECTOR PROBLEM 4
Pancreatic cancer
Constipation
Pancreatic cancer
Constipation
Functional quadriplegia
Constipation
Debility
Pancreatic cancer
Constipation
Functional quadriplegia
Pancreatic cancer
Pancreatic cancer
Constipation
Functional quadriplegia
Pancreatic cancer
Constipation
Constipation
Pancreatic cancer
Constipation
Constipation
Pancreatic cancer
Constipation

## 2021-05-04 NOTE — PROGRESS NOTE ADULT - PROBLEM SELECTOR PROBLEM 7
Prophylactic measure
Palliative care encounter
Prophylactic measure
Palliative care encounter
Palliative care encounter
Prophylactic measure

## 2021-05-04 NOTE — PROGRESS NOTE ADULT - NSICDXPILOT_GEN_ALL_CORE
[place]

## 2021-05-04 NOTE — PROGRESS NOTE ADULT - PROBLEM SELECTOR PLAN 6
.  4/15  > DNR, DNI  > MOLST placed in chart    Total time performing non-direct services for a patient (coordinating discussions of goals of care, advance care planning, code status and hospice over the phone with a surrogate or healthcare proxy)= 16mins
patient w/ new ankle and foot edema unclear etiology.  Doppler w/o DVT.   - abraxane, gemzar can cause lower extremity edema   - incidentally discovered partial fem art occlusion, VA duplex w/ mild fempop arterial dz
patient w/ new ankle and foot edema unclear etiology.  Doppler w/o DVT.   - abraxane, gemzar can cause lower extremity edema   - incidentally discovered partial fem art occlusion, VA duplex w/ mild fempop arterial dz
patient w/ new ankle and foot edema unclear etiology improved.    Doppler w/o DVT.   - abraxane, gemzar can cause lower extremity edema   - incidentally discovered partial fem art occlusion, VA duplex w/ mild fempop arterial dz
.  4/22  > DNR, DNI  > Goal is for rehab to get stronger  > Not ready for hospice    Total face to face time discussing goals of care, advance care planning, code status and hospice = 16 mins
patient w/ new ankle and foot edema unclear etiology.  Doppler w/o DVT.   - abraxane, gemzar can cause lower extremity edema   - incidentally discovered partial fem art occlusion, VA duplex w/ mild fempop arterial dz
patient w/ new ankle and foot edema unclear etiology improved.    Doppler w/o DVT.   - abraxane, gemzar can cause lower extremity edema   - incidentally discovered partial fem art occlusion, VA duplex w/ mild fempop arterial dz
patient w/ new ankle and foot edema unclear etiology improved.    Doppler w/o DVT.   - abraxane, gemzar can cause lower extremity edema   - incidentally discovered partial fem art occlusion, VA duplex w/ mild fempop arterial dz
patient w/ new ankle and foot edema unclear etiology.  Doppler w/o DVT.   - abraxane, gemzar can cause lower extremity edema   - will get BNP in AM, lungs clear. If BNP elevated will order TTE.  - incidentally discovered partial fem art occlusion, VA duplex arterial pending
Thank you for allowing us to participate in your patient's care. We will continue to follow with you. Please page [phone] or contact us via [employer] Teams for any q's or c's.     Mehul Day  Palliative Medicine
patient w/ new ankle and foot edema unclear etiology.  Doppler w/o DVT.   - abraxane, gemzar can cause lower extremity edema   - incidentally discovered partial fem art occlusion, VA duplex w/ mild fempop arterial dz
patient w/ new ankle and foot edema unclear etiology improved.    Doppler w/o DVT.   - abraxane, gemzar can cause lower extremity edema   - incidentally discovered partial fem art occlusion, VA duplex w/ mild fempop arterial dz
patient w/ new ankle and foot edema unclear etiology.  Doppler w/o DVT.   - abraxane, gemzar can cause lower extremity edema   - incidentally discovered partial fem art occlusion, VA duplex w/ mild fempop arterial dz
patient w/ new ankle and foot edema unclear etiology improved.    Doppler w/o DVT.   - abraxane, gemzar can cause lower extremity edema   - incidentally discovered partial fem art occlusion, VA duplex w/ mild fempop arterial dz
patient w/ new ankle and foot edema unclear etiology.  Doppler w/o DVT.   - abraxane, gemzar can cause lower extremity edema   - incidentally discovered partial fem art occlusion, VA duplex w/ mild fempop arterial dz
patient w/ new ankle and foot edema unclear etiology.  Doppler w/o DVT.   - abraxane, gemzar can cause lower extremity edema   - will get BNP in AM, lungs clear. If BNP elevated will order TTE.  - incidentally discovered partial fem art occlusion, VA duplex arterial pending
patient w/ new ankle and foot edema unclear etiology improved.    Doppler w/o DVT.   - abraxane, gemzar can cause lower extremity edema   - incidentally discovered partial fem art occlusion, VA duplex w/ mild fempop arterial dz
patient w/ new ankle and foot edema unclear etiology.  Doppler w/o DVT.   - abraxane, gemzar can cause lower extremity edema   - incidentally discovered partial fem art occlusion, VA duplex w/ mild fempop arterial dz
patient w/ new ankle and foot edema unclear etiology.  Doppler w/o DVT.   - abraxane, gemzar can cause lower extremity edema   - incidentally discovered partial fem art occlusion, VA duplex w/ mild fempop arterial dz
.  4/14  - Broached GOC with her  - Not interested in hospice currently. Does not want to think about future. Frustrated about what is happening to her now.     Total face to face time discussing goals of care, advance care planning, code status and hospice = 16 mins
patient w/ new ankle and foot edema unclear etiology improved.    Doppler w/o DVT.   - abraxane, gemzar can cause lower extremity edema   - incidentally discovered partial fem art occlusion, VA duplex w/ mild fempop arterial dz
patient w/ new ankle and foot edema unclear etiology improved.    Doppler w/o DVT.   - abraxane, gemzar can cause lower extremity edema   - incidentally discovered partial fem art occlusion, VA duplex w/ mild fempop arterial dz
patient w/ new ankle and foot edema unclear etiology.  Doppler w/o DVT.   - abraxane, gemzar can cause lower extremity edema   - incidentally discovered partial fem art occlusion, VA duplex w/ mild fempop arterial dz
patient w/ new ankle and foot edema unclear etiology improved.    Doppler w/o DVT.   - abraxane, gemzar can cause lower extremity edema   - incidentally discovered partial fem art occlusion, VA duplex w/ mild fempop arterial dz
patient w/ new ankle and foot edema unclear etiology improved.    Doppler w/o DVT.   - abraxane, gemzar can cause lower extremity edema   - incidentally discovered partial fem art occlusion, VA duplex w/ mild fempop arterial dz

## 2021-05-04 NOTE — PROGRESS NOTE ADULT - PROBLEM SELECTOR PROBLEM 6
Lower extremity edema
Lower extremity edema
Advance care planning
Lower extremity edema
Advance care planning
Lower extremity edema
Advance care planning
Lower extremity edema
Palliative care encounter
Lower extremity edema

## 2021-05-04 NOTE — PROGRESS NOTE ADULT - NUTRITIONAL ASSESSMENT
This patient has been assessed with a concern for Malnutrition and has been determined to have a diagnosis/diagnoses of Severe protein-calorie malnutrition.    This patient is being managed with:   Diet Consistent Carbohydrate w/Evening Snack-  Supplement Feeding Modality:  Oral  Glucerna Shake Cans or Servings Per Day:  1       Frequency:  Three Times a day  Entered: Apr 10 2021  2:53PM    
This patient has been assessed with a concern for Malnutrition and has been determined to have a diagnosis/diagnoses of Severe protein-calorie malnutrition.    This patient is being managed with:   Diet Consistent Carbohydrate w/Evening Snack-  Supplement Feeding Modality:  Oral  Glucerna Shake Cans or Servings Per Day:  1       Frequency:  Three Times a day  Entered: Apr 10 2021  2:53PM    Diet Consistent Carbohydrate w/Evening Snack-  Entered: Apr 9 2021  8:42AM    The following pending diet order is being considered for treatment of Severe protein-calorie malnutrition:null
This patient has been assessed with a concern for Malnutrition and has been determined to have a diagnosis/diagnoses of Severe protein-calorie malnutrition.    This patient is being managed with:   Diet Consistent Carbohydrate w/Evening Snack-  Supplement Feeding Modality:  Oral  Glucerna Shake Cans or Servings Per Day:  1       Frequency:  Three Times a day  Entered: Apr 10 2021  2:53PM    

## 2021-05-04 NOTE — PROGRESS NOTE ADULT - PROBLEM SELECTOR PROBLEM 2
Type 2 diabetes mellitus
Hydronephrosis
Constipation due to opioid therapy
Hydronephrosis
Type 2 diabetes mellitus
Hydronephrosis
Type 2 diabetes mellitus
Type 2 diabetes mellitus
Constipation due to opioid therapy
Hydronephrosis
Hydronephrosis
Type 2 diabetes mellitus
Constipation due to opioid therapy
Type 2 diabetes mellitus
Constipation due to opioid therapy
Hydronephrosis
Hydronephrosis
Type 2 diabetes mellitus

## 2021-05-04 NOTE — PROGRESS NOTE ADULT - PROBLEM SELECTOR PROBLEM 5
Constipation
Hydronephrosis
Constipation
Malignant neoplasm of pancreas, unspecified location of malignancy
Constipation
Hydronephrosis
Constipation
Hydronephrosis
Malignant neoplasm of pancreas, unspecified location of malignancy
Constipation
Hydronephrosis
Constipation
Constipation
Malignant neoplasm of pancreas, unspecified location of malignancy
Malignant neoplasm of pancreas, unspecified location of malignancy
Constipation
Hydronephrosis
Hydronephrosis
Constipation
Hydronephrosis
Constipation
Hydronephrosis
Constipation

## 2021-05-04 NOTE — PROGRESS NOTE ADULT - SUBJECTIVE AND OBJECTIVE BOX
INTERVAL HPI/OVERNIGHT EVENTS:  Patient seen at bedside.  Patient endorsing no real improvement of symptoms  "I know have a different type of pain" in addition to LE weakness  Now complaining of slight abdominal pain (bandlike)  starting yesterday, relieved with pain meds  Hesistant to take meds " I dont want to be taking too much"  Anticipating discharge to rehab this afternoon      VITAL SIGNS:  T(F): 98.6 (05-04-21 @ 05:33)  HR: 96 (05-04-21 @ 05:33)  BP: 155/79 (05-04-21 @ 05:33)  RR: 18 (05-04-21 @ 05:33)  SpO2: 96% (05-04-21 @ 05:33)  Wt(kg): --    PHYSICAL EXAM:    In accordance with current standard limiting patient contact, deferred physical exam  2/2 COVID pandemic  Please refer to physical exam of primary team.    MEDICATIONS  (STANDING):  bisacodyl 5 milliGRAM(s) Oral every 12 hours  bisacodyl 5 milliGRAM(s) Oral at bedtime  dextrose 40% Gel 15 Gram(s) Oral once  dextrose 5%. 1000 milliLiter(s) (100 mL/Hr) IV Continuous <Continuous>  dextrose 5%. 1000 milliLiter(s) (50 mL/Hr) IV Continuous <Continuous>  dextrose 50% Injectable 25 Gram(s) IV Push once  dextrose 50% Injectable 12.5 Gram(s) IV Push once  dextrose 50% Injectable 25 Gram(s) IV Push once  dronabinol 2.5 milliGRAM(s) Oral three times a day  enoxaparin Injectable 40 milliGRAM(s) SubCutaneous daily  fentaNYL   Patch  12 MICROgram(s)/Hr 1 Patch Transdermal every 72 hours  glucagon  Injectable 1 milliGRAM(s) IntraMuscular once  glycerin Suppository - Adult 1 Suppository(s) Rectal daily  insulin lispro (ADMELOG) corrective regimen sliding scale   SubCutaneous three times a day before meals  insulin lispro (ADMELOG) corrective regimen sliding scale   SubCutaneous at bedtime  polyethylene glycol 3350 17 Gram(s) Oral <User Schedule>  pyridoxine 50 milliGRAM(s) Oral daily  senna 2 Tablet(s) Oral two times a day  thiamine 100 milliGRAM(s) Oral daily    MEDICATIONS  (PRN):  HYDROmorphone   Tablet 8 milliGRAM(s) Oral every 4 hours PRN Severe Pain (7 - 10)  HYDROmorphone   Tablet 6 milliGRAM(s) Oral every 4 hours PRN Moderate Pain (4 - 6)  HYDROmorphone  Injectable 0.5 milliGRAM(s) IV Push every 6 hours PRN breakthrough pain in case PO dilaudid does not work      Allergies    No Known Allergies    Intolerances        LABS:                        8.7    9.65  )-----------( 262      ( 03 May 2021 07:43 )             26.0     05-03    134<L>  |  99  |  16  ----------------------------<  184<H>  4.2   |  28  |  0.42<L>    Ca    8.7      03 May 2021 07:43  Phos  3.0     05-03  Mg     2.0     05-03    TPro  7.8  /  Alb  2.8<L>  /  TBili  0.4  /  DBili  x   /  AST  25  /  ALT  17  /  AlkPhos  111  05-03          RADIOLOGY & ADDITIONAL TESTS:  Studies reviewed.

## 2021-05-04 NOTE — PROGRESS NOTE ADULT - PROBLEM SELECTOR PLAN 7
Lovenox DVT prophylaxis
Lovenox DVT prophylaxis
At this point, the patient is already DNR, DNI (ANATOLIY in chart)  Hospice discussed with her and she is not ready yet  Home is also not a safe d/c plan + she is not an inpatient hospice candidate  Patient's plan is rehab to try to get stronger    I will sign off but page for q's or c's! [phone]
Lovenox DVT prophylaxis
Thank you for allowing us to participate in your patient's care. We will continue to follow with you. Please page [phone] or contact us via [employer] Teams for any q's or c's.     Mehul Day  Palliative Medicine
Lovenox DVT prophylaxis
Thank you for allowing us to participate in your patient's care. We will continue to follow with you. Please page [phone] or contact us via [employer] Teams for any q's or c's.     Mehul Day  Palliative Medicine
Lovenox DVT prophylaxis

## 2021-05-04 NOTE — PROGRESS NOTE ADULT - PROVIDER SPECIALTY LIST ADULT
Heme/Onc
Heme/Onc
Neurology
Heme/Onc
Hospitalist
Neurology
Neurology
Urology
Heme/Onc
Heme/Onc
Neurology
Palliative Care
Hospitalist
Palliative Care
Hospitalist
Palliative Care
Hospitalist
Palliative Care
Hospitalist

## 2021-05-04 NOTE — PROGRESS NOTE ADULT - PROBLEM SELECTOR PLAN 5
Patient w/ large stool burden on mri  had bm w/ moviprep on 4/17  c/w miralax tid, dulcolax bid, dulcolax 5mg HS, senna but pt refusing at times  Continue to encourage PO intake and to take bowel regimen
Patient w/ severe left hydronephrosis seen on CT, patient reports difficulty emptying bladder.   joy placed for retention
Patient w/ severe left hydronephrosis seen on CT, patient reports difficulty emptying bladder.   joy placed for retention
Patient w/ large stool burden on mri  had bm w/ moviprep on 4/17  c/w miralax tid, dulcolax bid, dulcolax 5mg HS, senna but pt refusing at times  Continue to encourage PO intake and to take bowel regimen
Patient w/ severe left hydronephrosis seen on CT, patient reports difficulty emptying bladder.   joy placed for retention
Patient w/ severe left hydronephrosis seen on CT, patient reports difficulty emptying bladder.   - 1352 in bladder, joy per urology  - urology consulted
Patient w/ severe left hydronephrosis seen on CT, patient reports difficulty emptying bladder.   joy placed for retention- will d/w  as pt had hydronephrosis if trial of void should be done
Patient w/ severe left hydronephrosis seen on CT, patient reports difficulty emptying bladder.   joy placed for retention- trial of void 4/21
.  ONC: Dr. Vieira ([facility])
Patient w/ severe left hydronephrosis seen on CT, patient reports difficulty emptying bladder.   joy placed for retention
.  ONC: Dr. Vieira ([facility])
Patient w/ large stool burden on mri  had bm w/ moviprep on 4/17  c/w miralax tid, dulcolax bid, dulcolax 5mg HS, senna but pt refusing at times  Continue to encourage PO intake and to take bowel regimen
Patient w/ large stool burden on mri  had bm w/ moviprep on 4/17  c/w miralax tid, dulcolax bid, dulcolax 5mg HS, senna but pt refusing at times  Continue to encourage PO intake and to take bowel regimen
Patient w/ severe left hydronephrosis seen on CT, patient reports difficulty emptying bladder.   joy placed for retention
Patient w/ large stool burden on mri  had bm w/ moviprep on 4/17  c/w miralax tid, dulcolax bid, dulcolax 5mg HS, senna but pt refusing at times  Continue to encourage PO intake and to take bowel regimen
Patient w/ large stool burden on mri  had bm w/ moviprep on 4/17  c/w miralax tid, dulcolax bid, dulcolax 5mg HS, senna but pt refusing at times  Continue to encourage PO intake and to take bowel regimen
Patient w/ severe left hydronephrosis seen on CT, patient reports difficulty emptying bladder.   - 1352 in bladder, joy per urology  - urology consulted
Patient w/ severe left hydronephrosis seen on CT, patient reports difficulty emptying bladder.   joy placed for retention
.  ONC: Dr. Vieira ([facility])
.  ONC: Dr. Vieira ([facility])
Patient w/ severe left hydronephrosis seen on CT, patient reports difficulty emptying bladder.   Currently undergoing TOV but required straight cath x3 already, likely will need joy reinserted
Patient w/ severe left hydronephrosis seen on CT, patient reports difficulty emptying bladder.   joy placed for retention
Patient w/ severe left hydronephrosis seen on CT, patient reports difficulty emptying bladder.   joy placed for retention- will d/w  as pt had hydronephrosis if trial of void should be done  will do trial of void tonight as pt was also fecally impacted before
Patient w/ large stool burden on mri  had bm w/ moviprep on 4/17  c/w miralax tid, dulcolax bid, dulcolax 5mg HS, senna but pt refusing at times  Continue to encourage PO intake and to take bowel regimen
Patient w/ severe left hydronephrosis seen on CT, patient reports difficulty emptying bladder.   joy placed for retention

## 2021-05-04 NOTE — DISCHARGE NOTE NURSING/CASE MANAGEMENT/SOCIAL WORK - NSDCCRNAME_GEN_ALL_CORE_FT
1. [facility], [address], [facility] [place] [place] [address] 1. [facility], [address], [place] [address]  2. [facility]

## 2021-05-26 LAB
CULTURE RESULTS: SIGNIFICANT CHANGE UP
SPECIMEN SOURCE: SIGNIFICANT CHANGE UP

## 2021-06-15 ENCOUNTER — APPOINTMENT (OUTPATIENT)
Dept: ENDOCRINOLOGY | Facility: CLINIC | Age: 71
End: 2021-06-15

## 2021-06-16 LAB
CULTURE RESULTS: SIGNIFICANT CHANGE UP
SPECIMEN SOURCE: SIGNIFICANT CHANGE UP

## 2021-07-08 PROBLEM — C25.9 MALIGNANT NEOPLASM OF PANCREAS, UNSPECIFIED: Chronic | Status: ACTIVE | Noted: 2021-04-08

## 2021-07-15 ENCOUNTER — OUTPATIENT (OUTPATIENT)
Dept: OUTPATIENT SERVICES | Facility: HOSPITAL | Age: 71
LOS: 1 days | Discharge: HOME | End: 2021-07-15

## 2021-07-15 DIAGNOSIS — C25.9 MALIGNANT NEOPLASM OF PANCREAS, UNSPECIFIED: ICD-10-CM

## 2021-07-15 DIAGNOSIS — Z98.890 OTHER SPECIFIED POSTPROCEDURAL STATES: Chronic | ICD-10-CM

## 2021-07-19 ENCOUNTER — APPOINTMENT (OUTPATIENT)
Dept: INFUSION THERAPY | Facility: HOSPITAL | Age: 71
End: 2021-07-19

## 2021-07-22 ENCOUNTER — OUTPATIENT (OUTPATIENT)
Dept: OUTPATIENT SERVICES | Facility: HOSPITAL | Age: 71
LOS: 1 days | End: 2021-07-22

## 2021-07-22 ENCOUNTER — APPOINTMENT (OUTPATIENT)
Dept: CT IMAGING | Facility: HOSPITAL | Age: 71
End: 2021-07-22
Payer: MEDICARE

## 2021-07-22 DIAGNOSIS — Z98.890 OTHER SPECIFIED POSTPROCEDURAL STATES: Chronic | ICD-10-CM

## 2021-07-22 DIAGNOSIS — C25.9 MALIGNANT NEOPLASM OF PANCREAS, UNSPECIFIED: ICD-10-CM

## 2021-07-22 PROCEDURE — 71260 CT THORAX DX C+: CPT | Mod: 26

## 2021-07-22 PROCEDURE — 74177 CT ABD & PELVIS W/CONTRAST: CPT | Mod: 26

## 2021-07-26 ENCOUNTER — APPOINTMENT (OUTPATIENT)
Dept: INFUSION THERAPY | Facility: HOSPITAL | Age: 71
End: 2021-07-26

## 2021-07-26 ENCOUNTER — APPOINTMENT (OUTPATIENT)
Dept: HEMATOLOGY ONCOLOGY | Facility: CLINIC | Age: 71
End: 2021-07-26
Payer: MEDICARE

## 2021-07-26 ENCOUNTER — RESULT REVIEW (OUTPATIENT)
Age: 71
End: 2021-07-26

## 2021-07-26 VITALS
OXYGEN SATURATION: 98 % | HEIGHT: 62 IN | SYSTOLIC BLOOD PRESSURE: 103 MMHG | HEART RATE: 114 BPM | TEMPERATURE: 98.2 F | WEIGHT: 89.38 LBS | DIASTOLIC BLOOD PRESSURE: 66 MMHG | BODY MASS INDEX: 16.45 KG/M2 | RESPIRATION RATE: 14 BRPM

## 2021-07-26 DIAGNOSIS — C25.9 MALIGNANT NEOPLASM OF PANCREAS, UNSPECIFIED: ICD-10-CM

## 2021-07-26 LAB
BASOPHILS # BLD AUTO: 0.08 K/UL — SIGNIFICANT CHANGE UP (ref 0–0.2)
BASOPHILS NFR BLD AUTO: 1 % — SIGNIFICANT CHANGE UP (ref 0–2)
EOSINOPHIL # BLD AUTO: 0 K/UL — SIGNIFICANT CHANGE UP (ref 0–0.5)
EOSINOPHIL NFR BLD AUTO: 0 % — SIGNIFICANT CHANGE UP (ref 0–6)
HCT VFR BLD CALC: 31.6 % — LOW (ref 34.5–45)
HGB BLD-MCNC: 10.5 G/DL — LOW (ref 11.5–15.5)
LYMPHOCYTES # BLD AUTO: 1.49 K/UL — SIGNIFICANT CHANGE UP (ref 1–3.3)
LYMPHOCYTES # BLD AUTO: 18 % — SIGNIFICANT CHANGE UP (ref 13–44)
MCHC RBC-ENTMCNC: 29 PG — SIGNIFICANT CHANGE UP (ref 27–34)
MCHC RBC-ENTMCNC: 33.2 G/DL — SIGNIFICANT CHANGE UP (ref 32–36)
MCV RBC AUTO: 87.3 FL — SIGNIFICANT CHANGE UP (ref 80–100)
MONOCYTES # BLD AUTO: 0.66 K/UL — SIGNIFICANT CHANGE UP (ref 0–0.9)
MONOCYTES NFR BLD AUTO: 8 % — SIGNIFICANT CHANGE UP (ref 2–14)
NEUTROPHILS # BLD AUTO: 6.04 K/UL — SIGNIFICANT CHANGE UP (ref 1.8–7.4)
NEUTROPHILS NFR BLD AUTO: 73 % — SIGNIFICANT CHANGE UP (ref 43–77)
NRBC # BLD: 0 /100 — SIGNIFICANT CHANGE UP (ref 0–0)
NRBC # BLD: SIGNIFICANT CHANGE UP /100 WBCS (ref 0–0)
PLAT MORPH BLD: NORMAL — SIGNIFICANT CHANGE UP
PLATELET # BLD AUTO: 220 K/UL — SIGNIFICANT CHANGE UP (ref 150–400)
RBC # BLD: 3.62 M/UL — LOW (ref 3.8–5.2)
RBC # FLD: 12.9 % — SIGNIFICANT CHANGE UP (ref 10.3–14.5)
RBC BLD AUTO: SIGNIFICANT CHANGE UP
WBC # BLD: 8.28 K/UL — SIGNIFICANT CHANGE UP (ref 3.8–10.5)
WBC # FLD AUTO: 8.28 K/UL — SIGNIFICANT CHANGE UP (ref 3.8–10.5)

## 2021-07-26 PROCEDURE — 99072 ADDL SUPL MATRL&STAF TM PHE: CPT

## 2021-07-26 PROCEDURE — 99214 OFFICE O/P EST MOD 30 MIN: CPT

## 2021-07-26 RX ORDER — FUROSEMIDE 20 MG/1
20 TABLET ORAL
Qty: 15 | Refills: 1 | Status: DISCONTINUED | COMMUNITY
Start: 2021-04-05 | End: 2021-07-26

## 2021-07-26 RX ORDER — PEN NEEDLE, DIABETIC 29 G X1/2"
32G X 4 MM NEEDLE, DISPOSABLE MISCELLANEOUS
Qty: 1 | Refills: 3 | Status: DISCONTINUED | COMMUNITY
Start: 2021-03-09 | End: 2021-07-26

## 2021-07-26 RX ORDER — POTASSIUM CHLORIDE 1500 MG/1
20 TABLET, FILM COATED, EXTENDED RELEASE ORAL
Qty: 30 | Refills: 2 | Status: DISCONTINUED | COMMUNITY
Start: 2021-03-01 | End: 2021-07-26

## 2021-07-26 RX ORDER — METOCLOPRAMIDE 10 MG/1
10 TABLET ORAL EVERY 6 HOURS
Qty: 120 | Refills: 3 | Status: DISCONTINUED | COMMUNITY
Start: 2021-01-22 | End: 2021-07-26

## 2021-07-26 RX ORDER — TRAMADOL HYDROCHLORIDE 50 MG/1
50 TABLET, COATED ORAL
Qty: 21 | Refills: 0 | Status: DISCONTINUED | COMMUNITY
Start: 2021-02-12 | End: 2021-07-26

## 2021-07-26 RX ORDER — SIMETHICONE 80 MG/1
80 TABLET, CHEWABLE ORAL
Refills: 0 | Status: ACTIVE | COMMUNITY
Start: 2021-07-26

## 2021-07-26 RX ORDER — PYRIDOXINE HCL (VITAMIN B6) 50 MG
50 TABLET ORAL DAILY
Refills: 0 | Status: ACTIVE | COMMUNITY
Start: 2021-07-26

## 2021-07-26 RX ORDER — LORATADINE 5 MG
17 TABLET,CHEWABLE ORAL DAILY
Refills: 0 | Status: ACTIVE | COMMUNITY
Start: 2021-07-26

## 2021-07-26 RX ORDER — BLOOD-GLUCOSE SENSOR
EACH MISCELLANEOUS
Qty: 3 | Refills: 3 | Status: ACTIVE | COMMUNITY
Start: 2021-03-10

## 2021-07-26 RX ORDER — ISOPROPYL ALCOHOL 70 ML/100ML
SWAB TOPICAL
Qty: 1 | Refills: 0 | Status: DISCONTINUED | COMMUNITY
Start: 2020-12-24 | End: 2021-07-26

## 2021-07-26 RX ORDER — NYSTATIN 100000 [USP'U]/ML
100000 SUSPENSION ORAL 4 TIMES DAILY
Qty: 300 | Refills: 1 | Status: DISCONTINUED | COMMUNITY
Start: 2021-02-22 | End: 2021-07-26

## 2021-07-26 RX ORDER — GABAPENTIN 300 MG/1
300 CAPSULE ORAL 3 TIMES DAILY
Refills: 0 | Status: ACTIVE | COMMUNITY
Start: 2021-07-26

## 2021-07-26 RX ORDER — SAW PALMETTO 160 MG
500 CAPSULE ORAL
Refills: 0 | Status: ACTIVE | COMMUNITY
Start: 2021-07-26

## 2021-07-26 RX ORDER — BISACODYL 5 MG/1
5 TABLET ORAL
Refills: 0 | Status: ACTIVE | COMMUNITY
Start: 2021-07-26

## 2021-07-26 RX ORDER — SENNOSIDES 8.6 MG/1
8.6 TABLET ORAL
Refills: 0 | Status: ACTIVE | COMMUNITY
Start: 2021-07-26

## 2021-07-26 RX ORDER — OXYCODONE 5 MG/1
5 TABLET ORAL
Qty: 28 | Refills: 0 | Status: DISCONTINUED | COMMUNITY
Start: 2021-02-22 | End: 2021-07-26

## 2021-07-26 RX ORDER — INSULIN ASPART 100 [IU]/ML
100 INJECTION, SOLUTION INTRAVENOUS; SUBCUTANEOUS
Refills: 0 | Status: ACTIVE | COMMUNITY
Start: 2021-07-26

## 2021-07-26 RX ORDER — DRONABINOL 2.5 MG/1
2.5 CAPSULE ORAL TWICE DAILY
Refills: 0 | Status: ACTIVE | COMMUNITY

## 2021-07-26 RX ORDER — CYANOCOBALAMIN (VITAMIN B-12) 1000 MCG
100 TABLET, EXTENDED RELEASE ORAL
Refills: 0 | Status: ACTIVE | COMMUNITY
Start: 2021-07-26

## 2021-07-26 RX ORDER — DRONABINOL 2.5 MG/1
2.5 CAPSULE ORAL 3 TIMES DAILY
Qty: 90 | Refills: 0 | Status: DISCONTINUED | COMMUNITY
Start: 2021-03-16 | End: 2021-07-26

## 2021-07-26 RX ORDER — INSULIN ASPART 100 [IU]/ML
100 INJECTION, SOLUTION INTRAVENOUS; SUBCUTANEOUS
Qty: 2 | Refills: 3 | Status: DISCONTINUED | COMMUNITY
Start: 2021-03-09 | End: 2021-07-26

## 2021-07-27 NOTE — ASSESSMENT
[Palliative] : Goals of care discussed with patient: Palliative [FreeTextEntry1] : Pt will continue Abraxane/Gemzar chemotherapy. Will monitor for side effects toxicities. Pt with edema to lower extremities. Will give Lasix 20 mg every other day. Advised pt to elevated feet at night, f/u. Patient will need evaluation for home services. PT/OT to recondition and strength lower extremities. Also, she needs assistance with all ADL's because she lives alone. Will get SW involved.  RTO in 1 month.\par \par 7/26/21-a discussion took place with the patient regarding further management.  The patient underwent repeat CAT scans of the chest abdomen pelvis which now shows progression of her liver metastases along with some mild ascites and abdominal adenopathy.  The pancreatic tumor is stable and the lung nodules have resolved.  We discussed going on IV chemotherapy with Onivyde, 5-FU leucovorin versus oral chemotherapy with Xeloda.  Side effects were discussed in detail and informed consent was obtained.  The patient is willing to begin chemotherapy and will undergo escalating doses to monitor her for side effects and toxicity because of her advanced disease and poor medical condition.  Patient is very frail and has lost weight.  She was encouraged to increase her caloric intake.  The patient will start with 1 pill twice a day for 1 week and then 1 week off and then increase to 2 pills and 1 pill the second week of therapy.  She will take 7 days of therapy 7 days no therapy and continuing cycling on that schedule.  She will return in 3 to 4 weeks depending on her side effects and toxicity.\par \par We also spoke about palliative care supportive care with no chemotherapy which the patient will consider.  At the present time she is agreeable to begin Xeloda and be monitored for side effects and toxicity. [Palliative Care Plan] : not applicable at this time

## 2021-07-27 NOTE — HISTORY OF PRESENT ILLNESS
[de-identified] : The patient has a history of a pancreatic mass noted on CAT scan and MRI along with liver lesions both of which were biopsied and are consistent with metastatic pancreatic carcinoma.  A discussion took place regarding treatment including chemotherapy with FOLFIRINOX versus Gemzar and Abraxane versus Xeloda took place.  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  Currently she has been noticing weight loss but not much abdominal pain. [de-identified] : Patient is here from Rehab center for evaluation. She has been in rehab for over 2 months. She feels very weak, and she is able to walk with a walker. However, she pain to lower extremities does require Dilaudid 2 mg as needed for pain.

## 2021-07-27 NOTE — PHYSICAL EXAM
[Capable of only limited self care, confined to bed or chair more than 50% of waking hours] : Status 3- Capable of only limited self care, confined to bed or chair more than 50% of waking hours [Normal] : affect appropriate [Cachectic] : cachectic [de-identified] : Patient is very frail and thin. She requires wheelchair for ambulation [de-identified] : +2 edema with pitting to bilateral lower extremities. No erythema. + decreased muscle strength 3/5 bilateral

## 2021-08-02 ENCOUNTER — APPOINTMENT (OUTPATIENT)
Dept: INFUSION THERAPY | Facility: HOSPITAL | Age: 71
End: 2021-08-02

## 2021-08-04 RX ORDER — CAPECITABINE 500 MG/1
500 TABLET ORAL
Qty: 28 | Refills: 3 | Status: ACTIVE | COMMUNITY
Start: 2021-08-04 | End: 1900-01-01

## 2021-08-16 ENCOUNTER — APPOINTMENT (OUTPATIENT)
Dept: INFUSION THERAPY | Facility: HOSPITAL | Age: 71
End: 2021-08-16

## 2021-08-23 ENCOUNTER — APPOINTMENT (OUTPATIENT)
Dept: INFUSION THERAPY | Facility: HOSPITAL | Age: 71
End: 2021-08-23

## 2021-08-24 ENCOUNTER — INPATIENT (INPATIENT)
Facility: HOSPITAL | Age: 71
LOS: 8 days | Discharge: NURSING, REHAB OR LONG-TERM CARE | End: 2021-09-02
Attending: HOSPITALIST | Admitting: HOSPITALIST
Payer: MEDICARE

## 2021-08-24 VITALS
HEART RATE: 91 BPM | TEMPERATURE: 98 F | DIASTOLIC BLOOD PRESSURE: 62 MMHG | OXYGEN SATURATION: 99 % | RESPIRATION RATE: 19 BRPM | HEIGHT: 64 IN | SYSTOLIC BLOOD PRESSURE: 115 MMHG

## 2021-08-24 DIAGNOSIS — Z98.890 OTHER SPECIFIED POSTPROCEDURAL STATES: Chronic | ICD-10-CM

## 2021-08-24 LAB
ALBUMIN SERPL ELPH-MCNC: 2.6 G/DL — LOW (ref 3.3–5)
ALP SERPL-CCNC: 83 U/L — SIGNIFICANT CHANGE UP (ref 40–120)
ALT FLD-CCNC: 9 U/L — SIGNIFICANT CHANGE UP (ref 4–33)
ANION GAP SERPL CALC-SCNC: 10 MMOL/L — SIGNIFICANT CHANGE UP (ref 7–14)
AST SERPL-CCNC: 14 U/L — SIGNIFICANT CHANGE UP (ref 4–32)
BASOPHILS # BLD AUTO: 0.03 K/UL — SIGNIFICANT CHANGE UP (ref 0–0.2)
BASOPHILS NFR BLD AUTO: 0.3 % — SIGNIFICANT CHANGE UP (ref 0–2)
BILIRUB SERPL-MCNC: 0.3 MG/DL — SIGNIFICANT CHANGE UP (ref 0.2–1.2)
BLOOD GAS VENOUS COMPREHENSIVE RESULT: SIGNIFICANT CHANGE UP
BUN SERPL-MCNC: 25 MG/DL — HIGH (ref 7–23)
CALCIUM SERPL-MCNC: 8.6 MG/DL — SIGNIFICANT CHANGE UP (ref 8.4–10.5)
CHLORIDE SERPL-SCNC: 104 MMOL/L — SIGNIFICANT CHANGE UP (ref 98–107)
CO2 SERPL-SCNC: 26 MMOL/L — SIGNIFICANT CHANGE UP (ref 22–31)
CREAT SERPL-MCNC: 0.45 MG/DL — LOW (ref 0.5–1.3)
EOSINOPHIL # BLD AUTO: 0.11 K/UL — SIGNIFICANT CHANGE UP (ref 0–0.5)
EOSINOPHIL NFR BLD AUTO: 1 % — SIGNIFICANT CHANGE UP (ref 0–6)
GLUCOSE SERPL-MCNC: 76 MG/DL — SIGNIFICANT CHANGE UP (ref 70–99)
HCT VFR BLD CALC: 30 % — LOW (ref 34.5–45)
HGB BLD-MCNC: 9.5 G/DL — LOW (ref 11.5–15.5)
IANC: 9.36 K/UL — HIGH (ref 1.5–8.5)
IMM GRANULOCYTES NFR BLD AUTO: 0.4 % — SIGNIFICANT CHANGE UP (ref 0–1.5)
LIDOCAIN IGE QN: 12 U/L — SIGNIFICANT CHANGE UP (ref 7–60)
LYMPHOCYTES # BLD AUTO: 1.07 K/UL — SIGNIFICANT CHANGE UP (ref 1–3.3)
LYMPHOCYTES # BLD AUTO: 9.5 % — LOW (ref 13–44)
MCHC RBC-ENTMCNC: 26.5 PG — LOW (ref 27–34)
MCHC RBC-ENTMCNC: 31.7 GM/DL — LOW (ref 32–36)
MCV RBC AUTO: 83.8 FL — SIGNIFICANT CHANGE UP (ref 80–100)
MONOCYTES # BLD AUTO: 0.64 K/UL — SIGNIFICANT CHANGE UP (ref 0–0.9)
MONOCYTES NFR BLD AUTO: 5.7 % — SIGNIFICANT CHANGE UP (ref 2–14)
NEUTROPHILS # BLD AUTO: 9.36 K/UL — HIGH (ref 1.8–7.4)
NEUTROPHILS NFR BLD AUTO: 83.1 % — HIGH (ref 43–77)
NRBC # BLD: 0 /100 WBCS — SIGNIFICANT CHANGE UP
NRBC # FLD: 0 K/UL — SIGNIFICANT CHANGE UP
PLATELET # BLD AUTO: 250 K/UL — SIGNIFICANT CHANGE UP (ref 150–400)
POTASSIUM SERPL-MCNC: 3.5 MMOL/L — SIGNIFICANT CHANGE UP (ref 3.5–5.3)
POTASSIUM SERPL-SCNC: 3.5 MMOL/L — SIGNIFICANT CHANGE UP (ref 3.5–5.3)
PROT SERPL-MCNC: 6.3 G/DL — SIGNIFICANT CHANGE UP (ref 6–8.3)
RBC # BLD: 3.58 M/UL — LOW (ref 3.8–5.2)
RBC # FLD: 14.3 % — SIGNIFICANT CHANGE UP (ref 10.3–14.5)
SODIUM SERPL-SCNC: 140 MMOL/L — SIGNIFICANT CHANGE UP (ref 135–145)
WBC # BLD: 11.26 K/UL — HIGH (ref 3.8–10.5)
WBC # FLD AUTO: 11.26 K/UL — HIGH (ref 3.8–10.5)

## 2021-08-24 RX ORDER — MORPHINE SULFATE 50 MG/1
4 CAPSULE, EXTENDED RELEASE ORAL ONCE
Refills: 0 | Status: DISCONTINUED | OUTPATIENT
Start: 2021-08-24 | End: 2021-08-24

## 2021-08-24 RX ORDER — PIPERACILLIN AND TAZOBACTAM 4; .5 G/20ML; G/20ML
3.38 INJECTION, POWDER, LYOPHILIZED, FOR SOLUTION INTRAVENOUS ONCE
Refills: 0 | Status: COMPLETED | OUTPATIENT
Start: 2021-08-24 | End: 2021-08-24

## 2021-08-24 RX ORDER — ONDANSETRON 8 MG/1
4 TABLET, FILM COATED ORAL ONCE
Refills: 0 | Status: COMPLETED | OUTPATIENT
Start: 2021-08-24 | End: 2021-08-24

## 2021-08-24 RX ADMIN — MORPHINE SULFATE 4 MILLIGRAM(S): 50 CAPSULE, EXTENDED RELEASE ORAL at 22:32

## 2021-08-24 RX ADMIN — ONDANSETRON 4 MILLIGRAM(S): 8 TABLET, FILM COATED ORAL at 22:31

## 2021-08-24 RX ADMIN — PIPERACILLIN AND TAZOBACTAM 200 GRAM(S): 4; .5 INJECTION, POWDER, LYOPHILIZED, FOR SOLUTION INTRAVENOUS at 23:37

## 2021-08-24 NOTE — ED ADULT NURSE NOTE - OBJECTIVE STATEMENT
70 yr old female pt presents to ED for evaluation of abdominal pain. pt describes diffuse abdominal pain that began x5 days ago but has gotten progressively worse. pt with history of pancreatic CA on oral chemo (last dose today), axox4, observed to have distended abdomen, admits to some nausea and fevers, denies any cp sob dizziness constipation or diarrhea

## 2021-08-24 NOTE — ED PROVIDER NOTE - NSFOLLOWUPINSTRUCTIONS_ED_ALL_ED_FT
The patient is a 70y Female who has a past medical and surgery history of   ,   PAST MEDICAL & SURGICAL HISTORY:  Type 2 diabetes mellitus    Pancreatic cancer  mets to lung and liver    Hydronephrosis, left  chronic joy    S/P tendon repair  R foot, R elbow     PTED with   Vital Signs Last 24 Hrs  T(C): 36.3 (25 Aug 2021 15:47), Max: 37.9 (24 Aug 2021 22:41)  T(F): 97.3 (25 Aug 2021 15:47), Max: 100.3 (24 Aug 2021 22:41)  HR: 95 (25 Aug 2021 15:47) (91 - 111)  BP: 105/58 (25 Aug 2021 15:47) (104/64 - 115/62)  BP(mean): --  RR: 17 (25 Aug 2021 15:47) (17 - 20)  SpO2: 100% (25 Aug 2021 15:47) (99% - 100%)Height (cm): 162.6 (08-24-21 @ 19:04), 162.6 (04-09-21 @ 06:02), 157.4 (03-29-21 @ 10:15), 157.5 (03-21-21 @ 15:15), 157.5 (01-25-21 @ 10:17), 160 (11-30-20 @ 08:36)  Weight (kg): 42.2 (04-09-21 @ 06:02), 42.29 (03-29-21 @ 10:15), 44 (01-25-21 @ 10:17), 44.8 (12-02-20 @ 05:53)  BMI (kg/m2): 16 (08-24-21 @ 19:04), 16 (04-09-21 @ 06:02), 17.1 (03-29-21 @ 10:15), 17 (03-29-21 @ 10:15), 17.7 (03-21-21 @ 15:15), 17.7 (01-25-21 @ 10:17), 17.5 (12-02-20 @ 05:53)  BSA (m2): 1.41 (08-24-21 @ 19:04), 1.41 (04-09-21 @ 06:02), 1.38 (03-29-21 @ 10:15), 1.38 (03-29-21 @ 10:15), 1.41 (03-21-21 @ 15:15), 1.41 (01-25-21 @ 10:17), 1.43 (12-02-20 @ 05:53)  PE: as described; my additions and exceptions are noted in the chart    DATA:  EKG: pending at time of evaluation  LAB: Pending at time of evaluation  Blood Gas Venous - Lactate: 1.1 mmol/L (08-24-21 @ 23:02)                        9.5    11.26 )-----------( 250      ( 24 Aug 2021 23:02 )             30.0   Mean Cell Volume: 83.8 fL (08-24-21 @ 23:02)  Auto Neutrophil %: 83.1 % (08-24-21 @ 23:02)  Auto Eosinophil %: 1.0 % (08-24-21 @ 23:02)  08-24    140  |  104  |  25<H>  ----------------------------<  76  3.5   |  26  |  0.45<L>    Ca    8.6      24 Aug 2021 23:02    TPro  6.3  /  Alb  2.6<L>  /  TBili  0.3  /  DBili  x   /  AST  14  /  ALT  9   /  AlkPhos  83  08-24  Lipase, Serum: 12 U/L (08-24-21 @ 23:02)  Lipase, Serum: 12 U/L (08-24-21 @ 23:02)     Urinalysis Basic - ( 25 Aug 2021 14:42 )    Color: Yellow / Appearance: Clear / SG: >1.050 / pH: x  Gluc: x / Ketone: Negative  / Bili: Negative / Urobili: <2 mg/dL   Blood: x / Protein: 100 mg/dL / Nitrite: Negative   Leuk Esterase: Moderate / RBC: 6 /HPF / WBC 15 /HPF   Sq Epi: x / Non Sq Epi: 3 /HPF / Bacteria: Negative       IMPRESSION/RISK:  Dx=  Differential includes but not limited to conditions listed in order of most possible first:   Consideration include  Plan  acetaminophen   Tablet .. 650 milliGRAM(s) Oral PRN  aluminum hydroxide/magnesium hydroxide/simethicone Suspension 30 milliLiter(s) Oral PRN  ascorbic acid 500 milliGRAM(s) Oral  bisacodyl 5 milliGRAM(s) Oral  dextrose 40% Gel 15 Gram(s) Oral  dextrose 5%. 1000 milliLiter(s) (50 mL/Hr) IV Continuous  dextrose 5%. 1000 milliLiter(s) (100 mL/Hr) IV Continuous  dextrose 50% Injectable 25 Gram(s) IV Push  dextrose 50% Injectable 12.5 Gram(s) IV Push  dextrose 50% Injectable 25 Gram(s) IV Push  dronabinol 2.5 milliGRAM(s) Oral  enoxaparin Injectable 30 milliGRAM(s) SubCutaneous  fentaNYL   Patch  12 MICROgram(s)/Hr 1 Patch Transdermal  gabapentin 300 milliGRAM(s) Oral  glucagon  Injectable 1 milliGRAM(s) IntraMuscular  HYDROmorphone   Tablet 6 milliGRAM(s) Oral PRN  insulin glargine Injectable (LANTUS) 8 Unit(s) SubCutaneous  insulin lispro (ADMELOG) corrective regimen sliding scale   SubCutaneous  lactulose Syrup 10 Gram(s) Oral  melatonin 3 milliGRAM(s) Oral PRN  ondansetron Injectable 4 milliGRAM(s) IV Push PRN  piperacillin/tazobactam IVPB.. 3.375 Gram(s) IV Intermittent  pyridoxine 50 milliGRAM(s) Oral  thiamine 100 milliGRAM(s) Oral

## 2021-08-24 NOTE — ED ADULT NURSE NOTE - NSIMPLEMENTINTERV_GEN_ALL_ED
Implemented All Fall Risk Interventions:  [place] to call system. Call bell, personal items and telephone within reach. Instruct patient to call for assistance. Room bathroom lighting operational. Non-slip footwear when patient is off stretcher. Physically safe environment: no spills, clutter or unnecessary equipment. Stretcher in lowest position, wheels locked, appropriate side rails in place. Provide visual cue, wrist band, yellow gown, etc. Monitor gait and stability. Monitor for mental status changes and reorient to person, place, and time. Review medications for side effects contributing to fall risk. Reinforce activity limits and safety measures with patient and family.

## 2021-08-24 NOTE — ED ADULT NURSE NOTE - CHIEF COMPLAINT QUOTE
Patient brought to ER by EMS from [facility] for c/o abdominal pain and distention that started on Friday. Pt has a chronic joy.

## 2021-08-24 NOTE — ED PROVIDER NOTE - CLINICAL SUMMARY MEDICAL DECISION MAKING FREE TEXT BOX
70yr old woman PMH diabetes, wheelchair bound, neuropathy, bound form  pancreatic cancer w/ mets to lung and liver on chemo (last session yesterday) p/w diffuse abdominal pain and distention since Friday.  Significantly distended abdomen with diffuse ttp, no CVA tenderness. Ddx worsening malignancy, SBP, 70yr old woman PMH diabetes, wheelchair bound, neuropathy, bound form  pancreatic cancer w/ mets to lung and liver on chemo (last session yesterday) p/w diffuse abdominal pain and distention since Friday. Febrile.  Significantly distended abdomen with diffuse ttp, no CVA tenderness. Ddx worsening malignancy, SBP, surgical intraabdominal pathology. Basic labs, BC, UA, UC, CT and likely admit

## 2021-08-24 NOTE — ED PROVIDER NOTE - PHYSICAL EXAMINATION
Gen: AAOx3, non-toxic  Head: NCAT  HEENT: EOMI, oral mucosa moist, normal conjunctiva  Lung: CTAB, no respiratory distress, no wheezes/rhonchi/rales B/L, speaking in full sentences  CV: RRR, no murmurs, rubs or gallops  Abd:  Significantly distended abdomen with diffuse ttp, no CVA tenderness  : Borden catheter with slight cloudy urine.   MSK: no visible deformities  Neuro: No focal sensory or motor deficits, normal CN exam   Skin: Warm, well perfused, no rash  Psych: normal affect.

## 2021-08-24 NOTE — ED PROVIDER NOTE - OBJECTIVE STATEMENT
70yr old woman PMH diabetes, wheelchair bound, neuropathy, bound form  pancreatic cancer w/ mets to lung and liver on chemo (last session yesterday) p/w diffuse abdominal pain and distention since Friday. Reports a 10/10 non-radiating diffuse abd pain and significant abdominal distention. LBM on Friday, passing gas. No abd surgeries. No hx of cirrhosis. Reports fever 2 weeks ago. Denies urinary symptoms, but has a chronic joy catheter. No chest pain, sob or cough. No alcohol use.

## 2021-08-24 NOTE — ED ADULT TRIAGE NOTE - CHIEF COMPLAINT QUOTE
Patient brought to ER by EMS from [facility] for c/o abdominal pain and distention that started on Friday. Pt has a chronic joy.

## 2021-08-25 DIAGNOSIS — K59.00 CONSTIPATION, UNSPECIFIED: ICD-10-CM

## 2021-08-25 DIAGNOSIS — R18.8 OTHER ASCITES: ICD-10-CM

## 2021-08-25 DIAGNOSIS — Z71.89 OTHER SPECIFIED COUNSELING: ICD-10-CM

## 2021-08-25 DIAGNOSIS — A41.9 SEPSIS, UNSPECIFIED ORGANISM: ICD-10-CM

## 2021-08-25 DIAGNOSIS — N39.0 URINARY TRACT INFECTION, SITE NOT SPECIFIED: ICD-10-CM

## 2021-08-25 DIAGNOSIS — E11.9 TYPE 2 DIABETES MELLITUS WITHOUT COMPLICATIONS: ICD-10-CM

## 2021-08-25 DIAGNOSIS — Z29.9 ENCOUNTER FOR PROPHYLACTIC MEASURES, UNSPECIFIED: ICD-10-CM

## 2021-08-25 DIAGNOSIS — R10.9 UNSPECIFIED ABDOMINAL PAIN: ICD-10-CM

## 2021-08-25 DIAGNOSIS — C25.9 MALIGNANT NEOPLASM OF PANCREAS, UNSPECIFIED: ICD-10-CM

## 2021-08-25 LAB
APPEARANCE UR: ABNORMAL
APPEARANCE UR: CLEAR — SIGNIFICANT CHANGE UP
B PERT IGG+IGM PNL SER: CLEAR — SIGNIFICANT CHANGE UP
BACTERIA # UR AUTO: NEGATIVE — SIGNIFICANT CHANGE UP
BILIRUB UR-MCNC: NEGATIVE — SIGNIFICANT CHANGE UP
BILIRUB UR-MCNC: NEGATIVE — SIGNIFICANT CHANGE UP
COLOR FLD: YELLOW
COLOR SPEC: YELLOW — SIGNIFICANT CHANGE UP
COLOR SPEC: YELLOW — SIGNIFICANT CHANGE UP
DIFF PNL FLD: NEGATIVE — SIGNIFICANT CHANGE UP
DIFF PNL FLD: NEGATIVE — SIGNIFICANT CHANGE UP
EOSINOPHIL # FLD: 1 % — SIGNIFICANT CHANGE UP
EPI CELLS # UR: 3 /HPF — SIGNIFICANT CHANGE UP (ref 0–5)
FLUID INTAKE SUBSTANCE CLASS: SIGNIFICANT CHANGE UP
FLUID SEGMENTED GRANULOCYTES: 13 % — SIGNIFICANT CHANGE UP
FOLATE+VIT B12 SERBLD-IMP: 0 % — SIGNIFICANT CHANGE UP
GLUCOSE UR QL: NEGATIVE — SIGNIFICANT CHANGE UP
GLUCOSE UR QL: NEGATIVE — SIGNIFICANT CHANGE UP
GRAM STN FLD: SIGNIFICANT CHANGE UP
HYALINE CASTS # UR AUTO: 4 /LPF — SIGNIFICANT CHANGE UP (ref 0–7)
KETONES UR-MCNC: NEGATIVE — SIGNIFICANT CHANGE UP
KETONES UR-MCNC: NEGATIVE — SIGNIFICANT CHANGE UP
LDH SERPL L TO P-CCNC: 132 U/L — SIGNIFICANT CHANGE UP
LEUKOCYTE ESTERASE UR-ACNC: ABNORMAL
LEUKOCYTE ESTERASE UR-ACNC: ABNORMAL
LYMPHOCYTES # FLD: 13 % — SIGNIFICANT CHANGE UP
MESOTHL CELL # FLD: 0 % — SIGNIFICANT CHANGE UP
MONOS+MACROS # FLD: 73 % — SIGNIFICANT CHANGE UP
NITRITE UR-MCNC: NEGATIVE — SIGNIFICANT CHANGE UP
NITRITE UR-MCNC: NEGATIVE — SIGNIFICANT CHANGE UP
OTHER CELLS FLD MANUAL: 0 % — SIGNIFICANT CHANGE UP
PH UR: 6 — SIGNIFICANT CHANGE UP (ref 5–8)
PH UR: 6.5 — SIGNIFICANT CHANGE UP (ref 5–8)
PROT FLD-MCNC: 3.5 G/DL — SIGNIFICANT CHANGE UP
PROT UR-MCNC: ABNORMAL
PROT UR-MCNC: ABNORMAL
RBC CASTS # UR COMP ASSIST: 6 /HPF — HIGH (ref 0–4)
RCV VOL RI: <1000 CELLS/UL — HIGH (ref 0–5)
SARS-COV-2 RNA SPEC QL NAA+PROBE: SIGNIFICANT CHANGE UP
SP GR SPEC: 1.03 — HIGH (ref 1.01–1.02)
SP GR SPEC: >1.05 (ref 1.01–1.02)
SPECIMEN SOURCE: SIGNIFICANT CHANGE UP
TOTAL CELLS COUNTED, BODY FLUID: 100 CELLS — SIGNIFICANT CHANGE UP
TOTAL NUCLEATED CELL COUNT, BODY FLUID: 529 CELLS/UL — HIGH (ref 0–5)
TUBE TYPE: SIGNIFICANT CHANGE UP
UROBILINOGEN FLD QL: ABNORMAL
UROBILINOGEN FLD QL: SIGNIFICANT CHANGE UP
WBC UR QL: 15 /HPF — HIGH (ref 0–5)

## 2021-08-25 PROCEDURE — 99223 1ST HOSP IP/OBS HIGH 75: CPT

## 2021-08-25 PROCEDURE — 99232 SBSQ HOSP IP/OBS MODERATE 35: CPT

## 2021-08-25 PROCEDURE — 99222 1ST HOSP IP/OBS MODERATE 55: CPT

## 2021-08-25 PROCEDURE — 74177 CT ABD & PELVIS W/CONTRAST: CPT | Mod: 26

## 2021-08-25 RX ORDER — GLUCAGON INJECTION, SOLUTION 0.5 MG/.1ML
1 INJECTION, SOLUTION SUBCUTANEOUS ONCE
Refills: 0 | Status: DISCONTINUED | OUTPATIENT
Start: 2021-08-25 | End: 2021-09-02

## 2021-08-25 RX ORDER — THIAMINE MONONITRATE (VIT B1) 100 MG
100 TABLET ORAL DAILY
Refills: 0 | Status: DISCONTINUED | OUTPATIENT
Start: 2021-08-25 | End: 2021-09-02

## 2021-08-25 RX ORDER — DEXTROSE 50 % IN WATER 50 %
12.5 SYRINGE (ML) INTRAVENOUS ONCE
Refills: 0 | Status: DISCONTINUED | OUTPATIENT
Start: 2021-08-25 | End: 2021-09-02

## 2021-08-25 RX ORDER — ENOXAPARIN SODIUM 100 MG/ML
30 INJECTION SUBCUTANEOUS DAILY
Refills: 0 | Status: DISCONTINUED | OUTPATIENT
Start: 2021-08-25 | End: 2021-08-30

## 2021-08-25 RX ORDER — ONDANSETRON 8 MG/1
4 TABLET, FILM COATED ORAL EVERY 8 HOURS
Refills: 0 | Status: DISCONTINUED | OUTPATIENT
Start: 2021-08-25 | End: 2021-09-02

## 2021-08-25 RX ORDER — SODIUM CHLORIDE 9 MG/ML
1000 INJECTION, SOLUTION INTRAVENOUS
Refills: 0 | Status: DISCONTINUED | OUTPATIENT
Start: 2021-08-25 | End: 2021-09-02

## 2021-08-25 RX ORDER — ACETAMINOPHEN 500 MG
650 TABLET ORAL EVERY 6 HOURS
Refills: 0 | Status: DISCONTINUED | OUTPATIENT
Start: 2021-08-25 | End: 2021-09-02

## 2021-08-25 RX ORDER — INSULIN LISPRO 100/ML
VIAL (ML) SUBCUTANEOUS
Refills: 0 | Status: DISCONTINUED | OUTPATIENT
Start: 2021-08-25 | End: 2021-09-02

## 2021-08-25 RX ORDER — LANOLIN ALCOHOL/MO/W.PET/CERES
3 CREAM (GRAM) TOPICAL AT BEDTIME
Refills: 0 | Status: DISCONTINUED | OUTPATIENT
Start: 2021-08-25 | End: 2021-09-02

## 2021-08-25 RX ORDER — FENTANYL CITRATE 50 UG/ML
1 INJECTION INTRAVENOUS
Refills: 0 | Status: DISCONTINUED | OUTPATIENT
Start: 2021-08-25 | End: 2021-08-26

## 2021-08-25 RX ORDER — GABAPENTIN 400 MG/1
1 CAPSULE ORAL
Qty: 0 | Refills: 0 | DISCHARGE

## 2021-08-25 RX ORDER — PIPERACILLIN AND TAZOBACTAM 4; .5 G/20ML; G/20ML
3.38 INJECTION, POWDER, LYOPHILIZED, FOR SOLUTION INTRAVENOUS ONCE
Refills: 0 | Status: COMPLETED | OUTPATIENT
Start: 2021-08-25 | End: 2021-08-25

## 2021-08-25 RX ORDER — PIPERACILLIN AND TAZOBACTAM 4; .5 G/20ML; G/20ML
3.38 INJECTION, POWDER, LYOPHILIZED, FOR SOLUTION INTRAVENOUS ONCE
Refills: 0 | Status: DISCONTINUED | OUTPATIENT
Start: 2021-08-25 | End: 2021-08-25

## 2021-08-25 RX ORDER — PIPERACILLIN AND TAZOBACTAM 4; .5 G/20ML; G/20ML
3.38 INJECTION, POWDER, LYOPHILIZED, FOR SOLUTION INTRAVENOUS EVERY 8 HOURS
Refills: 0 | Status: DISCONTINUED | OUTPATIENT
Start: 2021-08-25 | End: 2021-08-25

## 2021-08-25 RX ORDER — DRONABINOL 2.5 MG
2.5 CAPSULE ORAL
Refills: 0 | Status: DISCONTINUED | OUTPATIENT
Start: 2021-08-25 | End: 2021-08-26

## 2021-08-25 RX ORDER — HYDROMORPHONE HYDROCHLORIDE 2 MG/ML
6 INJECTION INTRAMUSCULAR; INTRAVENOUS; SUBCUTANEOUS EVERY 6 HOURS
Refills: 0 | Status: DISCONTINUED | OUTPATIENT
Start: 2021-08-25 | End: 2021-08-26

## 2021-08-25 RX ORDER — ASCORBIC ACID 60 MG
1 TABLET,CHEWABLE ORAL
Qty: 0 | Refills: 0 | DISCHARGE

## 2021-08-25 RX ORDER — INSULIN GLARGINE 100 [IU]/ML
8 INJECTION, SOLUTION SUBCUTANEOUS AT BEDTIME
Refills: 0 | Status: DISCONTINUED | OUTPATIENT
Start: 2021-08-25 | End: 2021-08-26

## 2021-08-25 RX ORDER — INSULIN GLARGINE 100 [IU]/ML
3 INJECTION, SOLUTION SUBCUTANEOUS
Qty: 0 | Refills: 0 | DISCHARGE

## 2021-08-25 RX ORDER — LACTULOSE 10 G/15ML
10 SOLUTION ORAL THREE TIMES A DAY
Refills: 0 | Status: COMPLETED | OUTPATIENT
Start: 2021-08-25 | End: 2021-08-27

## 2021-08-25 RX ORDER — SIMETHICONE 80 MG/1
1 TABLET, CHEWABLE ORAL
Qty: 0 | Refills: 0 | DISCHARGE

## 2021-08-25 RX ORDER — PYRIDOXINE HCL (VITAMIN B6) 100 MG
50 TABLET ORAL DAILY
Refills: 0 | Status: DISCONTINUED | OUTPATIENT
Start: 2021-08-25 | End: 2021-09-02

## 2021-08-25 RX ORDER — SENNA PLUS 8.6 MG/1
2 TABLET ORAL
Qty: 0 | Refills: 0 | DISCHARGE

## 2021-08-25 RX ORDER — MORPHINE SULFATE 50 MG/1
4 CAPSULE, EXTENDED RELEASE ORAL ONCE
Refills: 0 | Status: DISCONTINUED | OUTPATIENT
Start: 2021-08-25 | End: 2021-08-25

## 2021-08-25 RX ORDER — PIPERACILLIN AND TAZOBACTAM 4; .5 G/20ML; G/20ML
3.38 INJECTION, POWDER, LYOPHILIZED, FOR SOLUTION INTRAVENOUS EVERY 8 HOURS
Refills: 0 | Status: DISCONTINUED | OUTPATIENT
Start: 2021-08-25 | End: 2021-08-26

## 2021-08-25 RX ORDER — ASCORBIC ACID 60 MG
2 TABLET,CHEWABLE ORAL
Qty: 0 | Refills: 0 | DISCHARGE

## 2021-08-25 RX ORDER — GABAPENTIN 400 MG/1
300 CAPSULE ORAL THREE TIMES A DAY
Refills: 0 | Status: DISCONTINUED | OUTPATIENT
Start: 2021-08-25 | End: 2021-09-02

## 2021-08-25 RX ORDER — ASCORBIC ACID 60 MG
500 TABLET,CHEWABLE ORAL DAILY
Refills: 0 | Status: DISCONTINUED | OUTPATIENT
Start: 2021-08-25 | End: 2021-09-02

## 2021-08-25 RX ORDER — DEXTROSE 50 % IN WATER 50 %
25 SYRINGE (ML) INTRAVENOUS ONCE
Refills: 0 | Status: DISCONTINUED | OUTPATIENT
Start: 2021-08-25 | End: 2021-09-02

## 2021-08-25 RX ORDER — DEXTROSE 50 % IN WATER 50 %
15 SYRINGE (ML) INTRAVENOUS ONCE
Refills: 0 | Status: DISCONTINUED | OUTPATIENT
Start: 2021-08-25 | End: 2021-09-02

## 2021-08-25 RX ADMIN — Medication 2: at 16:18

## 2021-08-25 RX ADMIN — MORPHINE SULFATE 4 MILLIGRAM(S): 50 CAPSULE, EXTENDED RELEASE ORAL at 05:32

## 2021-08-25 RX ADMIN — PIPERACILLIN AND TAZOBACTAM 25 GRAM(S): 4; .5 INJECTION, POWDER, LYOPHILIZED, FOR SOLUTION INTRAVENOUS at 20:58

## 2021-08-25 RX ADMIN — PIPERACILLIN AND TAZOBACTAM 200 GRAM(S): 4; .5 INJECTION, POWDER, LYOPHILIZED, FOR SOLUTION INTRAVENOUS at 12:51

## 2021-08-25 RX ADMIN — FENTANYL CITRATE 1 PATCH: 50 INJECTION INTRAVENOUS at 20:59

## 2021-08-25 RX ADMIN — Medication 5 MILLIGRAM(S): at 20:59

## 2021-08-25 RX ADMIN — HYDROMORPHONE HYDROCHLORIDE 6 MILLIGRAM(S): 2 INJECTION INTRAMUSCULAR; INTRAVENOUS; SUBCUTANEOUS at 16:09

## 2021-08-25 RX ADMIN — GABAPENTIN 300 MILLIGRAM(S): 400 CAPSULE ORAL at 22:56

## 2021-08-25 RX ADMIN — HYDROMORPHONE HYDROCHLORIDE 6 MILLIGRAM(S): 2 INJECTION INTRAMUSCULAR; INTRAVENOUS; SUBCUTANEOUS at 12:51

## 2021-08-25 RX ADMIN — GABAPENTIN 300 MILLIGRAM(S): 400 CAPSULE ORAL at 16:03

## 2021-08-25 RX ADMIN — INSULIN GLARGINE 8 UNIT(S): 100 INJECTION, SOLUTION SUBCUTANEOUS at 22:56

## 2021-08-25 RX ADMIN — Medication 2.5 MILLIGRAM(S): at 19:48

## 2021-08-25 RX ADMIN — LACTULOSE 10 GRAM(S): 10 SOLUTION ORAL at 22:55

## 2021-08-25 RX ADMIN — LACTULOSE 10 GRAM(S): 10 SOLUTION ORAL at 16:03

## 2021-08-25 NOTE — H&P ADULT - ASSESSMENT
70F w/T2DM c/b neuropathy, chronic joy, pancreatic cancer w/ mets to lung and liver on chemo (last session yesterday) presenting from [facility] w/diffuse abdominal pain and distention, found to have new ascites on CT, admitted for sepsis 2/2 UTI vs SBP, s/p para in ER.

## 2021-08-25 NOTE — H&P ADULT - NSICDXPASTMEDICALHX_GEN_ALL_CORE_FT
PAST MEDICAL HISTORY:  Hydronephrosis, left chronic joy    Pancreatic cancer mets to lung and liver    Type 2 diabetes mellitus

## 2021-08-25 NOTE — H&P ADULT - NSHPPHYSICALEXAM_GEN_ALL_CORE
.  VITAL SIGNS:  T(C): 37.1 (08-25-21 @ 09:09), Max: 37.9 (08-24-21 @ 22:41)  T(F): 98.8 (08-25-21 @ 09:09), Max: 100.3 (08-24-21 @ 22:41)  HR: 111 (08-25-21 @ 08:28) (91 - 111)  BP: 114/74 (08-25-21 @ 08:28) (106/66 - 115/62)  BP(mean): --  RR: 18 (08-25-21 @ 08:28) (17 - 20)  SpO2: 99% (08-25-21 @ 08:28) (99% - 100%)  Wt(kg): --    PHYSICAL EXAM:    Constitutional: WDWN resting comfortably in bed; NAD  Head: NC/AT  Eyes: PERRL, EOMI, clear conjunctiva  ENT: no nasal discharge; uvula midline, no oropharyngeal erythema or exudates; MMM  Neck: supple; no JVD or thyromegaly  Respiratory: CTA B/L; no W/R/R, no retractions  Cardiac: +S1/S2; RRR; no M/R/G; PMI non-displaced  Gastrointestinal: soft, NT/ND; no rebound or guarding; +BSx4  Genitourinary: normal external genitalia  Back: spine midline, no bony tenderness or step-offs; no CVAT B/L  Extremities: WWP, no clubbing or cyanosis; no peripheral edema  Musculoskeletal: NROM x4; no joint swelling, tenderness or erythema  Vascular: 2+ radial, femoral, DP/PT pulses B/L  Dermatologic: skin warm, dry and intact; no rashes, wounds, or scars  Lymphatic: no submandibular or cervical LAD  Neurologic: AAOx3; CNII-XII grossly intact; no focal deficits  Psychiatric: affect and characteristics of appearance, verbalizations, behaviors are appropriate .  VITAL SIGNS:  T(C): 37.1 (08-25-21 @ 09:09), Max: 37.9 (08-24-21 @ 22:41)  T(F): 98.8 (08-25-21 @ 09:09), Max: 100.3 (08-24-21 @ 22:41)  HR: 111 (08-25-21 @ 08:28) (91 - 111)  BP: 114/74 (08-25-21 @ 08:28) (106/66 - 115/62)  BP(mean): --  RR: 18 (08-25-21 @ 08:28) (17 - 20)  SpO2: 99% (08-25-21 @ 08:28) (99% - 100%)  Wt(kg): --    PHYSICAL EXAM:    Constitutional: +cachectic, frail, comfortable in stretcher; NAD  Head: NC/AT, +temporal wasting   Eyes: PERRL, EOMI, clear conjunctiva  ENT: no nasal discharge; MMM  Neck: supple  Respiratory: CTA B/L; no W/R/R, no retractions  Cardiac: +S1/S2; RRR; no M/R/G  Gastrointestinal: soft, no TTP, moderately distended, s/p para, dressing c/d/i, +BS x4  Genitourinary: +Borden w/tacho colored urine   Back: spine midline, no bony tenderness or step-offs; no CVAT B/L  Extremities: thin extremities, pedal edema   Musculoskeletal: NROM x4; no joint swelling, tenderness or erythema  Vascular: 2+ radial, DP pulses B/L  Dermatologic: skin warm, dry and intact   Lymphatic: no submandibular or cervical LAD  Neurologic: AAOx3; CNII-XII grossly intact; no focal deficits  Psychiatric: affect and characteristics of appearance, verbalizations, behaviors are appropriate

## 2021-08-25 NOTE — H&P ADULT - PROBLEM SELECTOR PLAN 1
Pt p/w abdominal distention and pain since 8/20, CTAP with new ascites. Low grade temp in ER to 100.3  s/p diagnostic para in ER  -f/u para studies  -s/p Zosyn in ER, for now will continue until to treat ?UTI, although low suspicion for SBP  -ID consulted, f/u recs  -Hold Xeloda  -Heme/onc notified, f/u recs

## 2021-08-25 NOTE — ED ADULT NURSE REASSESSMENT NOTE - NS ED NURSE REASSESS COMMENT FT1
received report from RN Rait pt aox3 in no apparent distress VSS denies complaints medicated as per orders, report given to floor awaiting bed cleaning. Will continue to monitor received report from RN Rait pt aox3 in no apparent distress VSS denies complaints medicated as per orders, report given to floor awaiting bed cleaning. Borden noted with clear dark yellow output. Will continue to monitor

## 2021-08-25 NOTE — H&P ADULT - PROBLEM SELECTOR PLAN 3
likely opioid induced, last BM 8/20 after enema  -Start lactulose TID x2 days  -Dulcolax BID  -Tap water enema daily   -Stool count

## 2021-08-25 NOTE — CONSULT NOTE ADULT - SUBJECTIVE AND OBJECTIVE BOX
Nara Mitchell - [phone]    Patient is a 70y old  Female who presents with a chief complaint of abdominal pain (25 Aug 2021 15:55)    HPI:  70F with DM c/b neuropathy, pancreatic cancer w/ mets to lung and liver on chemo, chronic joy since last admission for urinary retention and hydronephrosis sent by [facility] with diffuse abdominal pain and distention since 8/20.  Here low grade temp 100.3  No leukocytosis,  CT with new abdominal ascites.  Underwent tap with no increased neutrophils.  BC sent.  patient was started empirically on zosyn pending work up.    In ER, pt received Zosyn x1 and IV Morphine.  (25 Aug 2021 10:40)     prior hospital charts reviewed [  x]  primary team notes reviewed [ x ]  other consultant notes reviewed [x  ]    PAST MEDICAL & SURGICAL HISTORY:  Type 2 diabetes mellitus  Pancreatic cancer with known mets to lung and liver  Hydronephrosis, left chronic joy  S/P tendon repair R foot, R elbow    Allergies  No Known Allergies    ANTIMICROBIALS:  piperacillin/tazobactam IVPB.. 3.375 every 8 hours (8/24-)    MEDICATIONS  (STANDING):  bisacodyl 5 every 12 hours  dronabinol 2.5 two times a day  enoxaparin Injectable 30 daily  fentaNYL   Patch  12 MICROgram(s)/Hr 1 every 72 hours  gabapentin 300 three times a day  insulin glargine Injectable (LANTUS) 8 at bedtime  insulin lispro (ADMELOG) corrective regimen sliding scale  Before meals and at bedtime  lactulose Syrup 10 three times a day    SOCIAL HISTORY:   smoker, lives alone, coming from [facility]    FAMILY HISTORY:  Family history of liver cancer Sister  Family history of cardiac arrest (Sibling)  FH: aneurysm (Sibling)    REVIEW OF SYSTEMS  [  ] ROS unobtainable because:    [ x ] All other systems negative except as noted below:	    Constitutional:  [ ] fever [ ] chills  [ ] weight loss  [ x] weakness  Skin:  [ ] rash [ ] phlebitis	  Eyes: [ ] icterus [ ] pain  [ ] discharge	  ENMT: [ ] sore throat  [ ] thrush [ ] ulcers [ ] exudates  Respiratory: [ ] dyspnea [ ] hemoptysis [ ] cough [ ] sputum	  Cardiovascular:  [ ] chest pain [ ] palpitations [ ] edema	  Gastrointestinal:  [ ] nausea [ ] vomiting [ ] diarrhea [ ] constipation [x ] pain	  Genitourinary:  [ ] dysuria [ ] frequency [ ] hematuria [ ] discharge [ ] flank pain  [ ] incontinence  Musculoskeletal:  [ ] myalgias [ ] arthralgias [ ] arthritis  [ ] back pain  Neurological:  [ ] headache [ ] seizures  [ ] confusion/altered mental status  Psychiatric:  [ ] anxiety [ ] depression	  Hematology/Lymphatics:  [ ] lymphadenopathy  Endocrine:  [ ] adrenal [ ] thyroid  Allergic/Immunologic:	 [ ] transplant [ ] seasonal    Vital Signs Last 24 Hrs  T(F): 97.3 (08-25-21 @ 15:47), Max: 100.3 (08-24-21 @ 22:41)  Vital Signs Last 24 Hrs  HR: 95 (08-25-21 @ 15:47) (91 - 111)  BP: 105/58 (08-25-21 @ 15:47) (104/64 - 115/62)  RR: 17 (08-25-21 @ 15:47)  SpO2: 100% (08-25-21 @ 15:47) (99% - 100%)  Wt(kg): --    PHYSICAL EXAM:  Constitutional: non-toxic, cachectic  HEAD/EYES: anicteric  ENT:  supple  Cardiovascular:   normal S1, S2  Respiratory:  clear BS bilaterally  GI:  distended, no rebound or guarding, not really tender now  :  new joy from ER  Musculoskeletal:  no synovitis  Neurologic: awake and alert, generally weak  Skin:  no rash  Psychiatric:  awake, alert, appropriate mood                        9.5    11.26 )-----------( 250      ( 24 Aug 2021 23:02 )             30.0   140  |  104  |  25<H>  ----------------------------<  76  3.5   |  26  |  0.45<L>    Ca    8.6      24 Aug 2021 23:02    TPro  6.3  /  Alb  2.6<L>  /  TBili  0.3  /  DBili  x   /  AST  14  /  ALT  9   /  AlkPhos  83  08-24    Urinalysis Basic - ( 25 Aug 2021 14:42 )  Color: Yellow / Appearance: Clear / SG: >1.050 / pH: x  Gluc: x / Ketone: Negative  / Bili: Negative / Urobili: <2 mg/dL   Blood: x / Protein: 100 mg/dL / Nitrite: Negative   Leuk Esterase: Moderate / RBC: 6 /HPF / WBC 15 /HPF   Sq Epi: x / Non Sq Epi: 3 /HPF / Bacteria: Negative    8/25/2021 Ascitic Fluid  Cell Count, Body Fluid (08.25.21 @ 05:37)  Total Nucleated Cell Count, Body Fluid: 529 cells/uL   Eosinophil Count - Body Fluid: 1 %   Monocyte/Macrophage Count - Body Fluid: 73 %   Fluid Segmented Granulocytes: 13 %   Fluid Type: Peritoneal Fluid Other Body Cells: 0 %   Mesothelial Cells - Body Fluid: 0 %   Body Fluid Appearance: Clear BF Lymphocytes: 13 %   Atypical Lymphocytes - Body Fluid: 0 %   Tube Type: Sterile Color - Body Fluid: Yellow   Total RBC Count: <1000: Red Cell count correlates with the number and proportion of cells on cytospin preparation. cells/uL   Total Cells Counted, Body Fluid: 100 Cells     MICROBIOLOGY:  Culture - Body Fluid with Gram Stain (collected 25 Aug 2021 10:27)  Source: .Body Fluid Peritoneal Fluid  Gram Stain (25 Aug 2021 12:34):    No organisms seen    polymorphonuclear leukocytes seen    by cytocentrifuge    Rapid RVP Result: NotDetec (08-25 @ 11:26)    RADIOLOGY:  imaging below personally reviewed and agree with findings    CT Abdomen and Pelvis w/ IV Cont (08.25.21 @ 02:20) >  IMPRESSION:  An ill-defined hypoattenuating pancreatic mass is not significantly changed.  Hepatic metastases again noted which are difficult to compare with the previous exam due to different phases of contrast enhancement however they appear grossly unchanged.  Large volume ascites, new.  Small left effusion with associated atelectasis, new.    CT Abdomen and Pelvis w/ IV Cont (07.22.21 @ 12:18) >  IMPRESSION:  Emphysema.  Pulmonary nodules unchanged or resolved with new right intrafissural nodules, likely lymph nodes.  Pancreatic body tail mass unchanged in size since 04/11/2021.  Enlarging liver metastases.  New upper abdominal lymphadenopathy  New ascites. Thickened rectal wall suggesting proctitis     Nara Mitchell - [phone]    Patient is a 70y old  Female who presents with a chief complaint of abdominal pain (25 Aug 2021 15:55)    HPI:  70F with DM c/b neuropathy, pancreatic cancer w/ mets to lung and liver on chemo, chronic joy since last admission for urinary retention and hydronephrosis sent by [facility] with diffuse abdominal pain and distention since 8/20.  Here low grade temp 100.3  No leukocytosis,  CT with new abdominal ascites.  Underwent tap with no increased neutrophils.  BC sent.  patient was started empirically on zosyn pending work up.     prior hospital charts reviewed [  x]  primary team notes reviewed [ x ]  other consultant notes reviewed [x  ]    PAST MEDICAL & SURGICAL HISTORY:  Type 2 diabetes mellitus  Pancreatic cancer with known mets to lung and liver  Hydronephrosis, left chronic joy  S/P tendon repair R foot, R elbow    Allergies  No Known Allergies    ANTIMICROBIALS:  piperacillin/tazobactam IVPB.. 3.375 every 8 hours (8/24-)    MEDICATIONS  (STANDING):  bisacodyl 5 every 12 hours  dronabinol 2.5 two times a day  enoxaparin Injectable 30 daily  fentaNYL   Patch  12 MICROgram(s)/Hr 1 every 72 hours  gabapentin 300 three times a day  insulin glargine Injectable (LANTUS) 8 at bedtime  insulin lispro (ADMELOG) corrective regimen sliding scale  Before meals and at bedtime  lactulose Syrup 10 three times a day    SOCIAL HISTORY:   smoker, lives alone, coming from [facility]    FAMILY HISTORY:  Family history of liver cancer Sister  Family history of cardiac arrest (Sibling)  FH: aneurysm (Sibling)    REVIEW OF SYSTEMS  [  ] ROS unobtainable because:    [ x ] All other systems negative except as noted below:	    Constitutional:  [ ] fever [ ] chills  [ ] weight loss  [ x] weakness  Skin:  [ ] rash [ ] phlebitis	  Eyes: [ ] icterus [ ] pain  [ ] discharge	  ENMT: [ ] sore throat  [ ] thrush [ ] ulcers [ ] exudates  Respiratory: [ ] dyspnea [ ] hemoptysis [ ] cough [ ] sputum	  Cardiovascular:  [ ] chest pain [ ] palpitations [ ] edema	  Gastrointestinal:  [ ] nausea [ ] vomiting [ ] diarrhea [ ] constipation [x ] pain	  Genitourinary:  [ ] dysuria [ ] frequency [ ] hematuria [ ] discharge [ ] flank pain  [ ] incontinence  Musculoskeletal:  [ ] myalgias [ ] arthralgias [ ] arthritis  [ ] back pain  Neurological:  [ ] headache [ ] seizures  [ ] confusion/altered mental status  Psychiatric:  [ ] anxiety [ ] depression	  Hematology/Lymphatics:  [ ] lymphadenopathy  Endocrine:  [ ] adrenal [ ] thyroid  Allergic/Immunologic:	 [ ] transplant [ ] seasonal    Vital Signs Last 24 Hrs  T(F): 97.3 (08-25-21 @ 15:47), Max: 100.3 (08-24-21 @ 22:41)  Vital Signs Last 24 Hrs  HR: 95 (08-25-21 @ 15:47) (91 - 111)  BP: 105/58 (08-25-21 @ 15:47) (104/64 - 115/62)  RR: 17 (08-25-21 @ 15:47)  SpO2: 100% (08-25-21 @ 15:47) (99% - 100%)  Wt(kg): --    PHYSICAL EXAM:  Constitutional: non-toxic, cachectic  HEAD/EYES: anicteric  ENT:  supple  Cardiovascular:   normal S1, S2  Respiratory:  clear BS bilaterally  GI:  distended, no rebound or guarding, not really tender now  :  new joy from ER  Musculoskeletal:  no synovitis  Neurologic: awake and alert, generally weak  Skin:  no rash  Psychiatric:  awake, alert, appropriate mood                        9.5    11.26 )-----------( 250      ( 24 Aug 2021 23:02 )             30.0   140  |  104  |  25<H>  ----------------------------<  76  3.5   |  26  |  0.45<L>    Ca    8.6      24 Aug 2021 23:02    TPro  6.3  /  Alb  2.6<L>  /  TBili  0.3  /  DBili  x   /  AST  14  /  ALT  9   /  AlkPhos  83  08-24    Urinalysis Basic - ( 25 Aug 2021 14:42 )  Color: Yellow / Appearance: Clear / SG: >1.050 / pH: x  Gluc: x / Ketone: Negative  / Bili: Negative / Urobili: <2 mg/dL   Blood: x / Protein: 100 mg/dL / Nitrite: Negative   Leuk Esterase: Moderate / RBC: 6 /HPF / WBC 15 /HPF   Sq Epi: x / Non Sq Epi: 3 /HPF / Bacteria: Negative    8/25/2021 Ascitic Fluid  Cell Count, Body Fluid (08.25.21 @ 05:37)  Total Nucleated Cell Count, Body Fluid: 529 cells/uL   Eosinophil Count - Body Fluid: 1 %   Monocyte/Macrophage Count - Body Fluid: 73 %   Fluid Segmented Granulocytes: 13 %   Fluid Type: Peritoneal Fluid Other Body Cells: 0 %   Mesothelial Cells - Body Fluid: 0 %   Body Fluid Appearance: Clear BF Lymphocytes: 13 %   Atypical Lymphocytes - Body Fluid: 0 %   Tube Type: Sterile Color - Body Fluid: Yellow   Total RBC Count: <1000: Red Cell count correlates with the number and proportion of cells on cytospin preparation. cells/uL   Total Cells Counted, Body Fluid: 100 Cells     MICROBIOLOGY:  Culture - Body Fluid with Gram Stain (collected 25 Aug 2021 10:27)  Source: .Body Fluid Peritoneal Fluid  Gram Stain (25 Aug 2021 12:34):    No organisms seen    polymorphonuclear leukocytes seen    by cytocentrifuge    Rapid RVP Result: NotDetec (08-25 @ 11:26)    RADIOLOGY:  imaging below personally reviewed and agree with findings    CT Abdomen and Pelvis w/ IV Cont (08.25.21 @ 02:20) >  IMPRESSION:  An ill-defined hypoattenuating pancreatic mass is not significantly changed.  Hepatic metastases again noted which are difficult to compare with the previous exam due to different phases of contrast enhancement however they appear grossly unchanged.  Large volume ascites, new.  Small left effusion with associated atelectasis, new.    CT Abdomen and Pelvis w/ IV Cont (07.22.21 @ 12:18) >  IMPRESSION:  Emphysema.  Pulmonary nodules unchanged or resolved with new right intrafissural nodules, likely lymph nodes.  Pancreatic body tail mass unchanged in size since 04/11/2021.  Enlarging liver metastases.  New upper abdominal lymphadenopathy  New ascites. Thickened rectal wall suggesting proctitis

## 2021-08-25 NOTE — H&P ADULT - PROBLEM SELECTOR PLAN 4
Follows with Dr. Amaury Vieira  -CT reviewed: large volume ascites, grossly unchanged pancreatic and hepatic lesions.   -Started on Xeloda as outpt, pt thinks ascites is side effect of the therapy  -f/u  [facility]  -Pain control with Fentanyl patch, PO Dilaudid and Gabapentin

## 2021-08-25 NOTE — H&P ADULT - PROBLEM SELECTOR PLAN 2
Per documentation, pt treated for CAUTI at [facility] w/Ceftriaxone on 7/27 x5 days, UCx at that time grew >100K GNR & >3 organisms per HIE. Pt reports having high grade fevers at that time to 101.  Tmax in .3, WBC 11  -UA collected from bag in ER, joy has not been changed  -RN notified to change joy and send new UA  -s/p Zosyn, for now will continue   -ID consulted, f/u recs

## 2021-08-25 NOTE — CONSULT NOTE ADULT - SUBJECTIVE AND OBJECTIVE BOX
Patient is a 70y old  Female who presents with a chief complaint of abdominal pain (25 Aug 2021 10:40)      HPI:  70F w/T2DM c/b neuropathy, pancreatic cancer w/ mets to lung and liver on chemo (last session yesterday) sent by  for diffuse abdominal pain and distention since Friday. Reports a 10/10 non-radiating diffuse abd pain and significant abdominal distention, currently pain is improved after paracentesis in ER, unclear how much fluid removed, although likely diagnostic.   Reports constipation since Friday, had a small BM after enema, but is passing gas.   Pt thinks the ascites is a side effect of the Xeloda she started outpt.   Also reports fever upto 101 about 4 weeks ago and was treated for UTI at  at that time.  ***Culture in HIE from 7/26/2021  -- >100,000 CFU/ml Gram Negative Rods, >=3 organisms. Probable collection contamination.   Pt treated with Ceftriaxone 1g x5 days starting 7/27 at  rehab.   Currently has indwelling joy, but denies any irritation at joy site. Joy placed in May 2021 during prior admission for acute urinary retention and severe L hydronephrosis, had failed TOV.    In ER, pt received Zosyn x1 and IV Morphine.  (25 Aug 2021 10:40)       Oncologic History:      ROS: as above     PAST MEDICAL & SURGICAL HISTORY:  Type 2 diabetes mellitus    Pancreatic cancer  mets to lung and liver    Hydronephrosis, left  chronic joy    S/P tendon repair  R foot, R elbow        SOCIAL HISTORY:    FAMILY HISTORY:  Family history of liver cancer  Sister    Family history of cardiac arrest (Sibling)    FH: aneurysm (Sibling)        MEDICATIONS  (STANDING):  ascorbic acid 500 milliGRAM(s) Oral daily  bisacodyl 5 milliGRAM(s) Oral every 12 hours  dextrose 40% Gel 15 Gram(s) Oral once  dextrose 5%. 1000 milliLiter(s) (50 mL/Hr) IV Continuous <Continuous>  dextrose 5%. 1000 milliLiter(s) (100 mL/Hr) IV Continuous <Continuous>  dextrose 50% Injectable 25 Gram(s) IV Push once  dextrose 50% Injectable 12.5 Gram(s) IV Push once  dextrose 50% Injectable 25 Gram(s) IV Push once  dronabinol 2.5 milliGRAM(s) Oral two times a day  enoxaparin Injectable 30 milliGRAM(s) SubCutaneous daily  fentaNYL   Patch  12 MICROgram(s)/Hr 1 Patch Transdermal every 72 hours  gabapentin 300 milliGRAM(s) Oral three times a day  glucagon  Injectable 1 milliGRAM(s) IntraMuscular once  insulin lispro (ADMELOG) corrective regimen sliding scale   SubCutaneous Before meals and at bedtime  lactulose Syrup 10 Gram(s) Oral three times a day  piperacillin/tazobactam IVPB.. 3.375 Gram(s) IV Intermittent every 8 hours  pyridoxine 50 milliGRAM(s) Oral daily  thiamine 100 milliGRAM(s) Oral daily    MEDICATIONS  (PRN):  acetaminophen   Tablet .. 650 milliGRAM(s) Oral every 6 hours PRN Temp greater or equal to 38C (100.4F), Mild Pain (1 - 3)  aluminum hydroxide/magnesium hydroxide/simethicone Suspension 30 milliLiter(s) Oral every 4 hours PRN Dyspepsia  HYDROmorphone   Tablet 6 milliGRAM(s) Oral every 6 hours PRN mod-severe pain  melatonin 3 milliGRAM(s) Oral at bedtime PRN Insomnia  ondansetron Injectable 4 milliGRAM(s) IV Push every 8 hours PRN Nausea and/or Vomiting      Allergies    No Known Allergies    Intolerances        Vital Signs Last 24 Hrs  T(C): 36.3 (25 Aug 2021 15:47), Max: 37.9 (24 Aug 2021 22:41)  T(F): 97.3 (25 Aug 2021 15:47), Max: 100.3 (24 Aug 2021 22:41)  HR: 95 (25 Aug 2021 15:47) (91 - 111)  BP: 105/58 (25 Aug 2021 15:47) (104/64 - 115/62)  BP(mean): --  RR: 17 (25 Aug 2021 15:47) (17 - 20)  SpO2: 100% (25 Aug 2021 15:47) (99% - 100%)    PHYSICAL EXAM  General: adult in NAD  HEENT: clear oropharynx, anicteric sclera, pink conjunctiva  Neck: supple  CV: normal S1/S2 with no murmur rubs or gallops  Lungs: positive air movement b/l ant lungs, clear to auscultation, no wheezes, no rales  Abdomen: soft non-tender non-distended, no hepatosplenomegaly  Ext: no clubbing cyanosis or edema  Skin: no rashes and no petechiae  Neuro: alert and oriented X 3, none focal    LABS:                          9.5    11.26 )-----------( 250      ( 24 Aug 2021 23:02 )             30.0         Mean Cell Volume : 83.8 fL  Mean Cell Hemoglobin : 26.5 pg  Mean Cell Hemoglobin Concentration : 31.7 gm/dL  Auto Neutrophil # : 9.36 K/uL  Auto Lymphocyte # : 1.07 K/uL  Auto Monocyte # : 0.64 K/uL  Auto Eosinophil # : 0.11 K/uL  Auto Basophil # : 0.03 K/uL  Auto Neutrophil % : 83.1 %  Auto Lymphocyte % : 9.5 %  Auto Monocyte % : 5.7 %  Auto Eosinophil % : 1.0 %  Auto Basophil % : 0.3 %      Serial CBC's  08-24 @ 23:02  Hct-30.0 / Hgb-9.5 / Plat-250 / RBC-3.58 / WBC-11.26      08-24    140  |  104  |  25<H>  ----------------------------<  76  3.5   |  26  |  0.45<L>    Ca    8.6      24 Aug 2021 23:02    TPro  6.3  /  Alb  2.6<L>  /  TBili  0.3  /  DBili  x   /  AST  14  /  ALT  9   /  AlkPhos  83  08-24          RADIOLOGY & ADDITIONAL STUDIES:  CT A/P    IMPRESSION:    An ill-defined hypoattenuating pancreatic mass is not significantly changed.    Hepatic metastases again noted which are difficult to compare with the previous exam due to different phases of contrast enhancement however they appear grossly unchanged.    Large volume ascites, new.    Small left effusion with associated atelectasis, new.

## 2021-08-25 NOTE — ED ADULT NURSE REASSESSMENT NOTE - NS ED NURSE REASSESS COMMENT FT1
Break RN: pt c/o severe abdominal pain, pt medicated as per PRN orders, pt appears comfortable post medication administration, will continue to monitor.

## 2021-08-25 NOTE — H&P ADULT - NSHPLABSRESULTS_GEN_ALL_CORE
.  LABS:                         9.5    11.26 )-----------( 250      ( 24 Aug 2021 23:02 )             30.0         140  |  104  |  25<H>  ----------------------------<  76  3.5   |  26  |  0.45<L>    Ca    8.6      24 Aug 2021 23:02    TPro  6.3  /  Alb  2.6<L>  /  TBili  0.3  /  DBili  x   /  AST  14  /  ALT  9   /  AlkPhos  83        Urinalysis Basic - ( 24 Aug 2021 23:45 )    Color: Yellow / Appearance: Slightly Turbid / S.026 / pH: x  Gluc: x / Ketone: Negative  / Bili: Negative / Urobili: 3 mg/dL   Blood: x / Protein: 30 mg/dL / Nitrite: Negative   Leuk Esterase: Large / RBC: <5 /HPF / WBC many /HPF   Sq Epi: x / Non Sq Epi: 0-5 /HPF / Bacteria: Many                RADIOLOGY, EKG & ADDITIONAL TESTS: Reviewed.   < from: CT Abdomen and Pelvis w/ IV Cont (21 @ 02:20) >  IMPRESSION:  An ill-defined hypoattenuating pancreatic mass is not significantly changed.  Hepatic metastases again noted which are difficult to compare with the previous examdue to different phases of contrast enhancement however they appear grossly unchanged.  Large volume ascites, new.  Small left effusion with associated atelectasis, new.  --- End of Report ---  BLAIR WIGGINS MD; Resident Radiology  This document has been electronically signed.  ALESSIA MANTILLA MD; Attending Radiologist  This document has been electronically signed. Aug 25 2021  4:21AM

## 2021-08-25 NOTE — H&P ADULT - PROBLEM SELECTOR PLAN 5
Meds: Basaglar 8U qhs, Novalog 6U TID   -C/w ISS/FS   -Will add basal/bolus regimen if FS elevated Meds: Basaglar 8U qhs, Novalog 6U TID   -Resume Lantus 8Uqhs, add premeal if needed   -C/w ISS/FS   -Consistent carb diet

## 2021-08-25 NOTE — H&P ADULT - HISTORY OF PRESENT ILLNESS
70F w/T2DM c/b neuropathy, pancreatic cancer w/ mets to lung and liver on chemo (last session yesterday) sent by [facility] for diffuse abdominal pain and distention since Friday. Reports a 10/10 non-radiating diffuse abd pain and significant abdominal distention. LBM on Friday, passing gas. No abd surgeries. No hx of cirrhosis. Reports fever 2 weeks ago. Denies urinary symptoms, but has a chronic joy catheter d/t severe L hydronephrosis since May 2021. No chest pain, sob or cough.     In ER, pt received Zosyn x1 and IV Morphine.  70F w/T2DM c/b neuropathy, pancreatic cancer w/ mets to lung and liver on chemo (last session yesterday) sent by  for diffuse abdominal pain and distention since Friday. Reports a 10/10 non-radiating diffuse abd pain and significant abdominal distention, currently pain is improved after paracentesis in ER, unclear how much fluid removed, although likely diagnostic.   Reports constipation since Friday, had a small BM after enema, but is passing gas.   Pt thinks the ascites is a side effect of the Xeloda she started outpt.   Also reports fever upto 101 about 4 weeks ago and was treated for UTI at  at that time.  ***Culture in HIE from 7/26/2021  -- >100,000 CFU/ml Gram Negative Rods, >=3 organisms. Probable collection contamination.   Pt treated with Ceftriaxone 1g x5 days starting 7/27 at  rehab.   Currently has indwelling joy, but denies any irritation at joy site. Joy placed in May 2021 during prior admission for acute urinary retention and severe L hydronephrosis, had failed TOV.    In ER, pt received Zosyn x1 and IV Morphine.

## 2021-08-26 ENCOUNTER — OUTPATIENT (OUTPATIENT)
Dept: OUTPATIENT SERVICES | Facility: HOSPITAL | Age: 71
LOS: 1 days | Discharge: HOME | End: 2021-08-26

## 2021-08-26 DIAGNOSIS — C25.9 MALIGNANT NEOPLASM OF PANCREAS, UNSPECIFIED: ICD-10-CM

## 2021-08-26 DIAGNOSIS — R10.9 UNSPECIFIED ABDOMINAL PAIN: ICD-10-CM

## 2021-08-26 DIAGNOSIS — Z51.5 ENCOUNTER FOR PALLIATIVE CARE: ICD-10-CM

## 2021-08-26 DIAGNOSIS — Z98.890 OTHER SPECIFIED POSTPROCEDURAL STATES: Chronic | ICD-10-CM

## 2021-08-26 PROBLEM — N13.30 UNSPECIFIED HYDRONEPHROSIS: Chronic | Status: ACTIVE | Noted: 2021-08-25

## 2021-08-26 LAB
A1C WITH ESTIMATED AVERAGE GLUCOSE RESULT: 7.1 % — HIGH (ref 4–5.6)
ALBUMIN SERPL ELPH-MCNC: 2.5 G/DL — LOW (ref 3.3–5)
ALP SERPL-CCNC: 76 U/L — SIGNIFICANT CHANGE UP (ref 40–120)
ALT FLD-CCNC: 8 U/L — SIGNIFICANT CHANGE UP (ref 4–33)
ANION GAP SERPL CALC-SCNC: 10 MMOL/L — SIGNIFICANT CHANGE UP (ref 7–14)
AST SERPL-CCNC: 11 U/L — SIGNIFICANT CHANGE UP (ref 4–32)
BASOPHILS # BLD AUTO: 0.02 K/UL — SIGNIFICANT CHANGE UP (ref 0–0.2)
BASOPHILS NFR BLD AUTO: 0.2 % — SIGNIFICANT CHANGE UP (ref 0–2)
BILIRUB SERPL-MCNC: 0.3 MG/DL — SIGNIFICANT CHANGE UP (ref 0.2–1.2)
BUN SERPL-MCNC: 21 MG/DL — SIGNIFICANT CHANGE UP (ref 7–23)
CALCIUM SERPL-MCNC: 8.6 MG/DL — SIGNIFICANT CHANGE UP (ref 8.4–10.5)
CHLORIDE SERPL-SCNC: 106 MMOL/L — SIGNIFICANT CHANGE UP (ref 98–107)
CO2 SERPL-SCNC: 26 MMOL/L — SIGNIFICANT CHANGE UP (ref 22–31)
COVID-19 SPIKE DOMAIN AB INTERP: NEGATIVE — SIGNIFICANT CHANGE UP
COVID-19 SPIKE DOMAIN ANTIBODY RESULT: 0.4 U/ML — SIGNIFICANT CHANGE UP
CREAT SERPL-MCNC: 0.55 MG/DL — SIGNIFICANT CHANGE UP (ref 0.5–1.3)
CULTURE RESULTS: SIGNIFICANT CHANGE UP
EOSINOPHIL # BLD AUTO: 0.13 K/UL — SIGNIFICANT CHANGE UP (ref 0–0.5)
EOSINOPHIL NFR BLD AUTO: 1.5 % — SIGNIFICANT CHANGE UP (ref 0–6)
ESTIMATED AVERAGE GLUCOSE: 157 — SIGNIFICANT CHANGE UP
GLUCOSE SERPL-MCNC: 129 MG/DL — HIGH (ref 70–99)
HCT VFR BLD CALC: 28.6 % — LOW (ref 34.5–45)
HGB BLD-MCNC: 8.9 G/DL — LOW (ref 11.5–15.5)
IANC: 7.24 K/UL — SIGNIFICANT CHANGE UP (ref 1.5–8.5)
IMM GRANULOCYTES NFR BLD AUTO: 0.3 % — SIGNIFICANT CHANGE UP (ref 0–1.5)
LYMPHOCYTES # BLD AUTO: 0.71 K/UL — LOW (ref 1–3.3)
LYMPHOCYTES # BLD AUTO: 8.1 % — LOW (ref 13–44)
MAGNESIUM SERPL-MCNC: 2.1 MG/DL — SIGNIFICANT CHANGE UP (ref 1.6–2.6)
MCHC RBC-ENTMCNC: 26.8 PG — LOW (ref 27–34)
MCHC RBC-ENTMCNC: 31.1 GM/DL — LOW (ref 32–36)
MCV RBC AUTO: 86.1 FL — SIGNIFICANT CHANGE UP (ref 80–100)
MONOCYTES # BLD AUTO: 0.64 K/UL — SIGNIFICANT CHANGE UP (ref 0–0.9)
MONOCYTES NFR BLD AUTO: 7.3 % — SIGNIFICANT CHANGE UP (ref 2–14)
NEUTROPHILS # BLD AUTO: 7.24 K/UL — SIGNIFICANT CHANGE UP (ref 1.8–7.4)
NEUTROPHILS NFR BLD AUTO: 82.6 % — HIGH (ref 43–77)
NRBC # BLD: 0 /100 WBCS — SIGNIFICANT CHANGE UP
NRBC # FLD: 0 K/UL — SIGNIFICANT CHANGE UP
PHOSPHATE SERPL-MCNC: 3.5 MG/DL — SIGNIFICANT CHANGE UP (ref 2.5–4.5)
PLATELET # BLD AUTO: 278 K/UL — SIGNIFICANT CHANGE UP (ref 150–400)
POTASSIUM SERPL-MCNC: 3.7 MMOL/L — SIGNIFICANT CHANGE UP (ref 3.5–5.3)
POTASSIUM SERPL-SCNC: 3.7 MMOL/L — SIGNIFICANT CHANGE UP (ref 3.5–5.3)
PROT SERPL-MCNC: 6.1 G/DL — SIGNIFICANT CHANGE UP (ref 6–8.3)
RBC # BLD: 3.32 M/UL — LOW (ref 3.8–5.2)
RBC # FLD: 14.6 % — HIGH (ref 10.3–14.5)
SARS-COV-2 IGG+IGM SERPL QL IA: 0.4 U/ML — SIGNIFICANT CHANGE UP
SARS-COV-2 IGG+IGM SERPL QL IA: NEGATIVE — SIGNIFICANT CHANGE UP
SODIUM SERPL-SCNC: 142 MMOL/L — SIGNIFICANT CHANGE UP (ref 135–145)
SPECIMEN SOURCE: SIGNIFICANT CHANGE UP
WBC # BLD: 8.77 K/UL — SIGNIFICANT CHANGE UP (ref 3.8–10.5)
WBC # FLD AUTO: 8.77 K/UL — SIGNIFICANT CHANGE UP (ref 3.8–10.5)

## 2021-08-26 PROCEDURE — 99232 SBSQ HOSP IP/OBS MODERATE 35: CPT

## 2021-08-26 PROCEDURE — 99233 SBSQ HOSP IP/OBS HIGH 50: CPT

## 2021-08-26 PROCEDURE — 99223 1ST HOSP IP/OBS HIGH 75: CPT

## 2021-08-26 RX ORDER — DEXTROSE 50 % IN WATER 50 %
12.5 SYRINGE (ML) INTRAVENOUS ONCE
Refills: 0 | Status: COMPLETED | OUTPATIENT
Start: 2021-08-26 | End: 2021-08-26

## 2021-08-26 RX ORDER — HYDROMORPHONE HYDROCHLORIDE 2 MG/ML
6 INJECTION INTRAMUSCULAR; INTRAVENOUS; SUBCUTANEOUS EVERY 6 HOURS
Refills: 0 | Status: DISCONTINUED | OUTPATIENT
Start: 2021-08-26 | End: 2021-09-02

## 2021-08-26 RX ORDER — PIPERACILLIN AND TAZOBACTAM 4; .5 G/20ML; G/20ML
3.38 INJECTION, POWDER, LYOPHILIZED, FOR SOLUTION INTRAVENOUS ONCE
Refills: 0 | Status: COMPLETED | OUTPATIENT
Start: 2021-08-26 | End: 2021-08-26

## 2021-08-26 RX ORDER — INSULIN GLARGINE 100 [IU]/ML
6 INJECTION, SOLUTION SUBCUTANEOUS AT BEDTIME
Refills: 0 | Status: DISCONTINUED | OUTPATIENT
Start: 2021-08-26 | End: 2021-08-28

## 2021-08-26 RX ORDER — FENTANYL CITRATE 50 UG/ML
1 INJECTION INTRAVENOUS
Refills: 0 | Status: DISCONTINUED | OUTPATIENT
Start: 2021-08-26 | End: 2021-08-31

## 2021-08-26 RX ORDER — DRONABINOL 2.5 MG
2.5 CAPSULE ORAL
Refills: 0 | Status: DISCONTINUED | OUTPATIENT
Start: 2021-08-26 | End: 2021-09-02

## 2021-08-26 RX ADMIN — GABAPENTIN 300 MILLIGRAM(S): 400 CAPSULE ORAL at 05:28

## 2021-08-26 RX ADMIN — Medication 5 MILLIGRAM(S): at 22:29

## 2021-08-26 RX ADMIN — HYDROMORPHONE HYDROCHLORIDE 6 MILLIGRAM(S): 2 INJECTION INTRAMUSCULAR; INTRAVENOUS; SUBCUTANEOUS at 05:10

## 2021-08-26 RX ADMIN — GABAPENTIN 300 MILLIGRAM(S): 400 CAPSULE ORAL at 14:36

## 2021-08-26 RX ADMIN — PIPERACILLIN AND TAZOBACTAM 25 GRAM(S): 4; .5 INJECTION, POWDER, LYOPHILIZED, FOR SOLUTION INTRAVENOUS at 14:31

## 2021-08-26 RX ADMIN — HYDROMORPHONE HYDROCHLORIDE 6 MILLIGRAM(S): 2 INJECTION INTRAMUSCULAR; INTRAVENOUS; SUBCUTANEOUS at 04:10

## 2021-08-26 RX ADMIN — GABAPENTIN 300 MILLIGRAM(S): 400 CAPSULE ORAL at 22:28

## 2021-08-26 RX ADMIN — Medication 100 MILLIGRAM(S): at 12:47

## 2021-08-26 RX ADMIN — PIPERACILLIN AND TAZOBACTAM 25 GRAM(S): 4; .5 INJECTION, POWDER, LYOPHILIZED, FOR SOLUTION INTRAVENOUS at 05:29

## 2021-08-26 RX ADMIN — LACTULOSE 10 GRAM(S): 10 SOLUTION ORAL at 22:28

## 2021-08-26 RX ADMIN — Medication 5 MILLIGRAM(S): at 05:28

## 2021-08-26 RX ADMIN — HYDROMORPHONE HYDROCHLORIDE 6 MILLIGRAM(S): 2 INJECTION INTRAMUSCULAR; INTRAVENOUS; SUBCUTANEOUS at 22:27

## 2021-08-26 RX ADMIN — HYDROMORPHONE HYDROCHLORIDE 6 MILLIGRAM(S): 2 INJECTION INTRAMUSCULAR; INTRAVENOUS; SUBCUTANEOUS at 15:20

## 2021-08-26 RX ADMIN — FENTANYL CITRATE 1 PATCH: 50 INJECTION INTRAVENOUS at 06:09

## 2021-08-26 RX ADMIN — HYDROMORPHONE HYDROCHLORIDE 6 MILLIGRAM(S): 2 INJECTION INTRAMUSCULAR; INTRAVENOUS; SUBCUTANEOUS at 14:31

## 2021-08-26 RX ADMIN — LACTULOSE 10 GRAM(S): 10 SOLUTION ORAL at 05:28

## 2021-08-26 RX ADMIN — FENTANYL CITRATE 1 PATCH: 50 INJECTION INTRAVENOUS at 19:12

## 2021-08-26 RX ADMIN — LACTULOSE 10 GRAM(S): 10 SOLUTION ORAL at 14:35

## 2021-08-26 RX ADMIN — Medication 500 MILLIGRAM(S): at 12:49

## 2021-08-26 RX ADMIN — Medication 2.5 MILLIGRAM(S): at 22:27

## 2021-08-26 RX ADMIN — Medication 50 MILLIGRAM(S): at 22:27

## 2021-08-26 RX ADMIN — PIPERACILLIN AND TAZOBACTAM 25 GRAM(S): 4; .5 INJECTION, POWDER, LYOPHILIZED, FOR SOLUTION INTRAVENOUS at 22:29

## 2021-08-26 RX ADMIN — HYDROMORPHONE HYDROCHLORIDE 6 MILLIGRAM(S): 2 INJECTION INTRAMUSCULAR; INTRAVENOUS; SUBCUTANEOUS at 23:27

## 2021-08-26 RX ADMIN — ENOXAPARIN SODIUM 30 MILLIGRAM(S): 100 INJECTION SUBCUTANEOUS at 12:48

## 2021-08-26 NOTE — PROGRESS NOTE ADULT - PROBLEM SELECTOR PLAN 2
Per documentation, pt treated for CAUTI at [facility] w/Ceftriaxone on 7/27 x5 days, UCx at that time grew >100K GNR & >3 organisms per HIE. Pt reports having high grade fevers at that time to 101.  Tmax in .3, WBC 11  -UA +, f/u urine culture  -s/p Zosyn, for now will continue for a 3 day course   -ID consult appreciated, f/u ID recs Per documentation, pt treated for CAUTI at [facility] w/Ceftriaxone on 7/27 x5 days, UCx at that time grew >100K GNR & >3 organisms per HIE. Pt reports having high grade fevers at that time to 101.  Tmax in .3, WBC 11  -UA +,  urine culture <100,000 Psuedomonas  -s/p Zosyn, for now will continue for a 3 day course   -ID consult appreciated, f/u ID recs

## 2021-08-26 NOTE — PROGRESS NOTE ADULT - PROBLEM SELECTOR PLAN 1
Pt p/w abdominal distention and pain since 8/20, CTAP with new ascites. Low grade temp in ER to 100.3  s/p diagnostic para in ER  -doubt peritonitis  -s/p Zosyn in ER, for now will continue zosyn to complete 3 day course for UTI  -ID consult appreciated, f/u ID recs  -Hold Xeloda  -Onc follg, per Onc pall care and GOC discussion as no further treatment is being offered

## 2021-08-26 NOTE — CONSULT NOTE ADULT - PROBLEM SELECTOR RECOMMENDATION 9
Controlled with current regimen  Pain improved after paracentesis   Pt indicates that Dilaudid works well for pain management  Would recommend:  - PRN Dilaudid as above

## 2021-08-26 NOTE — PROGRESS NOTE ADULT - SUBJECTIVE AND OBJECTIVE BOX
INTERVAL HPI/OVERNIGHT EVENTS:  Patient seen at bedside.  Patient complaining of hunger, requesting   "some coffee and muffin"  Abdominal pain slightly improved s/p para  yesterday but continued to have considerable weakness      VITAL SIGNS:  T(F): 98.7 (08-26-21 @ 03:53)  HR: 93 (08-26-21 @ 03:53)  BP: 103/55 (08-26-21 @ 03:53)  RR: 17 (08-26-21 @ 03:53)  SpO2: 97% (08-26-21 @ 03:53)  Wt(kg): --    PHYSICAL EXAM:    Constitutional: Frail cachetic female NAD, resting in bed comfortably  HENT: temporal wasting  Respiratory: CTA b/l, good air entry b/l, no wheezing, rhonchi or crackles  Cardiovascular: RRR, normal S1S2, no M/R/G  Gastrointestinal: soft, improved abdominal distension non tender  Extremities: no edema  Neurological: AAOx3, non focal  Skin: Normal temperature    MEDICATIONS  (STANDING):  ascorbic acid 500 milliGRAM(s) Oral daily  bisacodyl 5 milliGRAM(s) Oral every 12 hours  dextrose 40% Gel 15 Gram(s) Oral once  dextrose 5%. 1000 milliLiter(s) (50 mL/Hr) IV Continuous <Continuous>  dextrose 5%. 1000 milliLiter(s) (100 mL/Hr) IV Continuous <Continuous>  dextrose 50% Injectable 25 Gram(s) IV Push once  dextrose 50% Injectable 12.5 Gram(s) IV Push once  dextrose 50% Injectable 25 Gram(s) IV Push once  dextrose 50% Injectable 12.5 Gram(s) IV Push once  dronabinol 2.5 milliGRAM(s) Oral two times a day  enoxaparin Injectable 30 milliGRAM(s) SubCutaneous daily  fentaNYL   Patch  12 MICROgram(s)/Hr 1 Patch Transdermal every 72 hours  gabapentin 300 milliGRAM(s) Oral three times a day  glucagon  Injectable 1 milliGRAM(s) IntraMuscular once  insulin glargine Injectable (LANTUS) 8 Unit(s) SubCutaneous at bedtime  insulin lispro (ADMELOG) corrective regimen sliding scale   SubCutaneous Before meals and at bedtime  lactulose Syrup 10 Gram(s) Oral three times a day  piperacillin/tazobactam IVPB.. 3.375 Gram(s) IV Intermittent every 8 hours  pyridoxine 50 milliGRAM(s) Oral daily  thiamine 100 milliGRAM(s) Oral daily    MEDICATIONS  (PRN):  acetaminophen   Tablet .. 650 milliGRAM(s) Oral every 6 hours PRN Temp greater or equal to 38C (100.4F), Mild Pain (1 - 3)  aluminum hydroxide/magnesium hydroxide/simethicone Suspension 30 milliLiter(s) Oral every 4 hours PRN Dyspepsia  HYDROmorphone   Tablet 6 milliGRAM(s) Oral every 6 hours PRN mod-severe pain  melatonin 3 milliGRAM(s) Oral at bedtime PRN Insomnia  ondansetron Injectable 4 milliGRAM(s) IV Push every 8 hours PRN Nausea and/or Vomiting      Allergies    No Known Allergies    Intolerances        LABS:                        8.9    8.77  )-----------( 278      ( 26 Aug 2021 07:27 )             28.6     08-26    142  |  106  |  21  ----------------------------<  129<H>  3.7   |  26  |  0.55    Ca    8.6      26 Aug 2021 07:27  Phos  3.5     08-26  Mg     2.10     08-26    TPro  6.1  /  Alb  2.5<L>  /  TBili  0.3  /  DBili  x   /  AST  11  /  ALT  8   /  AlkPhos  76  08-26      Urinalysis Basic - ( 25 Aug 2021 14:42 )    Color: Yellow / Appearance: Clear / SG: >1.050 / pH: x  Gluc: x / Ketone: Negative  / Bili: Negative / Urobili: <2 mg/dL   Blood: x / Protein: 100 mg/dL / Nitrite: Negative   Leuk Esterase: Moderate / RBC: 6 /HPF / WBC 15 /HPF   Sq Epi: x / Non Sq Epi: 3 /HPF / Bacteria: Negative        RADIOLOGY & ADDITIONAL TESTS:  Studies reviewed.

## 2021-08-26 NOTE — PROGRESS NOTE ADULT - ASSESSMENT
70F with metastatic pancreatic cancer w/ mets to lung and liver on xeloda, sent by [facility] for diffuse abdominal pain and distention since Friday. Reports a 10/10 non-radiating diffuse abd pain and significant abdominal distention.   S/p paracentesis in ED. No SBP.   Urine +  Cachectic, weak, PS 3.   CT reviewed. Large volume ascites.    -Appreciate ID consult, recs being treated with   Catheter associated UTI versus bacteriuria , last of Zosyn today  -S/p paracentesis in  the ED   -Patient is not a candidate for further chemotherapy 2/2 poor performance status and severe cachexia. Patient also endorsing that she is no longer interested in further treatment given her weakened state.  Patient has been living at 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 Patient expressing that her ultimate goal would be walk again and go home. Patient previously lived home alone, had not designated a HCP or NOK , nor has she provided team with someone who would be able to help her at home. I encouraged patient to continue to consider GOC discussions for a safe discharge since home alone could no longer be option. Please obtain [facility] care consult for further, symptom management,  GOC and  hospice referral. Patient is hospice appropriate.  -Discontinue Xeloda, no further plans for DMT.  -Patient may followup with Dr. Kerr ([facility]) upon discharge  -C/w Supportive care, pain control, Nutrition, PT, DVT ppx  -Oncology will continue to follow with you      Case d/w Dr. Hieu ETIENNE  Oncology Physician Assistant  [facility]  Pager [phone]    If after 5pm or weekends please page On-call Oncology Fellow     70F with metastatic pancreatic cancer w/ mets to lung and liver on xeloda, sent by [facility] for diffuse abdominal pain and distention since Friday. Reports a 10/10 non-radiating diffuse abd pain and significant abdominal distention.   S/p paracentesis in ED. No SBP.   Urine +  Cachectic, weak, PS 3.   CT reviewed. Large volume ascites.    -Appreciate ID consult, recs being treated with   Catheter associated UTI versus bacteriuria , last of Zosyn today  -S/p paracentesis in  the ED   -Patient is not a candidate for further chemotherapy 2/2 poor performance status and severe cachexia. Patient also endorsing that she is no longer interested in further treatment given her weakened state.  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 Patient expressing that her ultimate goal would be walk again and go home. Patient previously lived home alone, had not designated a HCP or NOK , nor has she provided team with someone who would be able to help her at home. I encouraged patient to continue to consider GOC discussions for a safe discharge since home alone could no longer be option. Please obtain [facility] care consult for further, symptom management,  GOC and  hospice referral. Patient is hospice appropriate.   -Discontinue Xeloda, no further plans for DMT.  -Patient may followup with Dr. Kerr ([facility]) upon discharge  -C/w Supportive care, pain control, Nutrition, PT, DVT ppx  -Oncology will continue to follow with you      Case d/w Dr. Hieu ETIENNE  Oncology Physician Assistant  [facility]  Pager [phone]    If after 5pm or weekends please page On-call Oncology Fellow

## 2021-08-26 NOTE — PROGRESS NOTE ADULT - ASSESSMENT
10F with DM c/b neuropathy, pancreatic cancer w/ mets to lung and liver on chemo, chronic joy since last admission for urinary retention and hydronephrosis sent by [facility] with diffuse abdominal pain and distention since 8/20.  Here low grade temp 100.3  No leukocytosis,  CT with new abdominal ascites.  Underwent tap with 529 cells but mostly mono/macrophages and not neutrophilic.  Doubt peritonitis.  Possibly UTI related to catheter.  No other obvious source of infection.  Could it be tumor?      Fever  - resolved  - today is D#3 zosyn  - would d/c later today    Catheter associated UTI versus bacteriuria  - today is last day of zosyn    metastatic pancreatic cancer with new ascites  - goals of care should be discussed     I have discussed plan of care as detailed above with PA    Please call Infectious Diseases if there is a change in status.  Thank you.  [phone].

## 2021-08-26 NOTE — PHYSICAL THERAPY INITIAL EVALUATION ADULT - RANGE OF MOTION EXAMINATION, REHAB EVAL
except pt. with bilateral foot drop/bilateral upper extremity ROM was WFL (within functional limits)/bilateral lower extremity ROM was WFL (within functional limits)

## 2021-08-26 NOTE — PROGRESS NOTE ADULT - SUBJECTIVE AND OBJECTIVE BOX
Patient is a 70y old  Female who presents with a chief complaint of abdominal pain (26 Aug 2021 12:48)      SUBJECTIVE / OVERNIGHT EVENTS: Pt seen and examined at 11:30am, no overnight events. Pt reports abdominal discomfort, no nausea, or vomiting, nsg reports that pt has poor appetite. No other new issues reported.    MEDICATIONS  (STANDING):  ascorbic acid 500 milliGRAM(s) Oral daily  bisacodyl 5 milliGRAM(s) Oral every 12 hours  dextrose 40% Gel 15 Gram(s) Oral once  dextrose 5%. 1000 milliLiter(s) (50 mL/Hr) IV Continuous <Continuous>  dextrose 5%. 1000 milliLiter(s) (100 mL/Hr) IV Continuous <Continuous>  dextrose 50% Injectable 25 Gram(s) IV Push once  dextrose 50% Injectable 12.5 Gram(s) IV Push once  dextrose 50% Injectable 25 Gram(s) IV Push once  dextrose 50% Injectable 12.5 Gram(s) IV Push once  dronabinol 2.5 milliGRAM(s) Oral two times a day  enoxaparin Injectable 30 milliGRAM(s) SubCutaneous daily  fentaNYL   Patch  12 MICROgram(s)/Hr 1 Patch Transdermal every 72 hours  gabapentin 300 milliGRAM(s) Oral three times a day  glucagon  Injectable 1 milliGRAM(s) IntraMuscular once  insulin glargine Injectable (LANTUS) 8 Unit(s) SubCutaneous at bedtime  insulin lispro (ADMELOG) corrective regimen sliding scale   SubCutaneous Before meals and at bedtime  lactulose Syrup 10 Gram(s) Oral three times a day  piperacillin/tazobactam IVPB.. 3.375 Gram(s) IV Intermittent every 8 hours  pyridoxine 50 milliGRAM(s) Oral daily  thiamine 100 milliGRAM(s) Oral daily    MEDICATIONS  (PRN):  acetaminophen   Tablet .. 650 milliGRAM(s) Oral every 6 hours PRN Temp greater or equal to 38C (100.4F), Mild Pain (1 - 3)  aluminum hydroxide/magnesium hydroxide/simethicone Suspension 30 milliLiter(s) Oral every 4 hours PRN Dyspepsia  HYDROmorphone   Tablet 6 milliGRAM(s) Oral every 6 hours PRN mod-severe pain  melatonin 3 milliGRAM(s) Oral at bedtime PRN Insomnia  ondansetron Injectable 4 milliGRAM(s) IV Push every 8 hours PRN Nausea and/or Vomiting      Vital Signs Last 24 Hrs  T(C): 37.1 (26 Aug 2021 03:53), Max: 37.4 (26 Aug 2021 01:25)  T(F): 98.7 (26 Aug 2021 03:53), Max: 99.3 (26 Aug 2021 01:25)  HR: 93 (26 Aug 2021 03:53) (90 - 104)  BP: 103/55 (26 Aug 2021 03:53) (95/43 - 112/66)  BP(mean): --  RR: 17 (26 Aug 2021 03:53) (16 - 18)  SpO2: 97% (26 Aug 2021 03:53) (97% - 100%)  CAPILLARY BLOOD GLUCOSE      POCT Blood Glucose.: 60 mg/dL (26 Aug 2021 13:10)  POCT Blood Glucose.: 66 mg/dL (26 Aug 2021 12:37)  POCT Blood Glucose.: 68 mg/dL (26 Aug 2021 12:37)  POCT Blood Glucose.: 125 mg/dL (26 Aug 2021 08:40)  POCT Blood Glucose.: 127 mg/dL (26 Aug 2021 02:10)  POCT Blood Glucose.: 151 mg/dL (25 Aug 2021 22:48)  POCT Blood Glucose.: 142 mg/dL (25 Aug 2021 21:07)  POCT Blood Glucose.: 232 mg/dL (25 Aug 2021 16:11)    I&O's Summary    25 Aug 2021 07:01  -  26 Aug 2021 07:00  --------------------------------------------------------  IN: 120 mL / OUT: 400 mL / NET: -280 mL        PHYSICAL EXAM:  GENERAL: NAD, cachetic  CHEST/LUNG: Clear to auscultation bilaterally; No wheeze  HEART: Regular rate and rhythm  ABDOMEN: Soft, nontender but distended  EXTREMITIES: no LE edema  PSYCH: Calm  NEUROLOGY: AA, answers questions appropriately  SKIN: No rashes or lesions    LABS:                        8.9    8.77  )-----------( 278      ( 26 Aug 2021 07:27 )             28.6     08-26    142  |  106  |  21  ----------------------------<  129<H>  3.7   |  26  |  0.55    Ca    8.6      26 Aug 2021 07:27  Phos  3.5     08-26  Mg     2.10     08-26    TPro  6.1  /  Alb  2.5<L>  /  TBili  0.3  /  DBili  x   /  AST  11  /  ALT  8   /  AlkPhos  76  08-26          Urinalysis Basic - ( 25 Aug 2021 14:42 )    Color: Yellow / Appearance: Clear / SG: >1.050 / pH: x  Gluc: x / Ketone: Negative  / Bili: Negative / Urobili: <2 mg/dL   Blood: x / Protein: 100 mg/dL / Nitrite: Negative   Leuk Esterase: Moderate / RBC: 6 /HPF / WBC 15 /HPF   Sq Epi: x / Non Sq Epi: 3 /HPF / Bacteria: Negative        RADIOLOGY & ADDITIONAL TESTS:    Imaging Personally Reviewed:    Consultant(s) Notes Reviewed:      Care Discussed with Consultants/Other Providers:

## 2021-08-26 NOTE — CHART NOTE - NSCHARTNOTEFT_GEN_A_CORE
Pt with episode of hypoglycemia earlier today- improved with eating lunch and juice. Discussed with primary rn.

## 2021-08-26 NOTE — CONSULT NOTE ADULT - ASSESSMENT
70F w/T2DM c/b neuropathy, pancreatic cancer w/ mets to lung and liver on chemo (last session yesterday) sent by [facility] for diffuse abdominal pain and distention since Friday. Palliative Care consulted for complex symptoms and decision making in the setting of advanced illness. 
10F with DM c/b neuropathy, pancreatic cancer w/ mets to lung and liver on chemo, chronic joy since last admission for urinary retention and [facility]onephrosis [facility] with diffuse abdominal pain and distention since 8/20.  Here low grade temp 100.3  No leukocytosis,  CT with new abdominal ascites.  Underwent tap with 529 cells but mostly mono/macrophages and not neutrophilic.  Doubt peritonitis.  Possibly UTI related to catheter.  No other obvious source of infection.  Could it be tumor?      Fever  - f/u cultures  - zosyn pending results    metastatic pancreatic cancer with new ascites  - goals of care should be discussed   
70F with metastatic pancreatic cancer w/ mets to lung and liver on xeloda, sent by [facility] for diffuse abdominal pain and distention since Friday. Reports a 10/10 non-radiating diffuse abd pain and significant abdominal distention.   S/p paracentesis in ED. No SBP.   Urine +  Cachectic, weak, PS 3.   CT reviewed. Large volume ascites.    -Follow infectious work up Ucx, c/w abx  -Consider large volume tap for comfort  -Patient is not a candidate for further chemotherapy 2/2 poor performance status and severe cachexia. Please consider pal care consult / hospice referral  -Supportive care, pain control, Nutrition, PT, DVT ppx  -Outpatient oncology f/u    Will follow. Please do not hesitate to call back with questions.     Glenys Monique MD  Medical Oncology Attending  C: [phone]

## 2021-08-26 NOTE — CHART NOTE - NSCHARTNOTEFT_GEN_A_CORE
PA Note   Called by nurse,   Fingerstick 96  Pt is asymptomatic, she had an hypoglycemic episode earlier   Will decrease Lantus to 6 units at bedtime  Will continue to monitor.

## 2021-08-26 NOTE — PROGRESS NOTE ADULT - PROBLEM SELECTOR PLAN 3
likely opioid induced, last BM 8/20 after enema  -Start lactulose TID x2 days  -Dulcolax BID  -Tap water enema daily   -monitor for bms

## 2021-08-26 NOTE — PROGRESS NOTE ADULT - PROBLEM SELECTOR PLAN 5
Meds: Basaglar 8U qhs, Novalog 6U TID   -Resumed Lantus 8Uqhs, add premeal if needed   -C/w ISS/FS   -Consistent carb diet  -A1c 7.1

## 2021-08-26 NOTE — PROGRESS NOTE ADULT - SUBJECTIVE AND OBJECTIVE BOX
Patient is a 70y old  Female who presents with a chief complaint of abdominal pain (25 Aug 2021 15:55)    f/u abd pain    Interval History/ROS:  no further fever.  still with abdominal discomfort.  no n/v/d.  no dysuria.  tired.   Remainder of ROS otherwise negative.  seen earlier in the morning    PAST MEDICAL & SURGICAL HISTORY:  Type 2 diabetes mellitus  Pancreatic cancer with known mets to lung and liver  Hydronephrosis, left chronic joy  S/P tendon repair R foot, R elbow    Allergies  No Known Allergies    ANTIMICROBIALS:  piperacillin/tazobactam IVPB.. 3.375 every 8 hours (8/24-)    MEDICATIONS  (STANDING):  bisacodyl 5 every 12 hours  dronabinol 2.5 two times a day  enoxaparin Injectable 30 daily  fentaNYL   Patch  12 MICROgram(s)/Hr 1 every 72 hours  gabapentin 300 three times a day  glucagon  Injectable 1 once  insulin glargine Injectable (LANTUS) 8 at bedtime  insulin lispro (ADMELOG) corrective regimen sliding scale  Before meals and at bedtime  lactulose Syrup 10 three times a day    Vital Signs Last 24 Hrs  T(F): 98.7 (08-26-21 @ 03:53), Max: 99.3 (08-26-21 @ 01:25)  HR: 93 (08-26-21 @ 03:53)  BP: 103/55 (08-26-21 @ 03:53)  RR: 17 (08-26-21 @ 03:53)  SpO2: 97% (08-26-21 @ 03:53) (97% - 100%)    PHYSICAL EXAM:  Constitutional: non-toxic, cachectic  HEAD/EYES: anicteric, temporal wasting  ENT:  supple  Cardiovascular:   normal S1, S2  Respiratory:  clear BS bilaterally  GI:  distended, no rebound or guarding, not tender  :  new joy  Musculoskeletal:  no synovitis  Neurologic: awake and alert, generally weak  Skin:  no rash  Psychiatric:  awake, alert, appropriate mood                            8.9    8.77  )-----------( 278      ( 26 Aug 2021 07:27 )             28.6 08-26    142  |  106  |  21  ----------------------------<  129  3.7   |  26  |  0.55  Ca    8.6      26 Aug 2021 07:27Phos  3.5     08-26Mg     2.10     08-26  TPro  6.1  /  Alb  2.5  /  TBili  0.3  /  DBili  x   /  AST  11  /  ALT  8   /  AlkPhos  76  08-26    Urinalysis Basic - ( 25 Aug 2021 14:42 )  Color: Yellow / Appearance: Clear / SG: >1.050 / pH: x  Gluc: x / Ketone: Negative  / Bili: Negative / Urobili: <2 mg/dL   Blood: x / Protein: 100 mg/dL / Nitrite: Negative   Leuk Esterase: Moderate / RBC: 6 /HPF / WBC 15 /HPF   Sq Epi: x / Non Sq Epi: 3 /HPF / Bacteria: Negative    8/25/2021 Ascitic Fluid  Cell Count, Body Fluid (08.25.21 @ 05:37)  Total Nucleated Cell Count, Body Fluid: 529 cells/uL   Eosinophil Count - Body Fluid: 1 %   Monocyte/Macrophage Count - Body Fluid: 73 %   Fluid Segmented Granulocytes: 13 %   Fluid Type: Peritoneal Fluid Other Body Cells: 0 %   Mesothelial Cells - Body Fluid: 0 %   Body Fluid Appearance: Clear BF Lymphocytes: 13 %   Atypical Lymphocytes - Body Fluid: 0 %   Tube Type: Sterile Color - Body Fluid: Yellow   Total RBC Count: <1000: Red Cell count correlates with the number and proportion of cells on cytospin preparation. cells/uL   Total Cells Counted, Body Fluid: 100 Cells     MICROBIOLOGY:  Culture - Body Fluid with Gram Stain (collected 08-25-21 @ 10:27)  Source: .Body Fluid Peritoneal Fluid  Gram Stain (08-25-21 @ 12:34):    No organisms seen    polymorphonuclear leukocytes seen    by cytocentrifuge  Preliminary Report (08-26-21 @ 09:43):    No growth    Culture - Blood (collected 08-25-21 @ 08:35)  Source: .Blood Blood-Venous  Preliminary Report (08-26-21 @ 09:01):    No growth to date.    Culture - Blood (collected 08-25-21 @ 08:35)  Source: .Blood Blood-Peripheral  Preliminary Report (08-26-21 @ 09:01):    No growth to date.    Culture - Urine (collected 08-25-21 @ 08:29)  Source: Clean Catch Clean Catch (Midstream)  Preliminary Report (08-26-21 @ 09:57):    >100,000 CFU/ml Gram Negative Rods    Rapid RVP Result: NotDetec (08-25 @ 11:26)    RADIOLOGY:  imaging below personally reviewed and agree with findings    CT Abdomen and Pelvis w/ IV Cont (08.25.21 @ 02:20) >  IMPRESSION:  An ill-defined hypoattenuating pancreatic mass is not significantly changed.  Hepatic metastases again noted which are difficult to compare with the previous exam due to different phases of contrast enhancement however they appear grossly unchanged.  Large volume ascites, new.  Small left effusion with associated atelectasis, new.    CT Abdomen and Pelvis w/ IV Cont (07.22.21 @ 12:18) >  IMPRESSION:  Emphysema.  Pulmonary nodules unchanged or resolved with new right intrafissural nodules, likely lymph nodes.  Pancreatic body tail mass unchanged in size since 04/11/2021.  Enlarging liver metastases.  New upper abdominal lymphadenopathy  New ascites. Thickened rectal wall suggesting proctitis

## 2021-08-27 ENCOUNTER — APPOINTMENT (OUTPATIENT)
Dept: HEMATOLOGY ONCOLOGY | Facility: CLINIC | Age: 71
End: 2021-08-27

## 2021-08-27 DIAGNOSIS — E87.6 HYPOKALEMIA: ICD-10-CM

## 2021-08-27 LAB
-  AMIKACIN: SIGNIFICANT CHANGE UP
-  AZTREONAM: SIGNIFICANT CHANGE UP
-  CEFEPIME: SIGNIFICANT CHANGE UP
-  CEFTAZIDIME: SIGNIFICANT CHANGE UP
-  CIPROFLOXACIN: SIGNIFICANT CHANGE UP
-  GENTAMICIN: SIGNIFICANT CHANGE UP
-  IMIPENEM: SIGNIFICANT CHANGE UP
-  LEVOFLOXACIN: SIGNIFICANT CHANGE UP
-  MEROPENEM: SIGNIFICANT CHANGE UP
-  PIPERACILLIN/TAZOBACTAM: SIGNIFICANT CHANGE UP
-  TOBRAMYCIN: SIGNIFICANT CHANGE UP
ALBUMIN SERPL ELPH-MCNC: 2.2 G/DL — LOW (ref 3.3–5)
ALP SERPL-CCNC: 73 U/L — SIGNIFICANT CHANGE UP (ref 40–120)
ALT FLD-CCNC: 8 U/L — SIGNIFICANT CHANGE UP (ref 4–33)
ANION GAP SERPL CALC-SCNC: 13 MMOL/L — SIGNIFICANT CHANGE UP (ref 7–14)
AST SERPL-CCNC: 16 U/L — SIGNIFICANT CHANGE UP (ref 4–32)
BASOPHILS # BLD AUTO: 0.02 K/UL — SIGNIFICANT CHANGE UP (ref 0–0.2)
BASOPHILS NFR BLD AUTO: 0.3 % — SIGNIFICANT CHANGE UP (ref 0–2)
BILIRUB SERPL-MCNC: 0.2 MG/DL — SIGNIFICANT CHANGE UP (ref 0.2–1.2)
BUN SERPL-MCNC: 15 MG/DL — SIGNIFICANT CHANGE UP (ref 7–23)
CALCIUM SERPL-MCNC: 8.5 MG/DL — SIGNIFICANT CHANGE UP (ref 8.4–10.5)
CHLORIDE SERPL-SCNC: 104 MMOL/L — SIGNIFICANT CHANGE UP (ref 98–107)
CO2 SERPL-SCNC: 23 MMOL/L — SIGNIFICANT CHANGE UP (ref 22–31)
CREAT SERPL-MCNC: 0.51 MG/DL — SIGNIFICANT CHANGE UP (ref 0.5–1.3)
EOSINOPHIL # BLD AUTO: 0.15 K/UL — SIGNIFICANT CHANGE UP (ref 0–0.5)
EOSINOPHIL NFR BLD AUTO: 1.9 % — SIGNIFICANT CHANGE UP (ref 0–6)
GLUCOSE SERPL-MCNC: 132 MG/DL — HIGH (ref 70–99)
HCT VFR BLD CALC: 28.5 % — LOW (ref 34.5–45)
HGB BLD-MCNC: 8.7 G/DL — LOW (ref 11.5–15.5)
IANC: 5.93 K/UL — SIGNIFICANT CHANGE UP (ref 1.5–8.5)
IMM GRANULOCYTES NFR BLD AUTO: 0.5 % — SIGNIFICANT CHANGE UP (ref 0–1.5)
LYMPHOCYTES # BLD AUTO: 1.06 K/UL — SIGNIFICANT CHANGE UP (ref 1–3.3)
LYMPHOCYTES # BLD AUTO: 13.5 % — SIGNIFICANT CHANGE UP (ref 13–44)
MAGNESIUM SERPL-MCNC: 2.1 MG/DL — SIGNIFICANT CHANGE UP (ref 1.6–2.6)
MCHC RBC-ENTMCNC: 25.9 PG — LOW (ref 27–34)
MCHC RBC-ENTMCNC: 30.5 GM/DL — LOW (ref 32–36)
MCV RBC AUTO: 84.8 FL — SIGNIFICANT CHANGE UP (ref 80–100)
METHOD TYPE: SIGNIFICANT CHANGE UP
MONOCYTES # BLD AUTO: 0.67 K/UL — SIGNIFICANT CHANGE UP (ref 0–0.9)
MONOCYTES NFR BLD AUTO: 8.5 % — SIGNIFICANT CHANGE UP (ref 2–14)
NEUTROPHILS # BLD AUTO: 5.93 K/UL — SIGNIFICANT CHANGE UP (ref 1.8–7.4)
NEUTROPHILS NFR BLD AUTO: 75.3 % — SIGNIFICANT CHANGE UP (ref 43–77)
NRBC # BLD: 0 /100 WBCS — SIGNIFICANT CHANGE UP
NRBC # FLD: 0 K/UL — SIGNIFICANT CHANGE UP
PHOSPHATE SERPL-MCNC: 3.1 MG/DL — SIGNIFICANT CHANGE UP (ref 2.5–4.5)
PLATELET # BLD AUTO: 219 K/UL — SIGNIFICANT CHANGE UP (ref 150–400)
POTASSIUM SERPL-MCNC: 3.3 MMOL/L — LOW (ref 3.5–5.3)
POTASSIUM SERPL-SCNC: 3.3 MMOL/L — LOW (ref 3.5–5.3)
PROT SERPL-MCNC: 6.1 G/DL — SIGNIFICANT CHANGE UP (ref 6–8.3)
RBC # BLD: 3.36 M/UL — LOW (ref 3.8–5.2)
RBC # FLD: 14.4 % — SIGNIFICANT CHANGE UP (ref 10.3–14.5)
SODIUM SERPL-SCNC: 140 MMOL/L — SIGNIFICANT CHANGE UP (ref 135–145)
WBC # BLD: 7.87 K/UL — SIGNIFICANT CHANGE UP (ref 3.8–10.5)
WBC # FLD AUTO: 7.87 K/UL — SIGNIFICANT CHANGE UP (ref 3.8–10.5)

## 2021-08-27 PROCEDURE — 99232 SBSQ HOSP IP/OBS MODERATE 35: CPT

## 2021-08-27 PROCEDURE — 99231 SBSQ HOSP IP/OBS SF/LOW 25: CPT

## 2021-08-27 RX ORDER — POTASSIUM CHLORIDE 20 MEQ
20 PACKET (EA) ORAL ONCE
Refills: 0 | Status: COMPLETED | OUTPATIENT
Start: 2021-08-27 | End: 2021-08-27

## 2021-08-27 RX ORDER — SENNA PLUS 8.6 MG/1
2 TABLET ORAL AT BEDTIME
Refills: 0 | Status: DISCONTINUED | OUTPATIENT
Start: 2021-08-27 | End: 2021-09-02

## 2021-08-27 RX ORDER — POLYETHYLENE GLYCOL 3350 17 G/17G
17 POWDER, FOR SOLUTION ORAL DAILY
Refills: 0 | Status: DISCONTINUED | OUTPATIENT
Start: 2021-08-27 | End: 2021-09-02

## 2021-08-27 RX ADMIN — FENTANYL CITRATE 1 PATCH: 50 INJECTION INTRAVENOUS at 07:59

## 2021-08-27 RX ADMIN — Medication 500 MILLIGRAM(S): at 12:46

## 2021-08-27 RX ADMIN — FENTANYL CITRATE 1 PATCH: 50 INJECTION INTRAVENOUS at 18:02

## 2021-08-27 RX ADMIN — INSULIN GLARGINE 6 UNIT(S): 100 INJECTION, SOLUTION SUBCUTANEOUS at 22:16

## 2021-08-27 RX ADMIN — GABAPENTIN 300 MILLIGRAM(S): 400 CAPSULE ORAL at 14:40

## 2021-08-27 RX ADMIN — GABAPENTIN 300 MILLIGRAM(S): 400 CAPSULE ORAL at 06:56

## 2021-08-27 RX ADMIN — GABAPENTIN 300 MILLIGRAM(S): 400 CAPSULE ORAL at 22:15

## 2021-08-27 RX ADMIN — ENOXAPARIN SODIUM 30 MILLIGRAM(S): 100 INJECTION SUBCUTANEOUS at 12:47

## 2021-08-27 RX ADMIN — Medication 2.5 MILLIGRAM(S): at 18:38

## 2021-08-27 RX ADMIN — LACTULOSE 10 GRAM(S): 10 SOLUTION ORAL at 06:56

## 2021-08-27 RX ADMIN — Medication 1: at 08:57

## 2021-08-27 RX ADMIN — Medication 5 MILLIGRAM(S): at 06:55

## 2021-08-27 RX ADMIN — Medication 50 MILLIGRAM(S): at 12:47

## 2021-08-27 RX ADMIN — HYDROMORPHONE HYDROCHLORIDE 6 MILLIGRAM(S): 2 INJECTION INTRAMUSCULAR; INTRAVENOUS; SUBCUTANEOUS at 15:40

## 2021-08-27 RX ADMIN — HYDROMORPHONE HYDROCHLORIDE 6 MILLIGRAM(S): 2 INJECTION INTRAMUSCULAR; INTRAVENOUS; SUBCUTANEOUS at 14:40

## 2021-08-27 RX ADMIN — Medication 1: at 18:26

## 2021-08-27 RX ADMIN — Medication 5 MILLIGRAM(S): at 18:38

## 2021-08-27 RX ADMIN — Medication 2.5 MILLIGRAM(S): at 06:51

## 2021-08-27 RX ADMIN — Medication 20 MILLIEQUIVALENT(S): at 09:26

## 2021-08-27 RX ADMIN — Medication 1: at 12:47

## 2021-08-27 RX ADMIN — Medication 100 MILLIGRAM(S): at 12:46

## 2021-08-27 RX ADMIN — Medication 30 MILLILITER(S): at 06:55

## 2021-08-27 NOTE — PROGRESS NOTE ADULT - SUBJECTIVE AND OBJECTIVE BOX
Patient is a 70y old  Female who presents with a chief complaint of abdominal pain (26 Aug 2021 23:23)      SUBJECTIVE / OVERNIGHT EVENTS:    MEDICATIONS  (STANDING):  ascorbic acid 500 milliGRAM(s) Oral daily  bisacodyl 5 milliGRAM(s) Oral every 12 hours  dextrose 40% Gel 15 Gram(s) Oral once  dextrose 5%. 1000 milliLiter(s) (50 mL/Hr) IV Continuous <Continuous>  dextrose 5%. 1000 milliLiter(s) (100 mL/Hr) IV Continuous <Continuous>  dextrose 50% Injectable 25 Gram(s) IV Push once  dextrose 50% Injectable 12.5 Gram(s) IV Push once  dextrose 50% Injectable 25 Gram(s) IV Push once  dronabinol 2.5 milliGRAM(s) Oral two times a day  enoxaparin Injectable 30 milliGRAM(s) SubCutaneous daily  fentaNYL   Patch  12 MICROgram(s)/Hr 1 Patch Transdermal every 72 hours  gabapentin 300 milliGRAM(s) Oral three times a day  glucagon  Injectable 1 milliGRAM(s) IntraMuscular once  insulin glargine Injectable (LANTUS) 6 Unit(s) SubCutaneous at bedtime  insulin lispro (ADMELOG) corrective regimen sliding scale   SubCutaneous Before meals and at bedtime  pyridoxine 50 milliGRAM(s) Oral daily  thiamine 100 milliGRAM(s) Oral daily    MEDICATIONS  (PRN):  acetaminophen   Tablet .. 650 milliGRAM(s) Oral every 6 hours PRN Temp greater or equal to 38C (100.4F), Mild Pain (1 - 3)  aluminum hydroxide/magnesium hydroxide/simethicone Suspension 30 milliLiter(s) Oral every 4 hours PRN Dyspepsia  HYDROmorphone   Tablet 6 milliGRAM(s) Oral every 6 hours PRN mod-severe pain  melatonin 3 milliGRAM(s) Oral at bedtime PRN Insomnia  ondansetron Injectable 4 milliGRAM(s) IV Push every 8 hours PRN Nausea and/or Vomiting      Vital Signs Last 24 Hrs  T(C): 37.2 (27 Aug 2021 06:50), Max: 37.2 (26 Aug 2021 15:34)  T(F): 99 (27 Aug 2021 06:50), Max: 99 (26 Aug 2021 15:34)  HR: 100 (27 Aug 2021 06:50) (91 - 100)  BP: 108/55 (27 Aug 2021 06:50) (95/52 - 108/55)  BP(mean): --  RR: 18 (27 Aug 2021 06:50) (18 - 18)  SpO2: 98% (27 Aug 2021 06:50) (98% - 99%)  CAPILLARY BLOOD GLUCOSE      POCT Blood Glucose.: 161 mg/dL (27 Aug 2021 08:28)  POCT Blood Glucose.: 96 mg/dL (26 Aug 2021 21:54)  POCT Blood Glucose.: 149 mg/dL (26 Aug 2021 17:28)  POCT Blood Glucose.: 143 mg/dL (26 Aug 2021 14:42)  POCT Blood Glucose.: 114 mg/dL (26 Aug 2021 14:10)  POCT Blood Glucose.: 60 mg/dL (26 Aug 2021 13:10)  POCT Blood Glucose.: 66 mg/dL (26 Aug 2021 12:37)  POCT Blood Glucose.: 68 mg/dL (26 Aug 2021 12:37)    I&O's Summary    26 Aug 2021 07:01  -  27 Aug 2021 07:00  --------------------------------------------------------  IN: 340 mL / OUT: 460 mL / NET: -120 mL        PHYSICAL EXAM:  GENERAL: NAD, cachetic  CHEST/LUNG: Clear to auscultation bilaterally; No wheeze  HEART: Regular rate and rhythm  ABDOMEN: Soft, nontender but distended  EXTREMITIES: no LE edema  PSYCH: Calm  NEUROLOGY: AA, answers questions appropriately  SKIN: No rashes or lesions      LABS:                        8.7    7.87  )-----------( 219      ( 27 Aug 2021 07:03 )             28.5     08-27    140  |  104  |  15  ----------------------------<  132<H>  3.3<L>   |  23  |  0.51    Ca    8.5      27 Aug 2021 07:03  Phos  3.1     08-27  Mg     2.10     08-27    TPro  6.1  /  Alb  2.2<L>  /  TBili  0.2  /  DBili  x   /  AST  16  /  ALT  8   /  AlkPhos  73  08-27          Urinalysis Basic - ( 25 Aug 2021 14:42 )    Color: Yellow / Appearance: Clear / SG: >1.050 / pH: x  Gluc: x / Ketone: Negative  / Bili: Negative / Urobili: <2 mg/dL   Blood: x / Protein: 100 mg/dL / Nitrite: Negative   Leuk Esterase: Moderate / RBC: 6 /HPF / WBC 15 /HPF   Sq Epi: x / Non Sq Epi: 3 /HPF / Bacteria: Negative        RADIOLOGY & ADDITIONAL TESTS:    Imaging Personally Reviewed:    Consultant(s) Notes Reviewed:      Care Discussed with Consultants/Other Providers:   Patient is a 70y old  Female who presents with a chief complaint of abdominal pain (26 Aug 2021 23:23)      SUBJECTIVE / OVERNIGHT EVENTS: Pt seen and examined at 11:50am, overnight events reviewed. Pt reports some abdominal discomfort, no nausea, or vomiting, nsg reports that pt has poor appetite. Pt says that the meals are not good. No other new issues reported.      MEDICATIONS  (STANDING):  ascorbic acid 500 milliGRAM(s) Oral daily  bisacodyl 5 milliGRAM(s) Oral every 12 hours  dextrose 40% Gel 15 Gram(s) Oral once  dextrose 5%. 1000 milliLiter(s) (50 mL/Hr) IV Continuous <Continuous>  dextrose 5%. 1000 milliLiter(s) (100 mL/Hr) IV Continuous <Continuous>  dextrose 50% Injectable 25 Gram(s) IV Push once  dextrose 50% Injectable 12.5 Gram(s) IV Push once  dextrose 50% Injectable 25 Gram(s) IV Push once  dronabinol 2.5 milliGRAM(s) Oral two times a day  enoxaparin Injectable 30 milliGRAM(s) SubCutaneous daily  fentaNYL   Patch  12 MICROgram(s)/Hr 1 Patch Transdermal every 72 hours  gabapentin 300 milliGRAM(s) Oral three times a day  glucagon  Injectable 1 milliGRAM(s) IntraMuscular once  insulin glargine Injectable (LANTUS) 6 Unit(s) SubCutaneous at bedtime  insulin lispro (ADMELOG) corrective regimen sliding scale   SubCutaneous Before meals and at bedtime  pyridoxine 50 milliGRAM(s) Oral daily  thiamine 100 milliGRAM(s) Oral daily    MEDICATIONS  (PRN):  acetaminophen   Tablet .. 650 milliGRAM(s) Oral every 6 hours PRN Temp greater or equal to 38C (100.4F), Mild Pain (1 - 3)  aluminum hydroxide/magnesium hydroxide/simethicone Suspension 30 milliLiter(s) Oral every 4 hours PRN Dyspepsia  HYDROmorphone   Tablet 6 milliGRAM(s) Oral every 6 hours PRN mod-severe pain  melatonin 3 milliGRAM(s) Oral at bedtime PRN Insomnia  ondansetron Injectable 4 milliGRAM(s) IV Push every 8 hours PRN Nausea and/or Vomiting      Vital Signs Last 24 Hrs  T(C): 37.2 (27 Aug 2021 06:50), Max: 37.2 (26 Aug 2021 15:34)  T(F): 99 (27 Aug 2021 06:50), Max: 99 (26 Aug 2021 15:34)  HR: 100 (27 Aug 2021 06:50) (91 - 100)  BP: 108/55 (27 Aug 2021 06:50) (95/52 - 108/55)  BP(mean): --  RR: 18 (27 Aug 2021 06:50) (18 - 18)  SpO2: 98% (27 Aug 2021 06:50) (98% - 99%)  CAPILLARY BLOOD GLUCOSE      POCT Blood Glucose.: 161 mg/dL (27 Aug 2021 08:28)  POCT Blood Glucose.: 96 mg/dL (26 Aug 2021 21:54)  POCT Blood Glucose.: 149 mg/dL (26 Aug 2021 17:28)  POCT Blood Glucose.: 143 mg/dL (26 Aug 2021 14:42)  POCT Blood Glucose.: 114 mg/dL (26 Aug 2021 14:10)  POCT Blood Glucose.: 60 mg/dL (26 Aug 2021 13:10)  POCT Blood Glucose.: 66 mg/dL (26 Aug 2021 12:37)  POCT Blood Glucose.: 68 mg/dL (26 Aug 2021 12:37)    I&O's Summary    26 Aug 2021 07:01  -  27 Aug 2021 07:00  --------------------------------------------------------  IN: 340 mL / OUT: 460 mL / NET: -120 mL        PHYSICAL EXAM:  GENERAL: NAD, cachetic  CHEST/LUNG: Clear to auscultation bilaterally; No wheeze  HEART: Regular rate and rhythm  ABDOMEN: Soft, mild tenderness at the RUQ, distended  EXTREMITIES: no LE edema  PSYCH: Calm  NEUROLOGY: AA, answers questions appropriately  SKIN: No rashes or lesions      LABS:                        8.7    7.87  )-----------( 219      ( 27 Aug 2021 07:03 )             28.5     08-27    140  |  104  |  15  ----------------------------<  132<H>  3.3<L>   |  23  |  0.51    Ca    8.5      27 Aug 2021 07:03  Phos  3.1     08-27  Mg     2.10     08-27    TPro  6.1  /  Alb  2.2<L>  /  TBili  0.2  /  DBili  x   /  AST  16  /  ALT  8   /  AlkPhos  73  08-27          Urinalysis Basic - ( 25 Aug 2021 14:42 )    Color: Yellow / Appearance: Clear / SG: >1.050 / pH: x  Gluc: x / Ketone: Negative  / Bili: Negative / Urobili: <2 mg/dL   Blood: x / Protein: 100 mg/dL / Nitrite: Negative   Leuk Esterase: Moderate / RBC: 6 /HPF / WBC 15 /HPF   Sq Epi: x / Non Sq Epi: 3 /HPF / Bacteria: Negative        RADIOLOGY & ADDITIONAL TESTS:    Imaging Personally Reviewed:    Consultant(s) Notes Reviewed:      Care Discussed with Consultants/Other Providers:

## 2021-08-27 NOTE — PROGRESS NOTE ADULT - PROBLEM SELECTOR PLAN 2
Per documentation, pt treated for CAUTI at [facility] w/Ceftriaxone on 7/27 x5 days, UCx at that time grew >100K GNR & >3 organisms per HIE. Pt reports having high grade fevers at that time to 101.  Tmax in .3, WBC 11  -UA +,  urine culture <100,000 Psuedomonas  -s/p Zosyn, for now will continue for a 3 day course   -ID consult appreciated, f/u ID recs Per documentation, pt treated for CAUTI at [facility] w/Ceftriaxone on 7/27 x5 days, UCx at that time grew >100K GNR & >3 organisms per HIE. Pt reports having high grade fevers at that time to 101.  Tmax in .3, WBC 11  -UA +,  urine culture <100,000 Psuedomonas  -blood culture neg to date  -s/p Zosyn- 3 day course completed  -ID consult appreciated, f/u ID recs Per documentation, pt treated for CAUTI at [facility] w/Ceftriaxone on 7/27 x5 days, UCx at that time grew >100K GNR & >3 organisms per HIE. Pt reports having high grade fevers at that time to 101.  Tmax in .3, WBC 11  -UA +,  urine culture <100,000 Psuedomonas and ecoli, rpt culture <10,000  -blood culture neg to date  -s/p Zosyn- 3 day course completed  -ID consult appreciated, f/u ID recs

## 2021-08-27 NOTE — PROGRESS NOTE ADULT - SUBJECTIVE AND OBJECTIVE BOX
Patient is a 70y old  Female who presents with a chief complaint of abdominal pain (25 Aug 2021 15:55)    f/u abd pain    Interval History/ROS:  no further fever.  antibiotics discontinued.  no abdominal pain unless palpated.  no n/v/d.  no dysuria.  tired.   Remainder of ROS otherwise negative.    PAST MEDICAL & SURGICAL HISTORY:  Type 2 diabetes mellitus  Pancreatic cancer with known mets to lung and liver  Hydronephrosis, left chronic joy  S/P tendon repair R foot, R elbow    Allergies  No Known Allergies    ANTIMICROBIALS:  piperacillin/tazobactam IVPB.. 3.375 every 8 hours (8/24-8/26)    MEDICATIONS  (STANDING):  bisacodyl 5 every 12 hours  dronabinol 2.5 two times a day  enoxaparin Injectable 30 daily  fentaNYL   Patch  12 MICROgram(s)/Hr 1 every 72 hours  gabapentin 300 three times a day  glucagon  Injectable 1 once  insulin glargine Injectable (LANTUS) 6 at bedtime  insulin lispro (ADMELOG) corrective regimen sliding scale  Before meals and at bedtime    Vital Signs Last 24 Hrs  T(F): 99 (08-27-21 @ 06:50), Max: 99 (08-26-21 @ 15:34)  HR: 100 (08-27-21 @ 06:50)  BP: 108/55 (08-27-21 @ 06:50)  RR: 18 (08-27-21 @ 06:50)  SpO2: 98% (08-27-21 @ 06:50) (98% - 99%)    PHYSICAL EXAM:  Constitutional: non-toxic, cachectic  HEAD/EYES: anicteric, temporal wasting  ENT:  supple  Cardiovascular:   normal S1, S2  Respiratory:  clear BS bilaterally  GI:  distended, no rebound or guarding, not tender  :  new joy  Musculoskeletal:  no synovitis  Neurologic: awake and alert, generally weak  Skin:  no rash  Psychiatric:  awake, alert, appropriate mood                               8.7    7.87  )-----------( 219      ( 27 Aug 2021 07:03 )             28.5 08-27    140  |  104  |  15  ----------------------------<  132  3.3   |  23  |  0.51  Ca    8.5      27 Aug 2021 07:03Phos  3.1     08-27Mg     2.10     08-27  TPro  6.1  /  Alb  2.2  /  TBili  0.2  /  DBili  x   /  AST  16  /  ALT  8   /  AlkPhos  73  08-27    Urinalysis Basic - ( 25 Aug 2021 14:42 )  Color: Yellow / Appearance: Clear / SG: >1.050 / pH: x  Gluc: x / Ketone: Negative  / Bili: Negative / Urobili: <2 mg/dL   Blood: x / Protein: 100 mg/dL / Nitrite: Negative   Leuk Esterase: Moderate / RBC: 6 /HPF / WBC 15 /HPF   Sq Epi: x / Non Sq Epi: 3 /HPF / Bacteria: Negative    8/25/2021 Ascitic Fluid  Cell Count, Body Fluid (08.25.21 @ 05:37)  Total Nucleated Cell Count, Body Fluid: 529 cells/uL   Eosinophil Count - Body Fluid: 1 %   Monocyte/Macrophage Count - Body Fluid: 73 %   Fluid Segmented Granulocytes: 13 %   Fluid Type: Peritoneal Fluid Other Body Cells: 0 %   Mesothelial Cells - Body Fluid: 0 %   Body Fluid Appearance: Clear BF Lymphocytes: 13 %   Atypical Lymphocytes - Body Fluid: 0 %   Tube Type: Sterile Color - Body Fluid: Yellow   Total RBC Count: <1000: Red Cell count correlates with the number and proportion of cells on cytospin preparation. cells/uL   Total Cells Counted, Body Fluid: 100 Cells     MICROBIOLOGY:    Culture - Urine (collected 08-25-21 @ 19:14)  Source: Catheterized Catheterized  Final Report (08-26-21 @ 23:41):    <10,000 CFU/mL Normal Urogenital Jessica    Culture - Body Fluid with Gram Stain (collected 08-25-21 @ 10:27)  Source: .Body Fluid Peritoneal Fluid  Gram Stain (08-25-21 @ 12:34):    No organisms seen    polymorphonuclear leukocytes seen    by cytocentrifuge  Preliminary Report (08-26-21 @ 09:43):    No growth    Culture - Blood (collected 08-25-21 @ 08:35)  Source: .Blood Blood-Venous  Preliminary Report (08-26-21 @ 09:01):    No growth to date.    Culture - Blood (collected 08-25-21 @ 08:35)  Source: .Blood Blood-Peripheral  Preliminary Report (08-26-21 @ 09:01):    No growth to date.    Culture - Urine (collected 08-25-21 @ 08:29)  Source: Clean Catch Clean Catch (Midstream)  Preliminary Report (08-27-21 @ 11:02):    >100,000 CFU/ml Pseudomonas aeruginosa    >100,000 CFU/ml Escherichia coli Susceptibility to follow.  Organism: Pseudomonas aeruginosa (08-27-21 @ 10:52)  Organism: Pseudomonas aeruginosa (08-27-21 @ 10:52)      -  Amikacin: S <=16      -  Aztreonam: S <=4      -  Cefepime: S <=2      -  Ceftazidime: S <=1      -  Ciprofloxacin: S <=0.25      -  Gentamicin: S <=2      -  Imipenem: S <=1      -  Levofloxacin: S <=0.5      -  Meropenem: S <=1      -  Piperacillin/Tazobactam: S <=8      -  Tobramycin: S <=2      Method Type: KAVITHA    Rapid RVP Result: NotDete (08-25 @ 11:26)    RADIOLOGY:  imaging below personally reviewed and agree with findings    CT Abdomen and Pelvis w/ IV Cont (08.25.21 @ 02:20) >  IMPRESSION:  An ill-defined hypoattenuating pancreatic mass is not significantly changed.  Hepatic metastases again noted which are difficult to compare with the previous exam due to different phases of contrast enhancement however they appear grossly unchanged.  Large volume ascites, new.  Small left effusion with associated atelectasis, new.    CT Abdomen and Pelvis w/ IV Cont (07.22.21 @ 12:18) >  IMPRESSION:  Emphysema.  Pulmonary nodules unchanged or resolved with new right intrafissural nodules, likely lymph nodes.  Pancreatic body tail mass unchanged in size since 04/11/2021.  Enlarging liver metastases.  New upper abdominal lymphadenopathy  New ascites. Thickened rectal wall suggesting proctitis

## 2021-08-27 NOTE — PROGRESS NOTE ADULT - PROBLEM SELECTOR PLAN 3
likely opioid induced, last BM 8/20 after enema  -Start lactulose TID x2 days  -Dulcolax BID  -Tap water enema daily   -monitor for bms likely opiod induced, cont dulcolax, add senna and miralax  -monitor for bms likely opiod induced, cont dulcolax, added senna and miralax  -monitor for bms

## 2021-08-27 NOTE — PROGRESS NOTE ADULT - ASSESSMENT
10F with DM c/b neuropathy, pancreatic cancer w/ mets to lung and liver on chemo, chronic joy since last admission for urinary retention and hydronephrosis sent by [facility] with diffuse abdominal pain and distention since 8/20.  Here low grade temp 100.3  No leukocytosis,  CT with new abdominal ascites.  Underwent tap with 529 cells but mostly mono/macrophages and not neutrophilic.  Doubt peritonitis.  Possibly UTI related to catheter.  No other obvious source of infection.  Could it be tumor?      Fever  - resolved  - s/p 3 days zosyn  - observe off antibiotics    Catheter associated UTI versus bacteriuria  - s/p zosyn    metastatic pancreatic cancer with new ascites  - goals of care should be discussed     Please call Infectious Diseases if there is a change in status.  Thank you.  [phone].     70F with DM c/b neuropathy, pancreatic cancer w/ mets to lung and liver on chemo, chronic joy since last admission for urinary retention and hydronephrosis sent by [facility] with diffuse abdominal pain and distention since 8/20.  Here low grade temp 100.3  No leukocytosis,  CT with new abdominal ascites.  Underwent tap with 529 cells but mostly mono/macrophages and not neutrophilic.  Doubt peritonitis.  Possibly UTI related to catheter.  No other obvious source of infection.  Could it be tumor?      Fever  - resolved  - s/p 3 days zosyn  - observe off antibiotics    Catheter associated UTI versus bacteriuria  - s/p zosyn    metastatic pancreatic cancer with new ascites  - goals of care should be discussed     Please call Infectious Diseases if there is a change in status.  Thank you.  [phone].

## 2021-08-27 NOTE — PROGRESS NOTE ADULT - PROBLEM SELECTOR PLAN 1
Pt p/w abdominal distention and pain since 8/20, CTAP with new ascites. Low grade temp in ER to 100.3  s/p diagnostic para in ER  -doubt peritonitis  -s/p Zosyn in ER, for now will continue zosyn to complete 3 day course for UTI  -ID consult appreciated, f/u ID recs  -Hold Xeloda  -Onc follg, per Onc pall care and GOC discussion as no further treatment is being offered Pt p/w abdominal distention and pain since 8/20, CTAP with new ascites. Low grade temp in ER to 100.3  s/p diagnostic para in ER  -doubt peritonitis  -s/p Zosyn in ER, completed 3 day course of zosyn for UTI  -ID consult appreciated, f/u ID recs  -Hold Xeloda  -Onc follg, per Onc pall care and GOC discussion as no further treatment is being offered  -Pall care consult appreciated, f/u pall care recs  -Hospice referral done  -Likely hospice/bri [place]ondon on Monday  -Plan discussed with ACP

## 2021-08-27 NOTE — PROGRESS NOTE ADULT - PROBLEM SELECTOR PLAN 4
Follows with Dr. Amaury Vieira  -CT reviewed: large volume ascites, grossly unchanged pancreatic and hepatic lesions.   -Started on Xeloda as outpt  -f/u Dr. Moreiraarian  -Pain control with Fentanyl patch, PO Dilaudid and Gabapentin Follows with Dr. Amaury Vieira  -CT reviewed: large volume ascites, grossly unchanged pancreatic and hepatic lesions.   -Hold Xeloda   -f/u Dr. Monique  -Pain control with Fentanyl patch, PO Dilaudid and Gabapentin

## 2021-08-27 NOTE — PROGRESS NOTE ADULT - PROBLEM SELECTOR PLAN 5
Meds: Basaglar 8U qhs, Novalog 6U TID   -Resumed Lantus 8Uqhs, add premeal if needed   -C/w ISS/FS   -Consistent carb diet  -A1c 7.1 Meds: Basaglar 8U qhs, Novalog 6U TID   -lantus decreased to 6 units due to hypoglycemia  -C/w ISS/FS   -Consistent carb diet  -A1c 7.1

## 2021-08-28 LAB
-  AMIKACIN: SIGNIFICANT CHANGE UP
-  AMOXICILLIN/CLAVULANIC ACID: SIGNIFICANT CHANGE UP
-  AMPICILLIN/SULBACTAM: SIGNIFICANT CHANGE UP
-  AMPICILLIN: SIGNIFICANT CHANGE UP
-  AZTREONAM: SIGNIFICANT CHANGE UP
-  CEFAZOLIN: SIGNIFICANT CHANGE UP
-  CEFEPIME: SIGNIFICANT CHANGE UP
-  CEFOXITIN: SIGNIFICANT CHANGE UP
-  CEFTRIAXONE: SIGNIFICANT CHANGE UP
-  CIPROFLOXACIN: SIGNIFICANT CHANGE UP
-  ERTAPENEM: SIGNIFICANT CHANGE UP
-  GENTAMICIN: SIGNIFICANT CHANGE UP
-  IMIPENEM: SIGNIFICANT CHANGE UP
-  LEVOFLOXACIN: SIGNIFICANT CHANGE UP
-  MEROPENEM: SIGNIFICANT CHANGE UP
-  NITROFURANTOIN: SIGNIFICANT CHANGE UP
-  PIPERACILLIN/TAZOBACTAM: SIGNIFICANT CHANGE UP
-  TIGECYCLINE: SIGNIFICANT CHANGE UP
-  TOBRAMYCIN: SIGNIFICANT CHANGE UP
-  TRIMETHOPRIM/SULFAMETHOXAZOLE: SIGNIFICANT CHANGE UP
ANION GAP SERPL CALC-SCNC: 10 MMOL/L — SIGNIFICANT CHANGE UP (ref 7–14)
BASOPHILS # BLD AUTO: 0.02 K/UL — SIGNIFICANT CHANGE UP (ref 0–0.2)
BASOPHILS NFR BLD AUTO: 0.2 % — SIGNIFICANT CHANGE UP (ref 0–2)
BUN SERPL-MCNC: 16 MG/DL — SIGNIFICANT CHANGE UP (ref 7–23)
CALCIUM SERPL-MCNC: 8.5 MG/DL — SIGNIFICANT CHANGE UP (ref 8.4–10.5)
CHLORIDE SERPL-SCNC: 107 MMOL/L — SIGNIFICANT CHANGE UP (ref 98–107)
CO2 SERPL-SCNC: 26 MMOL/L — SIGNIFICANT CHANGE UP (ref 22–31)
CREAT SERPL-MCNC: 0.46 MG/DL — LOW (ref 0.5–1.3)
CULTURE RESULTS: SIGNIFICANT CHANGE UP
EOSINOPHIL # BLD AUTO: 0.14 K/UL — SIGNIFICANT CHANGE UP (ref 0–0.5)
EOSINOPHIL NFR BLD AUTO: 1.4 % — SIGNIFICANT CHANGE UP (ref 0–6)
GLUCOSE SERPL-MCNC: 38 MG/DL — CRITICAL LOW (ref 70–99)
HCT VFR BLD CALC: 31.3 % — LOW (ref 34.5–45)
HGB BLD-MCNC: 9.7 G/DL — LOW (ref 11.5–15.5)
IANC: 7.31 K/UL — SIGNIFICANT CHANGE UP (ref 1.5–8.5)
IMM GRANULOCYTES NFR BLD AUTO: 0.5 % — SIGNIFICANT CHANGE UP (ref 0–1.5)
LYMPHOCYTES # BLD AUTO: 1.46 K/UL — SIGNIFICANT CHANGE UP (ref 1–3.3)
LYMPHOCYTES # BLD AUTO: 14.8 % — SIGNIFICANT CHANGE UP (ref 13–44)
MAGNESIUM SERPL-MCNC: 2.2 MG/DL — SIGNIFICANT CHANGE UP (ref 1.6–2.6)
MCHC RBC-ENTMCNC: 25.8 PG — LOW (ref 27–34)
MCHC RBC-ENTMCNC: 31 GM/DL — LOW (ref 32–36)
MCV RBC AUTO: 83.2 FL — SIGNIFICANT CHANGE UP (ref 80–100)
METHOD TYPE: SIGNIFICANT CHANGE UP
MONOCYTES # BLD AUTO: 0.91 K/UL — HIGH (ref 0–0.9)
MONOCYTES NFR BLD AUTO: 9.2 % — SIGNIFICANT CHANGE UP (ref 2–14)
NEUTROPHILS # BLD AUTO: 7.31 K/UL — SIGNIFICANT CHANGE UP (ref 1.8–7.4)
NEUTROPHILS NFR BLD AUTO: 73.9 % — SIGNIFICANT CHANGE UP (ref 43–77)
NRBC # BLD: 0 /100 WBCS — SIGNIFICANT CHANGE UP
NRBC # FLD: 0 K/UL — SIGNIFICANT CHANGE UP
ORGANISM # SPEC MICROSCOPIC CNT: SIGNIFICANT CHANGE UP
PHOSPHATE SERPL-MCNC: 2.6 MG/DL — SIGNIFICANT CHANGE UP (ref 2.5–4.5)
PLATELET # BLD AUTO: 331 K/UL — SIGNIFICANT CHANGE UP (ref 150–400)
POTASSIUM SERPL-MCNC: 3.5 MMOL/L — SIGNIFICANT CHANGE UP (ref 3.5–5.3)
POTASSIUM SERPL-SCNC: 3.5 MMOL/L — SIGNIFICANT CHANGE UP (ref 3.5–5.3)
RBC # BLD: 3.76 M/UL — LOW (ref 3.8–5.2)
RBC # FLD: 14.7 % — HIGH (ref 10.3–14.5)
SODIUM SERPL-SCNC: 143 MMOL/L — SIGNIFICANT CHANGE UP (ref 135–145)
SPECIMEN SOURCE: SIGNIFICANT CHANGE UP
WBC # BLD: 9.89 K/UL — SIGNIFICANT CHANGE UP (ref 3.8–10.5)
WBC # FLD AUTO: 9.89 K/UL — SIGNIFICANT CHANGE UP (ref 3.8–10.5)

## 2021-08-28 PROCEDURE — 99232 SBSQ HOSP IP/OBS MODERATE 35: CPT

## 2021-08-28 RX ORDER — INSULIN GLARGINE 100 [IU]/ML
3 INJECTION, SOLUTION SUBCUTANEOUS AT BEDTIME
Refills: 0 | Status: DISCONTINUED | OUTPATIENT
Start: 2021-08-28 | End: 2021-08-29

## 2021-08-28 RX ADMIN — HYDROMORPHONE HYDROCHLORIDE 6 MILLIGRAM(S): 2 INJECTION INTRAMUSCULAR; INTRAVENOUS; SUBCUTANEOUS at 18:17

## 2021-08-28 RX ADMIN — Medication 500 MILLIGRAM(S): at 12:52

## 2021-08-28 RX ADMIN — FENTANYL CITRATE 1 PATCH: 50 INJECTION INTRAVENOUS at 20:58

## 2021-08-28 RX ADMIN — Medication 50 MILLIGRAM(S): at 12:52

## 2021-08-28 RX ADMIN — FENTANYL CITRATE 1 PATCH: 50 INJECTION INTRAVENOUS at 07:46

## 2021-08-28 RX ADMIN — GABAPENTIN 300 MILLIGRAM(S): 400 CAPSULE ORAL at 22:10

## 2021-08-28 RX ADMIN — GABAPENTIN 300 MILLIGRAM(S): 400 CAPSULE ORAL at 17:06

## 2021-08-28 RX ADMIN — Medication 1: at 17:07

## 2021-08-28 RX ADMIN — GABAPENTIN 300 MILLIGRAM(S): 400 CAPSULE ORAL at 07:07

## 2021-08-28 RX ADMIN — FENTANYL CITRATE 1 PATCH: 50 INJECTION INTRAVENOUS at 18:29

## 2021-08-28 RX ADMIN — Medication 100 MILLIGRAM(S): at 12:52

## 2021-08-28 RX ADMIN — INSULIN GLARGINE 3 UNIT(S): 100 INJECTION, SOLUTION SUBCUTANEOUS at 22:09

## 2021-08-28 RX ADMIN — FENTANYL CITRATE 1 PATCH: 50 INJECTION INTRAVENOUS at 22:10

## 2021-08-28 RX ADMIN — Medication 2.5 MILLIGRAM(S): at 07:07

## 2021-08-28 RX ADMIN — Medication 2.5 MILLIGRAM(S): at 18:17

## 2021-08-28 RX ADMIN — POLYETHYLENE GLYCOL 3350 17 GRAM(S): 17 POWDER, FOR SOLUTION ORAL at 12:52

## 2021-08-28 RX ADMIN — Medication 2: at 22:09

## 2021-08-28 RX ADMIN — SENNA PLUS 2 TABLET(S): 8.6 TABLET ORAL at 22:10

## 2021-08-28 RX ADMIN — Medication 1: at 12:51

## 2021-08-28 RX ADMIN — ENOXAPARIN SODIUM 30 MILLIGRAM(S): 100 INJECTION SUBCUTANEOUS at 12:52

## 2021-08-28 NOTE — PROGRESS NOTE ADULT - PROBLEM SELECTOR PLAN 2
Per documentation, pt treated for CAUTI at [facility] w/Ceftriaxone on 7/27 x5 days. UCx at that time grew >100K GNR & >3 organisms per HIE. Pt reports having high grade fevers at that time to 101.  Tmax in .3, WBC 11  -UA +,  urine culture <100,000 Psuedomonas and ecoli, rpt culture <10,000  -blood culture neg to date  -s/p Zosyn- 3 day course completed  -ID consult appreciated

## 2021-08-28 NOTE — PROGRESS NOTE ADULT - PROBLEM SELECTOR PLAN 1
Pt p/w abdominal distention and pain since 8/20, CTAP with new ascites. Low grade temp in ER to 100.3  s/p diagnostic para in ER  -doubt peritonitis  -s/p Zosyn in ER, completed 3 day course of zosyn for UTI  -Per oncology and pall care, no further treatment is being offered  -Hospice referral done and plan for likely discharge to hospice/bri on Monday

## 2021-08-28 NOTE — PROGRESS NOTE ADULT - SUBJECTIVE AND OBJECTIVE BOX
[facility] Division of Hospital Medicine  Rosa Riley MD  Pager: [phone]      Patient is a 70y old  Female who presents with a chief complaint of abdominal pain (28 Aug 2021 13:47)      SUBJECTIVE / OVERNIGHT EVENTS: patient seen and examined. states she feels okay. Asking if her abd is always going to be distended - i said that the fluid will continue to accumulate even if we drain it because of the cancer. She would like another blanket bc she is cold.     MEDICATIONS  (STANDING):  ascorbic acid 500 milliGRAM(s) Oral daily  dextrose 40% Gel 15 Gram(s) Oral once  dextrose 5%. 1000 milliLiter(s) (50 mL/Hr) IV Continuous <Continuous>  dextrose 5%. 1000 milliLiter(s) (100 mL/Hr) IV Continuous <Continuous>  dextrose 50% Injectable 25 Gram(s) IV Push once  dextrose 50% Injectable 12.5 Gram(s) IV Push once  dextrose 50% Injectable 25 Gram(s) IV Push once  dronabinol 2.5 milliGRAM(s) Oral two times a day  enoxaparin Injectable 30 milliGRAM(s) SubCutaneous daily  fentaNYL   Patch  12 MICROgram(s)/Hr 1 Patch Transdermal every 72 hours  gabapentin 300 milliGRAM(s) Oral three times a day  glucagon  Injectable 1 milliGRAM(s) IntraMuscular once  insulin glargine Injectable (LANTUS) 3 Unit(s) SubCutaneous at bedtime  insulin lispro (ADMELOG) corrective regimen sliding scale   SubCutaneous Before meals and at bedtime  polyethylene glycol 3350 17 Gram(s) Oral daily  pyridoxine 50 milliGRAM(s) Oral daily  senna 2 Tablet(s) Oral at bedtime  thiamine 100 milliGRAM(s) Oral daily    MEDICATIONS  (PRN):  acetaminophen   Tablet .. 650 milliGRAM(s) Oral every 6 hours PRN Temp greater or equal to 38C (100.4F), Mild Pain (1 - 3)  aluminum hydroxide/magnesium hydroxide/simethicone Suspension 30 milliLiter(s) Oral every 4 hours PRN Dyspepsia  HYDROmorphone   Tablet 6 milliGRAM(s) Oral every 6 hours PRN mod-severe pain  melatonin 3 milliGRAM(s) Oral at bedtime PRN Insomnia  ondansetron Injectable 4 milliGRAM(s) IV Push every 8 hours PRN Nausea and/or Vomiting      CAPILLARY BLOOD GLUCOSE      POCT Blood Glucose.: 188 mg/dL (28 Aug 2021 12:25)  POCT Blood Glucose.: 159 mg/dL (28 Aug 2021 08:40)  POCT Blood Glucose.: 95 mg/dL (28 Aug 2021 07:22)  POCT Blood Glucose.: 63 mg/dL (28 Aug 2021 06:58)  POCT Blood Glucose.: 40 mg/dL (28 Aug 2021 06:41)  POCT Blood Glucose.: 172 mg/dL (27 Aug 2021 22:12)  POCT Blood Glucose.: 199 mg/dL (27 Aug 2021 17:52)    I&O's Summary    27 Aug 2021 07:01  -  28 Aug 2021 07:00  --------------------------------------------------------  IN: 0 mL / OUT: 350 mL / NET: -350 mL        PHYSICAL EXAM:  Vital Signs Last 24 Hrs  T(C): 37 (28 Aug 2021 13:54), Max: 37.5 (27 Aug 2021 21:42)  T(F): 98.6 (28 Aug 2021 13:54), Max: 99.5 (27 Aug 2021 21:42)  HR: 100 (28 Aug 2021 13:54) (94 - 100)  BP: 98/58 (28 Aug 2021 13:54) (97/59 - 101/61)  BP(mean): --  RR: 16 (28 Aug 2021 13:54) (16 - 17)  SpO2: 100% (28 Aug 2021 13:54) (98% - 100%)    CONSTITUTIONAL: chronically ill appearing, cachetic appearing female in NAD   ENMT: Moist oral mucosa  RESPIRATORY: Normal respiratory effort; lungs are clear to auscultation bilaterally  CARDIOVASCULAR:  S1/S2; No lower extremity edema  ABDOMEN: distended, +ascites; Nontender to palpation, normoactive bowel sounds, no rebound/guarding  MUSCULOSKELETAL:  no clubbing or cyanosis of digits; no joint swelling or tenderness to palpation  PSYCH: A+O to person, place, and time; affect appropriate  NEUROLOGY: no focal deficits  SKIN: No rashes; no palpable lesions    LABS:                        9.7    9.89  )-----------( 331      ( 28 Aug 2021 06:54 )             31.3     08-28    143  |  107  |  16  ----------------------------<  38<LL>  3.5   |  26  |  0.46<L>    Ca    8.5      28 Aug 2021 06:54  Phos  2.6     08-28  Mg     2.20     08-28    TPro  6.1  /  Alb  2.2<L>  /  TBili  0.2  /  DBili  x   /  AST  16  /  ALT  8   /  AlkPhos  73  08-27              Culture - Urine (collected 25 Aug 2021 19:14)  Source: Catheterized Catheterized  Final Report (26 Aug 2021 23:41):    <10,000 CFU/mL Normal Urogenital Jessica        RADIOLOGY & ADDITIONAL TESTS:  Results Reviewed:   Imaging Personally Reviewed:  Electrocardiogram Personally Reviewed:    COORDINATION OF CARE:  Care Discussed with Consultants/Other Providers [Y/N]:  Prior or Outpatient Records Reviewed [Y/N]:

## 2021-08-28 NOTE — DIETITIAN NUTRITION RISK NOTIFICATION - TREATMENT: THE FOLLOWING DIET HAS BEEN RECOMMENDED
Diet, DASH/TLC:   Sodium & Cholesterol Restricted  Consistent Carbohydrate {Evening Snack} (CSTCHOSN)  1000mL Fluid Restriction ([id number]) (08-25-21 @ 12:07) [Active]

## 2021-08-28 NOTE — PROGRESS NOTE ADULT - ASSESSMENT
70F w/ T2DM c/b neuropathy, chronic joy, pancreatic cancer w/ mets to lung and liver on chemo a/w new ascites on CT s/p para. Now plan for dispo to [facility] w/ hospice.

## 2021-08-28 NOTE — DIETITIAN INITIAL EVALUATION ADULT. - OTHER INFO
71 y/o female with metastatic pancreatic ca admitted with dx of abdominal pain and distention. Pt s/p diagnostic paracentesis today. Visited with pt to obtain nutrition hx. Pt said she doesn't like hospital food, therefore she hasn't been eating much. Her oral intake at home was not very good as she recently had chemo PTA. She denies food allergies, nausea/vomiting/diarrhea/constipation, or issues with chewing/swallowing; last BM 8/27. Food preferences taken and menu alternatives discussed. She said her weight was relatively stable PTA - she said she had lost a lot of weight earlier this year. This aligns with [facility] records with weight 4/21 42.2 kg with cw 43.5 kg. She drank Glucerna ONS at home and requested receiving some - suggest Glucerna Therapeutic Nutrition Shake 240mls 3x daily (660kcals, 30g protein) to optimize nutrition and oral intake. Pt exhibits severe subcutaneous fat store loss and muscle mass wasting which place her at severe risk for malnutrition. Encourage and monitor oral intake, especially of nutrition supplement. RDN services to remain available as needed.

## 2021-08-28 NOTE — DIETITIAN INITIAL EVALUATION ADULT. - PERTINENT LABORATORY DATA
08-28 Na 143 mmol/L Glu 38 mg/dL<LL> K+ 3.5 mmol/L Cr 0.46 mg/dL<L> BUN 16 mg/dL Phos 2.6 mg/dL  08-28 @ 12:25 POCT 188 mg/dL  08-28 @ 08:40 POCT 159 mg/dL  08-28 @ 07:22 POCT 95 mg/dL  08-28 @ 06:58 POCT 63 mg/dL  08-28 @ 06:41 POCT 40 mg/dL  08-27 @ 22:12 POCT 172 mg/dL  08-27 @ 17:52 POCT 199 mg/dL

## 2021-08-28 NOTE — PROVIDER CONTACT NOTE (OTHER) - ASSESSMENT
Other vitals WDL and documented in flowsheet, no signs of acute distress noted.
FS before breakfast was 159 - per sliding scale, pt requires 1 unit of admelog  Pt refusing insulin bc she was hypoglycemic this AM

## 2021-08-28 NOTE — PROGRESS NOTE ADULT - PROBLEM SELECTOR PLAN 5
Meds: Basaglar 8U qhs, Novalog 6U TID   -Patient with another episode of hypoglycemia this AM --> Lantus decreased from 6U to 3U. Will continue to monitor and adjust regimen as necessary to avoid hypoglycemia.    -C/w ISS/FS   -Consistent carb diet  -A1c 7.1

## 2021-08-28 NOTE — DIETITIAN INITIAL EVALUATION ADULT. - PERTINENT MEDS FT
MEDICATIONS  (STANDING):  ascorbic acid 500 milliGRAM(s) Oral daily  dextrose 40% Gel 15 Gram(s) Oral once  dextrose 5%. 1000 milliLiter(s) (50 mL/Hr) IV Continuous <Continuous>  dextrose 5%. 1000 milliLiter(s) (100 mL/Hr) IV Continuous <Continuous>  dextrose 50% Injectable 25 Gram(s) IV Push once  dextrose 50% Injectable 12.5 Gram(s) IV Push once  dextrose 50% Injectable 25 Gram(s) IV Push once  dronabinol 2.5 milliGRAM(s) Oral two times a day  enoxaparin Injectable 30 milliGRAM(s) SubCutaneous daily  fentaNYL   Patch  12 MICROgram(s)/Hr 1 Patch Transdermal every 72 hours  gabapentin 300 milliGRAM(s) Oral three times a day  glucagon  Injectable 1 milliGRAM(s) IntraMuscular once  insulin glargine Injectable (LANTUS) 3 Unit(s) SubCutaneous at bedtime  insulin lispro (ADMELOG) corrective regimen sliding scale   SubCutaneous Before meals and at bedtime  polyethylene glycol 3350 17 Gram(s) Oral daily  pyridoxine 50 milliGRAM(s) Oral daily  senna 2 Tablet(s) Oral at bedtime  thiamine 100 milliGRAM(s) Oral daily

## 2021-08-28 NOTE — PROGRESS NOTE ADULT - PROBLEM SELECTOR PLAN 4
Follows with Dr. Amaury Vieira  -CT reviewed: large volume ascites, grossly unchanged pancreatic and hepatic lesions.   -Plan for hospice as above   -Pain control with Fentanyl patch, PO Dilaudid and Gabapentin

## 2021-08-29 LAB
ANION GAP SERPL CALC-SCNC: 10 MMOL/L — SIGNIFICANT CHANGE UP (ref 7–14)
BASOPHILS # BLD AUTO: 0.03 K/UL — SIGNIFICANT CHANGE UP (ref 0–0.2)
BASOPHILS NFR BLD AUTO: 0.4 % — SIGNIFICANT CHANGE UP (ref 0–2)
BUN SERPL-MCNC: 17 MG/DL — SIGNIFICANT CHANGE UP (ref 7–23)
CALCIUM SERPL-MCNC: 8.5 MG/DL — SIGNIFICANT CHANGE UP (ref 8.4–10.5)
CHLORIDE SERPL-SCNC: 107 MMOL/L — SIGNIFICANT CHANGE UP (ref 98–107)
CO2 SERPL-SCNC: 24 MMOL/L — SIGNIFICANT CHANGE UP (ref 22–31)
CREAT SERPL-MCNC: 0.52 MG/DL — SIGNIFICANT CHANGE UP (ref 0.5–1.3)
EOSINOPHIL # BLD AUTO: 0.19 K/UL — SIGNIFICANT CHANGE UP (ref 0–0.5)
EOSINOPHIL NFR BLD AUTO: 2.3 % — SIGNIFICANT CHANGE UP (ref 0–6)
GLUCOSE SERPL-MCNC: 67 MG/DL — LOW (ref 70–99)
HCT VFR BLD CALC: 29.6 % — LOW (ref 34.5–45)
HGB BLD-MCNC: 9.1 G/DL — LOW (ref 11.5–15.5)
IANC: 6.19 K/UL — SIGNIFICANT CHANGE UP (ref 1.5–8.5)
IMM GRANULOCYTES NFR BLD AUTO: 0.5 % — SIGNIFICANT CHANGE UP (ref 0–1.5)
LYMPHOCYTES # BLD AUTO: 1.28 K/UL — SIGNIFICANT CHANGE UP (ref 1–3.3)
LYMPHOCYTES # BLD AUTO: 15.3 % — SIGNIFICANT CHANGE UP (ref 13–44)
MAGNESIUM SERPL-MCNC: 2.3 MG/DL — SIGNIFICANT CHANGE UP (ref 1.6–2.6)
MCHC RBC-ENTMCNC: 26.1 PG — LOW (ref 27–34)
MCHC RBC-ENTMCNC: 30.7 GM/DL — LOW (ref 32–36)
MCV RBC AUTO: 84.8 FL — SIGNIFICANT CHANGE UP (ref 80–100)
MONOCYTES # BLD AUTO: 0.64 K/UL — SIGNIFICANT CHANGE UP (ref 0–0.9)
MONOCYTES NFR BLD AUTO: 7.6 % — SIGNIFICANT CHANGE UP (ref 2–14)
NEUTROPHILS # BLD AUTO: 6.19 K/UL — SIGNIFICANT CHANGE UP (ref 1.8–7.4)
NEUTROPHILS NFR BLD AUTO: 73.9 % — SIGNIFICANT CHANGE UP (ref 43–77)
NRBC # BLD: 0 /100 WBCS — SIGNIFICANT CHANGE UP
NRBC # FLD: 0 K/UL — SIGNIFICANT CHANGE UP
PHOSPHATE SERPL-MCNC: 3.2 MG/DL — SIGNIFICANT CHANGE UP (ref 2.5–4.5)
PLATELET # BLD AUTO: 330 K/UL — SIGNIFICANT CHANGE UP (ref 150–400)
POTASSIUM SERPL-MCNC: 3.8 MMOL/L — SIGNIFICANT CHANGE UP (ref 3.5–5.3)
POTASSIUM SERPL-SCNC: 3.8 MMOL/L — SIGNIFICANT CHANGE UP (ref 3.5–5.3)
RBC # BLD: 3.49 M/UL — LOW (ref 3.8–5.2)
RBC # FLD: 14.8 % — HIGH (ref 10.3–14.5)
SODIUM SERPL-SCNC: 141 MMOL/L — SIGNIFICANT CHANGE UP (ref 135–145)
WBC # BLD: 8.37 K/UL — SIGNIFICANT CHANGE UP (ref 3.8–10.5)
WBC # FLD AUTO: 8.37 K/UL — SIGNIFICANT CHANGE UP (ref 3.8–10.5)

## 2021-08-29 PROCEDURE — 99232 SBSQ HOSP IP/OBS MODERATE 35: CPT

## 2021-08-29 RX ADMIN — POLYETHYLENE GLYCOL 3350 17 GRAM(S): 17 POWDER, FOR SOLUTION ORAL at 12:30

## 2021-08-29 RX ADMIN — Medication 2.5 MILLIGRAM(S): at 17:57

## 2021-08-29 RX ADMIN — FENTANYL CITRATE 1 PATCH: 50 INJECTION INTRAVENOUS at 07:14

## 2021-08-29 RX ADMIN — GABAPENTIN 300 MILLIGRAM(S): 400 CAPSULE ORAL at 21:43

## 2021-08-29 RX ADMIN — Medication 2: at 17:56

## 2021-08-29 RX ADMIN — Medication 3 MILLIGRAM(S): at 21:43

## 2021-08-29 RX ADMIN — SENNA PLUS 2 TABLET(S): 8.6 TABLET ORAL at 21:43

## 2021-08-29 RX ADMIN — HYDROMORPHONE HYDROCHLORIDE 6 MILLIGRAM(S): 2 INJECTION INTRAMUSCULAR; INTRAVENOUS; SUBCUTANEOUS at 19:32

## 2021-08-29 RX ADMIN — Medication 50 MILLIGRAM(S): at 12:30

## 2021-08-29 RX ADMIN — Medication 100 MILLIGRAM(S): at 12:28

## 2021-08-29 RX ADMIN — HYDROMORPHONE HYDROCHLORIDE 6 MILLIGRAM(S): 2 INJECTION INTRAMUSCULAR; INTRAVENOUS; SUBCUTANEOUS at 04:26

## 2021-08-29 RX ADMIN — HYDROMORPHONE HYDROCHLORIDE 6 MILLIGRAM(S): 2 INJECTION INTRAMUSCULAR; INTRAVENOUS; SUBCUTANEOUS at 18:41

## 2021-08-29 RX ADMIN — ENOXAPARIN SODIUM 30 MILLIGRAM(S): 100 INJECTION SUBCUTANEOUS at 12:28

## 2021-08-29 RX ADMIN — FENTANYL CITRATE 1 PATCH: 50 INJECTION INTRAVENOUS at 17:07

## 2021-08-29 RX ADMIN — Medication 2.5 MILLIGRAM(S): at 06:36

## 2021-08-29 RX ADMIN — Medication 500 MILLIGRAM(S): at 12:30

## 2021-08-29 RX ADMIN — GABAPENTIN 300 MILLIGRAM(S): 400 CAPSULE ORAL at 12:33

## 2021-08-29 RX ADMIN — HYDROMORPHONE HYDROCHLORIDE 6 MILLIGRAM(S): 2 INJECTION INTRAMUSCULAR; INTRAVENOUS; SUBCUTANEOUS at 03:26

## 2021-08-29 RX ADMIN — GABAPENTIN 300 MILLIGRAM(S): 400 CAPSULE ORAL at 06:36

## 2021-08-29 NOTE — PROGRESS NOTE ADULT - SUBJECTIVE AND OBJECTIVE BOX
[facility] Medicine        Patient is a 70y old  Female who presents with a chief complaint of abdominal pain (28 Aug 2021 13:47)      SUBJECTIVE / OVERNIGHT EVENTS: States she has LLQ pain with distention. Pain is worse on standing but tolerable. the pain radiates to the right side of her abd. Wants to participate in PT.    MEDICATIONS  (STANDING):  ascorbic acid 500 milliGRAM(s) Oral daily  dextrose 40% Gel 15 Gram(s) Oral once  dextrose 5%. 1000 milliLiter(s) (50 mL/Hr) IV Continuous <Continuous>  dextrose 5%. 1000 milliLiter(s) (100 mL/Hr) IV Continuous <Continuous>  dextrose 50% Injectable 25 Gram(s) IV Push once  dextrose 50% Injectable 12.5 Gram(s) IV Push once  dextrose 50% Injectable 25 Gram(s) IV Push once  dronabinol 2.5 milliGRAM(s) Oral two times a day  enoxaparin Injectable 30 milliGRAM(s) SubCutaneous daily  fentaNYL   Patch  12 MICROgram(s)/Hr 1 Patch Transdermal every 72 hours  gabapentin 300 milliGRAM(s) Oral three times a day  glucagon  Injectable 1 milliGRAM(s) IntraMuscular once  insulin glargine Injectable (LANTUS) 3 Unit(s) SubCutaneous at bedtime  insulin lispro (ADMELOG) corrective regimen sliding scale   SubCutaneous Before meals and at bedtime  polyethylene glycol 3350 17 Gram(s) Oral daily  pyridoxine 50 milliGRAM(s) Oral daily  senna 2 Tablet(s) Oral at bedtime  thiamine 100 milliGRAM(s) Oral daily    MEDICATIONS  (PRN):  acetaminophen   Tablet .. 650 milliGRAM(s) Oral every 6 hours PRN Temp greater or equal to 38C (100.4F), Mild Pain (1 - 3)  aluminum hydroxide/magnesium hydroxide/simethicone Suspension 30 milliLiter(s) Oral every 4 hours PRN Dyspepsia  HYDROmorphone   Tablet 6 milliGRAM(s) Oral every 6 hours PRN mod-severe pain  melatonin 3 milliGRAM(s) Oral at bedtime PRN Insomnia  ondansetron Injectable 4 milliGRAM(s) IV Push every 8 hours PRN Nausea and/or Vomiting      CAPILLARY BLOOD GLUCOSE      POCT Blood Glucose.: 188 mg/dL (28 Aug 2021 12:25)  POCT Blood Glucose.: 159 mg/dL (28 Aug 2021 08:40)  POCT Blood Glucose.: 95 mg/dL (28 Aug 2021 07:22)  POCT Blood Glucose.: 63 mg/dL (28 Aug 2021 06:58)  POCT Blood Glucose.: 40 mg/dL (28 Aug 2021 06:41)  POCT Blood Glucose.: 172 mg/dL (27 Aug 2021 22:12)  POCT Blood Glucose.: 199 mg/dL (27 Aug 2021 17:52)    I&O's Summary    27 Aug 2021 07:01  -  28 Aug 2021 07:00  --------------------------------------------------------  IN: 0 mL / OUT: 350 mL / NET: -350 mL        PHYSICAL EXAM:  Vital Signs Last 24 Hrs  T(C): 37 (28 Aug 2021 13:54), Max: 37.5 (27 Aug 2021 21:42)  T(F): 98.6 (28 Aug 2021 13:54), Max: 99.5 (27 Aug 2021 21:42)  HR: 100 (28 Aug 2021 13:54) (94 - 100)  BP: 98/58 (28 Aug 2021 13:54) (97/59 - 101/61)  BP(mean): --  RR: 16 (28 Aug 2021 13:54) (16 - 17)  SpO2: 100% (28 Aug 2021 13:54) (98% - 100%)    CONSTITUTIONAL: chronically ill appearing, cachetic appearing female in NAD   ENMT: Moist oral mucosa  RESPIRATORY: Normal respiratory effort; lungs are clear to auscultation bilaterally  CARDIOVASCULAR:  S1/S2; No lower extremity edema  ABDOMEN: distended, +ascites; Nontender to palpation, normoactive bowel sounds, no rebound/guarding  MUSCULOSKELETAL:  no clubbing or cyanosis of digits; no joint swelling or tenderness to palpation  PSYCH: A+O to person, place, and time; affect appropriate  NEUROLOGY: no focal deficits  SKIN: No rashes; no palpable lesions    LABS:                        9.7    9.89  )-----------( 331      ( 28 Aug 2021 06:54 )             31.3     08-28    143  |  107  |  16  ----------------------------<  38<LL>  3.5   |  26  |  0.46<L>    Ca    8.5      28 Aug 2021 06:54  Phos  2.6     08-28  Mg     2.20     08-28    TPro  6.1  /  Alb  2.2<L>  /  TBili  0.2  /  DBili  x   /  AST  16  /  ALT  8   /  AlkPhos  73  08-27              Culture - Urine (collected 25 Aug 2021 19:14)  Source: Catheterized Catheterized  Final Report (26 Aug 2021 23:41):    <10,000 CFU/mL Normal Urogenital Jessica        RADIOLOGY & ADDITIONAL TESTS:  Results Reviewed:   Imaging Personally Reviewed:  Electrocardiogram Personally Reviewed:    COORDINATION OF CARE:  Care Discussed with Consultants/Other Providers [Y/N]:  Prior or Outpatient Records Reviewed [Y/N]:

## 2021-08-29 NOTE — PROVIDER CONTACT NOTE (HYPOGLYCEMIA EVENT) - NS PROVIDER CONTACT BACKGROUND-HYPO
Age: 70y    Gender: Female    POCT Blood Glucose:  140 mg/dL (08-29-21 @ 10:28)  86 mg/dL (08-29-21 @ 09:08)  53 mg/dL (08-29-21 @ 08:49)  56 mg/dL (08-29-21 @ 08:24)  54 mg/dL (08-29-21 @ 08:23)  212 mg/dL (08-28-21 @ 21:40)  163 mg/dL (08-28-21 @ 16:51)  188 mg/dL (08-28-21 @ 12:25)      eMAR:  insulin glargine Injectable (LANTUS)   3 Unit(s) SubCutaneous (08-28-21 @ 22:09)    insulin lispro (ADMELOG) corrective regimen sliding scale   2 Unit(s) SubCutaneous (08-28-21 @ 22:09)   1 Unit(s) SubCutaneous (08-28-21 @ 17:07)   1 Unit(s) SubCutaneous (08-28-21 @ 12:51)    
Age: 70y    Gender: Female    POCT Blood Glucose:  63 mg/dL (08-28-21 @ 06:58)  40 mg/dL (08-28-21 @ 06:41)  172 mg/dL (08-27-21 @ 22:12)  199 mg/dL (08-27-21 @ 17:52)  182 mg/dL (08-27-21 @ 12:37)  161 mg/dL (08-27-21 @ 08:28)      eMAR:  insulin glargine Injectable (LANTUS)   6 Unit(s) SubCutaneous (08-27-21 @ 22:16)    insulin lispro (ADMELOG) corrective regimen sliding scale   1 Unit(s) SubCutaneous (08-27-21 @ 18:26)   1 Unit(s) SubCutaneous (08-27-21 @ 12:47)   1 Unit(s) SubCutaneous (08-27-21 @ 08:57)

## 2021-08-29 NOTE — PROVIDER CONTACT NOTE (HYPOGLYCEMIA EVENT) - NS PROVIDER CONTACT RECOMMEND-HYPO
Hypoglycemia protocol    Patient had cookie at bedside, food from home, and apple juice, rechecked sugar after 15 mins 53.  An additional apple juice was given--> 15 minutes later blood glucose 86. 
patient refused hypogylcemia protocol only wishes to eat chocolate/  cake/coffee/apple juice

## 2021-08-29 NOTE — PROVIDER CONTACT NOTE (HYPOGLYCEMIA EVENT) - NS PROVIDER CONTACT SITUATION-HYPO
patient [id number] admitted for abdomen.  FS 40 
Hypoglycemia, glood glucose 56 (repeated), asymptomatic and alert

## 2021-08-29 NOTE — PROGRESS NOTE ADULT - ASSESSMENT
70F w/ T2DM c/b neuropathy, chronic joy, pancreatic cancer w/ mets to lung and liver on chemo a/w new ascites on CT s/p para. Now plan for dispo to [facility] w/ hospice.

## 2021-08-29 NOTE — PROGRESS NOTE ADULT - TIME-BASED BILLING (NON-CRITICAL CARE)
Time-based billing (NON-critical care) Birth Control Pills Pregnancy And Lactation Text: This medication should be avoided if pregnant and for the first 30 days post-partum.

## 2021-08-29 NOTE — PROVIDER CONTACT NOTE (HYPOGLYCEMIA EVENT) - NS PROVIDER CONTACT NOTE-TREATMENT-HYPO
4 oz Fruit Juice (Specify quantity, date/time)/Other (Specify)
Patient had cookie at bedside, food from home, and apple juice, rechecked sugar after 15 mins 53.  An additional apple juice was given--> 15 minutes later blood glucose 86. Provider contacted--> will recheck 30 mins post breakfast b/c patient alert and tolerating food. Rechecked blood glucose 140./4 oz Fruit Juice (Specify quantity, date/time)

## 2021-08-30 ENCOUNTER — APPOINTMENT (OUTPATIENT)
Dept: INFUSION THERAPY | Facility: HOSPITAL | Age: 71
End: 2021-08-30

## 2021-08-30 DIAGNOSIS — I95.9 HYPOTENSION, UNSPECIFIED: ICD-10-CM

## 2021-08-30 LAB
ANION GAP SERPL CALC-SCNC: 11 MMOL/L — SIGNIFICANT CHANGE UP (ref 7–14)
BASOPHILS # BLD AUTO: 0.03 K/UL — SIGNIFICANT CHANGE UP (ref 0–0.2)
BASOPHILS NFR BLD AUTO: 0.4 % — SIGNIFICANT CHANGE UP (ref 0–2)
BUN SERPL-MCNC: 20 MG/DL — SIGNIFICANT CHANGE UP (ref 7–23)
CALCIUM SERPL-MCNC: 8.4 MG/DL — SIGNIFICANT CHANGE UP (ref 8.4–10.5)
CHLORIDE SERPL-SCNC: 108 MMOL/L — HIGH (ref 98–107)
CO2 SERPL-SCNC: 24 MMOL/L — SIGNIFICANT CHANGE UP (ref 22–31)
CREAT SERPL-MCNC: 0.56 MG/DL — SIGNIFICANT CHANGE UP (ref 0.5–1.3)
CULTURE RESULTS: SIGNIFICANT CHANGE UP
EOSINOPHIL # BLD AUTO: 0.17 K/UL — SIGNIFICANT CHANGE UP (ref 0–0.5)
EOSINOPHIL NFR BLD AUTO: 2.1 % — SIGNIFICANT CHANGE UP (ref 0–6)
GLUCOSE SERPL-MCNC: 155 MG/DL — HIGH (ref 70–99)
HCT VFR BLD CALC: 29.7 % — LOW (ref 34.5–45)
HGB BLD-MCNC: 9.1 G/DL — LOW (ref 11.5–15.5)
IANC: 5.92 K/UL — SIGNIFICANT CHANGE UP (ref 1.5–8.5)
IMM GRANULOCYTES NFR BLD AUTO: 0.5 % — SIGNIFICANT CHANGE UP (ref 0–1.5)
LYMPHOCYTES # BLD AUTO: 1.27 K/UL — SIGNIFICANT CHANGE UP (ref 1–3.3)
LYMPHOCYTES # BLD AUTO: 15.9 % — SIGNIFICANT CHANGE UP (ref 13–44)
MAGNESIUM SERPL-MCNC: 2.3 MG/DL — SIGNIFICANT CHANGE UP (ref 1.6–2.6)
MCHC RBC-ENTMCNC: 26.1 PG — LOW (ref 27–34)
MCHC RBC-ENTMCNC: 30.6 GM/DL — LOW (ref 32–36)
MCV RBC AUTO: 85.1 FL — SIGNIFICANT CHANGE UP (ref 80–100)
MONOCYTES # BLD AUTO: 0.58 K/UL — SIGNIFICANT CHANGE UP (ref 0–0.9)
MONOCYTES NFR BLD AUTO: 7.2 % — SIGNIFICANT CHANGE UP (ref 2–14)
NEUTROPHILS # BLD AUTO: 5.92 K/UL — SIGNIFICANT CHANGE UP (ref 1.8–7.4)
NEUTROPHILS NFR BLD AUTO: 73.9 % — SIGNIFICANT CHANGE UP (ref 43–77)
NRBC # BLD: 0 /100 WBCS — SIGNIFICANT CHANGE UP
NRBC # FLD: 0 K/UL — SIGNIFICANT CHANGE UP
PHOSPHATE SERPL-MCNC: 3.5 MG/DL — SIGNIFICANT CHANGE UP (ref 2.5–4.5)
PLATELET # BLD AUTO: 348 K/UL — SIGNIFICANT CHANGE UP (ref 150–400)
POTASSIUM SERPL-MCNC: 4.1 MMOL/L — SIGNIFICANT CHANGE UP (ref 3.5–5.3)
POTASSIUM SERPL-SCNC: 4.1 MMOL/L — SIGNIFICANT CHANGE UP (ref 3.5–5.3)
RBC # BLD: 3.49 M/UL — LOW (ref 3.8–5.2)
RBC # FLD: 15 % — HIGH (ref 10.3–14.5)
SODIUM SERPL-SCNC: 143 MMOL/L — SIGNIFICANT CHANGE UP (ref 135–145)
SPECIMEN SOURCE: SIGNIFICANT CHANGE UP
WBC # BLD: 8.01 K/UL — SIGNIFICANT CHANGE UP (ref 3.8–10.5)
WBC # FLD AUTO: 8.01 K/UL — SIGNIFICANT CHANGE UP (ref 3.8–10.5)

## 2021-08-30 PROCEDURE — 99232 SBSQ HOSP IP/OBS MODERATE 35: CPT

## 2021-08-30 RX ORDER — SODIUM CHLORIDE 9 MG/ML
500 INJECTION INTRAMUSCULAR; INTRAVENOUS; SUBCUTANEOUS ONCE
Refills: 0 | Status: COMPLETED | OUTPATIENT
Start: 2021-08-30 | End: 2021-08-30

## 2021-08-30 RX ADMIN — GABAPENTIN 300 MILLIGRAM(S): 400 CAPSULE ORAL at 21:52

## 2021-08-30 RX ADMIN — Medication 50 MILLIGRAM(S): at 12:07

## 2021-08-30 RX ADMIN — Medication 2: at 12:06

## 2021-08-30 RX ADMIN — Medication 100 MILLIGRAM(S): at 12:07

## 2021-08-30 RX ADMIN — Medication 2.5 MILLIGRAM(S): at 18:17

## 2021-08-30 RX ADMIN — HYDROMORPHONE HYDROCHLORIDE 6 MILLIGRAM(S): 2 INJECTION INTRAMUSCULAR; INTRAVENOUS; SUBCUTANEOUS at 07:41

## 2021-08-30 RX ADMIN — HYDROMORPHONE HYDROCHLORIDE 6 MILLIGRAM(S): 2 INJECTION INTRAMUSCULAR; INTRAVENOUS; SUBCUTANEOUS at 08:00

## 2021-08-30 RX ADMIN — HYDROMORPHONE HYDROCHLORIDE 6 MILLIGRAM(S): 2 INJECTION INTRAMUSCULAR; INTRAVENOUS; SUBCUTANEOUS at 18:17

## 2021-08-30 RX ADMIN — FENTANYL CITRATE 1 PATCH: 50 INJECTION INTRAVENOUS at 06:11

## 2021-08-30 RX ADMIN — Medication 2.5 MILLIGRAM(S): at 06:01

## 2021-08-30 RX ADMIN — ENOXAPARIN SODIUM 30 MILLIGRAM(S): 100 INJECTION SUBCUTANEOUS at 12:06

## 2021-08-30 RX ADMIN — FENTANYL CITRATE 1 PATCH: 50 INJECTION INTRAVENOUS at 18:29

## 2021-08-30 RX ADMIN — SODIUM CHLORIDE 1000 MILLILITER(S): 9 INJECTION INTRAMUSCULAR; INTRAVENOUS; SUBCUTANEOUS at 14:29

## 2021-08-30 RX ADMIN — POLYETHYLENE GLYCOL 3350 17 GRAM(S): 17 POWDER, FOR SOLUTION ORAL at 12:07

## 2021-08-30 RX ADMIN — SENNA PLUS 2 TABLET(S): 8.6 TABLET ORAL at 21:52

## 2021-08-30 RX ADMIN — Medication 500 MILLIGRAM(S): at 12:07

## 2021-08-30 RX ADMIN — GABAPENTIN 300 MILLIGRAM(S): 400 CAPSULE ORAL at 06:02

## 2021-08-30 RX ADMIN — GABAPENTIN 300 MILLIGRAM(S): 400 CAPSULE ORAL at 12:07

## 2021-08-30 RX ADMIN — HYDROMORPHONE HYDROCHLORIDE 6 MILLIGRAM(S): 2 INJECTION INTRAMUSCULAR; INTRAVENOUS; SUBCUTANEOUS at 19:17

## 2021-08-30 RX ADMIN — Medication 1: at 09:20

## 2021-08-30 NOTE — PROVIDER CONTACT NOTE (OTHER) - ACTION/TREATMENT ORDERED:
Provider aware -  no new orders at this time
Continue to monitor BP, no intervention at this time
PA notified; Will hang 500cc NS bolus over 30min.

## 2021-08-30 NOTE — PROGRESS NOTE ADULT - PROBLEM SELECTOR PLAN 6
A1c 7.1Home Meds: Basaglar 8U qhs, Novalog 6U TID     Course c/b hypoglycemia. Last noted on 8/29 and insulin regimen was reduced and lantus eventually dcd    -c/w current regimen of insulin. C/w ISS for now. Will continue to monitor and adjust regimen as necessary to avoid hypoglycemia.    -Consistent carb diet. May need to liberalize her diet shakira if she is having a poor appetite

## 2021-08-30 NOTE — PROGRESS NOTE ADULT - SUBJECTIVE AND OBJECTIVE BOX
INTERVAL HPI/OVERNIGHT EVENTS:  Patient seen at bedside.  Patient with continued symptoms of abdominal pain  No other acute complaints    VITAL SIGNS:  T(F): 97.4 (08-30-21 @ 06:00)  HR: 101 (08-30-21 @ 06:00)  BP: 106/66 (08-30-21 @ 06:00)  RR: 18 (08-30-21 @ 06:00)  SpO2: 98% (08-30-21 @ 06:00)  Wt(kg): --    PHYSICAL EXAM:    Constitutional: cachectic elderly female in NAD, resting in bed comfortably  Eyes: EOMI, sclera non-icteric  Neck: supple, no LAD  Respiratory: CTA b/l, good air entry b/l,   Cardiovascular: RRR,  Gastrointestinal: soft, distended and tender abdomen  Extremities: no edema  Neurological: AAOx3, non focal  Skin: Normal temperature    MEDICATIONS  (STANDING):  ascorbic acid 500 milliGRAM(s) Oral daily  dextrose 40% Gel 15 Gram(s) Oral once  dextrose 5%. 1000 milliLiter(s) (50 mL/Hr) IV Continuous <Continuous>  dextrose 5%. 1000 milliLiter(s) (100 mL/Hr) IV Continuous <Continuous>  dextrose 50% Injectable 25 Gram(s) IV Push once  dextrose 50% Injectable 12.5 Gram(s) IV Push once  dextrose 50% Injectable 25 Gram(s) IV Push once  dronabinol 2.5 milliGRAM(s) Oral two times a day  fentaNYL   Patch  12 MICROgram(s)/Hr 1 Patch Transdermal every 72 hours  gabapentin 300 milliGRAM(s) Oral three times a day  glucagon  Injectable 1 milliGRAM(s) IntraMuscular once  insulin lispro (ADMELOG) corrective regimen sliding scale   SubCutaneous Before meals and at bedtime  polyethylene glycol 3350 17 Gram(s) Oral daily  pyridoxine 50 milliGRAM(s) Oral daily  senna 2 Tablet(s) Oral at bedtime  thiamine 100 milliGRAM(s) Oral daily    MEDICATIONS  (PRN):  acetaminophen   Tablet .. 650 milliGRAM(s) Oral every 6 hours PRN Temp greater or equal to 38C (100.4F), Mild Pain (1 - 3)  aluminum hydroxide/magnesium hydroxide/simethicone Suspension 30 milliLiter(s) Oral every 4 hours PRN Dyspepsia  HYDROmorphone   Tablet 6 milliGRAM(s) Oral every 6 hours PRN mod-severe pain  melatonin 3 milliGRAM(s) Oral at bedtime PRN Insomnia  ondansetron Injectable 4 milliGRAM(s) IV Push every 8 hours PRN Nausea and/or Vomiting      Allergies    No Known Allergies    Intolerances        LABS:                        9.1    8.01  )-----------( 348      ( 30 Aug 2021 07:28 )             29.7     08-30    143  |  108<H>  |  20  ----------------------------<  155<H>  4.1   |  24  |  0.56    Ca    8.4      30 Aug 2021 07:28  Phos  3.5     08-30  Mg     2.30     08-30            RADIOLOGY & ADDITIONAL TESTS:  Studies reviewed.

## 2021-08-30 NOTE — PROGRESS NOTE ADULT - PROBLEM SELECTOR PLAN 5
Follows with Dr. Amaury Vieira CT reviewed: large volume ascites, grossly unchanged pancreatic and hepatic lesions.   -Plan for hospice as above   -Pain control with Fentanyl patch, PO Dilaudid and Gabapentin

## 2021-08-30 NOTE — PROGRESS NOTE ADULT - ASSESSMENT
70F with metastatic pancreatic cancer w/ mets to lung and liver on xeloda, sent by [facility] for diffuse abdominal pain and distention since Friday. Reports a 10/10 non-radiating diffuse abd pain and significant abdominal distention.   S/p paracentesis in ED. No SBP.   Urine +  Cachectic, weak, PS 3.   CT reviewed. Large volume ascites.    -Appreciate ID consult, s/p abx txn for Catheter associated UTI versus bacteriuria   -S/p diagnostic paracentesis in  the ED (50cc removed). Please get therapeutic tap for symptomatic relief  -Patient is not a candidate for further chemotherapy 2/2 poor performance status and severe cachexia. Patient also endorsing that she is no longer interested in further treatment given her weakened state.  Patient has been living at [facility] since May 2021  after she was hospitalized at [facility] from 4/9-5/4 for LE weakness 2.2 possible  BLE femoropopliteal arterial disease. Patient expressing that her ultimate goal would be walk again and go home. Patient previously lived home alone, had not designated a HCP or NOK , nor has she provided team with someone who would be able to help her at home. I encouraged patient to continue to consider GOC discussions for a safe discharge since home alone could no longer be option.  -Appreciate Palliative Care consult recs for, symptom management,  GOC and  hospice referral. Patient is hospice appropriate.   -Discontinue Xeloda, no further plans for DMT.  -Patient may followup with Dr. Kerr ([facility]) upon discharge  -C/w Supportive care, pain control, Nutrition, PT, DVT ppx  -Oncology will continue to follow with you      Case d/w Dr. Eneida ETIENNE  Oncology Physician Assistant  [facility]  Pager [phone]    If after 5pm or weekends please page On-call Oncology Fellow

## 2021-08-30 NOTE — PROGRESS NOTE ADULT - ATTENDING COMMENTS
Patient seen at bedside. Case discussed with LOWELL Samano. Plan as above.  70f with metastatic pancreatic ca.   poor performance status. Plan is supportive care only.   She has large volume ascites and has abdominal pain and distension. Please consider large volume tap for symptom control.

## 2021-08-30 NOTE — PROGRESS NOTE ADULT - PROBLEM SELECTOR PLAN 1
Pt asymptomatic. Likely multifactorial in the setting of poor PO, reduced effective circulating volume in setting of ascites and meds. Currently no signs of symptoms of infection and pt remains afebrile. H/H has been stable and therefore less concern for bleeding.    -Giving 500cc bolus then will repeat BPs  -encourage PO. Maintenance IVFs if pt not taking in adequate PO  -if spikes fever or signs or symptoms of infection w/o localizing source - would obtain blood cx, and start empiric abx to cover intraabdominal infection . If signs and symptoms lead to alternative source would work that up accordingly.

## 2021-08-30 NOTE — PROGRESS NOTE ADULT - SUBJECTIVE AND OBJECTIVE BOX
Patient is a 70y old  Female who presents with a chief complaint of abdominal pain (29 Aug 2021 19:45)      SUBJECTIVE / OVERNIGHT EVENTS:    Review of Systems:     MEDICATIONS  (STANDING):  ascorbic acid 500 milliGRAM(s) Oral daily  dextrose 40% Gel 15 Gram(s) Oral once  dextrose 5%. 1000 milliLiter(s) (50 mL/Hr) IV Continuous <Continuous>  dextrose 5%. 1000 milliLiter(s) (100 mL/Hr) IV Continuous <Continuous>  dextrose 50% Injectable 25 Gram(s) IV Push once  dextrose 50% Injectable 12.5 Gram(s) IV Push once  dextrose 50% Injectable 25 Gram(s) IV Push once  dronabinol 2.5 milliGRAM(s) Oral two times a day  enoxaparin Injectable 30 milliGRAM(s) SubCutaneous daily  fentaNYL   Patch  12 MICROgram(s)/Hr 1 Patch Transdermal every 72 hours  gabapentin 300 milliGRAM(s) Oral three times a day  glucagon  Injectable 1 milliGRAM(s) IntraMuscular once  insulin lispro (ADMELOG) corrective regimen sliding scale   SubCutaneous Before meals and at bedtime  polyethylene glycol 3350 17 Gram(s) Oral daily  pyridoxine 50 milliGRAM(s) Oral daily  senna 2 Tablet(s) Oral at bedtime  thiamine 100 milliGRAM(s) Oral daily    MEDICATIONS  (PRN):  acetaminophen   Tablet .. 650 milliGRAM(s) Oral every 6 hours PRN Temp greater or equal to 38C (100.4F), Mild Pain (1 - 3)  aluminum hydroxide/magnesium hydroxide/simethicone Suspension 30 milliLiter(s) Oral every 4 hours PRN Dyspepsia  HYDROmorphone   Tablet 6 milliGRAM(s) Oral every 6 hours PRN mod-severe pain  melatonin 3 milliGRAM(s) Oral at bedtime PRN Insomnia  ondansetron Injectable 4 milliGRAM(s) IV Push every 8 hours PRN Nausea and/or Vomiting      PHYSICAL EXAM:    I&O's Summary    29 Aug 2021 07:01  -  30 Aug 2021 07:00  --------------------------------------------------------  IN: 800 mL / OUT: 200 mL / NET: 600 mL      ICU Vital Signs Last 24 Hrs  T(C): 36.3 (30 Aug 2021 06:00), Max: 37 (29 Aug 2021 14:17)  T(F): 97.4 (30 Aug 2021 06:00), Max: 98.6 (29 Aug 2021 14:17)  HR: 101 (30 Aug 2021 06:00) (101 - 105)  BP: 106/66 (30 Aug 2021 06:00) (95/53 - 106/66)  BP(mean): --  ABP: --  ABP(mean): --  RR: 18 (30 Aug 2021 06:00) (17 - 18)  SpO2: 98% (30 Aug 2021 06:00) (98% - 100%)      LABS:  CAPILLARY BLOOD GLUCOSE      POCT Blood Glucose.: 163 mg/dL (30 Aug 2021 08:33)  POCT Blood Glucose.: 166 mg/dL (29 Aug 2021 21:47)  POCT Blood Glucose.: 224 mg/dL (29 Aug 2021 17:30)  POCT Blood Glucose.: 196 mg/dL (29 Aug 2021 12:18)  POCT Blood Glucose.: 140 mg/dL (29 Aug 2021 10:28)                          9.1    8.01  )-----------( 348      ( 30 Aug 2021 07:28 )             29.7     08-30    143  |  108<H>  |  20  ----------------------------<  155<H>  4.1   |  24  |  0.56    Ca    8.4      30 Aug 2021 07:28  Phos  3.5     08-30  Mg     2.30     08-30                RADIOLOGY & ADDITIONAL TESTS:    Imaging Personally Reviewed:    Consultant(s) Notes Reviewed:      Care Discussed with Consultants/Other Providers: Patient is a 70y old  Female who presents with a chief complaint of abdominal pain (29 Aug 2021 19:45)      SUBJECTIVE / OVERNIGHT EVENTS: Denies CP, SOB, n/v/d. Last BM 1-2 days ago. She does report abdominal discomfort and pain and requesting if some fluid can be removed to help her feel better    Review of Systems:     MEDICATIONS  (STANDING):  ascorbic acid 500 milliGRAM(s) Oral daily  dextrose 40% Gel 15 Gram(s) Oral once  dextrose 5%. 1000 milliLiter(s) (50 mL/Hr) IV Continuous <Continuous>  dextrose 5%. 1000 milliLiter(s) (100 mL/Hr) IV Continuous <Continuous>  dextrose 50% Injectable 25 Gram(s) IV Push once  dextrose 50% Injectable 12.5 Gram(s) IV Push once  dextrose 50% Injectable 25 Gram(s) IV Push once  dronabinol 2.5 milliGRAM(s) Oral two times a day  enoxaparin Injectable 30 milliGRAM(s) SubCutaneous daily  fentaNYL   Patch  12 MICROgram(s)/Hr 1 Patch Transdermal every 72 hours  gabapentin 300 milliGRAM(s) Oral three times a day  glucagon  Injectable 1 milliGRAM(s) IntraMuscular once  insulin lispro (ADMELOG) corrective regimen sliding scale   SubCutaneous Before meals and at bedtime  polyethylene glycol 3350 17 Gram(s) Oral daily  pyridoxine 50 milliGRAM(s) Oral daily  senna 2 Tablet(s) Oral at bedtime  thiamine 100 milliGRAM(s) Oral daily    MEDICATIONS  (PRN):  acetaminophen   Tablet .. 650 milliGRAM(s) Oral every 6 hours PRN Temp greater or equal to 38C (100.4F), Mild Pain (1 - 3)  aluminum hydroxide/magnesium hydroxide/simethicone Suspension 30 milliLiter(s) Oral every 4 hours PRN Dyspepsia  HYDROmorphone   Tablet 6 milliGRAM(s) Oral every 6 hours PRN mod-severe pain  melatonin 3 milliGRAM(s) Oral at bedtime PRN Insomnia  ondansetron Injectable 4 milliGRAM(s) IV Push every 8 hours PRN Nausea and/or Vomiting      PHYSICAL EXAM:    I&O's Summary    29 Aug 2021 07:01  -  30 Aug 2021 07:00  --------------------------------------------------------  IN: 800 mL / OUT: 200 mL / NET: 600 mL      ICU Vital Signs Last 24 Hrs  T(C): 36.3 (30 Aug 2021 06:00), Max: 37 (29 Aug 2021 14:17)  T(F): 97.4 (30 Aug 2021 06:00), Max: 98.6 (29 Aug 2021 14:17)  HR: 101 (30 Aug 2021 06:00) (101 - 105)  BP: 106/66 (30 Aug 2021 06:00) (95/53 - 106/66)  BP(mean): --  ABP: --  ABP(mean): --  RR: 18 (30 Aug 2021 06:00) (17 - 18)  SpO2: 98% (30 Aug 2021 06:00) (98% - 100%)    CONSTITUTIONAL: ccachetic appearing female in NAD   ENMT: Moist oral mucosa  RESPIRATORY: Normal respiratory effort; lungs are clear to auscultation bilaterally  CARDIOVASCULAR:  S1/S2; No lower extremity edema  ABDOMEN: distended, Diffuse mild TTP more in lower quadrants. No rebound/guarding  MUSCULOSKELETAL:  no clubbing or cyanosis of digits; no joint swelling or tenderness to palpation  PSYCH: A+O to person, place, and time; affect appropriate  NEUROLOGY: no focal deficits  SKIN: No rashes; no palpable lesions    LABS:  CAPILLARY BLOOD GLUCOSE      POCT Blood Glucose.: 163 mg/dL (30 Aug 2021 08:33)  POCT Blood Glucose.: 166 mg/dL (29 Aug 2021 21:47)  POCT Blood Glucose.: 224 mg/dL (29 Aug 2021 17:30)  POCT Blood Glucose.: 196 mg/dL (29 Aug 2021 12:18)  POCT Blood Glucose.: 140 mg/dL (29 Aug 2021 10:28)                          9.1    8.01  )-----------( 348      ( 30 Aug 2021 07:28 )             29.7     08-30    143  |  108<H>  |  20  ----------------------------<  155<H>  4.1   |  24  |  0.56    Ca    8.4      30 Aug 2021 07:28  Phos  3.5     08-30  Mg     2.30     08-30                RADIOLOGY & ADDITIONAL TESTS:    Imaging Personally Reviewed:    Consultant(s) Notes Reviewed:      Care Discussed with Consultants/Other Providers:

## 2021-08-30 NOTE — PROGRESS NOTE ADULT - PROBLEM SELECTOR PLAN 3
Per documentation, pt treated for CAUTI at [facility] w/Ceftriaxone on 7/27 x5 days. UCx at that time grew >100K GNR & >3 organisms per HIE. Pt reports having high grade fevers at that time to 101.Tmax in .3, WBC 11    UA +,  urine culture <100,000 Psuedomonas and ecoli, rpt culture <10,000. Blood culture neg to date  -s/p Zosyn- 3 day course completed  -ID consult appreciated

## 2021-08-30 NOTE — HOSPICE CARE NOTE - CONVESATION DETAILS
HCN RN collaborated with OSMEL Keller. Pt's post hospital plan is for [facility] at [facility] - with possible transition to [facility]  - also at [facility].

## 2021-08-30 NOTE — PROGRESS NOTE ADULT - ASSESSMENT
70F w/ T2DM c/b neuropathy, chronic joy, pancreatic cancer w/ mets to lung and liver on chemo a/w new ascites on CT s/p para. Now plan for dispo to [facility] w/ hospice.

## 2021-08-30 NOTE — PROGRESS NOTE ADULT - PROBLEM SELECTOR PLAN 2
Pt p/w abdominal distention and pain since 8/20, CTAP with new ascites. Low grade temp in ER to 100.3  s/p diagnostic para in ER. There was low suspicion for peritonitis and pt s/p Zosyn in ER, completed 3 day course of zosyn for UTI  -Per oncology and pall care, no further treatment is being offered  -Hospice referral done and plan for likely discharge to hospice/bri  -Pt w/ abdominal pain and discomfort. Procedure consulted to assess pt for therapeutic tap.

## 2021-08-31 LAB
ANION GAP SERPL CALC-SCNC: 5 MMOL/L — LOW (ref 7–14)
APTT BLD: 27.1 SEC — SIGNIFICANT CHANGE UP (ref 27–36.3)
BUN SERPL-MCNC: 20 MG/DL — SIGNIFICANT CHANGE UP (ref 7–23)
CALCIUM SERPL-MCNC: 8.4 MG/DL — SIGNIFICANT CHANGE UP (ref 8.4–10.5)
CHLORIDE SERPL-SCNC: 110 MMOL/L — HIGH (ref 98–107)
CO2 SERPL-SCNC: 25 MMOL/L — SIGNIFICANT CHANGE UP (ref 22–31)
CREAT SERPL-MCNC: 0.47 MG/DL — LOW (ref 0.5–1.3)
GLUCOSE SERPL-MCNC: 111 MG/DL — HIGH (ref 70–99)
HCT VFR BLD CALC: 29 % — LOW (ref 34.5–45)
HGB BLD-MCNC: 9.1 G/DL — LOW (ref 11.5–15.5)
INR BLD: 1.01 RATIO — SIGNIFICANT CHANGE UP (ref 0.88–1.16)
MAGNESIUM SERPL-MCNC: 2.2 MG/DL — SIGNIFICANT CHANGE UP (ref 1.6–2.6)
MCHC RBC-ENTMCNC: 26.3 PG — LOW (ref 27–34)
MCHC RBC-ENTMCNC: 31.4 GM/DL — LOW (ref 32–36)
MCV RBC AUTO: 83.8 FL — SIGNIFICANT CHANGE UP (ref 80–100)
NRBC # BLD: 0 /100 WBCS — SIGNIFICANT CHANGE UP
NRBC # FLD: 0 K/UL — SIGNIFICANT CHANGE UP
PHOSPHATE SERPL-MCNC: 3.7 MG/DL — SIGNIFICANT CHANGE UP (ref 2.5–4.5)
PLATELET # BLD AUTO: 363 K/UL — SIGNIFICANT CHANGE UP (ref 150–400)
POTASSIUM SERPL-MCNC: 3.9 MMOL/L — SIGNIFICANT CHANGE UP (ref 3.5–5.3)
POTASSIUM SERPL-SCNC: 3.9 MMOL/L — SIGNIFICANT CHANGE UP (ref 3.5–5.3)
PROTHROM AB SERPL-ACNC: 11.6 SEC — SIGNIFICANT CHANGE UP (ref 10.6–13.6)
RBC # BLD: 3.46 M/UL — LOW (ref 3.8–5.2)
RBC # FLD: 14.9 % — HIGH (ref 10.3–14.5)
SARS-COV-2 RNA SPEC QL NAA+PROBE: SIGNIFICANT CHANGE UP
SODIUM SERPL-SCNC: 140 MMOL/L — SIGNIFICANT CHANGE UP (ref 135–145)
WBC # BLD: 7.8 K/UL — SIGNIFICANT CHANGE UP (ref 3.8–10.5)
WBC # FLD AUTO: 7.8 K/UL — SIGNIFICANT CHANGE UP (ref 3.8–10.5)

## 2021-08-31 PROCEDURE — 99233 SBSQ HOSP IP/OBS HIGH 50: CPT | Mod: 25

## 2021-08-31 PROCEDURE — 49083 ABD PARACENTESIS W/IMAGING: CPT | Mod: GC

## 2021-08-31 RX ORDER — LIDOCAINE HCL 20 MG/ML
20 VIAL (ML) INJECTION ONCE
Refills: 0 | Status: COMPLETED | OUTPATIENT
Start: 2021-08-31 | End: 2021-08-31

## 2021-08-31 RX ADMIN — Medication 2.5 MILLIGRAM(S): at 19:17

## 2021-08-31 RX ADMIN — HYDROMORPHONE HYDROCHLORIDE 6 MILLIGRAM(S): 2 INJECTION INTRAMUSCULAR; INTRAVENOUS; SUBCUTANEOUS at 20:16

## 2021-08-31 RX ADMIN — Medication 50 MILLIGRAM(S): at 12:45

## 2021-08-31 RX ADMIN — SENNA PLUS 2 TABLET(S): 8.6 TABLET ORAL at 21:08

## 2021-08-31 RX ADMIN — GABAPENTIN 300 MILLIGRAM(S): 400 CAPSULE ORAL at 12:45

## 2021-08-31 RX ADMIN — GABAPENTIN 300 MILLIGRAM(S): 400 CAPSULE ORAL at 21:08

## 2021-08-31 RX ADMIN — Medication 1: at 12:45

## 2021-08-31 RX ADMIN — HYDROMORPHONE HYDROCHLORIDE 6 MILLIGRAM(S): 2 INJECTION INTRAMUSCULAR; INTRAVENOUS; SUBCUTANEOUS at 13:44

## 2021-08-31 RX ADMIN — HYDROMORPHONE HYDROCHLORIDE 6 MILLIGRAM(S): 2 INJECTION INTRAMUSCULAR; INTRAVENOUS; SUBCUTANEOUS at 19:16

## 2021-08-31 RX ADMIN — FENTANYL CITRATE 1 PATCH: 50 INJECTION INTRAVENOUS at 22:50

## 2021-08-31 RX ADMIN — HYDROMORPHONE HYDROCHLORIDE 6 MILLIGRAM(S): 2 INJECTION INTRAMUSCULAR; INTRAVENOUS; SUBCUTANEOUS at 05:34

## 2021-08-31 RX ADMIN — GABAPENTIN 300 MILLIGRAM(S): 400 CAPSULE ORAL at 05:34

## 2021-08-31 RX ADMIN — FENTANYL CITRATE 1 PATCH: 50 INJECTION INTRAVENOUS at 19:03

## 2021-08-31 RX ADMIN — HYDROMORPHONE HYDROCHLORIDE 6 MILLIGRAM(S): 2 INJECTION INTRAMUSCULAR; INTRAVENOUS; SUBCUTANEOUS at 06:30

## 2021-08-31 RX ADMIN — Medication 3 MILLIGRAM(S): at 21:08

## 2021-08-31 RX ADMIN — POLYETHYLENE GLYCOL 3350 17 GRAM(S): 17 POWDER, FOR SOLUTION ORAL at 12:44

## 2021-08-31 RX ADMIN — Medication 2.5 MILLIGRAM(S): at 05:34

## 2021-08-31 RX ADMIN — Medication 100 MILLIGRAM(S): at 12:45

## 2021-08-31 RX ADMIN — Medication 20 MILLILITER(S): at 12:36

## 2021-08-31 RX ADMIN — HYDROMORPHONE HYDROCHLORIDE 6 MILLIGRAM(S): 2 INJECTION INTRAMUSCULAR; INTRAVENOUS; SUBCUTANEOUS at 12:44

## 2021-08-31 RX ADMIN — FENTANYL CITRATE 1 PATCH: 50 INJECTION INTRAVENOUS at 22:49

## 2021-08-31 RX ADMIN — FENTANYL CITRATE 1 PATCH: 50 INJECTION INTRAVENOUS at 06:40

## 2021-08-31 RX ADMIN — Medication 500 MILLIGRAM(S): at 12:45

## 2021-08-31 NOTE — PROGRESS NOTE ADULT - PROBLEM SELECTOR PLAN 2
Pt p/w abdominal distention and pain since 8/20, CTAP with new ascites. Low grade temp in ER to 100.3  s/p diagnostic para in ER. There was low suspicion for peritonitis and pt s/p Zosyn in ER, completed 3 day course of zosyn for UTI  -Per oncology and pall care, no further treatment is being offered  -Hospice referral done and plan for likely discharge to hospice/bri  -Pt w/ abdominal pain and discomfort. Procedure team consulted to assess pt for therapeutic tap and pt now s/p therapeutic tap today 8/31 w/ removal of 5050cc of fluid

## 2021-08-31 NOTE — PROCEDURE NOTE - ADDITIONAL PROCEDURE DETAILS
Invasive procedure team was consulted for diagnostic/therapeutic paracentesis due to abdominal discomfort. Explained risks (including bleeding, infection, and bowel perforation) and benefits to patient and patient expressed understanding and consented. Ultrasound was utilized and visualized 5 cm Ascitic fluid pocket identified w/ no epigastric vessels visualized. No rash or vessels overlying skin site. Pts plts 363, INR 1.01, DVT PPx held over night, not on NOACs or coumadin. Sterile technique was used and 8 mL of 1% lidocaine was used for local anesthesia. Small incision with scalpel done and 18 Fr Arrow Paracentesis catheter used. ____L of opaque yellow ascitic fluid drained. Catheter removed and dressed. Pt tolerated procedure well and no complications. Lab Analysis sent. Communicated to primary team. Invasive procedure team was consulted for diagnostic/therapeutic paracentesis due to abdominal discomfort. Explained risks (including bleeding, infection, and bowel perforation) and benefits to patient and patient expressed understanding and consented. Ultrasound was utilized and visualized 5 cm Ascitic fluid pocket identified w/ no epigastric vessels visualized. No rash or vessels overlying skin site. Pts plts 363, INR 1.01, Not on DVt prophylaxis or anticoaglation. Sterile technique was used and 8 mL of 1% lidocaine was used for local anesthesia. Small incision with scalpel done and 18 Fr Arrow Paracentesis catheter used. 5050mL of opaque yellow ascitic fluid drained. Catheter removed and dressed. Pt tolerated procedure well and no complications. Lab Analysis sent. Communicated to primary team.

## 2021-08-31 NOTE — PROGRESS NOTE ADULT - SUBJECTIVE AND OBJECTIVE BOX
Patient is a 70y old  Female who presents with a chief complaint of abdominal pain (30 Aug 2021 13:42)      SUBJECTIVE / OVERNIGHT EVENTS:    Review of Systems:     MEDICATIONS  (STANDING):  ascorbic acid 500 milliGRAM(s) Oral daily  dextrose 40% Gel 15 Gram(s) Oral once  dextrose 5%. 1000 milliLiter(s) (50 mL/Hr) IV Continuous <Continuous>  dextrose 5%. 1000 milliLiter(s) (100 mL/Hr) IV Continuous <Continuous>  dextrose 50% Injectable 25 Gram(s) IV Push once  dextrose 50% Injectable 12.5 Gram(s) IV Push once  dextrose 50% Injectable 25 Gram(s) IV Push once  dronabinol 2.5 milliGRAM(s) Oral two times a day  fentaNYL   Patch  12 MICROgram(s)/Hr 1 Patch Transdermal every 72 hours  gabapentin 300 milliGRAM(s) Oral three times a day  glucagon  Injectable 1 milliGRAM(s) IntraMuscular once  insulin lispro (ADMELOG) corrective regimen sliding scale   SubCutaneous Before meals and at bedtime  polyethylene glycol 3350 17 Gram(s) Oral daily  pyridoxine 50 milliGRAM(s) Oral daily  senna 2 Tablet(s) Oral at bedtime  thiamine 100 milliGRAM(s) Oral daily    MEDICATIONS  (PRN):  acetaminophen   Tablet .. 650 milliGRAM(s) Oral every 6 hours PRN Temp greater or equal to 38C (100.4F), Mild Pain (1 - 3)  aluminum hydroxide/magnesium hydroxide/simethicone Suspension 30 milliLiter(s) Oral every 4 hours PRN Dyspepsia  HYDROmorphone   Tablet 6 milliGRAM(s) Oral every 6 hours PRN mod-severe pain  melatonin 3 milliGRAM(s) Oral at bedtime PRN Insomnia  ondansetron Injectable 4 milliGRAM(s) IV Push every 8 hours PRN Nausea and/or Vomiting      PHYSICAL EXAM:    I&O's Summary    30 Aug 2021 07:01  -  31 Aug 2021 07:00  --------------------------------------------------------  IN: 0 mL / OUT: 700 mL / NET: -700 mL    ICU Vital Signs Last 24 Hrs  T(C): 36.4 (31 Aug 2021 05:30), Max: 37.1 (30 Aug 2021 15:55)  T(F): 97.5 (31 Aug 2021 05:30), Max: 98.7 (30 Aug 2021 15:55)  HR: 97 (31 Aug 2021 05:30) (90 - 99)  BP: 107/63 (31 Aug 2021 05:30) (82/45 - 107/63)  BP(mean): --  ABP: --  ABP(mean): --  RR: 18 (31 Aug 2021 05:30) (17 - 18)  SpO2: 98% (31 Aug 2021 05:30) (97% - 100%)      LABS:  CAPILLARY BLOOD GLUCOSE      POCT Blood Glucose.: 127 mg/dL (31 Aug 2021 08:18)  POCT Blood Glucose.: 128 mg/dL (30 Aug 2021 22:26)  POCT Blood Glucose.: 91 mg/dL (30 Aug 2021 17:56)  POCT Blood Glucose.: 230 mg/dL (30 Aug 2021 12:02)                          9.1    7.80  )-----------( 363      ( 31 Aug 2021 07:08 )             29.0     08-31    140  |  110<H>  |  20  ----------------------------<  111<H>  3.9   |  25  |  0.47<L>    Ca    8.4      31 Aug 2021 07:08  Phos  3.7     08-31  Mg     2.20     08-31      PT/INR - ( 31 Aug 2021 09:25 )   PT: 11.6 sec;   INR: 1.01 ratio         PTT - ( 31 Aug 2021 09:25 )  PTT:27.1 sec          RADIOLOGY & ADDITIONAL TESTS:    Imaging Personally Reviewed:    Consultant(s) Notes Reviewed:      Care Discussed with Consultants/Other Providers: Patient is a 70y old  Female who presents with a chief complaint of abdominal pain (30 Aug 2021 13:42)      SUBJECTIVE / OVERNIGHT EVENTS: Pt continues to deny CP, SOB. However she still has abdominal pain and discomfort. Denie n/v however has decreased appetite     Review of Systems:     MEDICATIONS  (STANDING):  ascorbic acid 500 milliGRAM(s) Oral daily  dextrose 40% Gel 15 Gram(s) Oral once  dextrose 5%. 1000 milliLiter(s) (50 mL/Hr) IV Continuous <Continuous>  dextrose 5%. 1000 milliLiter(s) (100 mL/Hr) IV Continuous <Continuous>  dextrose 50% Injectable 25 Gram(s) IV Push once  dextrose 50% Injectable 12.5 Gram(s) IV Push once  dextrose 50% Injectable 25 Gram(s) IV Push once  dronabinol 2.5 milliGRAM(s) Oral two times a day  fentaNYL   Patch  12 MICROgram(s)/Hr 1 Patch Transdermal every 72 hours  gabapentin 300 milliGRAM(s) Oral three times a day  glucagon  Injectable 1 milliGRAM(s) IntraMuscular once  insulin lispro (ADMELOG) corrective regimen sliding scale   SubCutaneous Before meals and at bedtime  polyethylene glycol 3350 17 Gram(s) Oral daily  pyridoxine 50 milliGRAM(s) Oral daily  senna 2 Tablet(s) Oral at bedtime  thiamine 100 milliGRAM(s) Oral daily    MEDICATIONS  (PRN):  acetaminophen   Tablet .. 650 milliGRAM(s) Oral every 6 hours PRN Temp greater or equal to 38C (100.4F), Mild Pain (1 - 3)  aluminum hydroxide/magnesium hydroxide/simethicone Suspension 30 milliLiter(s) Oral every 4 hours PRN Dyspepsia  HYDROmorphone   Tablet 6 milliGRAM(s) Oral every 6 hours PRN mod-severe pain  melatonin 3 milliGRAM(s) Oral at bedtime PRN Insomnia  ondansetron Injectable 4 milliGRAM(s) IV Push every 8 hours PRN Nausea and/or Vomiting      PHYSICAL EXAM:    I&O's Summary    30 Aug 2021 07:01  -  31 Aug 2021 07:00  --------------------------------------------------------  IN: 0 mL / OUT: 700 mL / NET: -700 mL    ICU Vital Signs Last 24 Hrs  T(C): 36.4 (31 Aug 2021 05:30), Max: 37.1 (30 Aug 2021 15:55)  T(F): 97.5 (31 Aug 2021 05:30), Max: 98.7 (30 Aug 2021 15:55)  HR: 97 (31 Aug 2021 05:30) (90 - 99)  BP: 107/63 (31 Aug 2021 05:30) (82/45 - 107/63)  BP(mean): --  ABP: --  ABP(mean): --  RR: 18 (31 Aug 2021 05:30) (17 - 18)  SpO2: 98% (31 Aug 2021 05:30) (97% - 100%)    CONSTITUTIONAL: ccachetic appearing female in NAD   ENMT: Moist oral mucosa  RESPIRATORY: Normal respiratory effort; lungs are clear to auscultation bilaterally  CARDIOVASCULAR:  S1/S2; No lower extremity edema  ABDOMEN: distended, Diffuse mild TTP more in lower quadrants. No rebound/guarding  MUSCULOSKELETAL:  no clubbing or cyanosis of digits; no joint swelling or tenderness to palpation  PSYCH: A+O to person, place, and time; affect appropriate  NEUROLOGY: no focal deficits  SKIN: No rashes; no palpable lesions    LABS:  CAPILLARY BLOOD GLUCOSE      POCT Blood Glucose.: 127 mg/dL (31 Aug 2021 08:18)  POCT Blood Glucose.: 128 mg/dL (30 Aug 2021 22:26)  POCT Blood Glucose.: 91 mg/dL (30 Aug 2021 17:56)  POCT Blood Glucose.: 230 mg/dL (30 Aug 2021 12:02)                          9.1    7.80  )-----------( 363      ( 31 Aug 2021 07:08 )             29.0     08-31    140  |  110<H>  |  20  ----------------------------<  111<H>  3.9   |  25  |  0.47<L>    Ca    8.4      31 Aug 2021 07:08  Phos  3.7     08-31  Mg     2.20     08-31      PT/INR - ( 31 Aug 2021 09:25 )   PT: 11.6 sec;   INR: 1.01 ratio         PTT - ( 31 Aug 2021 09:25 )  PTT:27.1 sec          RADIOLOGY & ADDITIONAL TESTS:    Imaging Personally Reviewed:    Consultant(s) Notes Reviewed:      Care Discussed with Consultants/Other Providers:

## 2021-08-31 NOTE — PROGRESS NOTE ADULT - ASSESSMENT
70F w/ T2DM c/b neuropathy, chronic joy, pancreatic cancer w/ mets to lung and liver on chemo a/w new ascites on CT s/p para. Now plan for dispo to [facility] w/ hospice.

## 2021-08-31 NOTE — PROGRESS NOTE ADULT - PROBLEM SELECTOR PLAN 9
DVT ppx :Lovenox    Dispo pending placement to bri/hospice. Will f/u w/ SW/CM daily in regards to progress.

## 2021-08-31 NOTE — PROGRESS NOTE ADULT - PROBLEM SELECTOR PLAN 6
A1c 7.1Home Meds: Basaglar 8U qhs, Novalog 6U TID     Course c/b hypoglycemia. Last noted on 8/29 and insulin regimen was reduced and lantus dcd    -c/w current regimen of insulin w/ only ISS for now. Will continue to monitor and adjust regimen as necessary to avoid hypoglycemia.    -Consistent carb diet. May need to liberalize her diet shakira if she is having a poor appetite

## 2021-08-31 NOTE — PROGRESS NOTE ADULT - PROBLEM SELECTOR PLAN 3
Per documentation, pt treated for CAUTI at [facility] w/Ceftriaxone on 7/27 x5 days. UCx at that time grew >100K GNR & >3 organisms per HIE. Pt reports having high grade fevers at that time to 101.Tmax in .3, WBC 11    UA +,  urine culture <100,000 Pseudomonas and ecoli, rpt culture <10,000. Blood culture neg to date  -s/p Zosyn- 3 day course completed  -ID input appreciated

## 2021-08-31 NOTE — PROGRESS NOTE ADULT - PROBLEM SELECTOR PLAN 1
Pt hypotensive to the 80s systolic on 8/20. She was asymptomatic at the time. Likely multifactorial in the setting of poor PO, reduced effective circulating volume in setting of ascites and meds. No signs of symptoms of infection and pt remains afebrile. H/H has been stable and therefore less concern for bleeding.    -Pt s/p 500cc bolus on 8/30 w/ improvement     -encourage PO. Maintenance IVFs if pt not taking in adequate PO  -if spikes fever or signs or symptoms of infection w/o localizing source - would obtain blood cx, and start empiric abx to cover intraabdominal infection . If signs and symptoms lead to alternative source would work that up accordingly.

## 2021-09-01 ENCOUNTER — TRANSCRIPTION ENCOUNTER (OUTPATIENT)
Age: 71
End: 2021-09-01

## 2021-09-01 PROCEDURE — 99232 SBSQ HOSP IP/OBS MODERATE 35: CPT

## 2021-09-01 RX ORDER — ENOXAPARIN SODIUM 100 MG/ML
30 INJECTION SUBCUTANEOUS DAILY
Refills: 0 | Status: DISCONTINUED | OUTPATIENT
Start: 2021-09-01 | End: 2021-09-02

## 2021-09-01 RX ORDER — DIPHENHYDRAMINE HCL 50 MG
25 CAPSULE ORAL ONCE
Refills: 0 | Status: DISCONTINUED | OUTPATIENT
Start: 2021-09-01 | End: 2021-09-01

## 2021-09-01 RX ADMIN — HYDROMORPHONE HYDROCHLORIDE 6 MILLIGRAM(S): 2 INJECTION INTRAMUSCULAR; INTRAVENOUS; SUBCUTANEOUS at 21:52

## 2021-09-01 RX ADMIN — HYDROMORPHONE HYDROCHLORIDE 6 MILLIGRAM(S): 2 INJECTION INTRAMUSCULAR; INTRAVENOUS; SUBCUTANEOUS at 07:21

## 2021-09-01 RX ADMIN — SENNA PLUS 2 TABLET(S): 8.6 TABLET ORAL at 21:46

## 2021-09-01 RX ADMIN — Medication 50 MILLIGRAM(S): at 11:44

## 2021-09-01 RX ADMIN — Medication 2.5 MILLIGRAM(S): at 06:34

## 2021-09-01 RX ADMIN — FENTANYL CITRATE 1 PATCH: 50 INJECTION INTRAVENOUS at 18:46

## 2021-09-01 RX ADMIN — Medication 500 MILLIGRAM(S): at 11:43

## 2021-09-01 RX ADMIN — HYDROMORPHONE HYDROCHLORIDE 6 MILLIGRAM(S): 2 INJECTION INTRAMUSCULAR; INTRAVENOUS; SUBCUTANEOUS at 22:22

## 2021-09-01 RX ADMIN — GABAPENTIN 300 MILLIGRAM(S): 400 CAPSULE ORAL at 06:34

## 2021-09-01 RX ADMIN — GABAPENTIN 300 MILLIGRAM(S): 400 CAPSULE ORAL at 14:00

## 2021-09-01 RX ADMIN — FENTANYL CITRATE 1 PATCH: 50 INJECTION INTRAVENOUS at 06:35

## 2021-09-01 RX ADMIN — Medication 2: at 18:10

## 2021-09-01 RX ADMIN — ENOXAPARIN SODIUM 30 MILLIGRAM(S): 100 INJECTION SUBCUTANEOUS at 18:11

## 2021-09-01 RX ADMIN — GABAPENTIN 300 MILLIGRAM(S): 400 CAPSULE ORAL at 21:46

## 2021-09-01 RX ADMIN — HYDROMORPHONE HYDROCHLORIDE 6 MILLIGRAM(S): 2 INJECTION INTRAMUSCULAR; INTRAVENOUS; SUBCUTANEOUS at 06:51

## 2021-09-01 RX ADMIN — Medication 100 MILLIGRAM(S): at 11:43

## 2021-09-01 NOTE — PROGRESS NOTE ADULT - SUBJECTIVE AND OBJECTIVE BOX
Patient is a 70y old  Female who presents with a chief complaint of abdominal pain (31 Aug 2021 09:52)      SUBJECTIVE / OVERNIGHT EVENTS:    Review of Systems:     MEDICATIONS  (STANDING):  ascorbic acid 500 milliGRAM(s) Oral daily  dextrose 40% Gel 15 Gram(s) Oral once  dextrose 5%. 1000 milliLiter(s) (50 mL/Hr) IV Continuous <Continuous>  dextrose 5%. 1000 milliLiter(s) (100 mL/Hr) IV Continuous <Continuous>  dextrose 50% Injectable 25 Gram(s) IV Push once  dextrose 50% Injectable 12.5 Gram(s) IV Push once  dextrose 50% Injectable 25 Gram(s) IV Push once  dronabinol 2.5 milliGRAM(s) Oral two times a day  gabapentin 300 milliGRAM(s) Oral three times a day  glucagon  Injectable 1 milliGRAM(s) IntraMuscular once  insulin lispro (ADMELOG) corrective regimen sliding scale   SubCutaneous Before meals and at bedtime  polyethylene glycol 3350 17 Gram(s) Oral daily  pyridoxine 50 milliGRAM(s) Oral daily  senna 2 Tablet(s) Oral at bedtime  thiamine 100 milliGRAM(s) Oral daily    MEDICATIONS  (PRN):  acetaminophen   Tablet .. 650 milliGRAM(s) Oral every 6 hours PRN Temp greater or equal to 38C (100.4F), Mild Pain (1 - 3)  aluminum hydroxide/magnesium hydroxide/simethicone Suspension 30 milliLiter(s) Oral every 4 hours PRN Dyspepsia  HYDROmorphone   Tablet 6 milliGRAM(s) Oral every 6 hours PRN mod-severe pain  melatonin 3 milliGRAM(s) Oral at bedtime PRN Insomnia  ondansetron Injectable 4 milliGRAM(s) IV Push every 8 hours PRN Nausea and/or Vomiting      PHYSICAL EXAM:    I&O's Summary    ICU Vital Signs Last 24 Hrs  T(C): 36.4 (01 Sep 2021 06:24), Max: 36.6 (31 Aug 2021 21:40)  T(F): 97.6 (01 Sep 2021 06:24), Max: 97.9 (31 Aug 2021 21:40)  HR: 92 (01 Sep 2021 06:24) (68 - 93)  BP: 93/50 (01 Sep 2021 06:24) (83/44 - 94/56)  BP(mean): --  ABP: --  ABP(mean): --  RR: 18 (01 Sep 2021 06:24) (18 - 19)  SpO2: 100% (01 Sep 2021 06:24) (98% - 100%)      LABS:  CAPILLARY BLOOD GLUCOSE      POCT Blood Glucose.: 115 mg/dL (01 Sep 2021 08:49)  POCT Blood Glucose.: 129 mg/dL (31 Aug 2021 22:15)  POCT Blood Glucose.: 97 mg/dL (31 Aug 2021 17:47)  POCT Blood Glucose.: 184 mg/dL (31 Aug 2021 12:28)                          9.1    7.80  )-----------( 363      ( 31 Aug 2021 07:08 )             29.0     08-31    140  |  110<H>  |  20  ----------------------------<  111<H>  3.9   |  25  |  0.47<L>    Ca    8.4      31 Aug 2021 07:08  Phos  3.7     08-31  Mg     2.20     08-31      PT/INR - ( 31 Aug 2021 09:25 )   PT: 11.6 sec;   INR: 1.01 ratio         PTT - ( 31 Aug 2021 09:25 )  PTT:27.1 sec          RADIOLOGY & ADDITIONAL TESTS:    Imaging Personally Reviewed:    Consultant(s) Notes Reviewed:      Care Discussed with Consultants/Other Providers: Patient is a 70y old  Female who presents with a chief complaint of abdominal pain (31 Aug 2021 09:52)      SUBJECTIVE / OVERNIGHT EVENTS: Pt states her abd discomfort has improved since paracentesis but she does still have some level of discomfort. Otherwise she denies CP, SOB, n/v. + constipation    Review of Systems:     MEDICATIONS  (STANDING):  ascorbic acid 500 milliGRAM(s) Oral daily  dextrose 40% Gel 15 Gram(s) Oral once  dextrose 5%. 1000 milliLiter(s) (50 mL/Hr) IV Continuous <Continuous>  dextrose 5%. 1000 milliLiter(s) (100 mL/Hr) IV Continuous <Continuous>  dextrose 50% Injectable 25 Gram(s) IV Push once  dextrose 50% Injectable 12.5 Gram(s) IV Push once  dextrose 50% Injectable 25 Gram(s) IV Push once  dronabinol 2.5 milliGRAM(s) Oral two times a day  gabapentin 300 milliGRAM(s) Oral three times a day  glucagon  Injectable 1 milliGRAM(s) IntraMuscular once  insulin lispro (ADMELOG) corrective regimen sliding scale   SubCutaneous Before meals and at bedtime  polyethylene glycol 3350 17 Gram(s) Oral daily  pyridoxine 50 milliGRAM(s) Oral daily  senna 2 Tablet(s) Oral at bedtime  thiamine 100 milliGRAM(s) Oral daily    MEDICATIONS  (PRN):  acetaminophen   Tablet .. 650 milliGRAM(s) Oral every 6 hours PRN Temp greater or equal to 38C (100.4F), Mild Pain (1 - 3)  aluminum hydroxide/magnesium hydroxide/simethicone Suspension 30 milliLiter(s) Oral every 4 hours PRN Dyspepsia  HYDROmorphone   Tablet 6 milliGRAM(s) Oral every 6 hours PRN mod-severe pain  melatonin 3 milliGRAM(s) Oral at bedtime PRN Insomnia  ondansetron Injectable 4 milliGRAM(s) IV Push every 8 hours PRN Nausea and/or Vomiting      PHYSICAL EXAM:    I&O's Summary    ICU Vital Signs Last 24 Hrs  T(C): 36.4 (01 Sep 2021 06:24), Max: 36.6 (31 Aug 2021 21:40)  T(F): 97.6 (01 Sep 2021 06:24), Max: 97.9 (31 Aug 2021 21:40)  HR: 92 (01 Sep 2021 06:24) (68 - 93)  BP: 93/50 (01 Sep 2021 06:24) (83/44 - 94/56)  BP(mean): --  ABP: --  ABP(mean): --  RR: 18 (01 Sep 2021 06:24) (18 - 19)  SpO2: 100% (01 Sep 2021 06:24) (98% - 100%)  CONSTITUTIONAL: cachetic appearing female in NAD, sitting at this side of the bed with Physical Theraoy   ENMT: Moist oral mucosa  RESPIRATORY: Normal respiratory effort; lungs are clear to auscultation bilaterally  CARDIOVASCULAR:  S1/S2; No lower extremity edema  ABDOMEN: distended, Diffuse TTP improved. No rebound/guarding  MUSCULOSKELETAL:  no clubbing or cyanosis of digits; no joint swelling or tenderness to palpation  PSYCH: A+O to person, place, and time; affect appropriate  NEUROLOGY: no focal deficits  SKIN: No rashes; no palpable lesions      LABS:  CAPILLARY BLOOD GLUCOSE      POCT Blood Glucose.: 115 mg/dL (01 Sep 2021 08:49)  POCT Blood Glucose.: 129 mg/dL (31 Aug 2021 22:15)  POCT Blood Glucose.: 97 mg/dL (31 Aug 2021 17:47)  POCT Blood Glucose.: 184 mg/dL (31 Aug 2021 12:28)                          9.1    7.80  )-----------( 363      ( 31 Aug 2021 07:08 )             29.0     08-31    140  |  110<H>  |  20  ----------------------------<  111<H>  3.9   |  25  |  0.47<L>    Ca    8.4      31 Aug 2021 07:08  Phos  3.7     08-31  Mg     2.20     08-31      PT/INR - ( 31 Aug 2021 09:25 )   PT: 11.6 sec;   INR: 1.01 ratio         PTT - ( 31 Aug 2021 09:25 )  PTT:27.1 sec          RADIOLOGY & ADDITIONAL TESTS:    Imaging Personally Reviewed:    Consultant(s) Notes Reviewed:      Care Discussed with Consultants/Other Providers:

## 2021-09-01 NOTE — PROGRESS NOTE ADULT - PROBLEM SELECTOR PLAN 2
Pt p/w abdominal distention and pain since 8/20, CTAP with new ascites. Low grade temp in ER to 100.3  s/p diagnostic para in ER. There was low suspicion for peritonitis and pt s/p Zosyn in ER, completed 3 day course of zosyn for UTI    -Per oncology and pall care, no further treatment is being offered  -Hospice referral done and plan for likely discharge to hospice/Center  -Pt now s/p another therapeutic tap on 8/31 w/ removal of 5050cc due to abdominal pain and discomfort.    -monitor abdominal exam and symptoms

## 2021-09-01 NOTE — CHART NOTE - NSCHARTNOTEFT_GEN_A_CORE
Patient seen for malnutrition initial follow up. Chart reviewed, events noted.   Source: EMR, patient     Per chart, patient is a 70F w/ T2DM c/b neuropathy, chronic joy, pancreatic cancer w/ mets to lung and liver on chemo a/w new ascites on CT s/p para. Now plan for dispo to [facility] w/ hospice.     Diet : Diet, DASH/TLC:   Sodium & Cholesterol Restricted  Consistent Carbohydrate {Evening Snack} (CSTCHOSN)  1000mL Fluid Restriction ([id number])  Supplement Feeding Modality:  Oral  Glucerna Shake Cans or Servings Per Day:  1       Frequency:  Three Times a day (08-28-21 @ 16:07)    Nutrition Events:  - Patient reports ~50% PO intake, aligns with RN flowsheets indicated patient with 50-75% PO x 4 days.  - Patient states she is not drinking glucerna shakes as she is afraid they will raise her blood glucose; informed patient BG is controlled in house and intake of supplements is important to optimize PO intake. Patient verbalized understanding  - Denies difficulty chewing/swallowing  - Denies GI distress, last BM 8/28 per EMR. Noted order for bowel regimen    Current Weight: Weight (kg): 43.5 (08-26 @ 03:53)  Height: 162.6 cm  BMI: 16.5 kg/m2  IBW: 56.8 kg    Pertinent Medications: MEDICATIONS  (STANDING):  ascorbic acid 500 milliGRAM(s) Oral daily  dextrose 40% Gel 15 Gram(s) Oral once  dextrose 5%. 1000 milliLiter(s) (50 mL/Hr) IV Continuous <Continuous>  dextrose 5%. 1000 milliLiter(s) (100 mL/Hr) IV Continuous <Continuous>  dextrose 50% Injectable 25 Gram(s) IV Push once  dextrose 50% Injectable 12.5 Gram(s) IV Push once  dextrose 50% Injectable 25 Gram(s) IV Push once  dronabinol 2.5 milliGRAM(s) Oral two times a day  enoxaparin Injectable 30 milliGRAM(s) SubCutaneous daily  gabapentin 300 milliGRAM(s) Oral three times a day  glucagon  Injectable 1 milliGRAM(s) IntraMuscular once  insulin lispro (ADMELOG) corrective regimen sliding scale   SubCutaneous Before meals and at bedtime  polyethylene glycol 3350 17 Gram(s) Oral daily  pyridoxine 50 milliGRAM(s) Oral daily  senna 2 Tablet(s) Oral at bedtime  thiamine 100 milliGRAM(s) Oral daily    MEDICATIONS  (PRN):  acetaminophen   Tablet .. 650 milliGRAM(s) Oral every 6 hours PRN Temp greater or equal to 38C (100.4F), Mild Pain (1 - 3)  aluminum hydroxide/magnesium hydroxide/simethicone Suspension 30 milliLiter(s) Oral every 4 hours PRN Dyspepsia  HYDROmorphone   Tablet 6 milliGRAM(s) Oral every 6 hours PRN mod-severe pain  melatonin 3 milliGRAM(s) Oral at bedtime PRN Insomnia  ondansetron Injectable 4 milliGRAM(s) IV Push every 8 hours PRN Nausea and/or Vomiting    Pertinent Labs:  08-31 Na140 mmol/L Glu 111 mg/dL<H> K+ 3.9 mmol/L Cr  0.47 mg/dL<L> BUN 20 mg/dL 08-31 Phos 3.7 mg/dL 08-27 Alb 2.2 g/dL<L>  CAPILLARY BLOOD GLUCOSE  POCT Blood Glucose.: 156 mg/dL (01 Sep 2021 12:09)  POCT Blood Glucose.: 115 mg/dL (01 Sep 2021 08:49)  POCT Blood Glucose.: 129 mg/dL (31 Aug 2021 22:15)  POCT Blood Glucose.: 97 mg/dL (31 Aug 2021 17:47)    Physical Assessment per RN flowsheets:  Skin: no known pressure injuries per RN documentation   Fluids: No edema per RN documentation     Estimated Needs:   [x] no change since previous assessment: based on 43.5kg dosing weight  Estimated Energy Needs: 30-35 kcal/kg provides 7581-1496 kcal/d  Estimated Protein Needs: 1.2-1.4 g/kg provides 52.2-60.9 g/d  Defer fluid needs to team    Previous Nutrition Diagnosis: Severe malnutrition in the context of chronic illness  Nutrition Diagnosis is: [x] ongoing, being managed with PO diet and supplements     New Nutrition Diagnosis: [x] not applicable    Recommendations:  1) Change diet to Low sodium, Consistent Carbohydrate {Evening Snack}, 1000mL fluid restriction  2) Continue Glucerna Therapeutic Nutrition 3x daily (provides 660kcals, 30g protein)  3) Encourage and assist with PO intake as needed   4) Please document % PO intake in flowsheets     RD remains available, reconsult as needed: Coty Pelaez, [id number]

## 2021-09-01 NOTE — DISCHARGE NOTE PROVIDER - HOSPITAL COURSE
70F w/T2DM c/b neuropathy, chronic joy, pancreatic cancer w/ mets to lung and liver on chemo (last session yesterday) presenting from  rehab w/diffuse abdominal pain and distention, found to have new ascites on CT, admitted for sepsis 2/2 UTI vs SBP, s/p para in ER.    Hypotension.   -Pt hypotensive to the 80s systolic on 8/20. She was asymptomatic at the time. Likely multifactorial in the setting of poor PO, reduced effective circulating volume in setting of ascites and meds. No signs of symptoms of infection and pt remains afebrile. H/H has been stable and therefore less concern for bleeding.  -Pt s/p 500cc bolus on 8/30 w/ improvement   -encourage PO. Maintenance IVFs if pt not taking in adequate PO  -if spikes fever or signs or symptoms of infection w/o localizing source - would obtain blood cx, and start empiric abx to cover intraabdominal infection . If signs and symptoms lead to alternative source would work that up accordingly.    Ascites.   -Pt p/w abdominal distention and pain since 8/20, CTAP with new ascites. Low grade temp in ER to 100.3  -s/p diagnostic para in ER. There was low suspicion for peritonitis and pt s/p Zosyn in ER, completed 3 day course of zosyn for UTI  -Per oncology and pall care, no further treatment is being offered  -Hospice referral done and plan for likely discharge to hospice/[facility]  -Pt w/ abdominal pain and discomfort. Procedure team consulted to assess pt for therapeutic tap and pt now s/p therapeutic tap today 8/31 w/ removal of 5050cc of fluid.    Acute UTI.   -Per documentation, pt treated for CAUTI at  w/Ceftriaxone on 7/27 x5 days. UCx at that time grew >100K GNR & >3 organisms per HIE. Pt reports having high grade fevers at that time to 101.Tmax in .3, WBC 11  -UA +,  urine culture <100,000 Pseudomonas and ecoli, rpt culture <10,000. Blood culture neg to date  -s/p Zosyn- 3 day course completed  -ID input appreciated.    Constipation.   -cont dulcolax, senna and miralax  -monitor for bms.    Pancreatic carcinoma.   -Follows with Dr. Amaury Vieira CT reviewed: large volume ascites, grossly unchanged pancreatic and hepatic lesions.   -Plan for hospice as above   -Pain control with Fentanyl patch, PO Dilaudid and Gabapentin.    Type 2 diabetes mellitus.   -A1c 7.1Home Meds: Basaglar 8U qhs, Novalog 6U TID     Course c/b hypoglycemia.  -Last noted on 8/29 and insulin regimen was reduced and lantus dcd  -c/w current regimen of insulin w/ only ISS for now. Will continue to monitor and adjust regimen as necessary to avoid hypoglycemia.    -Consistent carb diet. May need to liberalize her diet shakira if she is having a poor appetite.    Hypokalemia.   -Improved. Replete K PRN.    Goals of care, counseling/discussion.   DNR/DNI with patient on admission  Copy of MOLST in chart sent by MICHELLE.    On_________, discussed with __________, patient is medically cleared and optimized for discharge today. All medications were reviewed with attending, and sent to mutually agreed upon pharmacy. 70F w/T2DM c/b neuropathy, chronic joy, pancreatic cancer w/ mets to lung and liver on chemo (last session yesterday) presenting from  rehab w/diffuse abdominal pain and distention, found to have new ascites on CT, admitted for sepsis 2/2 UTI vs SBP, s/p para in ER.    Hypotension.   -Pt hypotensive to the 80s systolic on 8/20. She was asymptomatic at the time. Likely multifactorial in the setting of poor PO, reduced effective circulating volume in setting of ascites and meds. No signs of symptoms of infection and pt remains afebrile. H/H has been stable and therefore less concern for bleeding.  -Pt S/P IVF 500cc bolus on 8/30 w/ improvement   -encourage PO.  -B/p improved and stable    Ascites.   -Pt p/w abdominal distention and pain since 8/20, CTAP with new ascites. Low grade temp in ER to 100.3  -s/p diagnostic para in ER. There was low suspicion for peritonitis and pt s/p Zosyn in ER, completed 3 day course of zosyn for UTI  -Per oncology and pall care, no further treatment is being offered  -Hospice referral done and plan for likely discharge to hospice/[facility] your door.  -Pt w/ abdominal pain and discomfort. Procedure team consulted to assess pt for therapeutic tap and pt now s/p therapeutic tap today 8/31 w/ removal of 5050cc of fluid.    Acute UTI.   -Per documentation, pt treated for CAUTI at  w/Ceftriaxone on 7/27 x5 days. UCx at that time grew >100K GNR & >3 organisms per HIE. Pt reports having high grade fevers at that time to 101.Tmax in .3, WBC 11  -UA +,  urine culture <100,000 Pseudomonas and ecoli, rpt culture <10,000. Blood culture neg to date  -s/p Zosyn- 3 day course completed  -ID input appreciated.    Constipation.   -s/p dulcolax, lactulose   -cont senna and MiraLax  -Last BM 8/28, Lactulose restarted 9/2/21    Pancreatic carcinoma.   -Follows with Dr. Amaury Vieira CT reviewed: large volume ascites, grossly unchanged pancreatic and hepatic lesions.   -Plan for hospice as above   -Pain control with Fentanyl patch, PO Dilaudid and Gabapentin.    Type 2 diabetes mellitus.   -A1c 7.1--Home Meds: Basaglar 8U qhs, Novolog 6U TID   -Managed on Lantus, admelog SS inpt, Lantus subsequently d/c'ed d/t hypoglycemia    Course c/b hypoglycemia.  -Last noted on 8/29 and insulin regimen was reduced and lantus dcd  -c/w current regimen of insulin w/ only ISS for now. Will continue to monitor and adjust regimen as necessary to avoid hypoglycemia.    -Consistent carb diet. May need to liberalize her diet shakira if she is having a poor appetite.    Hypokalemia.   -Improved. Replete K PRN.    Goals of care, counseling/discussion.   DNR/DNI with patient on admission  Copy of MOLST in chart sent by MICHELLE.    On 9/2/21 discussed with Dr. Vines, patient is medically cleared and optimized for discharge today. D/C to Rehab with plans for transition to Hospice. 70F w/T2DM c/b neuropathy, chronic joy, pancreatic cancer w/ mets to lung and liver on chemo (last session yesterday) presenting from [facility] rehab w/diffuse abdominal pain and distention, found to have new ascites on CT, admitted for sepsis 2/2 UTI vs SBP, s/p para in ER.     Pt treated for CAUTI at  w/Ceftriaxone on 7/27 x5 days. UCx at that time grew >100K GNR & >3 organisms per HIE. Pt reports having high grade fevers at that time to 101.Tmax in .3, WBC 1P, UA +,  urine culture <100,000 Pseudomonas and ecoli, rpt culture <10,000. Blood culture neg to date. S/p Zosyn- 3 day course completed.  CTAP with new ascites. Low grade temp in ER to 100.3  S/p diagnostic para in ER. There was low suspicion for peritonitis and pt s/p Zosyn in ER, completed 3 day course of zosyn for UTI.     Pt followed by onc and palliative. Per oncology and pall care, no further treatment is being offered. Pt symptoms was treated and  treated w/ pain meds. Pt DNR/DNI and filled out MOLST form.  Procedure team consulted to assess pt for therapeutic tap and pt  s/p repeat therapeutic tap  8/31 w/ removal of 5050cc of fluid.    Of note pt with episodes of hypotension during course.  Likely multifactorial in the setting of poor PO, reduced effective circulating volume in setting of ascites and meds and then post paracentesis.  No signs of symptoms of infection and pt remains afebrile. Pt was asymptomatic. H/H has been stable and therefore less concern for bleeding. On discharge her BPs were in the 90s to low 100s systolic.     Additionally:    Constipation  -treated with bowel regimen     Type 2 diabetes mellitus.   -A1c 7.1--Home Meds: Basaglar 8U qhs, Novolog 6U TID . Inppt pt was Managed on Lantus, admelog SS inpt, However , Lantus subsequently d/c'ed d/t hypoglycemia      On 9/2/21  patient is medically cleared and optimized for discharge today. D/C to Rehab with plans for transition to Hospice.

## 2021-09-01 NOTE — PROGRESS NOTE ADULT - PROBLEM SELECTOR PLAN 9
DVT ppx :Lovenox    Dispo: Initially pending placement to [facility]/hospice. Per SW/CM pt will not be able to go to [facility]. They are working on alternative options. Will f/u w/ SW/CM daily in regards to progress. DVT ppx :Resume Lovenox. Was held for para yesterday     Dispo: Initially pending placement to [facility]/hospice. Per SW/CM pt will not be able to go to [facility]. They are working on alternative options. Will f/u w/ SW/CM daily in regards to progress.

## 2021-09-01 NOTE — PROGRESS NOTE ADULT - PROBLEM SELECTOR PLAN 4
-cont dulcolax, senna and miralax. Will titrate regimen if she continues to have no improvement   -monitor for bms

## 2021-09-01 NOTE — DISCHARGE NOTE PROVIDER - DETAILS OF MALNUTRITION DIAGNOSIS/DIAGNOSES
This patient has been assessed with a concern for Malnutrition and was treated during this hospitalization for the following Nutrition diagnosis/diagnoses:     -  08/28/2021: Severe protein-calorie malnutrition   -  08/28/2021: Underweight (BMI < 19)

## 2021-09-01 NOTE — DISCHARGE NOTE PROVIDER - NSDCMRMEDTOKEN_GEN_ALL_CORE_FT
Basaglar KwikPen 100 units/mL subcutaneous solution: 8 unit(s) subcutaneous once a day (at bedtime)  bisacodyl 5 mg oral delayed release tablet: 1 tab(s) orally every 12 hours  dronabinol 2.5 mg oral capsule: 1 cap(s) orally 2 times a day  fentaNYL 12 mcg/hr transdermal film, extended release: 1 patch transdermal every 72 hours  gabapentin 300 mg oral capsule: 1 cap(s) orally 3 times a day  HYDROmorphone 2 mg oral tablet: 3 tab(s) orally every 6 hours, As Needed - 6)  NovoLOG FlexPen 100 units/mL injectable solution: 6 unit(s) injectable 3 times a day (before meals)  polyethylene glycol 3350 oral powder for reconstitution: 17 gram(s) orally   pyridoxine 50 mg oral tablet: 1 tab(s) orally once a day  Senna 8.6 mg oral tablet: 2 tab(s) orally once a day (at bedtime)  simethicone 80 mg oral tablet, chewable: 1 tab(s) orally 2 times a day  thiamine 100 mg oral tablet: 1 tab(s) orally once a day  Vitamin C 500 mg oral tablet: 2 tab(s) orally once a day  Xeloda 500 mg oral tablet: 1 tab(s) orally every 12 hours   acetaminophen 325 mg oral tablet: 2 tab(s) orally every 6 hours, As needed, Temp greater or equal to 38C (100.4F), Mild Pain (1 - 3)  aluminum hydroxide-magnesium hydroxide 200 mg-200 mg/5 mL oral suspension: 30 milliliter(s) orally every 4 hours, As needed, Dyspepsia  dronabinol 2.5 mg oral capsule: 1 cap(s) orally 2 times a day  gabapentin 300 mg oral capsule: 1 cap(s) orally 3 times a day  HYDROmorphone 2 mg oral tablet: 3 tab(s) orally every 6 hours, As needed, mod-severe pain  insulin lispro 100 units/mL injectable solution: injectable 4 times a day (before meals and at bedtime)  1 Unit(s) if Glucose 151 - 200  2 Unit(s) if Glucose 201 - 250  3 Unit(s) if Glucose 251 - 300  4 Unit(s) if Glucose 301 - 350  5 Unit(s) if Glucose 351 - 400  6 Unit(s) if Glucose Greater Than 400  lactulose 10 g/15 mL oral syrup: 15 milliliter(s) orally every 8 hours until BM then prn   melatonin 3 mg oral tablet: 1 tab(s) orally once a day (at bedtime), As needed, Insomnia  polyethylene glycol 3350 oral powder for reconstitution: 17 gram(s) orally   pyridoxine 50 mg oral tablet: 1 tab(s) orally once a day  Senna 8.6 mg oral tablet: 2 tab(s) orally once a day (at bedtime)  simethicone 80 mg oral tablet, chewable: 1 tab(s) orally 2 times a day  thiamine 100 mg oral tablet: 1 tab(s) orally once a day  Vitamin C 500 mg oral tablet: 2 tab(s) orally once a day

## 2021-09-01 NOTE — PROGRESS NOTE ADULT - PROBLEM SELECTOR PLAN 1
Pt hypotensive to the 80s systolic on 8/30 that improved s/p 500cc bolus. BPs now again in the 80s systolic in the setting of recent paracentesis on 8/31. Pt is asymptomatic. No signs of symptoms of infection and pt remains afebrile. H/H has been stable and therefore less concern for bleeding.    -would try to hold off on ivfs to avoid quick reaccumulation of ascites. Monitor BPs. If BPs not improving or if pt asymptomatic would start albumin.   -if spikes fever or signs or symptoms of infection w/o localizing source - would obtain blood cx, and start empiric abx to cover intraabdominal infection . If signs and symptoms lead to alternative source would work that up accordingly.

## 2021-09-01 NOTE — PROGRESS NOTE ADULT - PROBLEM SELECTOR PLAN 6
A1c 7.1Home Meds: Basaglar 8U qhs, Novalog 6U TID     Course c/b hypoglycemia. Last noted on 8/29 and insulin regimen was reduced and lantus dcd    -c/w current regimen of insulin w/ only ISS for now. Will continue to monitor off standing insulin and adjust regimen as necessary to avoid hypoglycemia.    -Consistent carb diet. May need to liberalize her diet shakira if she is having a poor appetite

## 2021-09-01 NOTE — DISCHARGE NOTE PROVIDER - NSDCCPCAREPLAN_GEN_ALL_CORE_FT
PRINCIPAL DISCHARGE DIAGNOSIS  Diagnosis: Abdominal pain  Assessment and Plan of Treatment: continue pain mediciations as needed. Paracentesis (drainage of fluid from abdomen) was done on 8/31 and 5L was drained.      SECONDARY DISCHARGE DIAGNOSES  Diagnosis: Pancreatic carcinoma  Assessment and Plan of Treatment: You are being discharged with Hospice services and it is recommended to focus on comfort measures and supportive care. Continue with medications prescribed for pain and other symptom control.    Diagnosis: Ascites  Assessment and Plan of Treatment: Plan as above.    Diagnosis: Acute UTI  Assessment and Plan of Treatment: completed antibiotics     PRINCIPAL DISCHARGE DIAGNOSIS  Diagnosis: Abdominal pain  Assessment and Plan of Treatment: continue pain mediciations as needed. Paracentesis (drainage of fluid from abdomen) was done on 8/31 and 5L was drained.      SECONDARY DISCHARGE DIAGNOSES  Diagnosis: Ascites  Assessment and Plan of Treatment: Plan as above.    Diagnosis: Acute UTI  Assessment and Plan of Treatment: completed antibiotics    Diagnosis: Pancreatic carcinoma  Assessment and Plan of Treatment: You are being discharged to Rehab with plans for transition to Hospice services and it is recommended to focus on comfort measures and supportive care. Continue with medications prescribed for pain and other symptom control.    Diagnosis: Type 2 diabetes mellitus  Assessment and Plan of Treatment:

## 2021-09-02 ENCOUNTER — TRANSCRIPTION ENCOUNTER (OUTPATIENT)
Age: 71
End: 2021-09-02

## 2021-09-02 VITALS
OXYGEN SATURATION: 98 % | HEART RATE: 92 BPM | TEMPERATURE: 98 F | RESPIRATION RATE: 18 BRPM | SYSTOLIC BLOOD PRESSURE: 90 MMHG | DIASTOLIC BLOOD PRESSURE: 49 MMHG

## 2021-09-02 PROCEDURE — 99239 HOSP IP/OBS DSCHRG MGMT >30: CPT

## 2021-09-02 RX ORDER — INSULIN ASPART 100 [IU]/ML
6 INJECTION, SOLUTION SUBCUTANEOUS
Qty: 0 | Refills: 0 | DISCHARGE

## 2021-09-02 RX ORDER — HYDROMORPHONE HYDROCHLORIDE 2 MG/ML
6 INJECTION INTRAMUSCULAR; INTRAVENOUS; SUBCUTANEOUS EVERY 6 HOURS
Refills: 0 | Status: DISCONTINUED | OUTPATIENT
Start: 2021-09-02 | End: 2021-09-02

## 2021-09-02 RX ORDER — CAPECITABINE 500 MG/1
1 TABLET ORAL
Qty: 0 | Refills: 0 | DISCHARGE

## 2021-09-02 RX ORDER — ACETAMINOPHEN 500 MG
2 TABLET ORAL
Qty: 0 | Refills: 0 | DISCHARGE
Start: 2021-09-02

## 2021-09-02 RX ORDER — DRONABINOL 2.5 MG
2.5 CAPSULE ORAL
Refills: 0 | Status: DISCONTINUED | OUTPATIENT
Start: 2021-09-02 | End: 2021-09-02

## 2021-09-02 RX ORDER — LANOLIN ALCOHOL/MO/W.PET/CERES
1 CREAM (GRAM) TOPICAL
Qty: 0 | Refills: 0 | DISCHARGE
Start: 2021-09-02

## 2021-09-02 RX ORDER — HYDROMORPHONE HYDROCHLORIDE 2 MG/ML
3 INJECTION INTRAMUSCULAR; INTRAVENOUS; SUBCUTANEOUS
Qty: 0 | Refills: 0 | DISCHARGE
Start: 2021-09-02

## 2021-09-02 RX ORDER — LACTULOSE 10 G/15ML
10 SOLUTION ORAL EVERY 8 HOURS
Refills: 0 | Status: DISCONTINUED | OUTPATIENT
Start: 2021-09-02 | End: 2021-09-02

## 2021-09-02 RX ORDER — LACTULOSE 10 G/15ML
15 SOLUTION ORAL
Qty: 0 | Refills: 0 | DISCHARGE
Start: 2021-09-02

## 2021-09-02 RX ORDER — INSULIN LISPRO 100/ML
0 VIAL (ML) SUBCUTANEOUS
Qty: 0 | Refills: 0 | DISCHARGE
Start: 2021-09-02

## 2021-09-02 RX ORDER — INSULIN GLARGINE 100 [IU]/ML
8 INJECTION, SOLUTION SUBCUTANEOUS
Qty: 0 | Refills: 0 | DISCHARGE

## 2021-09-02 RX ADMIN — HYDROMORPHONE HYDROCHLORIDE 6 MILLIGRAM(S): 2 INJECTION INTRAMUSCULAR; INTRAVENOUS; SUBCUTANEOUS at 12:51

## 2021-09-02 RX ADMIN — HYDROMORPHONE HYDROCHLORIDE 6 MILLIGRAM(S): 2 INJECTION INTRAMUSCULAR; INTRAVENOUS; SUBCUTANEOUS at 13:50

## 2021-09-02 RX ADMIN — FENTANYL CITRATE 1 PATCH: 50 INJECTION INTRAVENOUS at 18:23

## 2021-09-02 RX ADMIN — GABAPENTIN 300 MILLIGRAM(S): 400 CAPSULE ORAL at 16:24

## 2021-09-02 RX ADMIN — POLYETHYLENE GLYCOL 3350 17 GRAM(S): 17 POWDER, FOR SOLUTION ORAL at 12:52

## 2021-09-02 RX ADMIN — ENOXAPARIN SODIUM 30 MILLIGRAM(S): 100 INJECTION SUBCUTANEOUS at 12:57

## 2021-09-02 RX ADMIN — Medication 500 MILLIGRAM(S): at 12:52

## 2021-09-02 RX ADMIN — Medication 50 MILLIGRAM(S): at 12:52

## 2021-09-02 RX ADMIN — Medication 2: at 12:53

## 2021-09-02 RX ADMIN — LACTULOSE 10 GRAM(S): 10 SOLUTION ORAL at 16:25

## 2021-09-02 RX ADMIN — Medication 2.5 MILLIGRAM(S): at 06:13

## 2021-09-02 RX ADMIN — Medication 100 MILLIGRAM(S): at 12:52

## 2021-09-02 RX ADMIN — GABAPENTIN 300 MILLIGRAM(S): 400 CAPSULE ORAL at 06:12

## 2021-09-02 NOTE — PROGRESS NOTE ADULT - ASSESSMENT
70F w/ T2DM c/b neuropathy, chronic joy, pancreatic cancer w/ mets to lung and liver on chemo a/w new ascites on CT s/p para. Now plan for dispo to [facility] w/ hospice.

## 2021-09-02 NOTE — PROGRESS NOTE ADULT - PROBLEM SELECTOR PLAN 4
-cont dulcolax, senna and miralax. Will titrate regimen if she continues to have no improvement . Adding lactulose today   -monitor for bms

## 2021-09-02 NOTE — PROGRESS NOTE ADULT - PROBLEM SELECTOR PLAN 3
RESOLVED. Per documentation, pt treated for CAUTI at [facility] w/Ceftriaxone on 7/27 x5 days. UCx at that time grew >100K GNR & >3 organisms per HIE. Pt reports having high grade fevers at that time to 101.Tmax in .3, WBC 11    UA +,  urine culture <100,000 Pseudomonas and ecoli, rpt culture <10,000. Blood culture neg to date  -s/p Zosyn- 3 day course completed  -ID input appreciated

## 2021-09-02 NOTE — PROGRESS NOTE ADULT - PROBLEM SELECTOR PROBLEM 8
Hypokalemia
Goals of care, counseling/discussion
Hypokalemia
Goals of care, counseling/discussion
Hypokalemia

## 2021-09-02 NOTE — PROGRESS NOTE ADULT - PROBLEM SELECTOR PLAN 7
Confirmed DNR/DNI with patient on admission  copy of MOLST in chart sent by MICHELLE
Improved. Replete K PRN

## 2021-09-02 NOTE — PROGRESS NOTE ADULT - PROBLEM SELECTOR PROBLEM 4
Pancreatic carcinoma
Constipation
Pancreatic carcinoma
Constipation

## 2021-09-02 NOTE — PROGRESS NOTE ADULT - PROBLEM SELECTOR PLAN 9
DVT ppx :Resume Lovenox. Was held for para yesterday     Dispo: Initially pending placement to [facility]/hospice. Per SW/CM pt will not be able to go to [facility]. They are working on alternative options. Will f/u w/ SW/CM daily in regards to progress. Possible dc for today/tomorrow.

## 2021-09-02 NOTE — PROGRESS NOTE ADULT - PROBLEM SELECTOR PLAN 1
Most recent BPs in the 90s and low 100s systolic. Pt is asymptomatic. No signs of symptoms of infection and pt remains afebrile. H/H has been stable and therefore less concern for bleeding.    -would try to hold off on ivfs to avoid quick reaccumulation of ascites. Monitor BPs. If BPs not improving or if pt symptomatic would consider starting albumin.   -if spikes fever or signs or symptoms of infection w/o localizing source - would obtain blood cx, and start empiric abx to cover intraabdominal infection . If signs and symptoms lead to alternative source would work that up accordingly.

## 2021-09-02 NOTE — DISCHARGE NOTE NURSING/CASE MANAGEMENT/SOCIAL WORK - NSDCPEFALRISK_GEN_ALL_CORE
For information on Fall & injury Prevention, visit [email or web address]

## 2021-09-02 NOTE — PROGRESS NOTE ADULT - SUBJECTIVE AND OBJECTIVE BOX
Patient is a 70y old  Female who presents with a chief complaint of abdominal pain (01 Sep 2021 18:10)      SUBJECTIVE / OVERNIGHT EVENTS:    Review of Systems:     MEDICATIONS  (STANDING):  ascorbic acid 500 milliGRAM(s) Oral daily  dextrose 40% Gel 15 Gram(s) Oral once  dextrose 5%. 1000 milliLiter(s) (50 mL/Hr) IV Continuous <Continuous>  dextrose 5%. 1000 milliLiter(s) (100 mL/Hr) IV Continuous <Continuous>  dextrose 50% Injectable 25 Gram(s) IV Push once  dextrose 50% Injectable 12.5 Gram(s) IV Push once  dextrose 50% Injectable 25 Gram(s) IV Push once  dronabinol 2.5 milliGRAM(s) Oral two times a day  enoxaparin Injectable 30 milliGRAM(s) SubCutaneous daily  gabapentin 300 milliGRAM(s) Oral three times a day  glucagon  Injectable 1 milliGRAM(s) IntraMuscular once  insulin lispro (ADMELOG) corrective regimen sliding scale   SubCutaneous Before meals and at bedtime  polyethylene glycol 3350 17 Gram(s) Oral daily  pyridoxine 50 milliGRAM(s) Oral daily  senna 2 Tablet(s) Oral at bedtime  thiamine 100 milliGRAM(s) Oral daily    MEDICATIONS  (PRN):  acetaminophen   Tablet .. 650 milliGRAM(s) Oral every 6 hours PRN Temp greater or equal to 38C (100.4F), Mild Pain (1 - 3)  aluminum hydroxide/magnesium hydroxide/simethicone Suspension 30 milliLiter(s) Oral every 4 hours PRN Dyspepsia  HYDROmorphone   Tablet 6 milliGRAM(s) Oral every 6 hours PRN mod-severe pain  melatonin 3 milliGRAM(s) Oral at bedtime PRN Insomnia  ondansetron Injectable 4 milliGRAM(s) IV Push every 8 hours PRN Nausea and/or Vomiting      PHYSICAL EXAM:    I&O's Summary    01 Sep 2021 07:01  -  02 Sep 2021 07:00  --------------------------------------------------------  IN: 0 mL / OUT: 400 mL / NET: -400 mL      ICU Vital Signs Last 24 Hrs  T(C): 36.9 (02 Sep 2021 06:02), Max: 37 (01 Sep 2021 20:48)  T(F): 98.4 (02 Sep 2021 06:02), Max: 98.6 (01 Sep 2021 20:48)  HR: 88 (02 Sep 2021 06:02) (88 - 97)  BP: 98/59 (02 Sep 2021 06:02) (89/56 - 104/62)  BP(mean): --  ABP: --  ABP(mean): --  RR: 18 (02 Sep 2021 06:02) (17 - 18)  SpO2: 98% (02 Sep 2021 06:02) (98% - 100%)      LABS:  CAPILLARY BLOOD GLUCOSE      POCT Blood Glucose.: 134 mg/dL (02 Sep 2021 08:16)  POCT Blood Glucose.: 169 mg/dL (01 Sep 2021 22:13)  POCT Blood Glucose.: 243 mg/dL (01 Sep 2021 17:29)  POCT Blood Glucose.: 156 mg/dL (01 Sep 2021 12:09)                      RADIOLOGY & ADDITIONAL TESTS:    Imaging Personally Reviewed:    Consultant(s) Notes Reviewed:      Care Discussed with Consultants/Other Providers: Patient is a 70y old  Female who presents with a chief complaint of abdominal pain (01 Sep 2021 18:10)      SUBJECTIVE / OVERNIGHT EVENTS: Pt denies SOB, CP, n/v. Still with abdominal pain but overall improved. She has not had BM yet.     Review of Systems:     MEDICATIONS  (STANDING):  ascorbic acid 500 milliGRAM(s) Oral daily  dextrose 40% Gel 15 Gram(s) Oral once  dextrose 5%. 1000 milliLiter(s) (50 mL/Hr) IV Continuous <Continuous>  dextrose 5%. 1000 milliLiter(s) (100 mL/Hr) IV Continuous <Continuous>  dextrose 50% Injectable 25 Gram(s) IV Push once  dextrose 50% Injectable 12.5 Gram(s) IV Push once  dextrose 50% Injectable 25 Gram(s) IV Push once  dronabinol 2.5 milliGRAM(s) Oral two times a day  enoxaparin Injectable 30 milliGRAM(s) SubCutaneous daily  gabapentin 300 milliGRAM(s) Oral three times a day  glucagon  Injectable 1 milliGRAM(s) IntraMuscular once  insulin lispro (ADMELOG) corrective regimen sliding scale   SubCutaneous Before meals and at bedtime  polyethylene glycol 3350 17 Gram(s) Oral daily  pyridoxine 50 milliGRAM(s) Oral daily  senna 2 Tablet(s) Oral at bedtime  thiamine 100 milliGRAM(s) Oral daily    MEDICATIONS  (PRN):  acetaminophen   Tablet .. 650 milliGRAM(s) Oral every 6 hours PRN Temp greater or equal to 38C (100.4F), Mild Pain (1 - 3)  aluminum hydroxide/magnesium hydroxide/simethicone Suspension 30 milliLiter(s) Oral every 4 hours PRN Dyspepsia  HYDROmorphone   Tablet 6 milliGRAM(s) Oral every 6 hours PRN mod-severe pain  melatonin 3 milliGRAM(s) Oral at bedtime PRN Insomnia  ondansetron Injectable 4 milliGRAM(s) IV Push every 8 hours PRN Nausea and/or Vomiting      PHYSICAL EXAM:    I&O's Summary    01 Sep 2021 07:01  -  02 Sep 2021 07:00  --------------------------------------------------------  IN: 0 mL / OUT: 400 mL / NET: -400 mL      ICU Vital Signs Last 24 Hrs  T(C): 36.9 (02 Sep 2021 06:02), Max: 37 (01 Sep 2021 20:48)  T(F): 98.4 (02 Sep 2021 06:02), Max: 98.6 (01 Sep 2021 20:48)  HR: 88 (02 Sep 2021 06:02) (88 - 97)  BP: 98/59 (02 Sep 2021 06:02) (89/56 - 104/62)  BP(mean): --  ABP: --  ABP(mean): --  RR: 18 (02 Sep 2021 06:02) (17 - 18)  SpO2: 98% (02 Sep 2021 06:02) (98% - 100%)  CONSTITUTIONAL: cachetic appearing female in NAD, laying in bed   ENMT: Moist oral mucosa  RESPIRATORY: Normal respiratory effort; lungs are clear to auscultation bilaterally  CARDIOVASCULAR:  S1/S2; No lower extremity edema  ABDOMEN: distended but less tense than it was prior to recent tap, Diffuse TTP present but  improved. No rebound/guarding  MUSCULOSKELETAL:  no clubbing or cyanosis of digits; no joint swelling or tenderness to palpation  PSYCH: A+O to person, place, and time; affect appropriate  NEUROLOGY: no focal deficits  SKIN: No rashes; no palpable lesions    LABS:  CAPILLARY BLOOD GLUCOSE      POCT Blood Glucose.: 134 mg/dL (02 Sep 2021 08:16)  POCT Blood Glucose.: 169 mg/dL (01 Sep 2021 22:13)  POCT Blood Glucose.: 243 mg/dL (01 Sep 2021 17:29)  POCT Blood Glucose.: 156 mg/dL (01 Sep 2021 12:09)                      RADIOLOGY & ADDITIONAL TESTS:    Imaging Personally Reviewed:    Consultant(s) Notes Reviewed:      Care Discussed with Consultants/Other Providers:

## 2021-09-02 NOTE — PROGRESS NOTE ADULT - PROBLEM SELECTOR PROBLEM 5
Pancreatic carcinoma
Type 2 diabetes mellitus
Pancreatic carcinoma
Type 2 diabetes mellitus
Pancreatic carcinoma
Pancreatic carcinoma

## 2021-09-02 NOTE — PROGRESS NOTE ADULT - PROBLEM SELECTOR PROBLEM 7
Goals of care, counseling/discussion
Goals of care, counseling/discussion
Hypokalemia
Goals of care, counseling/discussion
Goals of care, counseling/discussion
Hypokalemia

## 2021-09-02 NOTE — PROGRESS NOTE ADULT - NUTRITIONAL ASSESSMENT
This patient has been assessed with a concern for Malnutrition and has been determined to have a diagnosis/diagnoses of Severe protein-calorie malnutrition and Underweight (BMI < 19).    This patient is being managed with:   Diet DASH/TLC-  Sodium & Cholesterol Restricted  Consistent Carbohydrate {Evening Snack} (CSTCHOSN)  1000mL Fluid Restriction ([id number])  Supplement Feeding Modality:  Oral  Glucerna Shake Cans or Servings Per Day:  1       Frequency:  Three Times a day  Entered: Aug 28 2021  4:07PM    
This patient has been assessed with a concern for Malnutrition and has been determined to have a diagnosis/diagnoses of Severe protein-calorie malnutrition and Underweight (BMI < 19).    This patient is being managed with:   Diet DASH/TLC-  Sodium & Cholesterol Restricted  Consistent Carbohydrate {Evening Snack} (CSTCHOSN)  1000mL Fluid Restriction ([id number])  Supplement Feeding Modality:  Oral  Glucerna Shake Cans or Servings Per Day:  1       Frequency:  Three Times a day  Entered: Aug 28 2021  4:07PM    
This patient has been assessed with a concern for Malnutrition and has been determined to have a diagnosis/diagnoses of Severe protein-calorie malnutrition and Underweight (BMI < 19).    This patient is being managed with:   Diet Regular-  Consistent Carbohydrate {Evening Snack} (CSTCHOSN)  1000mL Fluid Restriction ([id number])  Low Sodium  Supplement Feeding Modality:  Oral  Glucerna Shake Cans or Servings Per Day:  3       Frequency:  Daily  Entered: Sep  1 2021  5:01PM    
This patient has been assessed with a concern for Malnutrition and has been determined to have a diagnosis/diagnoses of Severe protein-calorie malnutrition and Underweight (BMI < 19).    This patient is being managed with:   Diet DASH/TLC-  Sodium & Cholesterol Restricted  Consistent Carbohydrate {Evening Snack} (CSTCHOSN)  1000mL Fluid Restriction ([id number])  Supplement Feeding Modality:  Oral  Glucerna Shake Cans or Servings Per Day:  1       Frequency:  Three Times a day  Entered: Aug 28 2021  4:07PM    
This patient has been assessed with a concern for Malnutrition and has been determined to have a diagnosis/diagnoses of Severe protein-calorie malnutrition and Underweight (BMI < 19).    This patient is being managed with:   Diet DASH/TLC-  Sodium & Cholesterol Restricted  Consistent Carbohydrate {Evening Snack} (CSTCHOSN)  1000mL Fluid Restriction ([id number])  Supplement Feeding Modality:  Oral  Glucerna Shake Cans or Servings Per Day:  1       Frequency:  Three Times a day  Entered: Aug 28 2021  4:07PM    
This patient has been assessed with a concern for Malnutrition and has been determined to have a diagnosis/diagnoses of Severe protein-calorie malnutrition and Underweight (BMI < 19).    This patient is being managed with:   Diet DASH/TLC-  Sodium & Cholesterol Restricted  Consistent Carbohydrate {Evening Snack} (CSTCHOSN)  1000mL Fluid Restriction ([id number])  Supplement Feeding Modality:  Oral  Glucerna Shake Cans or Servings Per Day:  1       Frequency:  Three Times a day  Entered: Aug 28 2021  4:07PM

## 2021-09-02 NOTE — PROGRESS NOTE ADULT - PROBLEM SELECTOR PLAN 8
Replete K
DNR/DNI with patient on admission  Copy of MOLST in chart sent by MICHELLE
DNR/DNI with patient on admission  Copy of MOLST in chart sent by MICHELLE
Replete K
Replete K
DNR/DNI with patient on admission  Copy of MOLST in chart sent by MICHELLE
DNR/DNI with patient on admission  Copy of MOLST in chart sent by MICHELLE

## 2021-09-02 NOTE — PROGRESS NOTE ADULT - PROBLEM SELECTOR PROBLEM 6
Prophylactic measure
Type 2 diabetes mellitus
Type 2 diabetes mellitus
Prophylactic measure
Type 2 diabetes mellitus
Prophylactic measure
Type 2 diabetes mellitus
Prophylactic measure

## 2021-09-02 NOTE — DISCHARGE NOTE NURSING/CASE MANAGEMENT/SOCIAL WORK - PATIENT PORTAL LINK FT
You can access the FollowMyHealth Patient Portal offered by [facility] by registering at the following website: [email or web address]. By joining [employer]’s FollowMyHealth portal, you will also be able to view your health information using other applications (apps) compatible with our system.

## 2021-09-02 NOTE — PROGRESS NOTE ADULT - PROBLEM SELECTOR PLAN 2
Pt p/w abdominal distention and pain since 8/20, CTAP with new ascites. Low grade temp in ER to 100.3  s/p diagnostic para in ER. There was low suspicion for peritonitis and pt s/p Zosyn in ER and completed 3 day course of zosyn for UTI.     Pt now s/p another therapeutic tap on 8/31 w/ removal of 5050cc due to abdominal pain and discomfort.      -Per oncology and pall care, no further treatment is being offered  -monitor abdominal exam and symptoms  -c/w supportive measures

## 2021-09-22 NOTE — ED PROVIDER NOTE - CRITICAL CARE ATTENDING CONTRIBUTION TO CARE
See notes.  Pt presented with high probability of imminent/ life threatening deterioration in condition.

## 2021-09-22 NOTE — CONSULT NOTE ADULT - PROBLEM SELECTOR RECOMMENDATION 3
Clinical evidence indicates that the patient has Severe protein calorie malnutrition/ 3rd degree.  1.7, BMI 15.  poor po intake prior to admission     In context of     Acute Illness/Injury (>7days)    vs    Chronic Illness (>1 month)    Energy/Food intake <50% of estimated energy requirement >5 days  Weight loss: Moderate - severe  Body Fat loss: Severe   Cachexia, temporal wasting,  muscle atrophy  Muscle mass loss: Severe    Fluid Accumulation: , ascites   Strength: weakened severe (bedbound)    Currently NPO 2/2 mentation Clinical evidence indicates that the patient has Severe protein calorie malnutrition/ 3rd degree.  1.7, BMI 15.  poor po intake prior to admission     In context of    Chronic Illness (>1 month)    Energy/Food intake <50% of estimated energy requirement >5 days  Weight loss: Moderate - severe  Body Fat loss: Severe   Cachexia, temporal wasting,  muscle atrophy  Muscle mass loss: Severe    Fluid Accumulation: , ascites   Strength: weakened severe (bedbound)    Currently NPO 2/2 mentation

## 2021-09-22 NOTE — CONSULT NOTE ADULT - PROBLEM SELECTOR RECOMMENDATION 5
Palliative care team called pt's sister Karolina gama and her niece Sonam prefers to speak in person, scheduled to meet at 1330 today.  Palliative care will follow. Please see discussion noted above in GOC conversation.

## 2021-09-22 NOTE — CONSULT NOTE ADULT - PROBLEM SELECTOR RECOMMENDATION 9
p/w respiratory failure on NRB.  Pt is Aox0-1, non verbal.  Prognosis poor, likely hours to days.  Hospice appropriate.      Pt is DNR/DNI p/w respiratory failure on NRB.  Pt is Aox0-1, non verbal.  Prognosis poor, likely hours to days.  Hospice appropriate given progressive decline, prognosis likely hours to days.   Pt is for IPU consents pending.    - Dilaudid 0.2 mg IVP every 2 hours prn for dyspnea/pain  - ativan 1 mg IVP every 4 hours prn for agitation  - glycopyrrolate 0.2 mg IVP every 6 hours prn  -bowel regimen     Pt is DNR/DNI p/w respiratory failure on NRB.  Pt is Aox0-1, non verbal.  Prognosis poor, likely hours to days.  Hospice appropriate given progressive decline, prognosis likely hours to days.   Pt is for IPU consents pending.    - Dilaudid 0.5 mg IVP every 2 hours prn for dyspnea/pain  - ativan 1 mg IVP every 4 hours prn for agitation  - glycopyrrolate 0.2 mg IVP every 6 hours prn  -bowel regimen     Pt is DNR/DNI

## 2021-09-22 NOTE — H&P ADULT - CONVERSATION DETAILS
Sister Karolina Mendez is unfamiliar with her sister's wishes and does not know whether she signed any papers stating DNR/DNI or any comfort measures. I spoke with her at length regarding her sister's prognosis and hospice given her sister's state.     Pt is DNR/DNI - GEOVANI faxed from [facility] Sister Karolina Mendez is unfamiliar with her sister's wishes and does not know whether she signed any papers stating DNR/DNI or any comfort measures. I spoke with her at length regarding her sister's prognosis and hospice given her sister's state.     Pt is DNR/DNI no feeding tube, limited medical interventions, iv fluids and abx ludwin OLIVO faxed from [facility]

## 2021-09-22 NOTE — CONSULT NOTE ADULT - PROBLEM SELECTOR RECOMMENDATION 4
Pt is bedbound.  Dependent.  2 person assist.  High risk for further skin injury  Skin care per protocol  Frequent positioning Pt is bedbound.  Dependent.  2 person assist.  High risk for further skin injury  Skin care per protocol  Frequent positioning    Comfort measures only

## 2021-09-22 NOTE — H&P ADULT - NSHPPHYSICALEXAM_GEN_ALL_CORE
General - NAD, laying in bed (+) cachectic, (+) temporal wasting  Eyes - PERRLA, EOM intact  ENT - Nonicteric sclerae, PERRLA, EOMI. Oropharynx clear. dry mucous membranes  Neck - No noticeable or palpable swelling, redness or rash around throat or on face  Cardiovascular - RRR no m/r/g, no JVD, no carotid bruits  Lungs - (+)b/l ronchi , no use of accessory muscles, no crackles or wheezes.  Skin - No rashes, skin warm and dry, no erythematous areas  Abdomen - Normal bowel sounds, (+) distended abdomen, tense, (+) anasarca  Extremities - (+) b/l leg edema +3  Neuro– awake, alert, eyes tracking

## 2021-09-22 NOTE — PATIENT PROFILE ADULT - NSPRONUTRITIONRISK_GEN_A_NUR
Pressure injury stage 2 or greater Pressure injury stage 2 or greater/BMI less than 19/Unintentional weight loss prior to admission

## 2021-09-22 NOTE — ED ADULT NURSE REASSESSMENT NOTE - NS ED NURSE REASSESS COMMENT FT1
mg/dl from PCA
Received patient admitted to medicine DNR/DNI, with non rebreather in place sprague to bedside with adequate drainage. Bear hugger in place awaiting bed continue to monitor patient.

## 2021-09-22 NOTE — CONSULT NOTE ADULT - SUBJECTIVE AND OBJECTIVE BOX
[facility] Geriatric and Palliative Consult Service:  Dr. Ines Richardson: cell ([phone])  Dr. Thelma Camacho: cell ([phone])   Leyla Olsen NP: cell ([phone])  Yuli Villela NP: cell ([phone])   Jean Mery LSW: cell ([phone])     HPI:  70F w/ PMH pancreatic ca with lung and liver mets, T2DM previously on insulin, mdd, urinary retention, heartburn, dm, bedbound, from [facility] BIBEMS for respiratory distress. Pt is awake but unable to answer any questions. Spoke with [facility] and as per RN, pt was sent for respiratory distress and family Karolina Mendez (sister, [phone]). Pt was diagnosed with pancreatic ca in  and underwent chemo Xeloda. Per [facility] Chart review ([facility] MRN [id number]), pt without molst form in scanned charts but palliative care notes show DNR/DNI with discussion and referral for hospice. Pt decided to stop chemo treatments in 2021 and is no longer a chemo candidate as per heme/onc note 2021 prior to discharge to [facility]. Pt was started on rocephin 1g qd at NH from .    Interval hx:  pt seen and examined at [facility] bedside, Aox0-1, non verbal on NRB.      PAST MEDICAL & SURGICAL HISTORY:  Pancreatic malignant neoplasm    Diabetes        SOCIAL HISTORY:    Admitted from:  [facility]  Pt sister Karolina Mendez is her assigned HCP.  Confucianism:   unknown                                 Preferred Language: [facility]    FAMILY HISTORY:   unable to obtain from patient due to poor mentation  Baseline ADLs (prior to admission): dependent    Allergies    Allergy Status Unknown    Intolerances      Present Symptoms:    Unable to obtain due to poor mentation    MEDICATIONS  (STANDING):  azithromycin  IVPB 500 milliGRAM(s) IV Intermittent every 24 hours  cefTRIAXone   IVPB 1000 milliGRAM(s) IV Intermittent every 24 hours  dextrose 5% + sodium chloride 0.9%. 1000 milliLiter(s) (40 mL/Hr) IV Continuous <Continuous>  heparin   Injectable 5000 Unit(s) SubCutaneous every 8 hours  insulin lispro (ADMELOG) corrective regimen sliding scale   SubCutaneous every 6 hours    MEDICATIONS  (PRN):  acetaminophen  Suppository .. 650 milliGRAM(s) Rectal every 8 hours PRN Temp greater or equal to 38C (100.4F), Mild Pain (1 - 3)  HYDROmorphone  Injectable 1 milliGRAM(s) IV Push every 6 hours PRN Severe Pain (7 - 10)  HYDROmorphone  Injectable 0.5 milliGRAM(s) IV Push every 4 hours PRN Moderate Pain (4 - 6)      PHYSICAL EXAM:  Vital Signs Last 24 Hrs  T(C): 36.3 (22 Sep 2021 10:58), Max: 36.3 (22 Sep 2021 10:58)  T(F): 97.4 (22 Sep 2021 10:58), Max: 97.4 (22 Sep 2021 10:58)  HR: 85 (22 Sep 2021 10:58) (77 - 99)  BP: 85/66 (22 Sep 2021 10:58) (56/41 - 91/61)  BP(mean): --  RR: 20 (22 Sep 2021 10:58) (18 - 20)  SpO2: 98% (22 Sep 2021 10:58) (98% - 100%)    General: cachetic chronically ill appearing woman, AOx0-1. On NRB    Palliative Performance Scale/Karnofsky Score:30  ECOG Performance: 4    HEENT: temporal wasting, edentulous xerostomia  Lungs: unlabored on NRB  CV: RRR, S1S2  GI: soft non distended, incontinent    : incontinent    Musculoskeletal: weakness, bedbound  Skin:   Neuro: able to follow simple commands  Oral intake ability: unable/only mouth care    LABS:                        9.7    6.00  )-----------( 215      ( 22 Sep 2021 05:47 )             33.1     -    143  |  112<H>  |  116<H>  ----------------------------<  138<H>  5.6<H>   |  17<L>  |  1.47<H>    Ca    8.5      22 Sep 2021 05:47  Phos  5.7       Mg     3.9         TPro  7.0  /  Alb  1.7<L>  /  TBili  0.3  /  DBili  x   /  AST  17  /  ALT  12  /  AlkPhos  80      Urinalysis Basic - ( 22 Sep 2021 03:20 )    Color: Yellow / Appearance: Clear / S.020 / pH: x  Gluc: x / Ketone: Small  / Bili: Negative / Urobili: Negative   Blood: x / Protein: 500 mg/dL / Nitrite: Negative   Leuk Esterase: Moderate / RBC: 10-25 /HPF / WBC 6-10 /HPF   Sq Epi: x / Non Sq Epi: Few /HPF / Bacteria: Moderate /HPF        RADIOLOGY & ADDITIONAL STUDIES:         [facility] Geriatric and Palliative Consult Service:  Dr. Ines Richardson: cell ([phone])  Dr. Thelma Camacho: cell ([phone])   Leyla Olsen NP: cell ([phone])  Yuli Villela NP: cell ([phone])   Jean Mery LSW: cell ([phone])     HPI:  70F w/ PMH pancreatic ca with lung and liver mets, T2DM previously on insulin, mdd, urinary retention, heartburn, dm, bedbound, from [facility] BIBEMS for respiratory distress. Pt is awake but unable to answer any questions. Spoke with [facility] and as per RN, pt was sent for respiratory distress and family Karolina Mendez (sister, [phone]). Pt was diagnosed with pancreatic ca in  and underwent chemo Xeloda. Per [facility] Chart review ([facility] MRN [id number]), pt without molst form in scanned charts but palliative care notes show DNR/DNI with discussion and referral for hospice. Pt decided to stop chemo treatments in 2021 and is no longer a chemo candidate as per heme/onc note 2021 prior to discharge to [facility]. Pt was started on rocephin 1g qd at NH from .    Interval hx:  pt seen and examined at [facility] bedside, Aox0-1, non verbal on NRB.      PAST MEDICAL & SURGICAL HISTORY:  Pancreatic malignant neoplasm    Diabetes        SOCIAL HISTORY:    Admitted from:  [facility]  Pt sister Karolina Mendez is her assigned HCP.  Mormon:   unknown                                 Preferred Language: [facility]    FAMILY HISTORY:   unable to obtain from patient due to poor mentation  Baseline ADLs (prior to admission): dependent    Allergies    Allergy Status Unknown    Intolerances      Present Symptoms:    Unable to obtain due to poor mentation    MEDICATIONS  (STANDING):  azithromycin  IVPB 500 milliGRAM(s) IV Intermittent every 24 hours  cefTRIAXone   IVPB 1000 milliGRAM(s) IV Intermittent every 24 hours  dextrose 5% + sodium chloride 0.9%. 1000 milliLiter(s) (40 mL/Hr) IV Continuous <Continuous>  heparin   Injectable 5000 Unit(s) SubCutaneous every 8 hours  insulin lispro (ADMELOG) corrective regimen sliding scale   SubCutaneous every 6 hours    MEDICATIONS  (PRN):  acetaminophen  Suppository .. 650 milliGRAM(s) Rectal every 8 hours PRN Temp greater or equal to 38C (100.4F), Mild Pain (1 - 3)  HYDROmorphone  Injectable 1 milliGRAM(s) IV Push every 6 hours PRN Severe Pain (7 - 10)  HYDROmorphone  Injectable 0.5 milliGRAM(s) IV Push every 4 hours PRN Moderate Pain (4 - 6)      PHYSICAL EXAM:  Vital Signs Last 24 Hrs  T(C): 36.3 (22 Sep 2021 10:58), Max: 36.3 (22 Sep 2021 10:58)  T(F): 97.4 (22 Sep 2021 10:58), Max: 97.4 (22 Sep 2021 10:58)  HR: 85 (22 Sep 2021 10:58) (77 - 99)  BP: 85/66 (22 Sep 2021 10:58) (56/41 - 91/61)  BP(mean): --  RR: 20 (22 Sep 2021 10:58) (18 - 20)  SpO2: 98% (22 Sep 2021 10:58) (98% - 100%)    General: cachetic chronically ill appearing woman, AOx0-1. On NRB    Palliative Performance Scale/Karnofsky Score:30  ECOG Performance: 4    HEENT: temporal wasting, edentulous xerostomia  Lungs: unlabored on NRB  CV: RRR, S1S2  GI: soft mild distention, incontinent    : incontinent    Musculoskeletal: weakness, bedbound  Skin: poor skin turgor, stage I to left hip  Neuro: able to follow simple commands  Oral intake ability: unable/only mouth care    LABS:                        9.7    6.00  )-----------( 215      ( 22 Sep 2021 05:47 )             33.1     09-    143  |  112<H>  |  116<H>  ----------------------------<  138<H>  5.6<H>   |  17<L>  |  1.47<H>    Ca    8.5      22 Sep 2021 05:47  Phos  5.7     -  Mg     3.9     -    TPro  7.0  /  Alb  1.7<L>  /  TBili  0.3  /  DBili  x   /  AST  17  /  ALT  12  /  AlkPhos  80  -    Urinalysis Basic - ( 22 Sep 2021 03:20 )    Color: Yellow / Appearance: Clear / S.020 / pH: x  Gluc: x / Ketone: Small  / Bili: Negative / Urobili: Negative   Blood: x / Protein: 500 mg/dL / Nitrite: Negative   Leuk Esterase: Moderate / RBC: 10-25 /HPF / WBC 6-10 /HPF   Sq Epi: x / Non Sq Epi: Few /HPF / Bacteria: Moderate /HPF    EXAM:  CT ABDOMEN AND PELVIS IC        PROCEDURE DATE:  Aug 25 2021        INTERPRETATION:  CLINICAL INFORMATION: Abdominal pain.    COMPARISON: CT chest abdomen pelvis 2021. MRI abdomen 2021    CONTRAST/COMPLICATIONS:  IV Contrast: Omnipaque 350  90 cc administered   10 cc discarded  Oral Contrast: NONE  Complications: None reported at time of study completion    PROCEDURE:  CT of the Abdomen and Pelvis was performed.  Arterial and Portal Venous phases were acquired.  Sagittal and coronal reformats were performed.    FINDINGS:  LOWER CHEST: Tiny nodules along the right minor fissure are unchanged. Small left effusion with associated atelectasis, new.    LIVER: Hepatic metastases as follows:  Segment 4: 3.9 x 2.5 cm (3:19) previously 4.1 x 3.6 cm.  Segment 6: 3.8 x 3.0 cm (3:39) previously 3.3 x 2.9 cm  Segment 6: 2.3 x 2.2 cm (3:49) previously 2.5 cm.  Segment 5:2.1 x 2.0 cm (3:44) previously 3.0 cm.    BILE DUCTS: Normal caliber.  GALLBLADDER: Within normal limits.  SPLEEN: Within normal limits.  PANCREAS: Again seen is an ill-defined hypoattenuating mass at the pancreatic body/tail. This measures approximately 5.3 x 2.8 cm and is not significantly changed.  ADRENALS: Within normal limits.  KIDNEYS/URETERS: Left-sided renal cysts. No hydronephrosis.    BLADDER: Collapsed around Leiva catheter limiting evaluation.  REPRODUCTIVE ORGANS: Calcified uterine fibroids.    BOWEL: No bowel obstruction. Appendix is not visualized.  PERITONEUM: Large volume ascites.  VESSELS: Atherosclerotic changes. The splenic vein is not seen and likely thrombosed. Greater than 180 degrees encasement of the splenic artery and likely hepatic artery and celiac axis, unchanged  RETROPERITONEUM/LYMPH NODES: No lymphadenopathy.  ABDOMINAL WALL: Within normal limits.  BONES: Degenerative changes of the spine.    IMPRESSION:    An ill-defined hypoattenuating pancreatic mass is not significantly changed.    Hepatic metastases again noted which are difficult to compare with the previous exam due to different phases of contrast enhancement however they appear grossly unchanged.    Large volume ascites, new.    Small left effusion with associated atelectasis, new.      RADIOLOGY & ADDITIONAL STUDIES: reviewed  ADVANCED DIRECTIVES:  DNR/DNI/no feeding tube

## 2021-09-22 NOTE — ED PROVIDER NOTE - CLINICAL SUMMARY MEDICAL DECISION MAKING FREE TEXT BOX
Pt admitted for IV hydration and supportive care. IV abx ordered for noted cloudy urine in presenting sprague cath collection bag.   MAR and Dr. Cary endorsed.

## 2021-09-22 NOTE — CONSULT NOTE ADULT - CONVERSATION DETAILS
Palliative care team met with the pt's sister Ale and her niece Sonam discussed her current clinical condition. Given her progressive decline advised them hospice would be appropriate.  Pt's sister stated she saw her the pt last evening and noted a change in her mentation.    Appropriate for IPU for management of dyspnea, end of life care. prognosis is anytime.  MOLST on file for DNR/DNI. Family in agreement with plan.   Chaplaincy offered and accepted.  All questions answered.  Support provided.      Malik BABCOCK provided collateral information   Plan discussed with primary team

## 2021-09-22 NOTE — H&P ADULT - PROBLEM SELECTOR PLAN 4
temporal wasting, cachectic  unable to walk as per documentation in [facility] charts  Palliative care consulted

## 2021-09-22 NOTE — CONSULT NOTE ADULT - ASSESSMENT
Patient Name: Yuridia Shaikh Date: 1950  Address: [address] [place], [place] [address]Zuh: Female  Rx Written	Rx Dispensed	Drug	Quantity	Days Supply	Prescriber Name	Prescriber Melissa #	Payment Method	Dispenser  09/18/2021	09/18/2021	hydromorphone 2 mg tablet	90	5	Ali, Reji SPARROW)	[id number]	Cash	[facility]  09/17/2021	09/17/2021	dronabinol 2.5 mg capsule	14	7	Ali, Reji SPARROW)	[id number]	Cash	[facility]  09/03/2021	09/11/2021	dronabinol 2.5 mg capsule	15	7	Ali, Reji SPARROW)	[id number]	Cash	[facility]  09/08/2021	09/08/2021	fentanyl 12 mcg/hr patch	4	12	AliReji)	[id number]	Cash	[facility]  09/03/2021	09/03/2021	dronabinol 2.5 mg capsule	14	7	Reji Eubanks)	[id number]	Cash	[facility]  09/03/2021	09/03/2021	hydromorphone 2 mg tablet	60	5	Ali, Reji SPARROW)	[id number]	Cash	[facility]

## 2021-09-22 NOTE — CHART NOTE - NSCHARTNOTEFT_GEN_A_CORE
70F w/ PMH pancreatic ca with lung and liver mets, T2DM previously on insulin, urinary retention, GERD, bedbound, from [facility] presented with co difficulties breathing   Pt was diagnosed with pancreatic cancer  in 2020 and underwent chemo. Pt stopped chemotherapy 9/2021, and was reffered to hospice on previous admission at [facility]   Pt is  DNR/DNI   IN ED CXR   Left lower lobe airspace consolidation which may reflect atelectasis and/or pneumonia     Urinalysis positive   Started on Ceftriaxone and Azythromycin   Admitted for Acute respiratory failure likely due to pneumonia and UTI   Pt seen at bedside, chronically ill appearing, cachectic, in mild respiratory distress, not answering any questions           OBJECTIVE:  Vital Signs Last 24 Hrs  T(C): 37.4 (22 Sep 2021 13:43), Max: 37.4 (22 Sep 2021 13:43)  T(F): 99.4 (22 Sep 2021 13:43), Max: 99.4 (22 Sep 2021 13:43)  HR: 85 (22 Sep 2021 10:58) (77 - 99)  BP: 85/66 (22 Sep 2021 10:58) (56/41 - 91/61)  BP(mean): --  RR: 20 (22 Sep 2021 10:58) (18 - 20)  SpO2: 98% (22 Sep 2021 10:58) (98% - 100%)    FOCUSED PHYSICAL EXAM:  Neuro: lethargic, not answering questions, chronically ill appearing, cachectic   Cardiovascular: hypotensive,  HR regular, LE  edema.  Respiratory: mild respiratory distress, scattered rhonchi shravan   GI: Abdomen mild ttp, distended   : no bladder distention       PLAN:       - continue antibiotics  - follow  up blood culture and urine culture   - palliative consulted, will follow recommendations   - aspiration precautions  - monitor electrolytes and kidney function  - continue home meds as indicated   - prophylactic measures

## 2021-09-22 NOTE — GOALS OF CARE CONVERSATION - ADVANCED CARE PLANNING - CONVERSATION DETAILS
Palliative care team met with pt's sister Karolina and niece Sonam to discuss pt's current condition and goals of care. Pt's sister Karolina reflected on pt's decline, sharing that she noted a miles difference yesterday evening. Pt's niece Sonam shared that the pt is the last of Karolina's remaining siblings and that they are having a difficult time adjusting to her poor prognosis. Emotional support was provided. Palliative care team provided education around the risks and benefits of hospice services. Pt's family acknowledged the pt's decline and shared that they wanted her to be as comfortable as possible at this time. Pt's family agreed to allow the pt to receive inpatient hospice services. Pt's family shared that the pt is Bahai, chaplaincy offered and accepted. Pt's family agreed to reach out as needed.    Kenroy Ordonez  [phone]

## 2021-09-22 NOTE — H&P ADULT - PROBLEM SELECTOR PLAN 1
Hypotensive, HR>90 + UTI source - fulfills Sepsis criteria  BP improved from 50s to 90s after bolus in ED  c/w gentle ivf  Rocephin 1g qd  f/u culture urine + blood  f/u cxr  UA positive Hypotensive, HR>90 + UTI source - fulfills Sepsis criteria  BP improved from 50s to 90s after bolus in ED  c/w gentle ivf  Rocephin 1g qd + Azithro 500  f/u culture urine + blood  f/u cxr  UA positive

## 2021-09-22 NOTE — H&P ADULT - ATTENDING COMMENTS
Patient is a 70 year old female with a PMH of DM, Pancreatic Cancer, Hypothyroidism who was BIBEMS due to respiratory distress.  Patient is unable to provide any collateral information.  Therefore a significant amount of information was obtained from medical records.  As per ED Attending note "BIBEMS from [facility] for respiratory distress., Pt is clinically dehydrated, emaciated, with dry oral membranes, tired appearing.  BP on arrival 56/41. Pt administered IV bolus at 245a Pt appears more awake and responsive BP now 91/54.  Pt had indwelling Leiva cath from NH, cloudy urine in bag noted. Leiva changed, IV Cefepime administrated for clinical UTI."    T(C): 35.7 (09-22-21 @ 01:27), Max: 35.7 (09-22-21 @ 01:27)  T(F): 96.2 (09-22-21 @ 01:27), Max: 96.2 (09-22-21 @ 01:27)  HR: 99 (09-22-21 @ 00:51) (99 - 99)  BP: 56/41 (09-22-21 @ 00:51) (56/41 - 56/41)  RR: 20 (09-22-21 @ 00:51) (20 - 20)  SpO2: 100% (09-22-21 @ 00:51) (100% - 100%)  Wt(kg): --    P/E: As above MAR    A/P:    Failure to Thrive:  -Pain control as necessary without sedating  -Will continue to provide any and all supportive and comforting measures as necessary  -Patient will benefit from a Palliative Care consult    Sepsis due to UTI:  -SIRS Criteria= 2 (Temp<96.8, HR>90) + likely source of infection (UTI)  -Tylenol PRN for fever  -Will empirically initiate patient Rocephin    Acute Renal Insufficiency:  -Cr= 1.56 (No baseline available)  -Can consider sending Urinary Electrolytes (Sodium, Potassium, Creatinine, Chloride)  -Will continue with IVF hydration  -Can consider obtaining Bilateral Renal Sonogram, though will hold for now    Hyperkalemia:  -Will order Lokelma    DM:  -Hemoglobin A1c with AM labs  -Blood Glucose Monitoring ACHS  -Regular Insulin Sliding Scale ACHS  -Carb Controlled, Heart Healthy, Renal (Non-Dialysis) diet as tolerated    Hypothyroidism:  -Thyroid Panel with AM labs  -Will resume patient's home medication    Normocytic Anemia:  -Can consider sending Iron Panel    Hypoalbuminemia:  -Nutrition Consult    GI/DVT PPx:  -Lovenox  -Pepcid

## 2021-09-22 NOTE — DISCHARGE NOTE PROVIDER - HOSPITAL COURSE
70F w/ PMH pancreatic ca with lung and liver mets, T2DM previously on insulin, urinary retention, GERD, bedbound, from [facility] presented with co difficulties breathing   Pt was diagnosed with pancreatic cancer  in 2020 and underwent chemo. Pt stopped chemotherapy 9/2021, and was reffered to hospice on previous admission at [facility]   Pt is  DNR/DNI   IN ED CXR   Left lower lobe airspace consolidation which may reflect atelectasis and/or pneumonia     Urinalysis positive   Started on Ceftriaxone and Azithromycin   Admitted for Acute respiratory failure likely due to pneumonia and UTI   Pancreatic malignant neoplasm.     Patient is not a candidate for further chemotherapy due to poor performance status, progression of disease and poor prognosis   Patient was evaluated by palliative team and is appropriate for  Inpatient   Hospice.      70F w/ PMH pancreatic ca with lung and liver mets, T2DM previously on insulin, urinary retention, GERD, bedbound, from [facility] presented with co difficulties breathing   Pt was diagnosed with pancreatic cancer  in 2020 and underwent chemo. Pt stopped chemotherapy 9/2021, and was reffered to hospice on previous admission at [facility]   Pt is  DNR/DNI   IN ED CXR   Left lower lobe airspace consolidation which may reflect atelectasis and/or pneumonia     Urinalysis positive   Started on Ceftriaxone and Azithromycin   Admitted for Acute respiratory failure likely due to pneumonia and UTI   Pancreatic malignant neoplasm.     Patient is not a candidate for further chemotherapy due to poor performance status, progression of disease and poor prognosis   Patient was evaluated by palliative team and is appropriate for  Inpatient  Hospice Unit. She is stable for transfer to .

## 2021-09-22 NOTE — DISCHARGE NOTE PROVIDER - NSDCCPCAREPLAN_GEN_ALL_CORE_FT
PRINCIPAL DISCHARGE DIAGNOSIS  Diagnosis: Acute respiratory failure with hypoxia  Assessment and Plan of Treatment: Comfort measures  continue suplemental oxygen  Further managment as per palliative team      SECONDARY DISCHARGE DIAGNOSES  Diagnosis: Pancreatic malignant neoplasm  Assessment and Plan of Treatment:     Diagnosis: Dehydration  Assessment and Plan of Treatment:     Diagnosis: Failure to thrive in adult  Assessment and Plan of Treatment:

## 2021-09-22 NOTE — CONSULT NOTE ADULT - CONSULT REASON
Discuss complex medical decision making related to goals of care Discuss complex medical decision making related to goals of care in setting of advanced malignancy

## 2021-09-22 NOTE — CONSULT NOTE ADULT - ATTENDING COMMENTS
70 y F with metastatic pancreatic Ca s/p chemo, bedbound, recent hosp at [facility], adm for hypoxic resp failure. Appears to be actively dying at this time. Prognosis likely hours to days. Lethargic, cachectic, ill appearing, on NRB, NAD. Appropriate for IPU for management of symptoms. Family in agreement. DNR/DNI, comfort measures.

## 2021-09-22 NOTE — H&P ADULT - HISTORY OF PRESENT ILLNESS
70F w/ PMH pancreatic ca, mdd, urinary retention, heartburn, dm, bedbound, from [facility] BIBEMS for respiratory distress. Pt is awake but unable to express. In ED, pt was given  70F w/ PMH pancreatic ca with lung and liver mets, T2DM previously on insulin, mdd, urinary retention, heartburn, dm, bedbound, [facility]. Pt is awake but unable to answer any questions. Spoke with [facility] and as per RN, pt was sent for respiratory distress and family Karolina Mendez (sister, [phone]). Pt was diagnosed with pancreatic ca in 2020 and underwent chemo Xeloda. Per [facility] Chart review ([facility] MRN [id number]), pt without molst form in scanned charts but palliative care notes show DNR/DNI with discussion and referral for hospice. Pt decided to stop chemo treatments in 09/2021 and is no longer a chemo candidate as per heme/onc note 8/30/2021 prior to discharge to [facility]. Pt was started on rocephin 1g qd at NH from 9/18.    I have asked [facility] to send the patient's MOLST form, and her medication list as the papers sent were only pages 6-10. No HCP forms were present in papers. Next of kin is Karolina Mendez. Another contact listed is Anabela Mchugh . I attempted to contact anabela Mchugh but she did not answer.    70F w/ PMH pancreatic ca with lung and liver mets, T2DM previously on insulin, mdd, urinary retention, heartburn, dm, bedbound, [facility]. Pt is awake but unable to answer any questions. Spoke with [facility] and as per RN, pt was sent for respiratory distress and family Karolina Mendez (sister, [phone]). Pt was diagnosed with pancreatic ca in 2020 and underwent chemo Xeloda. Per [facility] Chart review ([facility] MRN [id number]), pt without molst form in scanned charts but palliative care notes show DNR/DNI with discussion and referral for hospice. Pt decided to stop chemo treatments in 09/2021 and is no longer a chemo candidate as per heme/onc note 8/30/2021 prior to discharge to [facility]. Pt was started on rocephin 1g qd at NH from 9/18.    No HCP forms were present in papers. Next of kin is Karolina Mendez. Another contact listed is Anabela Mchugh . I attempted to contact anabela Mchugh but she did not answer.

## 2021-09-22 NOTE — H&P ADULT - PROBLEM SELECTOR PLAN 6
ascites s/p therapeutic tap at [facility] in august 2021  recurrence  will not do paracentesis at this time

## 2021-09-22 NOTE — ED ADULT NURSE NOTE - OBJECTIVE STATEMENT
garcia from [facility] with c/o respiratory distress.sespsis protocol initiated

## 2021-09-22 NOTE — CONSULT NOTE ADULT - PROBLEM SELECTOR RECOMMENDATION 2
Stage IV pancreatic cancer w/ mets to lung and liver on chemo 8/24 Follows with Dr. Salomon at [facility].    Patient is not a candidate for further chemotherapy 2/2 poor performance status and severe cachexia. Patient also endorsing that she is no longer interested in further treatment given her weakened state.  ECOG 4.  Hospice appropriate.      Pt is DNR/DNI

## 2021-09-22 NOTE — H&P ADULT - NSHPREVIEWOFSYSTEMS_GEN_ALL_CORE
CONSTITUTIONAL: No fever, (+) weight loss (+) fatigue  RESPIRATORY: (+) shortness of breath  CARDIOVASCULAR: No chest pain, palpitations, dizziness, (+) leg swelling  GASTROINTESTINAL: No abdominal pain. No nausea, vomiting, or hematemesis; No diarrhea or constipation. No melena or hematochezia.  GENITOURINARY: No dysuria or hematuria, urinary frequency  NEUROLOGICAL: No headaches, memory loss, loss of strength, numbness, or tremors  ENDOCRINE: No polyuria, polydipsia, or heat/cold intolerance  MUSKULOSKELETAL: No muscle aches, joint pains  SKIN: No itching, burning, rashes, or lesions .

## 2021-09-22 NOTE — ED ADULT NURSE NOTE - ED STAT RN HANDOFF DETAILS
Patient admitted to med surg, in no acute distress on non rebreather  mews suspended being transported via stretcher stable in no acute distress safety maintained.

## 2021-09-22 NOTE — H&P ADULT - ASSESSMENT
70F w/ PMH pancreatic ca, mdd, urinary retention, heartburn, dm, bedbound, from [facility] BIBEMS for respiratory distress admitted for sepsis secondary to likely aspiration pna 70F w/ PMH pancreatic ca, mdd, urinary retention, heartburn, dm, bedbound, from [facility] BIBEMS for respiratory distress admitted for sepsis secondary to likely aspiration pna vs UTI

## 2021-09-22 NOTE — ED PROVIDER NOTE - OBJECTIVE STATEMENT
BIBEMS from [facility] for respiratory distress.,  Pt is clinically dehydrated, emaciated, with dry oral membranes, tired appearing.  BP on arrival 56/41. Pt administered IV bolus at 245a Pt appears more awake and responsive BP now 91/54.  Pt had indwelling Leiva cath from NH, cloudy urine in bag noted. Leiva changed, IV Cefepime administrated for clinical UTI.

## 2021-09-23 NOTE — PATIENT PROFILE ADULT - LOCATION #4
[FreeTextEntry1] : She is 89 years old.  She was hospitalized at [facility] on October 30, presenting with progressing STOCK over the preceding week.  She described a mild cough, however no apparent fevers or chills.  Admission chest x-ray was consistent with congestive heart failure.  Admitting EKG showed sinus rhythm, however, she then developed atrial fibrillation with a RVR.\par \par Echocardiography showed a moderate to large pericardial effusion with early tamponade physiology.  At that point, she was moved to the CCU observation.\par \par She was sent to interventional radiology, with the plan to drain the effusion, however, on CT scan, the effusion appeared smaller in size; it was concluded that drainage was not necessary.\par \par She had reverted to normal rhythm during her hospital stay, and was treated with metoprolol to help maintain sinus rhythm.\par \par Incidental findings on inpatient testing included mild mitral valve insufficiency. Preserved LV systolic function with an estimated ejection fraction of 68%.  CT scan of the chest demonstrated coronary artery and aortic calcifications.\par \par Prior history includes a syncopal event in 2015, for which she was hospitalized at [facility].  Work-up at the time did not provide a clear explanation; in retrospect, it may have been vasovagal.  At that time, a CT angiogram of the neck demonstrated a 40-50% narrowing at the origin of the left internal carotid artery.\par \par Her dyspnea improved with diuresis.  At the time of discharge, she remained in sinus rhythm.  There was some discussion as to whether to start an anticoagulant for the paroxysmal A. fib, but as it had resolved, and she has a history of falls, it was decided to send her home with low-dose aspirin rather than an anticoagulant.  She was also to continue on Lasix and metoprolol.\par \par As she presents today, she continues to gradually improve.  She comes in the company of her daughter Lian.  She reports no resting breathlessness, and her exertional capacity is better, with less exertional dyspnea.  She describes no chest discomfort.  There have been no palpitations.  They describe no lightheadedness, and no episodes of orthopnea or PND.
Left heel

## 2021-09-23 NOTE — PROVIDER CONTACT NOTE (CRITICAL VALUE NOTIFICATION) - TEST AND RESULT REPORTED:
Blood cultures - growth in aerobic bottle gram + cocci in clusters
Blood culture collected 9/22/21 01:04 am preliminary result shown  growth in anerobic bottle gram variable rods

## 2021-09-23 NOTE — PHARMACOTHERAPY INTERVENTION NOTE - COMMENTS
Azithromycin has no stop date.  Recommended to adjust stop date or discontinue.   Please re-evaluate the need for IV antibiotics.

## 2021-09-23 NOTE — PROGRESS NOTE ADULT - SUBJECTIVE AND OBJECTIVE BOX
PGY-1 Progress Note discussed with attending    PAGER #: [[phone]] TILL 5:00 PM  PLEASE CONTACT ON CALL TEAM:  - On Call Team (Please refer to Valentina) FROM 5:00 PM - 8:30PM  - Nightfloat Team FROM 8:30 -7:30 AM    CHIEF COMPLAINT & BRIEF HOSPITAL COURSE: This is an 70 Y F that presented to the ED for acute resp. distress. She was BIBA from a nursing home with a PMH of pancreatic cancer w/ lung and liver mets & T2DM. She has discontinued her chemotherapy as of this month and has a DNR / DNI in place. BP on arrival was noted to be 56/41, for which she was given an IV Bolus in the ED with improvement to 91/54. Patient appeared emaciated, dehydrated, w/ flat affect and dry mucuous membranes. Pt. also had an indwelling catheter showing cloudy urine. The sprague was replaced and pt. was given Cefipime for clinical UTI. CXR ordered shows a left lower lobe consolidation for possible atelectasis / PNA. Pt. began treatment with ceftriaxone + azithromycin for UTI. Current course for comfort care pt. is being given Dilaudid and Acetaminophen for pain and is pending inpatient hospice care.     INTERVAL HPI/OVERNIGHT EVENTS: No events overnight. Pt. is nonverbal and somnolent + displays temporal wasting and is cachectic.      REVIEW OF SYSTEMS:  CONSTITUTIONAL: No fever, weight loss, or fatigue  RESPIRATORY: No cough, wheezing, chills or hemoptysis; No shortness of breath  CARDIOVASCULAR: No chest pain, palpitations, dizziness, or leg swelling  GASTROINTESTINAL: No abdominal pain. No nausea, vomiting, or hematemesis; No diarrhea or constipation. No melena or hematochezia.  GENITOURINARY: No dysuria or hematuria, urinary frequency  NEUROLOGICAL: No headaches, memory loss, loss of strength, numbness, or tremors  SKIN: No itching, burning, rashes, or lesions     Vital Signs Last 24 Hrs  T(C): 36.4 (23 Sep 2021 04:45), Max: 37.4 (22 Sep 2021 13:43)  T(F): 97.6 (23 Sep 2021 04:45), Max: 99.4 (22 Sep 2021 13:43)  HR: 81 (23 Sep 2021 04:45) (81 - 116)  BP: 96/64 (23 Sep 2021 04:45) (72/52 - 96/64)  BP(mean): --  RR: 20 (23 Sep 2021 04:45) (16 - 20)  SpO2: 93% (23 Sep 2021 04:45) (90% - 98%)    PHYSICAL EXAMINATION:  GENERAL: NAD, well built  HEAD:  Atraumatic, Normocephalic  EYES:  conjunctiva and sclera clear  NECK: Supple, No JVD, Normal thyroid  CHEST/LUNG: Clear to auscultation. Clear to percussion bilaterally; No rales, rhonchi, wheezing, or rubs  HEART: Regular rate and rhythm; No murmurs, rubs, or gallops  ABDOMEN: Soft, Nontender, Nondistended; Bowel sounds present  NERVOUS SYSTEM:  Alert & Oriented X3,    EXTREMITIES:  2+ Peripheral Pulses, No clubbing, cyanosis, or edema  SKIN: warm dry                          9.7    6.00  )-----------( 215      ( 22 Sep 2021 05:47 )             33.1     09-22    143  |  112<H>  |  116<H>  ----------------------------<  138<H>  5.6<H>   |  17<L>  |  1.47<H>    Ca    8.5      22 Sep 2021 05:47  Phos  5.7     09-22  Mg     3.9     09-22    TPro  7.0  /  Alb  1.7<L>  /  TBili  0.3  /  DBili  x   /  AST  17  /  ALT  12  /  AlkPhos  80  09-22    LIVER FUNCTIONS - ( 22 Sep 2021 05:47 )  Alb: 1.7 g/dL / Pro: 7.0 g/dL / ALK PHOS: 80 U/L / ALT: 12 U/L DA / AST: 17 U/L / GGT: x               PT/INR - ( 22 Sep 2021 02:09 )   PT: 11.2 sec;   INR: 0.94 ratio         PTT - ( 22 Sep 2021 02:09 )  PTT:25.2 sec    CAPILLARY BLOOD GLUCOSE      RADIOLOGY & ADDITIONAL TESTS:                   PGY-1 Progress Note discussed with attending    PAGER #: [[phone]] TILL 5:00 PM  PLEASE CONTACT ON CALL TEAM:  - On Call Team (Please refer to Valentina) FROM 5:00 PM - 8:30PM  - Nightfloat Team FROM 8:30 -7:30 AM    CHIEF COMPLAINT & BRIEF HOSPITAL COURSE: This is an 70 Y F that presented to the ED for acute resp. distress. She was BIBA from a nursing home with a PMH of pancreatic cancer w/ lung and liver mets & T2DM. She has discontinued her chemotherapy as of this month and has a DNR / DNI in place. BP on arrival was noted to be 56/41, for which she was given an IV Bolus in the ED with improvement to 91/54. Patient appeared emaciated, dehydrated, w/ flat affect and dry mucuous membranes. Pt. also had an indwelling catheter showing cloudy urine. The sprague was replaced and pt. was given Cefipime for clinical UTI. CXR ordered shows a left lower lobe consolidation for possible atelectasis / PNA. Pt. began treatment with ceftriaxone + azithromycin for UTI. Current course for comfort care pt. is being given Dilaudid and Acetaminophen for pain and is pending inpatient hospice care.     INTERVAL HPI/OVERNIGHT EVENTS: No events overnight. Pt. is nonverbal and somnolent but there is evidence of temporal wasting and is cachectic.       REVIEW OF SYSTEMS:  CONSTITUTIONAL: No fever, + weight loss, + fatigue  RESPIRATORY: No cough, wheezing, chills or hemoptysis; + shortness of breath  CARDIOVASCULAR: No chest pain, palpitations, dizziness, + leg swelling  GASTROINTESTINAL: Not obtained due to poor pt. mentation   GENITOURINARY: Not obtained due to poor pt. mentation   NEUROLOGICAL: Not obtained due to poor pt. mentation   SKIN: No itching, burning, rashes, or lesions     Vital Signs Last 24 Hrs  T(C): 36.4 (23 Sep 2021 04:45), Max: 37.4 (22 Sep 2021 13:43)  T(F): 97.6 (23 Sep 2021 04:45), Max: 99.4 (22 Sep 2021 13:43)  HR: 81 (23 Sep 2021 04:45) (81 - 116)  BP: 96/64 (23 Sep 2021 04:45) (72/52 - 96/64)  BP(mean): --  RR: 20 (23 Sep 2021 04:45) (16 - 20)  SpO2: 93% (23 Sep 2021 04:45) (90% - 98%)    PHYSICAL EXAMINATION:  General - NAD, laying in bed (+) cachectic, (+) temporal wasting  Eyes - PERRLA, EOM intact  ENT - Nonicteric sclerae, PERRLA, EOMI. Oropharynx clear. dry mucous membranes  Neck - No noticeable or palpable swelling, redness or rash around throat or on face  Cardiovascular - RRR no m/r/g, no JVD, no carotid bruits  Lungs - (+)b/l ronchi , no use of accessory muscles, no crackles or wheezes.  Skin - No rashes, skin warm and dry, no erythematous areas  Abdomen - Normal bowel sounds, (+) distended abdomen, tense, (+) anasarca  Extremities - (+) b/l leg edema +3  Neuro– awake, alert, eyes tracking                          9.7    6.00  )-----------( 215      ( 22 Sep 2021 05:47 )             33.1     09-22    143  |  112<H>  |  116<H>  ----------------------------<  138<H>  5.6<H>   |  17<L>  |  1.47<H>    Ca    8.5      22 Sep 2021 05:47  Phos  5.7     09-22  Mg     3.9     09-22    TPro  7.0  /  Alb  1.7<L>  /  TBili  0.3  /  DBili  x   /  AST  17  /  ALT  12  /  AlkPhos  80  09-22    LIVER FUNCTIONS - ( 22 Sep 2021 05:47 )  Alb: 1.7 g/dL / Pro: 7.0 g/dL / ALK PHOS: 80 U/L / ALT: 12 U/L DA / AST: 17 U/L / GGT: x               PT/INR - ( 22 Sep 2021 02:09 )   PT: 11.2 sec;   INR: 0.94 ratio         PTT - ( 22 Sep 2021 02:09 )  PTT:25.2 sec    CAPILLARY BLOOD GLUCOSE      RADIOLOGY & ADDITIONAL TESTS:

## 2021-09-23 NOTE — PROGRESS NOTE ADULT - ATTENDING COMMENTS
Patient seen and examined. Family at bedside. Case discussed with Dr. Koroma. Awaits transfer to IPU pending bed availability. Remaining care as above.

## 2021-09-23 NOTE — PROGRESS NOTE ADULT - PROBLEM SELECTOR PLAN 3
h/o pancreatic ca no longer chemo candidate and stopped treatment in beginning of september  mets to liver and lungs as per [facility] charts  Dilaudid 0.5 prn for moderate pain, 1g prn for severe pain  palliative care consulted  Pt likely benefit from hospice care h/o pancreatic ca no longer chemo candidate and stopped treatment in beginning of september  mets to liver and lungs as per [facility] charts  Dilaudid 0.5 prn for moderate pain, 1g prn for severe pain  F/u palliative care  Pt likely benefit from hospice care A1c 6.6  Hold FS and Sliding scale as pt is now inpatient hospice

## 2021-09-23 NOTE — HOSPICE CARE NOTE - CONVESATION DETAILS
Received c/b from pt's niece, informing she was currently at hosp.  Says pt's sister, Soha Mendez is at work, & unable to  her  VM msg's.  She & her aunt are in agreement for pt to be transf'd to [facility].  Was able to email [facility] Consents to SADIQ Palumbo,   who printed forms & gave to niece.  Reviewed forms w niece, who signed where indicated.  Niece needs to leave [facility] by 12:30.  SADIQ   left the signed consent forms in front of pt's chart.  Will be going to [facility] shortly to retrieve the consents.
Pt has been medically approved for IP hospice care for mgmt of Resp Distress.  Pt is unable to participate in discussions about her care   at this time.  Yesterday SADIQ Trimble, as well as PCT NP Yuli Villela & SADIQ Ordonez, had [facility] phone discussions w pt's sister,   Soha Mendez ([phone] & [phone]) and pt's niece, Sonam Mchugh ([phone]), and both expressed wish for pt to receive   IP Hospice care @ [facility].  Will need family member to sign Hospice Consent forms in order for pt to be transf'd to the IPU.  Called all 3 of the   phone numbers, above.  Left  msgs requesting return phone calls so we can discuss the Consent forms.

## 2021-09-23 NOTE — PROGRESS NOTE ADULT - ASSESSMENT
70F w/ PMH pancreatic ca, mdd, urinary retention, heartburn, dm, bedbound, from [facility] BIBEMS for respiratory distress admitted for sepsis secondary to likely aspiration pna vs UTI 70F w/ PMH pancreatic ca, mdd, urinary retention, heartburn, dm, bedbound, from [facility] BIBEMS for respiratory distress admitted for sepsis secondary to likely aspiration pna vs UTI

## 2021-09-23 NOTE — PROGRESS NOTE ADULT - PROBLEM SELECTOR PLAN 5
Ceftriaxone + Azithromycin for UTI temporal wasting, cachectic  unable to walk as per documentation in [facility] charts  Palliative care consulted ascites s/p therapeutic tap at [facility] in august 2021  recurrence  will not do paracentesis at this time, as pt is comfort measures only

## 2021-09-23 NOTE — PROVIDER CONTACT NOTE (CRITICAL VALUE NOTIFICATION) - SITUATION
Blood culture collected 9/22/21 01:04 am preliminary result shown  growth in anerobic bottle gram variable rods

## 2021-09-23 NOTE — PROGRESS NOTE ADULT - PROBLEM SELECTOR PLAN 6
ascites s/p therapeutic tap at [facility] in august 2021  recurrence  will not do paracentesis at this time temporal wasting, cachectic  unable to walk as per documentation in [facility] charts    Pt comfort measures only

## 2021-09-23 NOTE — PROGRESS NOTE ADULT - PROBLEM SELECTOR PLAN 1
Comfort Care   Dilaudid and Acetaminophen Hypotensive, HR>90 + UTI source - fulfills Sepsis criteria  BP improved from 50s to 90s after bolus in ED  c/w gentle ivf  Rocephin 1g qd + Azithro 500  Bcx;  gram variable rods - PCR shows Bacillus Cereus  Cxr - Left lower lobe airspace consolidation which may reflect atelectasis and/or pneumonia  UA positive  f/u Ucx Pt has been approved to inpatient hospice will provide comfort measures only

## 2021-09-23 NOTE — PROGRESS NOTE ADULT - PROBLEM SELECTOR PLAN 2
8/2021 a1c 7.1  f/u a1c  sliding scale  fs q6 while npo  adjust insulin as indicated A1c 6.6  fs q6 while npo  adjust sliding scale as indicated Hypotensive, HR>90 + UTI source - fulfills Sepsis criteria  BP improved from 50s to 90s after bolus in ED  Rocephin 1g qd + Azithro 500  Bcx;  gram variable rods - PCR shows Bacillus Cereus  Cxr - Left lower lobe airspace consolidation which may reflect atelectasis and/or pneumonia  UA positive  f/u Ucx    Pt has now been approved for inpatient hospice, will provide comfort measures only Hypotensive, HR>90 + UTI source - fulfills Sepsis criteria  BP improved from 50s to 90s after bolus in ED  Rocephin 1g qd + Azithro 500  Bcx;  gram variable rods - PCR shows Bacillus Cereus  Cxr - Left lower lobe airspace consolidation which may reflect atelectasis and/or pneumonia  UA positive  f/u Ucx  Will continue Abx until 9/24. Per pt's family's request.  Pt has now been approved for inpatient hospice, will provide comfort measures only

## 2021-09-24 PROBLEM — E11.9 TYPE 2 DIABETES MELLITUS WITHOUT COMPLICATIONS: Chronic | Status: ACTIVE | Noted: 2021-01-01

## 2021-09-24 PROBLEM — C25.9 MALIGNANT NEOPLASM OF PANCREAS, UNSPECIFIED: Chronic | Status: ACTIVE | Noted: 2021-01-01

## 2021-09-24 NOTE — H&P ADULT - PROBLEM SELECTOR PLAN 1
Stage IV pancreatic cancer w/ mets to lung and liver on chemo 8/24 Follows with Dr. Salomon at [facility] [place].    Patient is not a candidate for further chemotherapy 2/2 poor performance status and severe cachexia.  ECOG 4.  Hospice appropriate.

## 2021-09-24 NOTE — H&P ADULT - PROBLEM SELECTOR PLAN 2
2/2 metastatic pancreatic CA, PNA.O2 supplementation. No benefit to further antibiotics at this time as patient is actively dying. DNR/DNI.     dilaudid 0.5 mg IVP every 1h prn dyspnea Psych/Behavioral General

## 2021-09-24 NOTE — H&P ADULT - PROBLEM SELECTOR PLAN 4
DNR/DNI, comfort measures. Patient is actively dying, prognosis could be anytime, but likely hours to days. More hypotensive and hypoxic today. Family at bedside. Support provided.

## 2021-09-24 NOTE — H&P ADULT - HISTORY OF PRESENT ILLNESS
70F w/ PMH pancreatic ca with lung and liver mets, T2DM previously on insulin, urinary retention, GERD, bedbound, from [facility] presented with co difficulties breathing   Pt was diagnosed with pancreatic cancer  in 2020 and underwent chemo. Pt stopped chemotherapy 9/2021, and was reffered to hospice on previous admission at [facility]   Pt is  DNR/DNI   IN ED CXR   Left lower lobe airspace consolidation which may reflect atelectasis and/or pneumonia     Urinalysis positive   Started on Ceftriaxone and Azithromycin   Admitted for Acute respiratory failure likely due to pneumonia and UTI   Palliative team consulted, discussed with family poor overall prognosis, patient in the process of actively dying, family was agreeable to focus on comfort measures, IPU referral made for management of symptoms.

## 2021-09-24 NOTE — H&P ADULT - NSHPPHYSICALEXAM_GEN_ALL_CORE
Vital Signs Last 24 Hrs  T(C): 33.6 (09-24-21 @ 05:00), Max: 36.2 (09-23-21 @ 20:55)  T(F): 92.5 (09-24-21 @ 05:00), Max: 97.2 (09-23-21 @ 20:55)  HR: 70 (09-24-21 @ 05:00) (70 - 80)  BP: 92/54 (09-24-21 @ 05:00) (68/52 - 92/54)  BP(mean): --  RR: 8 (09-23-21 @ 21:57) (8 - 16)  SpO2: 72% (09-24-21 @ 05:00) (72% - 100%)       GENERAL: lethargic, emaciated and cachectic, ill appearing,  NAD  HEENT: Atraumatic, oropharynx clear, neck supple  CHEST/LUNG: unlabored, shallow breathing  HEART: Regular rate and rhythm    ABDOMEN: Soft, Nontender, Nondistended   MUSCULOSKELETAL:  No  edema,  bedbound  NERVOUS SYSTEM: lethargic  SKIN: No rashes or lesions noted  Oral intake: npo

## 2021-09-28 NOTE — PROGRESS NOTE ADULT - PROBLEM SELECTOR PLAN 4
DNR/DNI, comfort measures. Patient is actively dying, prognosis could be anytime, but likely hours to days. Family in agreement w plan. Spoke with niece today, support provided.
DNR/DNI, comfort measures. Patient is actively dying, prognosis could be anytime, but likely hours to days. Family in agreement w plan.
DNR/DNI, comfort measures. Patient is actively dying, prognosis could be anytime, but likely hours to days. More hypotensive and hypoxic today. Family at bedside. Support provided.

## 2021-09-28 NOTE — PROGRESS NOTE ADULT - PROBLEM SELECTOR PLAN 1
Stage IV pancreatic cancer w/ mets to lung and liver on chemo 8/24 [place] [facility]    Patient is not a candidate for further chemotherapy 2/2 poor performance status and severe cachexia.  ECOG 4.  Hospice appropriate.
Stage IV pancreatic cancer w/ mets to lung and liver on chemo 8/24 Follows with Dr. Salomon at [facility] [place].    Patient is not a candidate for further chemotherapy 2/2 poor performance status and severe cachexia.  ECOG 4.  Hospice appropriate.
Stage IV pancreatic cancer w/ mets to lung and liver on chemo 8/24 Follows with Dr. Salomon at [facility] [place].    Patient is not a candidate for further chemotherapy 2/2 poor performance status and severe cachexia.  ECOG 4.  Hospice appropriate.

## 2021-09-28 NOTE — PROGRESS NOTE ADULT - SUBJECTIVE AND OBJECTIVE BOX
MIGUEL LAO                    70y  Female       Symptom Requiring Inpatient Hospice Admission: dyspnea    Overnight events/interim history: no overnight events. Continues to decline clinically, hypothermic, hypoxic, hypotensive    HPI:  70F w/ PMH pancreatic ca with lung and liver mets, T2DM previously on insulin, urinary retention, GERD, bedbound, from [facility] presented with co difficulties breathing   Pt was diagnosed with pancreatic cancer  in 2020 and underwent chemo. Pt stopped chemotherapy 9/2021, and was reffered to hospice on previous admission at [facility]   Pt is  DNR/DNI   IN ED CXR   Left lower lobe airspace consolidation which may reflect atelectasis and/or pneumonia     Urinalysis positive   Started on Ceftriaxone and Azithromycin   Admitted for Acute respiratory failure likely due to pneumonia and UTI   Palliative team consulted, discussed with family poor overall prognosis, patient in the process of actively dying, family was agreeable to focus on comfort measures, IPU referral made for management of symptoms.    (24 Sep 2021 13:16)          PPSV2: 10    Code Status: DNR/DNI      Allergies    Allergy Status Unknown    Intolerances        MEDICATIONS  (STANDING):    MEDICATIONS  (PRN):  acetaminophen  Suppository .. 650 milliGRAM(s) Rectal every 6 hours PRN Temp greater or equal to 38C (100.4F)  bisacodyl Suppository 10 milliGRAM(s) Rectal daily PRN Constipation  glycopyrrolate Injectable 0.4 milliGRAM(s) IV Push every 6 hours PRN secretions  HYDROmorphone  Injectable 0.5 milliGRAM(s) IV Push every 1 hour PRN dyspnea or severe pain  LORazepam   Injectable 1 milliGRAM(s) IV Push every 4 hours PRN Agitation                             Vital Signs Last 24 Hrs  T(C): 35.2 (25 Sep 2021 12:22), Max: 35.2 (25 Sep 2021 12:22)  T(F): 95.4 (25 Sep 2021 12:22), Max: 95.4 (25 Sep 2021 12:22)  HR: 84 (25 Sep 2021 05:21) (79 - 84)  BP: 78/54 (25 Sep 2021 12:22) (64/44 - 78/54)  BP(mean): --  RR: 14 (25 Sep 2021 12:22) (12 - 19)  SpO2: 75% (25 Sep 2021 05:21) (75% - 96%          GENERAL: lethargic, emaciated and cachectic, ill appearing,  NAD  HEENT: Atraumatic, oropharynx clear, neck supple  CHEST/LUNG: mildly labored  HEART: Regular rate and rhythm    ABDOMEN: Soft, Nontender, Nondistended   MUSCULOSKELETAL:  No  edema,  bedbound  NERVOUS SYSTEM: lethargic  SKIN: No rashes or lesions noted  Oral intake: npo    
MIGUEL LAO                    71y  Female     Symptom Requiring Inpatient Hospice Admission: dyspnea    Overnight events/interim history: no overnight events    HPI:  70F w/ PMH pancreatic ca with lung and liver mets, T2DM previously on insulin, urinary retention, GERD, bedbound, from [facility] presented with co difficulties breathing   Pt was diagnosed with pancreatic cancer  in 2020 and underwent chemo. Pt stopped chemotherapy 9/2021, and was reffered to hospice on previous admission at [facility]   Pt is  DNR/DNI   IN ED CXR   Left lower lobe airspace consolidation which may reflect atelectasis and/or pneumonia     Urinalysis positive   Started on Ceftriaxone and Azithromycin   Admitted for Acute respiratory failure likely due to pneumonia and UTI   Palliative team consulted, discussed with family poor overall prognosis, patient in the process of actively dying, family was agreeable to focus on comfort measures, IPU referral made for management of symptoms.    (24 Sep 2021 13:16)          PPSV2: 10    Code Status: DNR/DNI      Allergies    Allergy Status Unknown    Intolerances        MEDICATIONS  (STANDING):    MEDICATIONS  (PRN):  acetaminophen  Suppository .. 650 milliGRAM(s) Rectal every 6 hours PRN Temp greater or equal to 38C (100.4F)  bisacodyl Suppository 10 milliGRAM(s) Rectal daily PRN Constipation  glycopyrrolate Injectable 0.4 milliGRAM(s) IV Push every 6 hours PRN secretions  HYDROmorphone  Injectable 0.5 milliGRAM(s) IV Push every 1 hour PRN dyspnea or severe pain  LORazepam   Injectable 1 milliGRAM(s) IV Push every 4 hours PRN Agitation                             Vital Signs Last 24 Hrs  T(C): 36.7 (28 Sep 2021 14:29), Max: 36.7 (28 Sep 2021 14:29)  T(F): 98 (28 Sep 2021 14:29), Max: 98 (28 Sep 2021 14:29)  HR: 99 (28 Sep 2021 14:29) (82 - 99)  BP: 77/43 (28 Sep 2021 14:29) (77/43 - 91/56)  BP(mean): --  RR: 13 (28 Sep 2021 14:29) (13 - 19)  SpO2: 79% (28 Sep 2021 14:29) (79% - 95%)    GENERAL: lethargic, emaciated and cachectic, ill appearing,  NAD  HEENT: Atraumatic, oropharynx clear, neck supple  CHEST/LUNG: mildly labored  HEART: Regular rate and rhythm    ABDOMEN: Soft, Nontender,  distended   MUSCULOSKELETAL:  No  edema,  bedbound  NERVOUS SYSTEM: lethargic  SKIN: No rashes or lesions noted  Oral intake: npo    
MIGUEL LAO                    71y  Female     Symptom Requiring Inpatient Hospice Admission: dyspnea    Overnight events/interim history: no overnight events.     HPI:  70F w/ PMH pancreatic ca with lung and liver mets, T2DM previously on insulin, urinary retention, GERD, bedbound, from [facility] presented with co difficulties breathing   Pt was diagnosed with pancreatic cancer  in 2020 and underwent chemo. Pt stopped chemotherapy 9/2021, and was reffered to hospice on previous admission at [facility]   Pt is  DNR/DNI   IN ED CXR   Left lower lobe airspace consolidation which may reflect atelectasis and/or pneumonia     Urinalysis positive   Started on Ceftriaxone and Azithromycin   Admitted for Acute respiratory failure likely due to pneumonia and UTI   Palliative team consulted, discussed with family poor overall prognosis, patient in the process of actively dying, family was agreeable to focus on comfort measures, IPU referral made for management of symptoms.    (24 Sep 2021 13:16)          PPSV2: 10    Code Status: DNR/DNI      Allergies    Allergy Status Unknown    Intolerances        MEDICATIONS  (STANDING):    MEDICATIONS  (PRN):  acetaminophen  Suppository .. 650 milliGRAM(s) Rectal every 6 hours PRN Temp greater or equal to 38C (100.4F)  bisacodyl Suppository 10 milliGRAM(s) Rectal daily PRN Constipation  glycopyrrolate Injectable 0.4 milliGRAM(s) IV Push every 6 hours PRN secretions  HYDROmorphone  Injectable 0.5 milliGRAM(s) IV Push every 1 hour PRN dyspnea or severe pain  LORazepam   Injectable 1 milliGRAM(s) IV Push every 4 hours PRN Agitation                             Vital Signs Last 24 Hrs  T(C): 35.7 (27 Sep 2021 13:18), Max: 35.7 (27 Sep 2021 13:18)  T(F): 96.3 (27 Sep 2021 13:18), Max: 96.3 (27 Sep 2021 13:18)  HR: 99 (27 Sep 2021 13:18) (99 - 103)  BP: 77/46 (27 Sep 2021 13:18) (77/46 - 97/54)  BP(mean): --  RR: 20 (27 Sep 2021 13:18) (15 - 20)  SpO2: 92% (27 Sep 2021 13:18) (92% - 98%)        GENERAL: lethargic, emaciated and cachectic, ill appearing,  NAD  HEENT: Atraumatic, oropharynx clear, neck supple  CHEST/LUNG: mildly labored  HEART: Regular rate and rhythm    ABDOMEN: Soft, Nontender, Nondistended   MUSCULOSKELETAL:  No  edema,  bedbound  NERVOUS SYSTEM: lethargic  SKIN: No rashes or lesions noted  Oral intake: npo

## 2021-09-28 NOTE — DISCHARGE NOTE FOR THE EXPIRED PATIENT - HOSPITAL COURSE
70 female patient with past medical history of  pancreatic ca with lung and liver mets, T2DM previously on insulin, urinary retention, GERD, bedbound, from [facility] presented with c/o difficulties breathing. Pt was diagnosed with pancreatic cancer  in 2020 and underwent chemo. Pt stopped chemotherapy 9/2021, and was referred to hospice on previous admission at [facility]. Prior to this admission patient was followed by Dr. Salomon at [facility] patient was not  a candidate for further chemotherapy 2/2 poor performance status and severe cachexia. CXR resulted Left lower lobe airspace consolidation which may reflect atelectasis and/or pneumonia. Admitted for Acute respiratory failure likely due to pneumonia and UTI. Palliative team consulted regarding patient in the process of actively dying, in which the family was agreeable to focus on comfort measures. Patient was admitted IPU.     2040 called by nurse to evaluate the patient for unresponsiveness.   On physical exam, patient did not respond to verbal or noxious stimuli.  No spontaneous respirations.  Absent heart and breath sounds.  Absent radial and carotid pulses.   Pupils are fixed and dilated, no corneal reflex.  Patient pronounced dead at 2050. Attending notified Dr. Camacho. Some family members at the bedside. Called and spoke with emergency contact Ale Mendez @ [phone].

## 2021-09-28 NOTE — ADVANCED PRACTICE NURSE CONSULT - ASSESSMENT
This is a 71yre old female patient admitted for Malignant Neoplasm of Pancreas, to which a wound care specialist consultation was place for evaluation of potential John Terminal Ulcers to multiple areas of the patients body. The patient is currently on Hospice Care with pressure injury prevention resources in place. At the time of evaluation, the RN stated that the patient is currently comfortable and a wound care consultation is not needed at this time

## 2021-09-28 NOTE — PROGRESS NOTE ADULT - PROBLEM SELECTOR PLAN 2
2/2 metastatic pancreatic CA, PNA.O2 supplementation. No benefit to further antibiotics at this time as patient is actively dying. DNR/DNI.     dilaudid 0.5 mg IVP every 1h prn dyspnea- received 3 doses past 24h
2/2 metastatic pancreatic CA, PNA.O2 supplementation. No benefit to further antibiotics at this time as patient is actively dying. DNR/DNI.     dilaudid 0.5 mg IVP every 1h prn dyspnea
2/2 metastatic pancreatic CA, PNA.O2 supplementation. No benefit to further antibiotics at this time as patient is actively dying. DNR/DNI.     dilaudid 0.5 mg IVP every 1h prn dyspnea

## 2021-10-06 PROBLEM — I10 ESSENTIAL HYPERTENSION: Status: ACTIVE | Noted: 2021-03-09

## 2021-10-13 NOTE — PROGRESS NOTE ADULT - PROBLEM SELECTOR PROBLEM 2
Hypertension, unspecified type Odomzo Pregnancy And Lactation Text: This medication is Pregnancy Category X and is absolutely contraindicated during pregnancy. It is unknown if it is excreted in breast milk.

## 2021-11-18 NOTE — H&P ADULT - HISTORY OF PRESENT ILLNESS
Patient sleeping, being monitored on camera   70F w/ ?osteoporosis, multiple tendon repairs who presents with weight loss since May and diarrhea x 2 weeks.  Patient reports 30lb weight loss since May 2020, poor appetite d/t loss of taste, progressive weakness.  Patient reports non-bloody diarrhea x 2 weeks that resolved this past Tuesday.  Patient denies f/c, nightsweats, abd pain, n/v, CP, palpitations, SOB, cough, blood in stools or urine, dysuria, leg pain/swelling.  No personal hx of cancer.  Family hx of cancer includes sister who had liver cancer. Pt reports that she is a current smoker, 1/2 PPD x 20 years.    In ED, T 97.6, HR 60-80s, RR 17-18, % RA.  S/p 2L NS.  Blood glc 674. S/p 6u of lispro. POC glc 537. S/p additional 6u lispro.

## 2022-01-07 NOTE — PATIENT PROFILE ADULT - FUNCTIONAL SCREEN CURRENT LEVEL: SWALLOWING (IF SCORE 2 OR MORE FOR ANY ITEM, CONSULT REHAB SERVICES), MLM)
Unable to assess. Patient is awake but non-verbal [Annual Wellness Visit] : an annual wellness visit

## 2022-04-27 NOTE — ED ADULT NURSE NOTE - NSFALLRSKASSESSDT_ED_ALL_ED
Contacted MD. Can return back to school after 24 hrs of use. Routed letter to mom via [employer]. 22-Nov-2020 12:38

## 2022-05-12 NOTE — OCCUPATIONAL THERAPY INITIAL EVALUATION ADULT - PERTINENT HX OF CURRENT PROBLEM, REHAB EVAL
[facility]    Procedure: Cardioversion    Date/Time: 5/12/2022 10:48 AM  Performed by: Adriana Martinez MD  Authorized by: Adriana Martinez MD       UNIVERSAL PROTOCOL   Site Marked: Yes  Prior Images Obtained and Reviewed:  Yes  Required items: Required blood products, implants, devices and special equipment available    Patient identity confirmed:  Verbally with patient  Patient was reevaluated immediately before administering moderate or deep sedation or anesthesia  Confirmation Checklist:  Patient's identity using two indicators  Time out: Immediately prior to the procedure a time out was called    Universal Protocol: the Joint Commission Universal Protocol was followed    Anesthesia was administered and monitored by anesthesiology.  See anesthesia documentation for details.     ANESTHESIA  Anesthesia was administered and monitored by anesthesiology.  See anesthesia documentation for details.    SEDATION  Patient Sedated: Yes    Sedation:  Propofol  Vital signs: Vital signs monitored during sedation      PROCEDURE  Describe Procedure: Anticoagulation status confirmed. EKG at baseline was confirmed to be atrial ectopic rhythm with Mobitz 1. Pads placed in AP position. Single shock administered with 150 J. No immediate complications. Post procedure rhythm normal sinus with HR of 45 bpm.   Patient Tolerance:  Patient tolerated the procedure well with no immediate complications      
70 year old female presenting with unintentional weight loss and diarrhea. Found to have pancreatic lesion, likely secondary to malignancy as well as hyperglycemia. MRI of abdomen with large pancreatic tail/body mass with possible hepatic metastases.

## 2022-07-14 NOTE — ED PROVIDER NOTE - COVID-19 RESULT
Called patient to clarify if established care at [facility] 6/29/22.       Patient Contact    Attempt # 1    Was call answered?  No.  Left message on voicemail with information to call me back.    
Pt A&Ox4. On ra,  & scds in place. Tolerating diet. Bs active, passing gas, given mom this am. Pain controlled on pain meds. Aquacell dressing has small amount of serosanguinous drainage, gel ice in place.  Vvs. Pt verbalized understanding of poc & call
Pt was seen there due to availibility. Veum is still primary  Prescription approved per [facility] Refill Protocol.  Sandie Hardin, RN  [facility] RN Triage Team    
NEGATIVE

## 2022-07-21 NOTE — CONSULT NOTE ADULT - PROBLEM SELECTOR RECOMMENDATION 3
Patient is in the supine position.   The body was positioned using the following devices: safety strap and gel pad mattress.  The head was positioned using the following devices: regular pillow.  The left arm was positioned using the following devices: arm board and gel arm pads.  The right arm was positioned using the following devices: arm board and gel arm pads.  The patient was positioned by Clemencia Nance RN, Mary Fernandes, Stephan Peace RN  Stage IV  Follows with Dr. Olivares at [facility]  Pt expressed that she does not want to pursue further tx  Hospice services discussed, pt in agreement with discussing services with hospice liaison.

## 2022-10-17 NOTE — ED ADULT NURSE NOTE - NS ED NURSE PRESS ULCER LOCATION 11
Assumed care of patient at this time
Pt verbalized understanding of discharge instructions. IV removed. Pt aware of following up with PCP. Pt alert and self ambulatory on discharge.
left hip open wound/hip

## 2022-10-25 NOTE — PROGRESS NOTE ADULT - REASON FOR ADMISSION
Lower Extremity Weakness, Inability to Ambulate
Bring Albuterol Inhaler to hospital
Lower Extremity Weakness, Inability to Ambulate

## 2022-11-12 NOTE — PROGRESS NOTE ADULT - PROBLEM SELECTOR PLAN 4
Follows with Dr. Amaury Vieira  -CT reviewed: large volume ascites, grossly unchanged pancreatic and hepatic lesions.   -Started on Xeloda as outpt  -f/u Dr. Moreiraarian  -Pain control with Fentanyl patch, PO Dilaudid and Gabapentin 160

## 2022-11-16 NOTE — PROGRESS NOTE ADULT - SUBJECTIVE AND OBJECTIVE BOX
Please advise if this specialist is within network.    INTERVAL HPI/OVERNIGHT EVENTS:  Patient seen at bedside.  patient with continued symptoms of LE weakness  and pain, patient feels like nothing hasnt  gotten better since the admission  + edema BLE  Unable to lift her LLE  Pain uncontrolled      VITAL SIGNS:  T(F): 98 (04-13-21 @ 12:43)  HR: 67 (04-13-21 @ 12:43)  BP: 124/48 (04-13-21 @ 12:43)  RR: 16 (04-13-21 @ 12:43)  SpO2: 99% (04-13-21 @ 12:43)  Wt(kg): --    PHYSICAL EXAM:  In accordance with current standard limiting patient contact, deferred physical exam  2/2 COVID pandemic  Please refer to physical exam of primary team.    MEDICATIONS  (STANDING):  dextrose 40% Gel 15 Gram(s) Oral once  dextrose 5%. 1000 milliLiter(s) (50 mL/Hr) IV Continuous <Continuous>  dextrose 5%. 1000 milliLiter(s) (100 mL/Hr) IV Continuous <Continuous>  dextrose 50% Injectable 25 Gram(s) IV Push once  dextrose 50% Injectable 12.5 Gram(s) IV Push once  dextrose 50% Injectable 25 Gram(s) IV Push once  dronabinol 2.5 milliGRAM(s) Oral three times a day  enoxaparin Injectable 40 milliGRAM(s) SubCutaneous daily  fentaNYL   Patch  12 MICROgram(s)/Hr 1 Patch Transdermal every 72 hours  glucagon  Injectable 1 milliGRAM(s) IntraMuscular once  glycerin Suppository - Adult 1 Suppository(s) Rectal daily  insulin lispro (ADMELOG) corrective regimen sliding scale   SubCutaneous three times a day before meals  insulin lispro (ADMELOG) corrective regimen sliding scale   SubCutaneous at bedtime  polyethylene glycol 3350 17 Gram(s) Oral <User Schedule>  senna 2 Tablet(s) Oral two times a day    MEDICATIONS  (PRN):  HYDROmorphone   Tablet 4 milliGRAM(s) Oral every 4 hours PRN Moderate Pain (4 - 6)  HYDROmorphone   Tablet 6 milliGRAM(s) Oral every 4 hours PRN Severe Pain (7 - 10)  HYDROmorphone  Injectable 0.5 milliGRAM(s) IV Push every 6 hours PRN breakthrough pain in case PO dilaudid does not work      Allergies    No Known Allergies    Intolerances        LABS:                        6.8    5.46  )-----------( 57       ( 13 Apr 2021 06:55 )             21.3     04-13    134<L>  |  99  |  13  ----------------------------<  130<H>  3.9   |  26  |  0.38<L>    Ca    8.4      13 Apr 2021 06:55  Phos  2.8     04-13  Mg     2.0     04-13    TPro  5.9<L>  /  Alb  2.5<L>  /  TBili  0.2  /  DBili  x   /  AST  25  /  ALT  20  /  AlkPhos  90  04-13          RADIOLOGY & ADDITIONAL TESTS:  Studies reviewed.

## 2022-12-06 NOTE — DISCHARGE NOTE PROVIDER - NSDCCPGOAL_GEN_ALL_CORE_FT
[facility] (Hospital)  Discharge Note  Short Stay    CT Biopsy Lung w/ guidance      OUTCOME: Patient tolerated treatment/procedure well without complication and is now ready for discharge.    DISPOSITION: Home or Self Care    FINAL DIAGNOSIS:  <principal problem not specified>    FOLLOWUP: In clinic    DISCHARGE INSTRUCTIONS:  No discharge procedures on file.      Clinical Reference Documents Added to Patient Instructions         Document    NEEDLE BIOPSY OF THE LUNG AND PLEURA (ENGLISH)    PROCEDURAL SEDATION, ADULT ED (ENGLISH)            TIME SPENT ON DISCHARGE: 15 minutes    Date:  12/06/2022    Pre Op Diagnosis: left lung mass     Post Op Diagnosis: same     Procedure:  left lung mass biopsy     Procedure performed by: Trever SORENSEN, Flo MERRITT     Written Informed Consent Obtained: Yes     Specimen Removed:  yes     Estimated Blood Loss:  minimal     Findings: Local anesthesia and moderate sedation used     The patient tolerated the procedure well and there were no complications.      Disposition: F/U in clinic or with ordering physician    Discharge instructions: Light activity for 24 hours.  No driving for 24 hours.  Remove bandage in 24 hours.  No baths for 24 hours; showers are appropriate.    Sterile technique was performed in the left thorax, lidocaine was used as a local anesthetic.  Multiple samples taken from left lung mass.  Patient tolerated the procedure well without immediate complications.  Please see radiologist report for details. F/u with PCP and/or ordering physician.     Time spent on discharge:  15 minutes    
To get better and follow your care plan as instructed.

## 2023-02-28 NOTE — ED PROCEDURE NOTE - CPROC ED INFORMED CONSENT1
Benefits, risks, and possible complications of procedure explained to patient/caregiver who verbalized understanding and gave verbal consent. 25

## 2023-06-10 NOTE — PHYSICAL THERAPY INITIAL EVALUATION ADULT - ADDITIONAL COMMENTS
----- Message from Gail Ortega sent at 6/8/2023  1:57 PM CDT -----  Regarding: Digital Medicine Program Patient  Hello Dr. Ra De Leon,    Your patient Spencer Zepeda was enrolled in [facility]. Unfortunately, He has requested discharge from Digital Medicine monitoring and we wanted to make you aware.     Thanks for your support of Digital Medicine programs! Please let me know if you any questions or concerns.    Sincerely,   Claudia Snell         
Pt. reports she was at rehab facility prior to admission. Pt. has been unable to ambulate secondary to LE weakness. Pt. requires assistance for transfers. Prior to previous admission, pt. was independent with functional mobility and ADLs.     Pt. was left in bed post PT Evaluation, no apparent distress, all lines intact, call ortiz within reach. Vickie OG made aware of pt. participation in PT.
show

## 2023-07-14 NOTE — ED ADULT NURSE NOTE - NSIMPLEMENTINTERV_GEN_ALL_ED
Can you place titration study please ? Implemented All Fall Risk Interventions:  Arnett to call system. Call bell, personal items and telephone within reach. Instruct patient to call for assistance. Room bathroom lighting operational. Non-slip footwear when patient is off stretcher. Physically safe environment: no spills, clutter or unnecessary equipment. Stretcher in lowest position, wheels locked, appropriate side rails in place. Provide visual cue, wrist band, yellow gown, etc. Monitor gait and stability. Monitor for mental status changes and reorient to person, place, and time. Review medications for side effects contributing to fall risk. Reinforce activity limits and safety measures with patient and family.

## 2023-11-13 NOTE — PROGRESS NOTE ADULT - PROBLEM SELECTOR PLAN 5
[address] [facility] [address] Hospitalist Group  Progress Note    Patient: Sadi Koroma Age: 60 y.o. : 1958 MR#: [id number] SSN: [id number]  Date/Time: 2023     Subjective:     POD3 paracentesis. AFP pending. Patient declined participation with OT this afternoon. Assessment/Plan:     1. Decompensated cirrhosis due to ascites. - follows w/ Dr. Glendy Garcia. MELD 30. Paracentesis negative for SBP, Gram stain negative. Culture negative, final.  Hepatitis panel negative. - Has received first 2 doses Hep B vaccine  - May need to do EGD as inpatient due to compliance issues. GI follows. 2. Coagulopathy  3. Hyperbilirubinemia  4. Thrombocytopenia due to above  5. Macrocytic anemia likely due to liver disease  6. Hypercalcemia. Calcium 8.4. Corrected calcium 10  7. Hyponatremia likely due to cirrhosis  8. Colon wall thickening consistent w/ portal hypertensive colopathy   9. Chronic Pancreatitis - currently Lipase normal, no clinical symptoms to suggest pancreatitis  10. History of alcohol dependence. Thiamine. 11. COPD not in exacerbation. Resume home inhalers. 12. History of gastric bypass surgery, RYGB . B12 level >2000. 13. Subtle liver mass not completely excluded on MRI liver. aFP pending. 14.   Mild malnutrition (23 1532)    Context:  Acute Illness     Findings of the 6 clinical characteristics of malnutrition:  Energy Intake:  50% or less of estimated energy requirements for 5 or more days  Weight Loss:  No significant weight loss     Body Fat Loss:  Unable to assess     Muscle Mass Loss:  Unable to assess    Fluid Accumulation:  Mild    --> on supplements. Nutritionist follows. On multivit. 15. Avoid chemical DVT prophylaxis  15. Full code. Continue telemetry monitoring. I spent 35 minutes with the patient in face-to-face consultation, of which greater than 50% was spent in counseling and coordination of care as described above.     Case discussed with Auto Differential    Collection Time: 11/13/23  4:12 AM   Result Value Ref Range    WBC 4.6 4.6 - 13.2 K/uL    RBC 1.72 (L) 4.20 - 5.30 M/uL    Hemoglobin 7.8 (L) 12.0 - 16.0 g/dL    Hematocrit 22.8 (L) 35.0 - 45.0 %    .6 (H) 78.0 - 100.0 FL    MCH 45.3 (H) 24.0 - 34.0 PG    MCHC 34.2 31.0 - 37.0 g/dL    RDW 24.7 (H) 11.6 - 14.5 %    Platelets 67 (L) 300 - 420 K/uL    MPV 9.9 9.2 - 11.8 FL    Nucleated RBCs 0.0 0  WBC    nRBC 0.00 0.00 - 0.01 K/uL    Neutrophils % PENDING %    Lymphocytes % PENDING %    Monocytes % PENDING %    Eosinophils % PENDING %    Basophils % PENDING %    Immature Granulocytes PENDING %    Neutrophils Absolute PENDING K/UL    Lymphocytes Absolute PENDING K/UL    Monocytes Absolute PENDING K/UL    Eosinophils Absolute PENDING K/UL    Basophils Absolute PENDING K/UL    Absolute Immature Granulocyte PENDING K/UL    Differential Type PENDING    Basic Metabolic Panel    Collection Time: 11/13/23  4:12 AM   Result Value Ref Range    Sodium 132 (L) 136 - 145 mmol/L    Potassium 4.5 3.5 - 5.5 mmol/L    Chloride 103 100 - 111 mmol/L    CO2 23 21 - 32 mmol/L    Anion Gap 6 3.0 - 18 mmol/L    Glucose 75 74 - 99 mg/dL    BUN 9 7.0 - 18 MG/DL    Creatinine 0.95 0.6 - 1.3 MG/DL    Bun/Cre Ratio 9 (L) 12 - 20      Est, Glom Filt Rate >60 >60 ml/min/1.73m2    Calcium 8.0 (L) 8.5 - 10.1 MG/DL   Hepatic Function Panel    Collection Time: 11/13/23  4:12 AM   Result Value Ref Range    Total Protein 5.5 (L) 6.4 - 8.2 g/dL    Albumin 1.7 (L) 3.4 - 5.0 g/dL    Globulin 3.8 2.0 - 4.0 g/dL    Albumin/Globulin Ratio 0.4 (L) 0.8 - 1.7      Total Bilirubin 10.8 (H) 0.2 - 1.0 MG/DL    Bilirubin, Direct 7.6 (H) 0.0 - 0.2 MG/DL    Alk Phosphatase 110 45 - 117 U/L    AST 97 (H) 10 - 38 U/L    ALT 53 13 - 56 U/L   Magnesium    Collection Time: 11/13/23  4:12 AM   Result Value Ref Range    Magnesium 1.8 1.6 - 2.6 mg/dL       Signed By: Francisco Javier Johnson MD     November 13, 2023      Disclaimer: Sections of Follows with Dr. Amaury Vieira CT reviewed: large volume ascites, grossly unchanged pancreatic and hepatic lesions.   -no further treatment is being offered as stated above   -Pain control with Fentanyl patch, PO Dilaudid and Gabapentin

## 2023-12-29 NOTE — H&P ADULT - NSCORESITESY/N_GEN_A_CORE_RD
"DAILY PROGRESS NOTE  [facility]    Patient Identification:  Name: Tony Leonard  Age: 86 y.o.  Sex: male  :  1937  MRN: [id number]         Primary Care Physician: Harpreet Kate MD    Subjective:  Interval History: He is very weak.  He complains of right shoulder pain.  Also some pain over right hip area with blistering of the skin.    Objective:    Scheduled Meds:FLUoxetine, 20 mg, Oral, Daily  gabapentin, 300 mg, Oral, TID  guaiFENesin, 600 mg, Oral, Q12H  pantoprazole, 40 mg, Oral, QAM AC  senna-docusate sodium, 2 tablet, Oral, BID  sodium chloride, 10 mL, Intravenous, Q12H      Continuous Infusions:       Vital signs in last 24 hours:  Temp:  [97.2 °F (36.2 °C)-97.7 °F (36.5 °C)] 97.2 °F (36.2 °C)  Heart Rate:  [59-73] 62  Resp:  [16-20] 18  BP: (119-160)/(57-71) 119/57    Intake/Output:    Intake/Output Summary (Last 24 hours) at 2023 1455  Last data filed at 2023 0900  Gross per 24 hour   Intake 120 ml   Output 750 ml   Net -630 ml       Exam:  /57 (BP Location: Left arm, Patient Position: Lying)   Pulse 62   Temp 97.2 °F (36.2 °C) (Oral)   Resp 18   Ht 180.3 cm (71\")   Wt 74.8 kg (165 lb)   SpO2 98%   BMI 23.01 kg/m²     General Appearance:    Alert, cooperative, no distress   Head:    Normocephalic, without obvious abnormality, atraumatic   Eyes:       Throat:   Lips, tongue, gums normal   Neck:   Supple, symmetrical, trachea midline, no JVD   Lungs:     Clear to auscultation bilaterally, respirations unlabored   Chest Wall:    No tenderness or deformity    Heart:    Regular rate and rhythm, S1 and S2 normal, no murmur,no  Rub or gallop   Abdomen:     Soft, nontender, bowel sounds active, no masses, no organomegaly    Extremities:   Extremities normal, atraumatic, no cyanosis or edema   Pulses:      Skin:   Skin is warm and dry,  no rashes or palpable lesions   Neurologic:   no focal deficits noted      Lab Results (last 72 hours)       Procedure " Component Value Units Date/Time    Manual Differential [[id number]]  (Abnormal) Collected: 12/27/23 0620    Specimen: Blood Updated: 12/27/23 0736     Neutrophil % 64.6 %      Lymphocyte % 18.2 %      Monocyte % 15.2 %      Basophil % 2.0 %      Neutrophils Absolute 2.87 10*3/mm3      Lymphocytes Absolute 0.81 10*3/mm3      Monocytes Absolute 0.67 10*3/mm3      Basophils Absolute 0.09 10*3/mm3      [place] Cells Slight/1+     Ovalocytes Slight/1+     Poikilocytes Mod/2+     RBC Fragments Slight/1+     Smudge Cells Mod/2+     Platelet Morphology Normal    CBC Auto Differential [[id number]]  (Abnormal) Collected: 12/27/23 0620    Specimen: Blood Updated: 12/27/23 0736     WBC 4.44 10*3/mm3      RBC 4.07 10*6/mm3      Hemoglobin 10.9 g/dL      Hematocrit 34.0 %      MCV 83.5 fL      MCH 26.8 pg      MCHC 32.1 g/dL      RDW 16.5 %      RDW-SD 50.3 fl      MPV 9.7 fL      Platelets 189 10*3/mm3     Basic Metabolic Panel [[id number]]  (Abnormal) Collected: 12/27/23 0620    Specimen: Blood Updated: 12/27/23 0702     Glucose 90 mg/dL      BUN 13 mg/dL      Creatinine 0.89 mg/dL      Sodium 138 mmol/L      Potassium 3.9 mmol/L      Chloride 108 mmol/L      CO2 21.0 mmol/L      Calcium 7.8 mg/dL      BUN/Creatinine Ratio 14.6     Anion Gap 9.0 mmol/L      eGFR 84.0 mL/min/1.73     Narrative:      GFR Normal >60  Chronic Kidney Disease <60  Kidney Failure <15    The GFR formula is only valid for adults with stable renal function between ages 18 and 70.    CK [[id number]]  (Abnormal) Collected: 12/27/23 0620    Specimen: Blood Updated: 12/27/23 0702     Creatine Kinase 1,018 U/L     Manual Differential [[id number]]  (Abnormal) Collected: 12/26/23 0744    Specimen: Blood from Arm, Left Updated: 12/26/23 0843     Neutrophil % 63.9 %      Lymphocyte % 18.6 %      Monocyte % 15.5 %      Basophil % 2.1 %      Neutrophils Absolute 3.73 10*3/mm3      Lymphocytes Absolute 1.08 10*3/mm3      Monocytes Absolute 0.90 10*3/mm3      Basophils  Absolute 0.12 10*3/mm3      nRBC 2.1 /100 WBC      Hypochromia Mod/2+     Ovalocytes Slight/1+     Smudge Cells Slight/1+     Platelet Morphology Normal    Comprehensive Metabolic Panel [[id number]]  (Abnormal) Collected: 12/26/23 0744    Specimen: Blood from Arm, Left Updated: 12/26/23 0814     Glucose 85 mg/dL      BUN 17 mg/dL      Creatinine 0.93 mg/dL      Sodium 136 mmol/L      Potassium 3.9 mmol/L      Chloride 104 mmol/L      CO2 22.1 mmol/L      Calcium 8.7 mg/dL      Total Protein 6.1 g/dL      Albumin 3.7 g/dL      ALT (SGPT) 26 U/L      AST (SGOT) 54 U/L      Alkaline Phosphatase 58 U/L      Total Bilirubin 0.6 mg/dL      Globulin 2.4 gm/dL      A/G Ratio 1.5 g/dL      BUN/Creatinine Ratio 18.3     Anion Gap 9.9 mmol/L      eGFR 80.5 mL/min/1.73     Narrative:      GFR Normal >60  Chronic Kidney Disease <60  Kidney Failure <15    The GFR formula is only valid for adults with stable renal function between ages 18 and 70.    CK [[id number]]  (Abnormal) Collected: 12/26/23 0744    Specimen: Blood from Arm, Left Updated: 12/26/23 0814     Creatine Kinase 1,782 U/L     CBC & Differential [[id number]]  (Abnormal) Collected: 12/26/23 0744    Specimen: Blood from Arm, Left Updated: 12/26/23 0758    Narrative:      The following orders were created for panel order CBC & Differential.  Procedure                               Abnormality         Status                     ---------                               -----------         ------                     CBC Auto Differential[[id number]]        Abnormal            Final result                 Please view results for these tests on the individual orders.    CBC Auto Differential [[id number]]  (Abnormal) Collected: 12/26/23 0744    Specimen: Blood from Arm, Left Updated: 12/26/23 0758     WBC 5.83 10*3/mm3      RBC 4.23 10*6/mm3      Hemoglobin 11.3 g/dL      Hematocrit 35.6 %      MCV 84.2 fL      MCH 26.7 pg      MCHC 31.7 g/dL      RDW 16.8 %      RDW-SD 52.0 fl       MPV 9.6 fL      Platelets 208 10*3/mm3      nRBC 0.2 /100 WBC           Data Review:  Results from last 7 days   Lab Units 12/29/23  0728 12/28/23  0733 12/27/23  0620   SODIUM mmol/L 141 139 138   POTASSIUM mmol/L 4.0 3.9 3.9   CHLORIDE mmol/L 109* 111* 108*   CO2 mmol/L 25.0 21.2* 21.0*   BUN mg/dL 13 16 13   CREATININE mg/dL 0.86 0.99 0.89   GLUCOSE mg/dL 94 97 90   CALCIUM mg/dL 8.2* 7.8* 7.8*     Results from last 7 days   Lab Units 12/29/23  0728 12/28/23  0733 12/27/23  0620   WBC 10*3/mm3 3.41 3.44 4.44   HEMOGLOBIN g/dL 10.7* 9.9* 10.9*   HEMATOCRIT % 33.5* 30.4* 34.0*   PLATELETS 10*3/mm3 196 177 189             Lab Results   Lab Value Date/Time    TROPONINT 59 (C) 12/22/2023 0737    TROPONINT 81 (C) 12/22/2023 0134    TROPONINT 65 (C) 12/21/2023 2339    TROPONINT 39 (H) 07/12/2023 0209    TROPONINT 36 (H) 07/11/2023 2110    TROPONINT 26 (H) 05/10/2023 0503    TROPONINT 32 (H) 05/10/2023 0218    TROPONINT 0.131 (C) 01/30/2023 0415    TROPONINT 0.418 (C) 01/29/2023 0538    TROPONINT 0.393 (C) 01/29/2023 0344    TROPONINT 0.167 (C) 01/29/2023 0133    TROPONINT 0.012 12/29/2022 1204    TROPONINT 0.089 (C) 12/10/2022 1003    TROPONINT 0.158 (C) 12/10/2022 0206    TROPONINT 0.079 (C) 12/09/2022 2343    TROPONINT 0.024 11/26/2022 0211    TROPONINT <0.010 11/13/2022 1437    TROPONINT <0.010 05/07/2022 2042    TROPONINT <0.010 04/26/2022 1423    TROPONINT <0.010 04/16/2018 2024    TROPONINT <0.010 11/23/2017 1843    TROPONINT <0.010 10/17/2017 2045    TROPONINT <0.010 06/28/2017 2114    TROPONINT <0.010 06/17/2017 1624    TROPONINT <0.010 05/31/2017 2026    TROPONINT <0.010 04/27/2017 2140    TROPONINT <0.010 03/26/2017 1335    TROPONINT <0.010 12/27/2016 1749    TROPONINT <0.010 12/21/2016 1309    TROPONINT <0.010 11/18/2016 1601    TROPONINT <0.010 10/04/2016 0606    TROPONINT <0.010 10/03/2016 1009         Results from last 7 days   Lab Units 12/26/23  0744   ALK PHOS U/L 58   BILIRUBIN mg/dL 0.6   ALT (SGPT)  "U/L 26   AST (SGOT) U/L 54*             No results found for: \"POCGLU\"        Past Medical History:   Diagnosis Date    ADHD (attention deficit hyperactivity disorder)     Bronchiectasis     CHF (congestive heart failure)     Colon polyp     COPD (chronic obstructive pulmonary disease)     Diverticulosis     Hard of hearing     On home oxygen therapy     2 LITERS    Pneumonia     Prostate cancer 04/22/2019    Spinal stenosis, lumbar region with neurogenic claudication 08/02/2022       Assessment:  Active Hospital Problems    Diagnosis  POA    **Rhabdomyolysis [M62.82]  Yes    Multiple contusions [T07.XXXA]  Unknown    Fall [W19.XXXA]  Unknown    Spinal stenosis, lumbar region with neurogenic claudication [M48.062]  Yes    HTN (hypertension) [I10]  Yes    Prostate cancer [C61]  Yes    Spondylolisthesis of lumbar region [M43.16]  Yes    Chronic diastolic CHF (congestive heart failure) [I50.32]  Yes    Dyslipidemia [E78.5]  Yes    Gastroesophageal reflux disease [K21.9]  Yes      Resolved Hospital Problems   No resolved problems to display.       Plan:  off IV fluids and follow-up labs.  DC planning.  He is very weak likely will need skilled nursing unit for rehab.  Discussed with case management.  They are looking for placement.  Ask for wound nurse consult about the blistering over his right hip.    Eduardo Hanks MD  12/29/2023  14:55 EST   " Yes

## 2024-03-18 NOTE — PATIENT PROFILE ADULT - ARRIVAL FROM
[Initial Evaluation] : an initial evaluation [FreeTextEntry1] : for R knee pain and both thighs referred by Dr. Varghese Home

## 2024-04-23 NOTE — ED ADULT TRIAGE NOTE - NS ED TRIAGE AVPU SCALE
Alert-The patient is alert, awake and responds to voice. The patient is oriented to time, place, and person. The triage nurse is able to obtain subjective information.
8

## 2024-11-19 NOTE — PHARMACOTHERAPY INTERVENTION NOTE - COMMENTS
[id number] OBS or IP - low complexity OR 25-34 mins Medication history is complete. Medication list updated in Outpatient Medication Record (OMR). Please call spectra [phone] if you have any questions. none

## 2024-11-23 NOTE — ED PROVIDER NOTE - WET READ LAUNCH FT
[facility]  part of [facility]    Report of Consultation    Alisha Wilde Patient Status:  Inpatient    10/25/1962 MRN [id number]   Location [facility] 2W/SW Attending Lizeth Buck MD   Hosp Day # 1 PCP Bridger Avendano MD     Date of Admission:  2024  Date of Consult:  2024   Reason for Consultation:   NAIMA    History of Present Illness:   Patient is a 62 year old female who was admitted to the hospital for chest pain.    The patient called 911 and came to the hospital due to chest and back pain that she was experiencing at home.  She was found to have an acute type a aortic dissection on CT scan.  She went for an emergent surgical repair on Thursday evening.    She has had very labile blood pressures postop.    Her baseline creatinine is 1.2 on admission.  Her creatinine is up to 2.28 this morning.  She is making about 30 to 50 cc of urine every hour    Past Medical History  Past Medical History:    Chronic kidney disease (CKD)    Esophageal reflux    High blood pressure    High cholesterol    HYPERTENSION    Hypertension    Unspecified essential hypertension       Past Surgical History  Past Surgical History:   Procedure Laterality Date    Colonoscopy N/A 2018    Procedure: COLONOSCOPY;  Surgeon: Magdy Webber MD;  Location: [facility] ENDOSCOPY    Endometrial ablation      child passed away for 5 mins          1    Other surgical history  11    cysto-Dr. Lacey -- pt denies       Family History  Family History   Problem Relation Age of Onset    Hypertension Mother     Heart Disorder Mother 70    Other (Other) Mother         kidney failure    Hypertension Father     Hypertension Maternal Grandfather     Cancer Neg     Diabetes Neg     Glaucoma Neg     Macular degeneration Neg        Social History  Social History     Socioeconomic History    Marital status: Single   Tobacco Use    Smoking status: Former     Current packs/day: 0.00     Average  packs/day: 1 pack/day for 13.0 years (13.0 ttl pk-yrs)     Types: Cigarettes     Start date:      Quit date:      Years since quittin.9    Smokeless tobacco: Former   Vaping Use    Vaping status: Never Used   Substance and Sexual Activity    Alcohol use: Yes     Alcohol/week: 1.0 - 2.0 standard drink of alcohol     Types: 1 - 2 Cans of beer per week     Comment: every day    Drug use: No     Social Drivers of Health     Food Insecurity: Unknown (2024)    Food Insecurity     Food Insecurity: Patient unable to answer   Transportation Needs: Unknown (2024)    Transportation Needs     Lack of Transportation: Patient unable to answer   Housing Stability: Unknown (2024)    Housing Stability     Housing Instability: Patient unable to answer       Current Medications:  Current Facility-Administered Medications   Medication Dose Route Frequency    heparin (Porcine) 5000 UNIT/ML injection 5,000 Units  5,000 Units Subcutaneous Q8H GABRIEL    acetaminophen (Ofirmev) 10 mg/mL infusion premix 1,000 mg  1,000 mg Intravenous Q8H    melatonin tab 3 mg  3 mg Oral Nightly PRN    sennosides (Senokot) tab 8.6 mg  8.6 mg Oral BID    docusate sodium (Colace) cap 100 mg  100 mg Oral BID    polyethylene glycol (PEG 3350) (Miralax) 17 g oral packet 17 g  17 g Oral Daily PRN    bisacodyl (Dulcolax) 10 MG rectal suppository 10 mg  10 mg Rectal Daily PRN    ondansetron (Zofran) 4 MG/2ML injection 4 mg  4 mg Intravenous Q6H PRN    dexmedeTOMIDine in sodium chloride 0.9% (Precedex) 400 mcg/100mL infusion premix  0.2-1.5 mcg/kg/hr (Dosing Weight) Intravenous Continuous    DOBUTamine in dextrose 5% (Dobutrex) 500 mg/250mL infusion premix  2.5-20 mcg/kg/min (Dosing Weight) Intravenous Continuous PRN    nitroGLYCERIN in dextrose 5% 50 mg/250mL infusion premix  5-300 mcg/min Intravenous Continuous PRN    norepinephrine (Levophed) 4 mg/250mL infusion premix  0.5-30 mcg/min Intravenous Continuous PRN    metoprolol tartrate  (Lopressor) tab 25 mg  25 mg Oral 2x Daily(Beta Blocker)    clevidipine (Cleviprex) 25 MG/50ML IV infusion  1-6 mg/hr Intravenous Continuous    potassium chloride 20 mEq/100mL IVPB premix 20 mEq  20 mEq Intravenous PRN    Or    potassium chloride 40 mEq/100mL IVPB premix (central line) 40 mEq  40 mEq Intravenous PRN    magnesium sulfate in dextrose 5% 1 g/100mL infusion premix 1 g  1 g Intravenous PRN    magnesium sulfate in sterile water for injection 2 g/50mL IVPB premix 2 g  2 g Intravenous PRN    mupirocin (Bactroban) 2% nasal ointment 1 Application  1 Application Nasal BID    chlorhexidine gluconate (Peridex) 0.12 % oral solution 15 mL  15 mL Mouth/Throat BID    dextrose 5%-sodium chloride 0.45% infusion   mL/hr Intravenous Continuous    morphINE PF 2 MG/ML injection 2 mg  2 mg Intravenous Q2H PRN    Or    morphINE PF 4 MG/ML injection 4 mg  4 mg Intravenous Q2H PRN    albumin human (Albumin) 5% injection 12.5 g  12.5 g Intravenous Once PRN    propofol (Diprivan) 10 mg/mL infusion premix  5-50 mcg/kg/min (Dosing Weight) Intravenous Continuous    fentaNYL (Sublimaze) 50 mcg/mL injection 25 mcg  25 mcg Intravenous Q3H PRN    piperacillin-tazobactam (Zosyn) 3.375 g in dextrose 5% 100 mL IVPB-ADDV  3.375 g Intravenous Q8H    metoclopramide (Reglan) 5 mg/mL injection 5 mg  5 mg Intravenous Q6H    famotidine (Pepcid) tab 20 mg  20 mg Oral Daily    Or    famotidine (Pepcid) 20 mg/2mL injection 20 mg  20 mg Intravenous Daily    aspirin chewable tab 81 mg  81 mg Oral Daily    norepinephrine (Levophed) 4 mg/250mL infusion premix  0.5-30 mcg/min Intravenous Continuous    insulin regular human (Novolin R, Humulin R) 100 Units in sodium chloride 0.9% 100 mL standard infusion (100 mL)  1-40 Units/hr Intravenous Continuous     Medications Prior to Admission   Medication Sig    Acetaminophen ER (TYLENOL 8 HOUR) 650 MG Oral Tab CR Take 2 tablets (1,300 mg total) by mouth daily as needed.    Calcium Carb-Cholecalciferol  600-10 MG-MCG Oral Tab Take 1 tablet by mouth daily.    metoprolol succinate ER 50 MG Oral Tablet 24 Hr Take 1 tablet (50 mg total) by mouth daily.    Losartan Potassium-HCTZ 100-12.5 MG Oral Tab Take 1 tablet by mouth daily.    Potassium Chloride ER 20 MEQ Oral Tab CR Take 1 tablet by mouth daily.    albuterol 108 (90 Base) MCG/ACT Inhalation Aero Soln Inhale 2 puffs into the lungs every 4 (four) hours as needed for Wheezing.    amLODIPine 10 MG Oral Tab Take 1 tablet (10 mg total) by mouth daily.    Ascorbic Acid (VITAMIN C) 1000 MG Oral Tab Take 1 tablet (1,000 mg total) by mouth daily.    vitamin B-12 50 MCG Oral Tab Take 1 tablet (50 mcg total) by mouth daily.    fluticasone propionate 50 MCG/ACT Nasal Suspension 2 sprays by Nasal route daily.    fluticasone-salmeterol 250-50 MCG/ACT Inhalation Aerosol Powder, Breath Activated Inhale 1 puff into the lungs 2 (two) times daily. inhale 1 puff by INHALATION route 2 times every day morning and evening approximately 12 hours apart (Patient not taking: Reported on 11/21/2024)       Allergies  Allergies[1]    Review of Systems:     General: weak       A comprehensive 12 point review of systems was completed.  Pertinent positives as above and all the rest were negative.     Physical Exam:   /71 (BP Location: Right arm)   Pulse 86   Temp 97.8 °F (36.6 °C) (Pulmonary Artery)   Resp 18   Ht 5' 5\" (1.651 m)   Wt 258 lb 2.5 oz (117.1 kg)   SpO2 97%   BMI 42.96 kg/m²      Intake/Output Summary (Last 24 hours) at 11/23/2024 1044  Last data filed at 11/23/2024 1000  Gross per 24 hour   Intake 3819.65 ml   Output 1125 ml   Net 2694.65 ml     Wt Readings from Last 1 Encounters:   11/22/24 258 lb 2.5 oz (117.1 kg)       Exam  Gen: No acute distress  Heent: NC AT, mucous memb clear, neck supple  Pulm: Lungs clear, normal respiratory effort  CV: Heart with regular rate and rhythm, no edema  Abd: Abdomen soft, nontender, nondistended, no organomegaly, bowel sounds  present  Skin: no symptoms reported  Psych: unable to assess        Results:     Laboratory Data:  Recent Labs   Lab 11/21/24  1720 11/22/24  0133 11/22/24  1015 11/23/24  0418   RBC 4.58 3.38* 3.93 3.56*   HGB 12.2 9.3* 10.6* 9.6*   HCT 38.3 28.0* 32.7* 29.9*   MCV 83.6 82.8 83.2 84.0   MCH 26.6 27.5 27.0 27.0   MCHC 31.9 33.2 32.4 32.1   RDW 15.9* 15.9* 16.0* 16.4*   NEPRELIM 3.70  --   --  11.86*   WBC 6.4 14.2* 10.3 13.4*   .0 201.0 171.0 127.0*         Recent Labs   Lab 11/22/24  0251 11/22/24  1015 11/23/24  0418   * 180* 142*   BUN 20 24* 32*   CREATSERUM 1.25* 1.60* 2.28*   CA 8.7 8.8 8.4*    143 144   K 4.0 4.0 4.2    112 112   CO2 22.0 20.0* 21.0        Imaging:  XR CHEST AP PORTABLE  (CPT=71045)    Result Date: 11/23/2024  CONCLUSION: Post emergent acute type A aortic dissection repair.  Stable cardiomegaly.  Gross stable/satisfactory position of lines and tubes.  Mild pulmonary vascular congestion.   Dictated by (CST): Jagjit Jin MD on 11/23/2024 at 9:21 AM     Finalized by (CST): Jagjit Jin MD on 11/23/2024 at 9:26 AM          XR CHEST AP PORTABLE  (CPT=71045)    Result Date: 11/22/2024  CONCLUSION:  1. ET tube in the trachea the level of the aortic knob.  NG tube is been advanced and is now at the distal body of the stomach.  No pneumothorax.  Minimal bibasilar atelectasis.    Dictated by (CST): Adalberto Santos MD on 11/22/2024 at 2:47 PM     Finalized by (CST): Adalberto Santos MD on 11/22/2024 at 2:50 PM          XR CHEST AP PORTABLE  (CPT=71045)    Result Date: 11/22/2024  CONCLUSION:  1. NG tube terminates at the distal esophagus and needs to be advanced approximately 15 cm into the body of the stomach.  ET tube in the trachea the level the clavicles.  No pneumothorax.  Linear bibasilar atelectasis.    Dictated by (CST): Adalberto Santos MD on 11/22/2024 at 1:02 PM     Finalized by (CST): Adalberto Santos MD on 11/22/2024 at 1:05 PM          XR CHEST AP PORTABLE   (CPT=71045)    Result Date: 11/22/2024  CONCLUSION: Post feeding tube placement with tip in the distal esophagus approximately 4 cm above the gastroesophageal junction.  Recommend advancement of the tube by approximately 10 cm to place it in the stomach.   Dictated by (CST): Jagjit Jin MD on 11/22/2024 at 11:53 AM     Finalized by (CST): Jagjit Jin MD on 11/22/2024 at 11:55 AM                   Impression/Receommendations:     1 - NAIMA  NAIMA is likely due to contrast exposure versus ATN postop/barotrauma from the labile blood pressures.    Continue supportive care.  Will check a renal ultrasound.  She did have 1 urinalysis that showed proteinuria.  Will recheck it    2 -aortic dissection  Per CV surgery.    3 - HTN with CKD  The patient has very labile blood pressures that are being managed by Levophed, dobutamine, and Cleviprex      Thank you for allowing me to participate in the care of your patient.    ZAY LINARES MD  11/23/2024        [1]   Allergies  Allergen Reactions    Atorvastatin MYALGIA    Pravastatin MYALGIA    Rosuvastatin MYALGIA    Seasonal Runny nose      There are no Wet Read(s) to document.

## 2024-12-09 NOTE — ED ADULT NURSE NOTE - PRO INTERPRETER NEED 2
Quality 47: Advance Care Plan: Advance Care Planning discussed and documented in the medical record; patient did not wish or was not able to name a surrogate decision maker or provide an advance care plan. Quality 130: Documentation Of Current Medications In The Medical Record: Current Medications Documented Detail Level: Detailed Quality 226: Preventive Care And Screening: Tobacco Use: Screening And Cessation Intervention: Patient screened for tobacco use and is an ex/non-smoker Quality 431: Preventive Care And Screening: Unhealthy Alcohol Use - Screening: Patient not identified as an unhealthy alcohol user when screened for unhealthy alcohol use using a systematic screening method English

## 2025-05-09 NOTE — HISTORY OF PRESENT ILLNESS
[de-identified] : The patient has a history of a pancreatic mass noted on CAT scan and MRI along with liver lesions both of which were biopsied and are consistent with metastatic pancreatic carcinoma.  A discussion took place regarding treatment including chemotherapy with FOLFIRINOX versus Gemzar and Abraxane versus Xeloda took place.  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  Currently she has been noticing weight loss but not much abdominal pain. [de-identified] : Patient was seen in treatment room. She c/o increasing swelling to lower extremities which make her feet heavy and she has increased difficulties walking. Patient requires wheelchair for ambulation. Her pain is controlled with present pain medication. 09-May-2025 18:39

## 2025-06-10 NOTE — H&P ADULT - PROBLEM SELECTOR PROBLEM 1
Transition of Care Pharmacist Note -- Future Cost Assessment:     Pharmacy test claim ran for the following on 6/10/2025:    Drug Covered/PA required Patient Copay per month   Jardiance (empagliflozin)    Brilinta (ticagrelor)      Entresto Covered without PA      Covered without PA (generic covered)    Covered without PA $ 0      $0      $0 (must be billed through secondary as well - )     Evaluation:   Patient Insurance Type: Commercial Insurance - they are eligible to use a monthly  discount card in the future    Meds to Beds Enrollment? Yes      For billing questions, reach out to [facility] at extension [phone].  For meds to beds questions, reach out to [facility] at extension [phone].    Corrie Portillo RPH   Transition of Care Pharmacist       Pancreatic malignant neoplasm No

## 2025-06-12 NOTE — PROGRESS NOTE ADULT - PROBLEM SELECTOR PLAN 4
Reason for call:   [x] Refill   [] Prior Auth  [] Other:     Office:   [] PCP/Provider -   [x] Specialty/Provider -       lisinopril (ZESTRIL) 10 mg tablet 10 mg, Oral, Every evening       Quantity: 90    Pharmacy: [facility]   Does the patient have enough for 3 days?   [] Yes   [x] No - Send as HP to POD    Mail Away Pharmacy   Does the patient have enough for 10 days?   [] Yes   [] No - Send as HP to POD     temporal wasting, cachectic  unable to walk as per documentation in [facility] charts  Palliative care consulted ascites s/p therapeutic tap at [facility] in august 2021  recurrence  will not do paracentesis at this time h/o pancreatic ca no longer chemo candidate and stopped treatment in beginning of september  mets to liver and lungs as per [facility] charts  Dilaudid 0.5 prn for moderate pain, 1g prn for severe pain    Pt is comfort measures only

## 2025-06-30 NOTE — PROGRESS NOTE ADULT - PROBLEM SELECTOR PROBLEM 3
Lov 2-17-08  
Increased oropharyngeal secretions